# Patient Record
Sex: MALE | Race: WHITE | Employment: OTHER
[De-identification: names, ages, dates, MRNs, and addresses within clinical notes are randomized per-mention and may not be internally consistent; named-entity substitution may affect disease eponyms.]

---

## 2017-03-15 ENCOUNTER — TELEPHONE (OUTPATIENT)
Dept: OTHER | Facility: CLINIC | Age: 82
End: 2017-03-15

## 2017-03-16 ENCOUNTER — TELEPHONE (OUTPATIENT)
Dept: INTERNAL MEDICINE | Age: 82
End: 2017-03-16

## 2017-03-16 RX ORDER — AMOXICILLIN AND CLAVULANATE POTASSIUM 875; 125 MG/1; MG/1
1 TABLET, FILM COATED ORAL 2 TIMES DAILY
Qty: 20 TABLET | Refills: 0 | Status: SHIPPED | OUTPATIENT
Start: 2017-03-16 | End: 2017-03-26

## 2017-03-21 ENCOUNTER — OFFICE VISIT (OUTPATIENT)
Dept: INTERNAL MEDICINE | Age: 82
End: 2017-03-21

## 2017-03-21 ENCOUNTER — HOSPITAL ENCOUNTER (OUTPATIENT)
Dept: OTHER | Age: 82
Discharge: HOME | End: 2017-03-21
Attending: INTERNAL MEDICINE | Admitting: INTERNAL MEDICINE

## 2017-03-21 VITALS
HEART RATE: 72 BPM | DIASTOLIC BLOOD PRESSURE: 80 MMHG | WEIGHT: 227 LBS | HEIGHT: 71 IN | BODY MASS INDEX: 31.78 KG/M2 | SYSTOLIC BLOOD PRESSURE: 168 MMHG

## 2017-03-21 DIAGNOSIS — R20.0 NUMBNESS IN BOTH HANDS: ICD-10-CM

## 2017-03-21 DIAGNOSIS — Z00.00 PREVENTATIVE HEALTH CARE: ICD-10-CM

## 2017-03-21 DIAGNOSIS — E03.9 HYPOTHYROIDISM, UNSPECIFIED TYPE: ICD-10-CM

## 2017-03-21 DIAGNOSIS — E55.9 VITAMIN D DEFICIENCY: ICD-10-CM

## 2017-03-21 DIAGNOSIS — M19.90 ARTHRITIS: ICD-10-CM

## 2017-03-21 DIAGNOSIS — G62.9 NEUROPATHY: ICD-10-CM

## 2017-03-21 DIAGNOSIS — E78.5 HYPERLIPIDEMIA, UNSPECIFIED HYPERLIPIDEMIA TYPE: ICD-10-CM

## 2017-03-21 DIAGNOSIS — R79.89 LOW TESTOSTERONE: ICD-10-CM

## 2017-03-21 DIAGNOSIS — D50.8 OTHER IRON DEFICIENCY ANEMIA: ICD-10-CM

## 2017-03-21 DIAGNOSIS — I10 ESSENTIAL HYPERTENSION: ICD-10-CM

## 2017-03-21 DIAGNOSIS — J22 LOWER RESP. TRACT INFECTION: ICD-10-CM

## 2017-03-21 DIAGNOSIS — M79.89 SWELLING OF THIGH: ICD-10-CM

## 2017-03-21 DIAGNOSIS — E10.42 TYPE 1 DIABETES MELLITUS WITH DIABETIC POLYNEUROPATHY (HCC): ICD-10-CM

## 2017-03-21 DIAGNOSIS — Z12.5 SPECIAL SCREENING FOR MALIGNANT NEOPLASM OF PROSTATE: ICD-10-CM

## 2017-03-21 DIAGNOSIS — Z00.00 PREVENTATIVE HEALTH CARE: Primary | ICD-10-CM

## 2017-03-21 DIAGNOSIS — G62.9 ACQUIRED POLYNEUROPATHY: ICD-10-CM

## 2017-03-21 LAB
BASOPHILS ABSOLUTE: 0 K/UL (ref 0–0.2)
BASOPHILS RELATIVE PERCENT: 0.4 %
BILIRUBIN URINE: NEGATIVE
BLOOD, URINE: NEGATIVE
CLARITY: CLEAR
COLOR: YELLOW
EOSINOPHILS ABSOLUTE: 0.2 K/UL (ref 0–0.6)
EOSINOPHILS RELATIVE PERCENT: 2.8 %
GLUCOSE URINE: NEGATIVE MG/DL
HCT VFR BLD CALC: 41 % (ref 40.5–52.5)
HEMOGLOBIN: 13.4 G/DL (ref 13.5–17.5)
KETONES, URINE: NEGATIVE MG/DL
LEUKOCYTE ESTERASE, URINE: NEGATIVE
LYMPHOCYTES ABSOLUTE: 1.5 K/UL (ref 1–5.1)
LYMPHOCYTES RELATIVE PERCENT: 22.1 %
MCH RBC QN AUTO: 30.1 PG (ref 26–34)
MCHC RBC AUTO-ENTMCNC: 32.7 G/DL (ref 31–36)
MCV RBC AUTO: 92.1 FL (ref 80–100)
MICROSCOPIC EXAMINATION: NORMAL
MONOCYTES ABSOLUTE: 0.6 K/UL (ref 0–1.3)
MONOCYTES RELATIVE PERCENT: 8.7 %
NEUTROPHILS ABSOLUTE: 4.5 K/UL (ref 1.7–7.7)
NEUTROPHILS RELATIVE PERCENT: 66 %
NITRITE, URINE: NEGATIVE
PDW BLD-RTO: 14.2 % (ref 12.4–15.4)
PH UA: 5.5
PLATELET # BLD: 204 K/UL (ref 135–450)
PMV BLD AUTO: 9.8 FL (ref 5–10.5)
PROTEIN UA: NEGATIVE MG/DL
RBC # BLD: 4.46 M/UL (ref 4.2–5.9)
SPECIFIC GRAVITY UA: 1.02
URINE TYPE: NORMAL
UROBILINOGEN, URINE: 0.2 E.U./DL
WBC # BLD: 6.9 K/UL (ref 4–11)

## 2017-03-21 PROCEDURE — 99215 OFFICE O/P EST HI 40 MIN: CPT | Performed by: INTERNAL MEDICINE

## 2017-03-21 ASSESSMENT — PATIENT HEALTH QUESTIONNAIRE - PHQ9
1. LITTLE INTEREST OR PLEASURE IN DOING THINGS: 0
2. FEELING DOWN, DEPRESSED OR HOPELESS: 0
SUM OF ALL RESPONSES TO PHQ9 QUESTIONS 1 & 2: 0
SUM OF ALL RESPONSES TO PHQ QUESTIONS 1-9: 0

## 2017-03-21 ASSESSMENT — ENCOUNTER SYMPTOMS
COUGH: 1
GASTROINTESTINAL NEGATIVE: 1

## 2017-03-22 LAB
A/G RATIO: 1.4 (ref 1.1–2.2)
ALBUMIN SERPL-MCNC: 3.4 G/DL (ref 3.1–4.9)
ALBUMIN SERPL-MCNC: 4.1 G/DL (ref 3.4–5)
ALP BLD-CCNC: 58 U/L (ref 40–129)
ALPHA-1-GLOBULIN: 0.3 G/DL (ref 0.2–0.4)
ALPHA-2-GLOBULIN: 0.8 G/DL (ref 0.4–1.1)
ALT SERPL-CCNC: 21 U/L (ref 10–40)
ANION GAP SERPL CALCULATED.3IONS-SCNC: 14 MMOL/L (ref 3–16)
AST SERPL-CCNC: 22 U/L (ref 15–37)
BETA GLOBULIN: 1.1 G/DL (ref 0.9–1.6)
BILIRUB SERPL-MCNC: 0.3 MG/DL (ref 0–1)
BUN BLDV-MCNC: 13 MG/DL (ref 7–20)
CALCIUM SERPL-MCNC: 9.1 MG/DL (ref 8.3–10.6)
CHLORIDE BLD-SCNC: 102 MMOL/L (ref 99–110)
CHOLESTEROL, TOTAL: 187 MG/DL (ref 0–199)
CO2: 25 MMOL/L (ref 21–32)
CREAT SERPL-MCNC: 0.9 MG/DL (ref 0.8–1.3)
CREATININE URINE: 104.1 MG/DL (ref 39–259)
ESTIMATED AVERAGE GLUCOSE: 159.9 MG/DL
FERRITIN: 279.4 NG/ML (ref 30–400)
GAMMA GLOBULIN: 1.4 G/DL (ref 0.6–1.8)
GFR AFRICAN AMERICAN: >60
GFR NON-AFRICAN AMERICAN: >60
GLOBULIN: 2.9 G/DL
GLUCOSE BLD-MCNC: 95 MG/DL (ref 70–99)
HBA1C MFR BLD: 7.2 %
HDLC SERPL-MCNC: 37 MG/DL (ref 40–60)
HEPATITIS C ANTIBODY INTERPRETATION: NORMAL
IRON SATURATION: 33 % (ref 20–50)
IRON: 76 UG/DL (ref 59–158)
LDL CHOLESTEROL CALCULATED: 132 MG/DL
MICROALBUMIN UR-MCNC: 2.8 MG/DL
MICROALBUMIN/CREAT UR-RTO: 26.9 MG/G (ref 0–30)
POTASSIUM SERPL-SCNC: 4.3 MMOL/L (ref 3.5–5.1)
PROSTATE SPECIFIC ANTIGEN: 0.32 NG/ML (ref 0–4)
PROTEIN PROTEIN: 0.02 G/DL
PROTEIN PROTEIN: 18 MG/DL
SODIUM BLD-SCNC: 141 MMOL/L (ref 136–145)
SPE/IFE INTERPRETATION: NORMAL
TOTAL IRON BINDING CAPACITY: 232 UG/DL (ref 260–445)
TOTAL PROTEIN: 7 G/DL (ref 6.4–8.2)
TRIGL SERPL-MCNC: 92 MG/DL (ref 0–150)
TSH REFLEX FT4: 1.3 UIU/ML (ref 0.27–4.2)
URINE ELECTROPHORESIS INTERP: NORMAL
VITAMIN B-12: 659 PG/ML (ref 211–911)
VITAMIN D 25-HYDROXY: 23.7 NG/ML
VLDLC SERPL CALC-MCNC: 18 MG/DL

## 2017-03-23 LAB
SEX HORMONE BINDING GLOBULIN: 51 NMOL/L (ref 11–80)
TESTOSTERONE FREE PERCENT: 1.4 % (ref 1.6–2.9)
TESTOSTERONE FREE, CALC: 37 PG/ML (ref 47–244)
TESTOSTERONE TOTAL-MALE: 268 NG/DL (ref 300–720)

## 2017-03-30 ENCOUNTER — TELEPHONE (OUTPATIENT)
Dept: INTERNAL MEDICINE | Age: 82
End: 2017-03-30

## 2017-03-30 RX ORDER — AMOXICILLIN AND CLAVULANATE POTASSIUM 875; 125 MG/1; MG/1
1 TABLET, FILM COATED ORAL 2 TIMES DAILY
Qty: 20 TABLET | Refills: 0 | Status: SHIPPED | OUTPATIENT
Start: 2017-03-30 | End: 2017-04-09

## 2017-04-03 RX ORDER — HYDROCORTISONE 25 MG/ML
LOTION TOPICAL
Qty: 118 ML | Refills: 3 | Status: SHIPPED | OUTPATIENT
Start: 2017-04-03 | End: 2017-11-21

## 2017-04-06 ENCOUNTER — HOSPITAL ENCOUNTER (OUTPATIENT)
Dept: MRI IMAGING | Age: 82
Discharge: HOME | End: 2017-04-06
Attending: INTERNAL MEDICINE | Admitting: INTERNAL MEDICINE

## 2017-04-06 DIAGNOSIS — M79.89 SWELLING OF THIGH: ICD-10-CM

## 2017-04-06 DIAGNOSIS — M79.89 OTHER SPECIFIED SOFT TISSUE DISORDERS: ICD-10-CM

## 2017-04-24 ENCOUNTER — TELEPHONE (OUTPATIENT)
Dept: INTERNAL MEDICINE | Age: 82
End: 2017-04-24

## 2017-04-24 DIAGNOSIS — R79.89 LOW TESTOSTERONE: Primary | ICD-10-CM

## 2017-05-30 RX ORDER — GABAPENTIN 300 MG/1
CAPSULE ORAL
Qty: 270 CAPSULE | Refills: 3 | Status: SHIPPED | OUTPATIENT
Start: 2017-05-30 | End: 2018-04-10 | Stop reason: SDUPTHER

## 2017-06-07 ENCOUNTER — TELEPHONE (OUTPATIENT)
Dept: INTERNAL MEDICINE | Age: 82
End: 2017-06-07

## 2017-06-21 RX ORDER — PEN NEEDLE, DIABETIC 31 GX5/16"
NEEDLE, DISPOSABLE MISCELLANEOUS
Qty: 100 EACH | Refills: 3 | Status: SHIPPED | OUTPATIENT
Start: 2017-06-21 | End: 2018-08-22 | Stop reason: SDUPTHER

## 2017-06-23 ENCOUNTER — TELEPHONE (OUTPATIENT)
Dept: INTERNAL MEDICINE | Age: 82
End: 2017-06-23

## 2017-06-23 RX ORDER — NEOMYCIN/POLYMYXIN B/HYDROCORT 3.5-10K-1
SUSPENSION, DROPS(FINAL DOSAGE FORM)(ML) OPHTHALMIC (EYE)
Qty: 5 ML | Refills: 0 | Status: SHIPPED | OUTPATIENT
Start: 2017-06-23 | End: 2017-11-21

## 2017-08-09 RX ORDER — KETOCONAZOLE 20 MG/G
CREAM TOPICAL
Qty: 30 G | Refills: 1 | Status: SHIPPED | OUTPATIENT
Start: 2017-08-09 | End: 2017-11-21

## 2017-08-21 ENCOUNTER — TELEPHONE (OUTPATIENT)
Dept: INTERNAL MEDICINE | Age: 82
End: 2017-08-21

## 2017-08-21 RX ORDER — CEPHALEXIN 500 MG/1
500 CAPSULE ORAL 4 TIMES DAILY
Qty: 40 CAPSULE | Refills: 0 | Status: SHIPPED | OUTPATIENT
Start: 2017-08-21 | End: 2017-08-25

## 2017-08-21 RX ORDER — SULFAMETHOXAZOLE AND TRIMETHOPRIM 800; 160 MG/1; MG/1
1 TABLET ORAL 2 TIMES DAILY
Qty: 20 TABLET | Refills: 0 | Status: SHIPPED | OUTPATIENT
Start: 2017-08-21 | End: 2017-08-28

## 2017-08-25 ENCOUNTER — TELEPHONE (OUTPATIENT)
Dept: INTERNAL MEDICINE | Age: 82
End: 2017-08-25

## 2017-08-25 RX ORDER — SULFAMETHOXAZOLE AND TRIMETHOPRIM 800; 160 MG/1; MG/1
1 TABLET ORAL 2 TIMES DAILY
Qty: 20 TABLET | Refills: 0 | Status: SHIPPED | OUTPATIENT
Start: 2017-08-25 | End: 2017-08-25

## 2017-08-28 ENCOUNTER — OFFICE VISIT (OUTPATIENT)
Dept: INTERNAL MEDICINE | Age: 82
End: 2017-08-28

## 2017-08-28 VITALS
HEART RATE: 81 BPM | WEIGHT: 226 LBS | DIASTOLIC BLOOD PRESSURE: 82 MMHG | OXYGEN SATURATION: 98 % | BODY MASS INDEX: 31.64 KG/M2 | HEIGHT: 71 IN | SYSTOLIC BLOOD PRESSURE: 136 MMHG

## 2017-08-28 DIAGNOSIS — E10.42 TYPE 1 DIABETES MELLITUS WITH DIABETIC POLYNEUROPATHY (HCC): ICD-10-CM

## 2017-08-28 DIAGNOSIS — G62.9 NEUROPATHY: ICD-10-CM

## 2017-08-28 DIAGNOSIS — R20.0 NUMBNESS IN BOTH HANDS: ICD-10-CM

## 2017-08-28 DIAGNOSIS — I10 ESSENTIAL HYPERTENSION: ICD-10-CM

## 2017-08-28 DIAGNOSIS — S81.811A LACERATION OF RIGHT LOWER EXTREMITY, INITIAL ENCOUNTER: ICD-10-CM

## 2017-08-28 PROBLEM — M79.89 SWELLING OF THIGH: Status: RESOLVED | Noted: 2017-03-21 | Resolved: 2017-08-28

## 2017-08-28 PROBLEM — J22 LOWER RESP. TRACT INFECTION: Status: RESOLVED | Noted: 2017-03-21 | Resolved: 2017-08-28

## 2017-08-28 PROCEDURE — 99214 OFFICE O/P EST MOD 30 MIN: CPT | Performed by: INTERNAL MEDICINE

## 2017-08-28 RX ORDER — CLINDAMYCIN HYDROCHLORIDE 150 MG/1
150 CAPSULE ORAL 4 TIMES DAILY
Qty: 40 CAPSULE | Refills: 0 | Status: SHIPPED | OUTPATIENT
Start: 2017-08-28 | End: 2017-11-21

## 2017-08-28 ASSESSMENT — ENCOUNTER SYMPTOMS: RESPIRATORY NEGATIVE: 1

## 2017-09-05 ENCOUNTER — TELEPHONE (OUTPATIENT)
Dept: INTERNAL MEDICINE | Age: 82
End: 2017-09-05

## 2017-09-20 ENCOUNTER — TELEPHONE (OUTPATIENT)
Dept: INTERNAL MEDICINE | Age: 82
End: 2017-09-20

## 2017-09-20 RX ORDER — AMOXICILLIN AND CLAVULANATE POTASSIUM 875; 125 MG/1; MG/1
1 TABLET, FILM COATED ORAL 2 TIMES DAILY
Qty: 20 TABLET | Refills: 0 | Status: SHIPPED | OUTPATIENT
Start: 2017-09-20 | End: 2017-09-30

## 2017-10-23 ENCOUNTER — TELEPHONE (OUTPATIENT)
Dept: INTERNAL MEDICINE | Age: 82
End: 2017-10-23

## 2017-10-23 RX ORDER — IBUPROFEN 600 MG/1
TABLET ORAL
Qty: 60 TABLET | Refills: 3 | Status: ON HOLD | OUTPATIENT
Start: 2017-10-23 | End: 2018-09-27 | Stop reason: HOSPADM

## 2017-10-23 RX ORDER — BETAMETHASONE DIPROPIONATE 0.05 %
OINTMENT (GRAM) TOPICAL
Qty: 15 G | Refills: 0 | Status: SHIPPED | OUTPATIENT
Start: 2017-10-23 | End: 2017-11-21

## 2017-10-23 NOTE — TELEPHONE ENCOUNTER
Symptoms:cracked skin in corners of mouth, red sore, crusty, and burning    What medications have you tried:pt states tried drugstore medication    Pharmacy:[facility] on [facility]     Appointment offered:pt is asking for rx being called into pharmacy

## 2017-10-27 ENCOUNTER — TELEPHONE (OUTPATIENT)
Dept: INTERNAL MEDICINE | Age: 82
End: 2017-10-27

## 2017-10-27 NOTE — TELEPHONE ENCOUNTER
If the diprolene ointment is not helping then I rec a derm appt to make sure this is not early skin CA

## 2017-10-30 ENCOUNTER — OFFICE VISIT (OUTPATIENT)
Dept: ORTHOPEDIC SURGERY | Age: 82
End: 2017-10-30

## 2017-10-30 VITALS
DIASTOLIC BLOOD PRESSURE: 80 MMHG | BODY MASS INDEX: 32.34 KG/M2 | WEIGHT: 231 LBS | RESPIRATION RATE: 16 BRPM | HEIGHT: 71 IN | SYSTOLIC BLOOD PRESSURE: 153 MMHG | HEART RATE: 63 BPM

## 2017-10-30 DIAGNOSIS — G62.9 NEUROPATHY: Primary | ICD-10-CM

## 2017-10-30 PROCEDURE — 1036F TOBACCO NON-USER: CPT | Performed by: ORTHOPAEDIC SURGERY

## 2017-10-30 PROCEDURE — 99214 OFFICE O/P EST MOD 30 MIN: CPT | Performed by: ORTHOPAEDIC SURGERY

## 2017-10-30 PROCEDURE — G8417 CALC BMI ABV UP PARAM F/U: HCPCS | Performed by: ORTHOPAEDIC SURGERY

## 2017-10-30 PROCEDURE — 1123F ACP DISCUSS/DSCN MKR DOCD: CPT | Performed by: ORTHOPAEDIC SURGERY

## 2017-10-30 PROCEDURE — G8484 FLU IMMUNIZE NO ADMIN: HCPCS | Performed by: ORTHOPAEDIC SURGERY

## 2017-10-30 PROCEDURE — 4040F PNEUMOC VAC/ADMIN/RCVD: CPT | Performed by: ORTHOPAEDIC SURGERY

## 2017-10-30 PROCEDURE — G8427 DOCREV CUR MEDS BY ELIG CLIN: HCPCS | Performed by: ORTHOPAEDIC SURGERY

## 2017-10-30 NOTE — PROGRESS NOTES
This 81 y.o., right hand dominant retired man is seen in referral for Bobby Garcia MD with a chief complaint of numbness and tingling of the bilateral hands. Symptoms have been present for many years. The patient also frequently notices pain in the hand, wrist and arm, as well as weakness of , morning stiffness and swelling as well as difficulty performing functions which require fine touch. Symptoms are sometimes worse at night and can disturb sleep. The problem seems to recently have become worse. He underwent carpal tunnel surgery on his left hand 50 + years ago. He was seen by Dr. Carmelo Fisher over a year ago. An EMG (7/16) showed peripheral neuropathy, not carpal tunnel syndrome. A steroid injection was preformed, but he does not recall if it helped and he did not return to Dr. Carmelo Fisher. There is no history of wrist fracture. There is history and/or family history of diabetes. There is no history of thyroid disease. There is no family history of carpal tunnel syndrome. The pain assessment has been reviewed and is correct as stated. The patient's social history, past medical history, family history, medications, allergies and review of systems, entered 10/30/17, have been reviewed, and dated and are recorded in the chart. On examination the patient is Height: 5' 11\" (180.3 cm) tall and weighs Weight: 231 lb (104.8 kg). Respirations are 18 per minute. The patient is well nourished, is oriented to time and place, demonstrates appropriate mood and affect as well as normal gait and station. There is soft tissue swelling involving the volar aspect of the bilateral wrist.  There is moderate bilateral thenar and intrinsic muscle atrophy. The Tinel sign is negative over the median nerve at the  carpal tunnel bilaterally and negative over the ulnar nerve at the elbow bilaterally. The Phalen test is negative bilaterally at 30 seconds.   There is hypalgesia on sensory testing over the median nerve distribution of the right hand. Two point discrimination is abnormal bilaterally. Range of motion of the fingers, thumbs and wrists is normal, despite arthritis of the hands. Skin is intact without lesions. Distal circulation is normal.  All joints are stable. Fine motor coordination and gross muscle strength are normal. Deep tendon reflexes are brisk and present bilaterally. There are no subcutaneous masses or enlarged epitrochlear lymph nodes. Impression:   Peripheral polyneuropathy. Possible associated carpal tunnel syndrome. The nature of this medical problem is fully discussed with the patient, including all treatment options. All questions are answered. Unless he is miserable with his symptoms, surgery probably not be considered, since it is unlikely to be of benefit to him. He will think this over and call me if he has any additional questions.

## 2017-11-21 ENCOUNTER — TELEPHONE (OUTPATIENT)
Dept: INTERNAL MEDICINE | Age: 82
End: 2017-11-21

## 2017-12-11 RX ORDER — BLOOD-GLUCOSE METER
1 EACH MISCELLANEOUS DAILY PRN
Qty: 1 KIT | Refills: 0 | Status: SHIPPED | OUTPATIENT
Start: 2017-12-11 | End: 2018-04-10

## 2017-12-11 RX ORDER — LANCETS
EACH MISCELLANEOUS
Qty: 100 EACH | Refills: 3 | Status: SHIPPED | OUTPATIENT
Start: 2017-12-11 | End: 2017-12-15 | Stop reason: SDUPTHER

## 2017-12-11 RX ORDER — LANCETS
EACH MISCELLANEOUS
Qty: 100 EACH | Refills: 3 | Status: SHIPPED | OUTPATIENT
Start: 2017-12-11 | End: 2018-04-10

## 2017-12-15 RX ORDER — LANCETS
EACH MISCELLANEOUS
Qty: 100 EACH | Refills: 3 | Status: SHIPPED | OUTPATIENT
Start: 2017-12-15 | End: 2020-03-24

## 2017-12-18 ENCOUNTER — TELEPHONE (OUTPATIENT)
Dept: INTERNAL MEDICINE | Age: 82
End: 2017-12-18

## 2017-12-18 RX ORDER — BLOOD-GLUCOSE CONTROL, NORMAL
EACH MISCELLANEOUS
Qty: 1 EACH | Refills: 5 | Status: SHIPPED | OUTPATIENT
Start: 2017-12-18 | End: 2020-03-24

## 2018-01-18 RX ORDER — CARVEDILOL 25 MG/1
TABLET ORAL
Qty: 180 TABLET | Refills: 2 | Status: ON HOLD | OUTPATIENT
Start: 2018-01-18 | End: 2018-09-27 | Stop reason: HOSPADM

## 2018-02-09 ENCOUNTER — TELEPHONE (OUTPATIENT)
Dept: INTERNAL MEDICINE | Age: 83
End: 2018-02-09

## 2018-04-10 ENCOUNTER — OFFICE VISIT (OUTPATIENT)
Dept: INTERNAL MEDICINE | Age: 83
End: 2018-04-10

## 2018-04-10 VITALS
WEIGHT: 226 LBS | OXYGEN SATURATION: 98 % | BODY MASS INDEX: 31.64 KG/M2 | HEART RATE: 73 BPM | DIASTOLIC BLOOD PRESSURE: 80 MMHG | HEIGHT: 71 IN | SYSTOLIC BLOOD PRESSURE: 148 MMHG

## 2018-04-10 DIAGNOSIS — L72.3 SEBACEOUS CYST OF LEFT AXILLA: ICD-10-CM

## 2018-04-10 DIAGNOSIS — R20.0 NUMBNESS IN BOTH HANDS: ICD-10-CM

## 2018-04-10 DIAGNOSIS — G62.9 NEUROPATHY: Primary | ICD-10-CM

## 2018-04-10 DIAGNOSIS — E10.42 TYPE 1 DIABETES MELLITUS WITH DIABETIC POLYNEUROPATHY (HCC): ICD-10-CM

## 2018-04-10 PROBLEM — S81.811A LACERATION OF RIGHT LOWER EXTREMITY: Status: RESOLVED | Noted: 2017-08-28 | Resolved: 2018-04-10

## 2018-04-10 PROCEDURE — 99214 OFFICE O/P EST MOD 30 MIN: CPT | Performed by: INTERNAL MEDICINE

## 2018-04-10 PROCEDURE — 1036F TOBACCO NON-USER: CPT | Performed by: INTERNAL MEDICINE

## 2018-04-10 PROCEDURE — G8427 DOCREV CUR MEDS BY ELIG CLIN: HCPCS | Performed by: INTERNAL MEDICINE

## 2018-04-10 PROCEDURE — G8417 CALC BMI ABV UP PARAM F/U: HCPCS | Performed by: INTERNAL MEDICINE

## 2018-04-10 PROCEDURE — 1123F ACP DISCUSS/DSCN MKR DOCD: CPT | Performed by: INTERNAL MEDICINE

## 2018-04-10 PROCEDURE — 4040F PNEUMOC VAC/ADMIN/RCVD: CPT | Performed by: INTERNAL MEDICINE

## 2018-04-10 RX ORDER — DOXYCYCLINE HYCLATE 100 MG/1
100 CAPSULE ORAL 2 TIMES DAILY
Qty: 20 CAPSULE | Refills: 0 | Status: SHIPPED | OUTPATIENT
Start: 2018-04-10 | End: 2018-05-09

## 2018-04-10 RX ORDER — GABAPENTIN 300 MG/1
CAPSULE ORAL
Qty: 270 CAPSULE | Refills: 0 | Status: SHIPPED | OUTPATIENT
Start: 2018-04-10 | End: 2018-06-29 | Stop reason: SDUPTHER

## 2018-04-10 ASSESSMENT — ENCOUNTER SYMPTOMS
BACK PAIN: 1
GASTROINTESTINAL NEGATIVE: 1
RESPIRATORY NEGATIVE: 1

## 2018-04-17 ENCOUNTER — OFFICE VISIT (OUTPATIENT)
Dept: ORTHOPEDIC SURGERY | Age: 83
End: 2018-04-17

## 2018-04-17 VITALS — HEIGHT: 71 IN | BODY MASS INDEX: 31.64 KG/M2 | WEIGHT: 226 LBS

## 2018-04-17 DIAGNOSIS — G56.03 BILATERAL CARPAL TUNNEL SYNDROME: ICD-10-CM

## 2018-04-17 PROCEDURE — 20526 THER INJECTION CARP TUNNEL: CPT | Performed by: ORTHOPAEDIC SURGERY

## 2018-04-17 PROCEDURE — 1036F TOBACCO NON-USER: CPT | Performed by: ORTHOPAEDIC SURGERY

## 2018-04-17 PROCEDURE — 1123F ACP DISCUSS/DSCN MKR DOCD: CPT | Performed by: ORTHOPAEDIC SURGERY

## 2018-04-17 PROCEDURE — 99214 OFFICE O/P EST MOD 30 MIN: CPT | Performed by: ORTHOPAEDIC SURGERY

## 2018-04-17 PROCEDURE — G8417 CALC BMI ABV UP PARAM F/U: HCPCS | Performed by: ORTHOPAEDIC SURGERY

## 2018-04-17 PROCEDURE — 4040F PNEUMOC VAC/ADMIN/RCVD: CPT | Performed by: ORTHOPAEDIC SURGERY

## 2018-04-17 PROCEDURE — G8427 DOCREV CUR MEDS BY ELIG CLIN: HCPCS | Performed by: ORTHOPAEDIC SURGERY

## 2018-05-01 ENCOUNTER — TELEPHONE (OUTPATIENT)
Dept: ORTHOPEDIC SURGERY | Age: 83
End: 2018-05-01

## 2018-05-09 ENCOUNTER — OFFICE VISIT (OUTPATIENT)
Dept: INTERNAL MEDICINE | Age: 83
End: 2018-05-09

## 2018-05-09 VITALS
OXYGEN SATURATION: 95 % | WEIGHT: 223 LBS | HEIGHT: 71 IN | DIASTOLIC BLOOD PRESSURE: 78 MMHG | HEART RATE: 70 BPM | BODY MASS INDEX: 31.22 KG/M2 | SYSTOLIC BLOOD PRESSURE: 148 MMHG

## 2018-05-09 DIAGNOSIS — E10.42 TYPE 1 DIABETES MELLITUS WITH DIABETIC POLYNEUROPATHY (HCC): ICD-10-CM

## 2018-05-09 DIAGNOSIS — G56.03 BILATERAL CARPAL TUNNEL SYNDROME: ICD-10-CM

## 2018-05-09 DIAGNOSIS — I10 ESSENTIAL HYPERTENSION: ICD-10-CM

## 2018-05-09 DIAGNOSIS — G62.9 NEUROPATHY: ICD-10-CM

## 2018-05-09 DIAGNOSIS — Z01.818 PRE-OP TESTING: Primary | ICD-10-CM

## 2018-05-09 DIAGNOSIS — Z01.818 PREOP EXAMINATION: ICD-10-CM

## 2018-05-09 PROBLEM — L72.3 SEBACEOUS CYST OF LEFT AXILLA: Status: RESOLVED | Noted: 2018-04-10 | Resolved: 2018-05-09

## 2018-05-09 LAB
A/G RATIO: 1.6 (ref 1.1–2.2)
ALBUMIN SERPL-MCNC: 4.2 G/DL (ref 3.4–5)
ALP BLD-CCNC: 55 U/L (ref 40–129)
ALT SERPL-CCNC: 22 U/L (ref 10–40)
ANION GAP SERPL CALCULATED.3IONS-SCNC: 15 MMOL/L (ref 3–16)
AST SERPL-CCNC: 24 U/L (ref 15–37)
BASOPHILS ABSOLUTE: 0 K/UL (ref 0–0.2)
BASOPHILS RELATIVE PERCENT: 0.5 %
BILIRUB SERPL-MCNC: 0.5 MG/DL (ref 0–1)
BUN BLDV-MCNC: 20 MG/DL (ref 7–20)
CALCIUM SERPL-MCNC: 9.2 MG/DL (ref 8.3–10.6)
CHLORIDE BLD-SCNC: 100 MMOL/L (ref 99–110)
CHOLESTEROL, TOTAL: 196 MG/DL (ref 0–199)
CO2: 29 MMOL/L (ref 21–32)
CREAT SERPL-MCNC: 0.7 MG/DL (ref 0.8–1.3)
EOSINOPHILS ABSOLUTE: 0.2 K/UL (ref 0–0.6)
EOSINOPHILS RELATIVE PERCENT: 2.6 %
GFR AFRICAN AMERICAN: >60
GFR NON-AFRICAN AMERICAN: >60
GLOBULIN: 2.7 G/DL
GLUCOSE BLD-MCNC: 87 MG/DL (ref 70–99)
HCT VFR BLD CALC: 40.7 % (ref 40.5–52.5)
HDLC SERPL-MCNC: 52 MG/DL (ref 40–60)
HEMOGLOBIN: 14 G/DL (ref 13.5–17.5)
LDL CHOLESTEROL CALCULATED: 127 MG/DL
LYMPHOCYTES ABSOLUTE: 1.3 K/UL (ref 1–5.1)
LYMPHOCYTES RELATIVE PERCENT: 16.3 %
MCH RBC QN AUTO: 31.5 PG (ref 26–34)
MCHC RBC AUTO-ENTMCNC: 34.4 G/DL (ref 31–36)
MCV RBC AUTO: 91.6 FL (ref 80–100)
MONOCYTES ABSOLUTE: 0.6 K/UL (ref 0–1.3)
MONOCYTES RELATIVE PERCENT: 7.8 %
NEUTROPHILS ABSOLUTE: 6 K/UL (ref 1.7–7.7)
NEUTROPHILS RELATIVE PERCENT: 72.8 %
PDW BLD-RTO: 14.7 % (ref 12.4–15.4)
PLATELET # BLD: 189 K/UL (ref 135–450)
PMV BLD AUTO: 9.4 FL (ref 5–10.5)
POTASSIUM SERPL-SCNC: 4.5 MMOL/L (ref 3.5–5.1)
RBC # BLD: 4.45 M/UL (ref 4.2–5.9)
SODIUM BLD-SCNC: 144 MMOL/L (ref 136–145)
TOTAL PROTEIN: 6.9 G/DL (ref 6.4–8.2)
TRIGL SERPL-MCNC: 83 MG/DL (ref 0–150)
VLDLC SERPL CALC-MCNC: 17 MG/DL
WBC # BLD: 8.2 K/UL (ref 4–11)

## 2018-05-09 PROCEDURE — 93000 ELECTROCARDIOGRAM COMPLETE: CPT | Performed by: INTERNAL MEDICINE

## 2018-05-09 PROCEDURE — 1123F ACP DISCUSS/DSCN MKR DOCD: CPT | Performed by: INTERNAL MEDICINE

## 2018-05-09 PROCEDURE — G8417 CALC BMI ABV UP PARAM F/U: HCPCS | Performed by: INTERNAL MEDICINE

## 2018-05-09 PROCEDURE — 99215 OFFICE O/P EST HI 40 MIN: CPT | Performed by: INTERNAL MEDICINE

## 2018-05-09 PROCEDURE — G8427 DOCREV CUR MEDS BY ELIG CLIN: HCPCS | Performed by: INTERNAL MEDICINE

## 2018-05-09 PROCEDURE — 1036F TOBACCO NON-USER: CPT | Performed by: INTERNAL MEDICINE

## 2018-05-09 PROCEDURE — 4040F PNEUMOC VAC/ADMIN/RCVD: CPT | Performed by: INTERNAL MEDICINE

## 2018-05-09 ASSESSMENT — ENCOUNTER SYMPTOMS
RESPIRATORY NEGATIVE: 1
GASTROINTESTINAL NEGATIVE: 1
EYE DISCHARGE: 0
TROUBLE SWALLOWING: 0

## 2018-05-10 LAB
ESTIMATED AVERAGE GLUCOSE: 159.9 MG/DL
HBA1C MFR BLD: 7.2 %

## 2018-05-16 ENCOUNTER — HOSPITAL ENCOUNTER (OUTPATIENT)
Dept: SURGERY | Age: 83
Discharge: HOME | End: 2018-05-16
Attending: ORTHOPAEDIC SURGERY | Admitting: ORTHOPAEDIC SURGERY

## 2018-05-16 VITALS
HEART RATE: 60 BPM | DIASTOLIC BLOOD PRESSURE: 78 MMHG | TEMPERATURE: 97 F | HEIGHT: 71 IN | SYSTOLIC BLOOD PRESSURE: 134 MMHG | WEIGHT: 223 LBS | RESPIRATION RATE: 18 BRPM | BODY MASS INDEX: 31.22 KG/M2 | OXYGEN SATURATION: 98 %

## 2018-05-16 DIAGNOSIS — G56.03 BILATERAL CARPAL TUNNEL SYNDROME: Primary | ICD-10-CM

## 2018-05-16 LAB
GLUCOSE BLD-MCNC: 106 MG/DL (ref 70–99)
GLUCOSE BLD-MCNC: 107 MG/DL (ref 70–99)
PERFORMED ON: ABNORMAL
PERFORMED ON: ABNORMAL

## 2018-05-16 RX ORDER — MORPHINE SULFATE 2 MG/ML
1 INJECTION, SOLUTION INTRAMUSCULAR; INTRAVENOUS EVERY 5 MIN PRN
Status: DISCONTINUED | OUTPATIENT
Start: 2018-05-16 | End: 2018-05-17 | Stop reason: HOSPADM

## 2018-05-16 RX ORDER — HYDROCODONE BITARTRATE AND ACETAMINOPHEN 5; 325 MG/1; MG/1
1 TABLET ORAL EVERY 6 HOURS PRN
Qty: 10 TABLET | Refills: 0 | Status: SHIPPED | OUTPATIENT
Start: 2018-05-16 | End: 2018-05-23

## 2018-05-16 RX ORDER — LABETALOL HYDROCHLORIDE 5 MG/ML
5 INJECTION, SOLUTION INTRAVENOUS EVERY 10 MIN PRN
Status: DISCONTINUED | OUTPATIENT
Start: 2018-05-16 | End: 2018-05-17 | Stop reason: HOSPADM

## 2018-05-16 RX ORDER — OXYCODONE HYDROCHLORIDE AND ACETAMINOPHEN 5; 325 MG/1; MG/1
2 TABLET ORAL PRN
Status: ACTIVE | OUTPATIENT
Start: 2018-05-16 | End: 2018-05-16

## 2018-05-16 RX ORDER — MEPERIDINE HYDROCHLORIDE 50 MG/ML
12.5 INJECTION INTRAMUSCULAR; INTRAVENOUS; SUBCUTANEOUS EVERY 5 MIN PRN
Status: DISCONTINUED | OUTPATIENT
Start: 2018-05-16 | End: 2018-05-17 | Stop reason: HOSPADM

## 2018-05-16 RX ORDER — HYDRALAZINE HYDROCHLORIDE 20 MG/ML
5 INJECTION INTRAMUSCULAR; INTRAVENOUS
Status: DISCONTINUED | OUTPATIENT
Start: 2018-05-16 | End: 2018-05-17 | Stop reason: HOSPADM

## 2018-05-16 RX ORDER — OXYCODONE HYDROCHLORIDE AND ACETAMINOPHEN 5; 325 MG/1; MG/1
1 TABLET ORAL PRN
Status: ACTIVE | OUTPATIENT
Start: 2018-05-16 | End: 2018-05-16

## 2018-05-16 RX ORDER — SODIUM CHLORIDE 0.9 % (FLUSH) 0.9 %
10 SYRINGE (ML) INJECTION PRN
Status: DISCONTINUED | OUTPATIENT
Start: 2018-05-16 | End: 2018-05-17 | Stop reason: HOSPADM

## 2018-05-16 RX ORDER — LIDOCAINE HYDROCHLORIDE 10 MG/ML
1 INJECTION, SOLUTION EPIDURAL; INFILTRATION; INTRACAUDAL; PERINEURAL
Status: ACTIVE | OUTPATIENT
Start: 2018-05-16 | End: 2018-05-16

## 2018-05-16 RX ORDER — ONDANSETRON 2 MG/ML
4 INJECTION INTRAMUSCULAR; INTRAVENOUS
Status: ACTIVE | OUTPATIENT
Start: 2018-05-16 | End: 2018-05-16

## 2018-05-16 RX ORDER — MORPHINE SULFATE 2 MG/ML
2 INJECTION, SOLUTION INTRAMUSCULAR; INTRAVENOUS EVERY 5 MIN PRN
Status: DISCONTINUED | OUTPATIENT
Start: 2018-05-16 | End: 2018-05-17 | Stop reason: HOSPADM

## 2018-05-16 RX ORDER — SODIUM CHLORIDE, SODIUM LACTATE, POTASSIUM CHLORIDE, CALCIUM CHLORIDE 600; 310; 30; 20 MG/100ML; MG/100ML; MG/100ML; MG/100ML
INJECTION, SOLUTION INTRAVENOUS CONTINUOUS
Status: DISCONTINUED | OUTPATIENT
Start: 2018-05-16 | End: 2018-05-17 | Stop reason: HOSPADM

## 2018-05-16 RX ORDER — SODIUM CHLORIDE 0.9 % (FLUSH) 0.9 %
10 SYRINGE (ML) INJECTION EVERY 12 HOURS SCHEDULED
Status: DISCONTINUED | OUTPATIENT
Start: 2018-05-16 | End: 2018-05-17 | Stop reason: HOSPADM

## 2018-05-16 RX ADMIN — SODIUM CHLORIDE, SODIUM LACTATE, POTASSIUM CHLORIDE, CALCIUM CHLORIDE: 600; 310; 30; 20 INJECTION, SOLUTION INTRAVENOUS at 06:54

## 2018-05-16 ASSESSMENT — PAIN SCALES - GENERAL: PAINLEVEL_OUTOF10: 2

## 2018-05-16 ASSESSMENT — PAIN - FUNCTIONAL ASSESSMENT: PAIN_FUNCTIONAL_ASSESSMENT: 0-10

## 2018-05-29 ENCOUNTER — OFFICE VISIT (OUTPATIENT)
Dept: ORTHOPEDIC SURGERY | Age: 83
End: 2018-05-29

## 2018-05-29 VITALS — HEIGHT: 71 IN | BODY MASS INDEX: 31.23 KG/M2 | WEIGHT: 223.11 LBS

## 2018-05-29 DIAGNOSIS — G56.03 BILATERAL CARPAL TUNNEL SYNDROME: Primary | ICD-10-CM

## 2018-05-29 PROCEDURE — 99024 POSTOP FOLLOW-UP VISIT: CPT | Performed by: ORTHOPAEDIC SURGERY

## 2018-05-29 PROCEDURE — L3908 WHO COCK-UP NONMOLDE PRE OTS: HCPCS | Performed by: ORTHOPAEDIC SURGERY

## 2018-06-08 PROBLEM — Z01.818 PREOP EXAMINATION: Status: RESOLVED | Noted: 2018-05-09 | Resolved: 2018-06-08

## 2018-06-29 DIAGNOSIS — G62.9 NEUROPATHY: ICD-10-CM

## 2018-06-29 RX ORDER — GABAPENTIN 300 MG/1
CAPSULE ORAL
Qty: 270 CAPSULE | Refills: 1 | Status: ON HOLD | OUTPATIENT
Start: 2018-06-29 | End: 2018-09-25 | Stop reason: SDUPTHER

## 2018-07-03 RX ORDER — GABAPENTIN 300 MG/1
CAPSULE ORAL
Qty: 270 CAPSULE | Refills: 1 | Status: ON HOLD | OUTPATIENT
Start: 2018-07-03 | End: 2018-09-27 | Stop reason: HOSPADM

## 2018-07-16 ENCOUNTER — HOSPITAL ENCOUNTER (OUTPATIENT)
Dept: PHYSICAL THERAPY | Age: 83
Discharge: HOME | End: 2018-07-31
Admitting: ORTHOPAEDIC SURGERY

## 2018-07-16 NOTE — PLAN OF CARE
(E66.01)  [x]Diabetes type 1(E10.65) or 2 (E11.65)   []Neuropathy (G60.9)     Pulmonary conditions   []Asthma (J45)  []Coughing   []COPD (J44.9)   Psychological Disorders  []Anxiety (F41.9)  []Depression (F32.9)   []Other:   []Other:          Barriers to/and or personal factors that will affect rehab potential:              [x]Age  []Sex    []Smoker              []Motivation/Lack of Motivation                        [x]Co-Morbidities              []Cognitive Function, education/learning barriers              []Environmental, home barriers              []profession/work barriers  []past PT/medical experience  []other:  Justification:     Falls Risk Assessment (30 days):   [x] Falls Risk assessed and no intervention required. [] Falls Risk assessed and Patient requires intervention due to being higher risk   TUG score (>12s at risk):     [] Falls education provided, including       G-Codes:  PT G-Codes  Functional Assessment Tool Used: LEFS  Score: 32% LOF  Functional Limitation: Carrying, moving and handling objects  Carrying, Moving and Handling Objects Current Status (): At least 20 percent but less than 40 percent impaired, limited or restricted  Carrying, Moving and Handling Objects Goal Status ():  At least 1 percent but less than 20 percent impaired, limited or restricted    ASSESSMENT:   Functional Impairments   []Noted spinal or UE joint hypomobility   []Noted spinal or UE joint hypermobility   [x]Decreased UE functional ROM   []Decreased UE functional strength   []Abnormal reflexes/sensation/myotomal/dermatomal deficits   [x]Decreased RC/scapular/core strength and neuromuscular control   []other:      Functional Activity Limitations (from functional questionnaire and intake)   []Reduced ability to tolerate prolonged functional positions   []Reduced ability or difficulty with changes of positions or transfers between positions   []Reduced ability to maintain good posture and demonstrate good body mechanics with sitting, bending, and lifting   [] Reduced ability or tolerance with driving and/or computer work   [x]Reduced ability to sleep   []Reduced ability to perform lifting, reaching, carrying tasks   []Reduced ability to tolerate impact through UE   []Reduced ability to reach behind back   []Reduced ability to  or hold objects   []Reduced ability to throw or toss an object   []other:    Participation Restrictions   []Reduced participation in self care activities   [x]Reduced participation in home management activities   []Reduced participation in work activities   [x]Reduced participation in social activities. [x]Reduced participation in sport/recreation activities. Classification:   []Signs/symptoms consistent with post-surgical status including decreased ROM, strength and function.   []Signs/symptoms consistent with joint sprain/strain   []Signs/symptoms consistent with shoulder impingement   []Signs/symptoms consistent with shoulder/elbow/wrist tendinopathy   [x]Signs/symptoms consistent with Rotator cuff tear   []Signs/symptoms consistent with labral tear   []Signs/symptoms consistent with postural dysfunction    []Signs/symptoms consistent with Glenohumeral IR Deficit - <45 degrees   []Signs/symptoms consistent with facet dysfunction of cervical/thoracic spine    []Signs/symptoms consistent with pathology which may benefit from Dry needling     []other:     Prognosis/Rehab Potential:      []Excellent   [x]Good    []Fair   []Poor    Tolerance of evaluation/treatment:    []Excellent   [x]Good    []Fair   []Poor    Physical Therapy Evaluation Complexity Justification  [x] A history of present problem with:  [] no personal factors and/or comorbidities that impact the plan of care;  []1-2 personal factors and/or comorbidities that impact the plan of care  [x]3 personal factors and/or comorbidities that impact the plan of care  [x] An examination of body systems using standardized tests and measures addressing any of the following: body structures and functions (impairments), activity limitations, and/or participation restrictions;:  [] a total of 1-2 or more elements   [] a total of 3 or more elements   [x] a total of 4 or more elements   [x] A clinical presentation with:  [x] stable and/or uncomplicated characteristics   [] evolving clinical presentation with changing characteristics  [] unstable and unpredictable characteristics;   [x] Clinical decision making of [x] low, [] moderate, [] high complexity using standardized patient assessment instrument and/or measurable assessment of functional outcome. [x] EVAL (LOW) 21573 (typically 15 minutes face-to-face)  [] EVAL (MOD) 91934 (typically 30 minutes face-to-face)  [] EVAL (HIGH) 27405 (typically 45 minutes face-to-face)  [] RE-EVAL     PLAN:  Frequency/Duration:  1 days per week for 6 Weeks:  Pt reports he is limited on how often he can attend therapy due to $40 copay  INTERVENTIONS:  [x] Therapeutic exercise including: strength training, ROM, for Upper extremity and core   [x]  NMR activation and proprioception for UE, scap and Core   [x] Manual therapy as indicated for shoulder, scapula and spine to include: Dry Needling/IASTM, STM, PROM, Gr I-IV mobilizations, manipulation. [x] Modalities as needed that may include: thermal agents, E-stim, Biofeedback, US, iontophoresis as indicated  [x] Patient education on joint protection, postural re-education, activity modification, progression of HEP. HEP instruction:(see scanned forms)    GOALS:  Patient stated goal:  Learn appropriate exercise and manage pain    Therapist goals for Patient:   Short Term Goals: To be achieved in: 2 weeks  1. Independent in HEP and progression per patient tolerance, in order to prevent re-injury. 2. Patient will have a decrease in pain to facilitate improvement in movement, function, and ADLs as indicated by Functional Deficits. Long Term Goals:  To be achieved in: 6 weeks  1. Disability index score of 15% or less for the UEFI to assist with reaching prior level of function. 2. Patient will demonstrate an increase in Strength to 4/5 or > ER strength (R) to allow for proper functional mobility as indicated by patients Functional Deficits. 3. Patient will return to functional activities including being able to lift up to 10lbs at his side without increased symptoms or restriction for improved performance of normal housework and gardening activities.       Electronically signed by:  Fareed Martinez PT

## 2018-07-16 NOTE — FLOWSHEET NOTE
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 Coughlin Has of care signed (Y/N):     Date of Patient follow up with Physician:     G-Code (if applicable):      Date G-Code Applied:  18  PT G-Codes  Functional Assessment Tool Used: LEFS  Score: 32% LOF  Functional Limitation: Carrying, moving and handling objects  Carrying, Moving and Handling Objects Current Status (): At least 20 percent but less than 40 percent impaired, limited or restricted  Carrying, Moving and Handling Objects Goal Status ():  At least 1 percent but less than 20 percent impaired, limited or restricted    Progress Note: [x]  Yes  []  No  Next due by: Visit #10      Latex Allergy:  [x]NO      []YES  Preferred Language for Healthcare:   [x]English       []other:    Visit # Insurance Allowable   1 MN     Pain level:  3/10 currently  8/10 at worst     SUBJECTIVE:  See eval    OBJECTIVE: See eval  Observation:   Test measurements:      RESTRICTIONS/PRECAUTIONS:  Type II Diabetes    Exercises/Interventions:   Therapeutic Exercise x24' Resistance / level Sets/sec Reps Notes   Supine AAROM wand 2 10 HEP   Standing ER/IR isometrics 3\" 2 10 HEP   Standing Extension Green 3 10 HEP                                                           Neuromuscular Re-ed / Therapeutic Activities                                                        Manual Intervention       Shld /GH Mobs       Post Cap mobs       Thoracic/Rib manipualtion       CT MT/Mobs       PROM MT

## 2018-07-24 ENCOUNTER — HOSPITAL ENCOUNTER (OUTPATIENT)
Dept: PHYSICAL THERAPY | Age: 83
Discharge: HOME | End: 2018-07-25
Admitting: ORTHOPAEDIC SURGERY

## 2018-07-24 NOTE — FLOWSHEET NOTE
Therapeutic Exercise x30' Resistance / level Sets/sec Reps Notes   Supine AAROM wand 2 10 HEP   Standing ER Red 3 10 HEP ^ 7/24   Standing Extension Green 3 10 HEP   Standing IR Green 3 10 Added 7/24   Wall Walks  2 10 Added 7/24   Standing Rows Green 3 10 Added 7 /24   Supine Punches 2lb 3 10 Added 7/24                               Neuromuscular Re-ed / Therapeutic Activities                                                        Manual Intervention x 12'       Shld /GH Mobs Gentle Distraction Gr III     Post Cap mobs Gr II/III      Thoracic/Rib manipualtion       CT MT/Mobs       PROM MT Gentle flexion 4'     STM Anterior shoulder including pec minor and proximal bicep 5'         Therapeutic Exercise and NMR EXR  [x] (13626) Provided verbal/tactile cueing for activities related to strengthening, flexibility, endurance, ROM  for improvements in scapular, scapulothoracic and UE control with self care, reaching, carrying, lifting, house/yardwork, driving/computer work.    [] (04702) Provided verbal/tactile cueing for activities related to improving balance, coordination, kinesthetic sense, posture, motor skill, proprioception  to assist with  scapular, scapulothoracic and UE control with self care, reaching, carrying, lifting, house/yardwork, driving/computer work. Therapeutic Activities:    [] (82086 or 03516) Provided verbal/tactile cueing for activities related to improving balance, coordination, kinesthetic sense, posture, motor skill, proprioception and motor activation to allow for proper function of scapular, scapulothoracic and UE control with self care, carrying, lifting, driving/computer work.      Home Exercise Program:    [x] (08763) Reviewed/Progressed HEP activities related to strengthening, flexibility, endurance, ROM of scapular, scapulothoracic and UE control with self care, reaching, carrying, lifting, house/yardwork, driving/computer work  [] (64602) Reviewed/Progressed HEP activities related

## 2018-08-01 ENCOUNTER — HOSPITAL ENCOUNTER (OUTPATIENT)
Dept: PHYSICAL THERAPY | Age: 83
Discharge: HOME | End: 2018-08-31
Attending: ORTHOPAEDIC SURGERY | Admitting: ORTHOPAEDIC SURGERY

## 2018-08-02 ENCOUNTER — HOSPITAL ENCOUNTER (OUTPATIENT)
Dept: PHYSICAL THERAPY | Age: 83
Discharge: HOME | End: 2018-08-03
Admitting: ORTHOPAEDIC SURGERY

## 2018-08-02 NOTE — FLOWSHEET NOTE
posterior glide (R) shoulder  TTP  And restrictions noted deltoid    RESTRICTIONS/PRECAUTIONS:  Type II Diabetes    Exercises/Interventions:   Therapeutic Exercise x27' Resistance / level Sets/sec Reps Notes   Supine AAROM wand 3 10 HEP   Standing ER Red 3 10 HEP ^ 7/24   Standing Extension Green 3 10 HEP   Standing IR Green 3 10 Added 7/24   Wall Walks  3 10 Added 7/24   Standing Rows Green 3 10 Added 7 /24   Supine Punches 2lb 3 10 Added 7/24   Sidelying ER 2lb 3 10 Added 8/2. Verbal and tactile cues to control trunk and shoulder rotation                        Neuromuscular Re-ed / Therapeutic Activities                                                        Manual Intervention x 12'       Shld /GH Mobs Gentle Distraction Gr III     Post Cap mobs Gr II/III      Thoracic/Rib manipualtion       CT MT/Mobs       PROM MT Gentle flexion, abd 5'     STM Deltoid, pec major 5'         Therapeutic Exercise and NMR EXR  [x] (87571) Provided verbal/tactile cueing for activities related to strengthening, flexibility, endurance, ROM  for improvements in scapular, scapulothoracic and UE control with self care, reaching, carrying, lifting, house/yardwork, driving/computer work.    [] (62339) Provided verbal/tactile cueing for activities related to improving balance, coordination, kinesthetic sense, posture, motor skill, proprioception  to assist with  scapular, scapulothoracic and UE control with self care, reaching, carrying, lifting, house/yardwork, driving/computer work. Therapeutic Activities:    [] (60015 or 41603) Provided verbal/tactile cueing for activities related to improving balance, coordination, kinesthetic sense, posture, motor skill, proprioception and motor activation to allow for proper function of scapular, scapulothoracic and UE control with self care, carrying, lifting, driving/computer work.      Home Exercise Program:    [x] (41614) Reviewed/Progressed HEP activities related to strengthening, flexibility, endurance, ROM of scapular, scapulothoracic and UE control with self care, reaching, carrying, lifting, house/yardwork, driving/computer work  [] (09193) Reviewed/Progressed HEP activities related to improving balance, coordination, kinesthetic sense, posture, motor skill, proprioception of scapular, scapulothoracic and UE control with self care, reaching, carrying, lifting, house/yardwork, driving/computer work      Manual Treatments:  PROM / STM / Oscillations-Mobs:  G-I, II, III, IV (PA's, Inf., Post.)  [x] (22107) Provided manual therapy to mobilize soft tissue/joints of cervical/CT, scapular GHJ and UE for the purpose of modulating pain, promoting relaxation,  increasing ROM, reducing/eliminating soft tissue swelling/inflammation/restriction, improving soft tissue extensibility and allowing for proper ROM for normal function with self care, reaching, carrying, lifting, house/yardwork, driving/computer work    Modalities:  declined    Charges:  Timed Code Treatment Minutes: 39'   Total Treatment Minutes: 52'       [] EVAL - LOW (75201)   [] EVAL - MOD (74458)  [] EVAL - HIGH (70227)  [] RE-EVAL (37475)  [x] VV(34523) x  2   [] IONTO  [] NMR (03726) x      [] VASO  [x] Manual (71974) x  1    [] Other:  [] TA x       [] Mech Traction (48083)  [] ES(attended) (82539)      [] ES (un) (33566):     GOALS:  Patient stated goal:  Learn appropriate exercise and manage pain    Therapist goals for Patient:   Short Term Goals: To be achieved in: 2 weeks  1. Independent in HEP and progression per patient tolerance, in order to prevent re-injury. 2. Patient will have a decrease in pain to facilitate improvement in movement, function, and ADLs as indicated by Functional Deficits. Long Term Goals: To be achieved in: 6 weeks  1. Disability index score of 15% or less for the UEFI to assist with reaching prior level of function.    2. Patient will demonstrate an increase in Strength to 4/5 or > ER strength (R) to

## 2018-08-22 DIAGNOSIS — E11.8 TYPE 2 DIABETES MELLITUS WITH COMPLICATION, UNSPECIFIED WHETHER LONG TERM INSULIN USE: Primary | ICD-10-CM

## 2018-08-22 RX ORDER — PEN NEEDLE, DIABETIC 31 GX5/16"
NEEDLE, DISPOSABLE MISCELLANEOUS
Qty: 100 EACH | Refills: 3 | Status: SHIPPED | OUTPATIENT
Start: 2018-08-22 | End: 2019-09-05 | Stop reason: SDUPTHER

## 2018-09-01 ENCOUNTER — HOSPITAL ENCOUNTER (OUTPATIENT)
Dept: PHYSICAL THERAPY | Age: 83
Discharge: HOME | End: 2018-09-01
Attending: ORTHOPAEDIC SURGERY | Admitting: ORTHOPAEDIC SURGERY

## 2018-09-05 ENCOUNTER — TELEPHONE (OUTPATIENT)
Dept: INTERNAL MEDICINE | Age: 83
End: 2018-09-05

## 2018-09-05 RX ORDER — NEOMYCIN/POLYMYXIN B/HYDROCORT 3.5-10K-1
SUSPENSION, DROPS(FINAL DOSAGE FORM)(ML) OPHTHALMIC (EYE)
Qty: 7.5 ML | Refills: 0 | Status: SHIPPED | OUTPATIENT
Start: 2018-09-05 | End: 2018-09-06 | Stop reason: ALTCHOICE

## 2018-09-05 NOTE — TELEPHONE ENCOUNTER
Patient believes he has pink eye. Burning, itching, pink eyes. Patient requesting Abx eyedrops to be called into:    [facility]/pharmacy #[id number]- [address] - [address]  Tiffany Age [phone] - F [phone] [LIYKA[place] Preferred]   [phone]

## 2018-09-06 ENCOUNTER — TELEPHONE (OUTPATIENT)
Dept: INTERNAL MEDICINE | Age: 83
End: 2018-09-06

## 2018-09-06 RX ORDER — GENTAMICIN SULFATE 3 MG/ML
1 SOLUTION/ DROPS OPHTHALMIC 4 TIMES DAILY
Qty: 5 ML | Refills: 0 | Status: SHIPPED | OUTPATIENT
Start: 2018-09-06 | End: 2018-09-16

## 2018-09-17 ENCOUNTER — TELEPHONE (OUTPATIENT)
Dept: INTERNAL MEDICINE | Age: 83
End: 2018-09-17

## 2018-09-17 RX ORDER — AZITHROMYCIN 250 MG/1
TABLET, FILM COATED ORAL
Qty: 1 PACKET | Refills: 0 | Status: SHIPPED | OUTPATIENT
Start: 2018-09-17 | End: 2018-09-21

## 2018-09-19 ENCOUNTER — TELEPHONE (OUTPATIENT)
Dept: INTERNAL MEDICINE | Age: 83
End: 2018-09-19

## 2018-09-19 NOTE — TELEPHONE ENCOUNTER
Patient called said he sent a letter addressed to Dr. Jensen Liu regarding a referral he needed to go see for diabetic neuropathy.      [facility] [address] [place] [address], [phone]

## 2018-09-19 NOTE — TELEPHONE ENCOUNTER
Left voice message that whoever he wants to see in the practice is fine and if he needs something to let us know

## 2018-09-25 ENCOUNTER — HOSPITAL ENCOUNTER (INPATIENT)
Age: 83
LOS: 1 days | Discharge: HOME | DRG: 280 | End: 2018-09-27
Attending: EMERGENCY MEDICINE | Admitting: PSYCHIATRY & NEUROLOGY
Payer: MEDICARE

## 2018-09-25 ENCOUNTER — APPOINTMENT (OUTPATIENT)
Dept: CT IMAGING | Age: 83
DRG: 280 | End: 2018-09-25
Payer: MEDICARE

## 2018-09-25 DIAGNOSIS — I50.9 CONGESTIVE HEART FAILURE, UNSPECIFIED HF CHRONICITY, UNSPECIFIED HEART FAILURE TYPE (HCC): ICD-10-CM

## 2018-09-25 DIAGNOSIS — R06.00 DYSPNEA, UNSPECIFIED TYPE: Primary | ICD-10-CM

## 2018-09-25 PROBLEM — R07.9 CHEST PAIN: Status: ACTIVE | Noted: 2018-09-25

## 2018-09-25 LAB
A/G RATIO: 1.3 (ref 1.1–2.2)
ALBUMIN SERPL-MCNC: 4.3 G/DL (ref 3.4–5)
ALP BLD-CCNC: 55 U/L (ref 40–129)
ALT SERPL-CCNC: 27 U/L (ref 10–40)
ANION GAP SERPL CALCULATED.3IONS-SCNC: 17 MMOL/L (ref 3–16)
APTT: 31.2 SEC (ref 26–36)
AST SERPL-CCNC: 24 U/L (ref 15–37)
BASOPHILS ABSOLUTE: 0 K/UL (ref 0–0.2)
BASOPHILS RELATIVE PERCENT: 0.4 %
BILIRUB SERPL-MCNC: 0.6 MG/DL (ref 0–1)
BUN BLDV-MCNC: 18 MG/DL (ref 7–20)
CALCIUM SERPL-MCNC: 9.7 MG/DL (ref 8.3–10.6)
CHLORIDE BLD-SCNC: 103 MMOL/L (ref 99–110)
CHOLESTEROL, TOTAL: 178 MG/DL (ref 0–199)
CO2: 22 MMOL/L (ref 21–32)
CREAT SERPL-MCNC: 0.9 MG/DL (ref 0.8–1.3)
EKG ATRIAL RATE: 64 BPM
EKG ATRIAL RATE: 82 BPM
EKG DIAGNOSIS: NORMAL
EKG DIAGNOSIS: NORMAL
EKG P AXIS: 42 DEGREES
EKG P AXIS: 55 DEGREES
EKG P-R INTERVAL: 176 MS
EKG P-R INTERVAL: 208 MS
EKG Q-T INTERVAL: 400 MS
EKG Q-T INTERVAL: 506 MS
EKG QRS DURATION: 114 MS
EKG QRS DURATION: 130 MS
EKG QTC CALCULATION (BAZETT): 467 MS
EKG QTC CALCULATION (BAZETT): 522 MS
EKG R AXIS: -52 DEGREES
EKG R AXIS: -67 DEGREES
EKG T AXIS: -2 DEGREES
EKG T AXIS: 52 DEGREES
EKG VENTRICULAR RATE: 64 BPM
EKG VENTRICULAR RATE: 82 BPM
EOSINOPHILS ABSOLUTE: 0 K/UL (ref 0–0.6)
EOSINOPHILS RELATIVE PERCENT: 0.4 %
ESTIMATED AVERAGE GLUCOSE: 151.3 MG/DL
GFR AFRICAN AMERICAN: >60
GFR NON-AFRICAN AMERICAN: >60
GLOBULIN: 3.3 G/DL
GLUCOSE BLD-MCNC: 117 MG/DL (ref 70–99)
GLUCOSE BLD-MCNC: 117 MG/DL (ref 70–99)
GLUCOSE BLD-MCNC: 122 MG/DL (ref 70–99)
GLUCOSE BLD-MCNC: 137 MG/DL (ref 70–99)
GLUCOSE BLD-MCNC: 149 MG/DL (ref 70–99)
GLUCOSE BLD-MCNC: 191 MG/DL (ref 70–99)
HBA1C MFR BLD: 6.9 %
HCT VFR BLD CALC: 40.8 % (ref 40.5–52.5)
HCT VFR BLD CALC: 41 % (ref 40.5–52.5)
HDLC SERPL-MCNC: 55 MG/DL (ref 40–60)
HEMOGLOBIN: 13.5 G/DL (ref 13.5–17.5)
HEMOGLOBIN: 13.6 G/DL (ref 13.5–17.5)
LDL CHOLESTEROL CALCULATED: 113 MG/DL
LV EF: 60 %
LVEF MODALITY: NORMAL
LYMPHOCYTES ABSOLUTE: 1.3 K/UL (ref 1–5.1)
LYMPHOCYTES RELATIVE PERCENT: 14.3 %
MCH RBC QN AUTO: 30.9 PG (ref 26–34)
MCH RBC QN AUTO: 31.2 PG (ref 26–34)
MCHC RBC AUTO-ENTMCNC: 33 G/DL (ref 31–36)
MCHC RBC AUTO-ENTMCNC: 33.3 G/DL (ref 31–36)
MCV RBC AUTO: 93.5 FL (ref 80–100)
MCV RBC AUTO: 93.7 FL (ref 80–100)
MONOCYTES ABSOLUTE: 0.6 K/UL (ref 0–1.3)
MONOCYTES RELATIVE PERCENT: 6.9 %
NEUTROPHILS ABSOLUTE: 7.2 K/UL (ref 1.7–7.7)
NEUTROPHILS RELATIVE PERCENT: 78 %
PDW BLD-RTO: 14.4 % (ref 12.4–15.4)
PDW BLD-RTO: 14.4 % (ref 12.4–15.4)
PERFORMED ON: ABNORMAL
PLATELET # BLD: 205 K/UL (ref 135–450)
PLATELET # BLD: 222 K/UL (ref 135–450)
PMV BLD AUTO: 10.1 FL (ref 5–10.5)
PMV BLD AUTO: 9.3 FL (ref 5–10.5)
POTASSIUM SERPL-SCNC: 3.8 MMOL/L (ref 3.5–5.1)
PRO-BNP: 1642 PG/ML (ref 0–449)
RBC # BLD: 4.36 M/UL (ref 4.2–5.9)
RBC # BLD: 4.37 M/UL (ref 4.2–5.9)
SODIUM BLD-SCNC: 142 MMOL/L (ref 136–145)
TOTAL PROTEIN: 7.6 G/DL (ref 6.4–8.2)
TRIGL SERPL-MCNC: 48 MG/DL (ref 0–150)
TROPONIN: 0.04 NG/ML
TROPONIN: 0.05 NG/ML
TROPONIN: 0.06 NG/ML
VLDLC SERPL CALC-MCNC: 10 MG/DL
WBC # BLD: 9.2 K/UL (ref 4–11)
WBC # BLD: 9.8 K/UL (ref 4–11)

## 2018-09-25 PROCEDURE — 6360000004 HC RX CONTRAST MEDICATION: Performed by: EMERGENCY MEDICINE

## 2018-09-25 PROCEDURE — 85730 THROMBOPLASTIN TIME PARTIAL: CPT

## 2018-09-25 PROCEDURE — G0378 HOSPITAL OBSERVATION PER HR: HCPCS

## 2018-09-25 PROCEDURE — 93306 TTE W/DOPPLER COMPLETE: CPT

## 2018-09-25 PROCEDURE — 93010 ELECTROCARDIOGRAM REPORT: CPT | Performed by: INTERNAL MEDICINE

## 2018-09-25 PROCEDURE — 99285 EMERGENCY DEPT VISIT HI MDM: CPT

## 2018-09-25 PROCEDURE — 96375 TX/PRO/DX INJ NEW DRUG ADDON: CPT

## 2018-09-25 PROCEDURE — 6370000000 HC RX 637 (ALT 250 FOR IP): Performed by: INTERNAL MEDICINE

## 2018-09-25 PROCEDURE — 85025 COMPLETE CBC W/AUTO DIFF WBC: CPT

## 2018-09-25 PROCEDURE — 6370000000 HC RX 637 (ALT 250 FOR IP): Performed by: EMERGENCY MEDICINE

## 2018-09-25 PROCEDURE — 2580000003 HC RX 258: Performed by: HOSPITALIST

## 2018-09-25 PROCEDURE — 83036 HEMOGLOBIN GLYCOSYLATED A1C: CPT

## 2018-09-25 PROCEDURE — 80061 LIPID PANEL: CPT

## 2018-09-25 PROCEDURE — 96372 THER/PROPH/DIAG INJ SC/IM: CPT

## 2018-09-25 PROCEDURE — 93005 ELECTROCARDIOGRAM TRACING: CPT | Performed by: EMERGENCY MEDICINE

## 2018-09-25 PROCEDURE — 99223 1ST HOSP IP/OBS HIGH 75: CPT | Performed by: INTERNAL MEDICINE

## 2018-09-25 PROCEDURE — 96374 THER/PROPH/DIAG INJ IV PUSH: CPT

## 2018-09-25 PROCEDURE — 6360000002 HC RX W HCPCS: Performed by: EMERGENCY MEDICINE

## 2018-09-25 PROCEDURE — 85027 COMPLETE CBC AUTOMATED: CPT

## 2018-09-25 PROCEDURE — 93005 ELECTROCARDIOGRAM TRACING: CPT | Performed by: HOSPITALIST

## 2018-09-25 PROCEDURE — 2580000003 HC RX 258

## 2018-09-25 PROCEDURE — 36415 COLL VENOUS BLD VENIPUNCTURE: CPT

## 2018-09-25 PROCEDURE — 2700000000 HC OXYGEN THERAPY PER DAY

## 2018-09-25 PROCEDURE — 6360000002 HC RX W HCPCS: Performed by: INTERNAL MEDICINE

## 2018-09-25 PROCEDURE — 6370000000 HC RX 637 (ALT 250 FOR IP): Performed by: HOSPITALIST

## 2018-09-25 PROCEDURE — 2580000003 HC RX 258: Performed by: INTERNAL MEDICINE

## 2018-09-25 PROCEDURE — 80053 COMPREHEN METABOLIC PANEL: CPT

## 2018-09-25 PROCEDURE — 94760 N-INVAS EAR/PLS OXIMETRY 1: CPT

## 2018-09-25 PROCEDURE — 71260 CT THORAX DX C+: CPT

## 2018-09-25 PROCEDURE — 84484 ASSAY OF TROPONIN QUANT: CPT

## 2018-09-25 PROCEDURE — 83880 ASSAY OF NATRIURETIC PEPTIDE: CPT

## 2018-09-25 RX ORDER — ONDANSETRON 2 MG/ML
4 INJECTION INTRAMUSCULAR; INTRAVENOUS EVERY 6 HOURS PRN
Status: DISCONTINUED | OUTPATIENT
Start: 2018-09-25 | End: 2018-09-27 | Stop reason: HOSPADM

## 2018-09-25 RX ORDER — ASPIRIN 81 MG/1
324 TABLET, CHEWABLE ORAL ONCE
Status: COMPLETED | OUTPATIENT
Start: 2018-09-25 | End: 2018-09-25

## 2018-09-25 RX ORDER — ATORVASTATIN CALCIUM 40 MG/1
40 TABLET, FILM COATED ORAL NIGHTLY
Status: DISCONTINUED | OUTPATIENT
Start: 2018-09-25 | End: 2018-09-27

## 2018-09-25 RX ORDER — DEXTROSE MONOHYDRATE 50 MG/ML
100 INJECTION, SOLUTION INTRAVENOUS PRN
Status: DISCONTINUED | OUTPATIENT
Start: 2018-09-25 | End: 2018-09-27 | Stop reason: HOSPADM

## 2018-09-25 RX ORDER — ASPIRIN 81 MG/1
81 TABLET, CHEWABLE ORAL DAILY
Status: DISCONTINUED | OUTPATIENT
Start: 2018-09-26 | End: 2018-09-27

## 2018-09-25 RX ORDER — FEXOFENADINE HCL 180 MG/1
180 TABLET ORAL DAILY PRN
Refills: 3 | COMMUNITY
Start: 2018-09-18 | End: 2019-04-01

## 2018-09-25 RX ORDER — GABAPENTIN 300 MG/1
900 CAPSULE ORAL 3 TIMES DAILY
Status: DISCONTINUED | OUTPATIENT
Start: 2018-09-25 | End: 2018-09-25

## 2018-09-25 RX ORDER — SODIUM CHLORIDE 0.9 % (FLUSH) 0.9 %
10 SYRINGE (ML) INJECTION PRN
Status: DISCONTINUED | OUTPATIENT
Start: 2018-09-25 | End: 2018-09-27 | Stop reason: HOSPADM

## 2018-09-25 RX ORDER — HEPARIN SODIUM 1000 [USP'U]/ML
4000 INJECTION, SOLUTION INTRAVENOUS; SUBCUTANEOUS ONCE
Status: COMPLETED | OUTPATIENT
Start: 2018-09-25 | End: 2018-09-25

## 2018-09-25 RX ORDER — SODIUM CHLORIDE 0.9 % (FLUSH) 0.9 %
10 SYRINGE (ML) INJECTION EVERY 12 HOURS SCHEDULED
Status: DISCONTINUED | OUTPATIENT
Start: 2018-09-25 | End: 2018-09-27 | Stop reason: HOSPADM

## 2018-09-25 RX ORDER — NICOTINE POLACRILEX 4 MG
15 LOZENGE BUCCAL PRN
Status: DISCONTINUED | OUTPATIENT
Start: 2018-09-25 | End: 2018-09-27 | Stop reason: HOSPADM

## 2018-09-25 RX ORDER — ACETAMINOPHEN 325 MG/1
650 TABLET ORAL EVERY 4 HOURS PRN
Status: DISCONTINUED | OUTPATIENT
Start: 2018-09-25 | End: 2018-09-27 | Stop reason: HOSPADM

## 2018-09-25 RX ORDER — CARVEDILOL 25 MG/1
25 TABLET ORAL 2 TIMES DAILY WITH MEALS
Status: DISCONTINUED | OUTPATIENT
Start: 2018-09-25 | End: 2018-09-25

## 2018-09-25 RX ORDER — AMOXICILLIN 250 MG/1
500 CAPSULE ORAL EVERY 8 HOURS SCHEDULED
Status: DISCONTINUED | OUTPATIENT
Start: 2018-09-25 | End: 2018-09-27 | Stop reason: HOSPADM

## 2018-09-25 RX ORDER — SODIUM CHLORIDE 9 MG/ML
INJECTION, SOLUTION INTRAVENOUS
Status: COMPLETED
Start: 2018-09-25 | End: 2018-09-25

## 2018-09-25 RX ORDER — METHYLPREDNISOLONE 4 MG/1
4 TABLET ORAL SEE ADMIN INSTRUCTIONS
Status: ON HOLD | COMMUNITY
Start: 2018-09-22 | End: 2018-09-27 | Stop reason: HOSPADM

## 2018-09-25 RX ORDER — HEPARIN SODIUM 10000 [USP'U]/100ML
10 INJECTION, SOLUTION INTRAVENOUS CONTINUOUS
Status: DISCONTINUED | OUTPATIENT
Start: 2018-09-25 | End: 2018-09-26

## 2018-09-25 RX ORDER — 0.9 % SODIUM CHLORIDE 0.9 %
500 INTRAVENOUS SOLUTION INTRAVENOUS ONCE
Status: COMPLETED | OUTPATIENT
Start: 2018-09-25 | End: 2018-09-25

## 2018-09-25 RX ORDER — GABAPENTIN 300 MG/1
600 CAPSULE ORAL NIGHTLY
Status: DISCONTINUED | OUTPATIENT
Start: 2018-09-25 | End: 2018-09-27

## 2018-09-25 RX ORDER — GABAPENTIN 300 MG/1
300 CAPSULE ORAL EVERY MORNING
Status: DISCONTINUED | OUTPATIENT
Start: 2018-09-25 | End: 2018-09-27

## 2018-09-25 RX ORDER — 0.9 % SODIUM CHLORIDE 0.9 %
1000 INTRAVENOUS SOLUTION INTRAVENOUS ONCE
Status: DISCONTINUED | OUTPATIENT
Start: 2018-09-25 | End: 2018-09-25

## 2018-09-25 RX ORDER — FUROSEMIDE 10 MG/ML
40 INJECTION INTRAMUSCULAR; INTRAVENOUS ONCE
Status: COMPLETED | OUTPATIENT
Start: 2018-09-25 | End: 2018-09-25

## 2018-09-25 RX ORDER — AMOXICILLIN 500 MG/1
500 CAPSULE ORAL 3 TIMES DAILY
Status: ON HOLD | COMMUNITY
Start: 2018-09-22 | End: 2018-09-27 | Stop reason: HOSPADM

## 2018-09-25 RX ORDER — FUROSEMIDE 10 MG/ML
20 INJECTION INTRAMUSCULAR; INTRAVENOUS ONCE
Status: COMPLETED | OUTPATIENT
Start: 2018-09-25 | End: 2018-09-25

## 2018-09-25 RX ORDER — CARVEDILOL 12.5 MG/1
12.5 TABLET ORAL 2 TIMES DAILY WITH MEALS
Status: DISCONTINUED | OUTPATIENT
Start: 2018-09-26 | End: 2018-09-27

## 2018-09-25 RX ORDER — DEXTROSE MONOHYDRATE 25 G/50ML
12.5 INJECTION, SOLUTION INTRAVENOUS PRN
Status: DISCONTINUED | OUTPATIENT
Start: 2018-09-25 | End: 2018-09-27 | Stop reason: HOSPADM

## 2018-09-25 RX ADMIN — SODIUM CHLORIDE 1000 ML: 9 INJECTION, SOLUTION INTRAVENOUS at 10:50

## 2018-09-25 RX ADMIN — MAGNESIUM HYDROXIDE 30 ML: 400 SUSPENSION ORAL at 09:51

## 2018-09-25 RX ADMIN — INSULIN LISPRO 1 UNITS: 100 INJECTION, SOLUTION INTRAVENOUS; SUBCUTANEOUS at 21:18

## 2018-09-25 RX ADMIN — HEPARIN SODIUM 4000 UNITS: 1000 INJECTION INTRAVENOUS; SUBCUTANEOUS at 18:17

## 2018-09-25 RX ADMIN — ENOXAPARIN SODIUM 40 MG: 40 INJECTION SUBCUTANEOUS at 09:33

## 2018-09-25 RX ADMIN — FUROSEMIDE 40 MG: 10 INJECTION, SOLUTION INTRAMUSCULAR; INTRAVENOUS at 17:58

## 2018-09-25 RX ADMIN — GABAPENTIN 300 MG: 300 CAPSULE ORAL at 09:33

## 2018-09-25 RX ADMIN — ASPIRIN 81 MG 324 MG: 81 TABLET ORAL at 03:26

## 2018-09-25 RX ADMIN — FUROSEMIDE 20 MG: 10 INJECTION, SOLUTION INTRAMUSCULAR; INTRAVENOUS at 03:26

## 2018-09-25 RX ADMIN — HEPARIN SODIUM 10 ML/HR: 10000 INJECTION, SOLUTION INTRAVENOUS at 18:18

## 2018-09-25 RX ADMIN — SODIUM CHLORIDE 500 ML: 9 INJECTION, SOLUTION INTRAVENOUS at 11:04

## 2018-09-25 RX ADMIN — INSULIN LISPRO 2 UNITS: 100 INJECTION, SOLUTION INTRAVENOUS; SUBCUTANEOUS at 18:05

## 2018-09-25 RX ADMIN — IOPAMIDOL 75 ML: 755 INJECTION, SOLUTION INTRAVENOUS at 02:21

## 2018-09-25 RX ADMIN — NITROGLYCERIN 1 INCH: 20 OINTMENT TOPICAL at 04:03

## 2018-09-25 RX ADMIN — AMOXICILLIN 500 MG: 250 CAPSULE ORAL at 21:14

## 2018-09-25 RX ADMIN — ATORVASTATIN CALCIUM 40 MG: 40 TABLET, FILM COATED ORAL at 21:14

## 2018-09-25 RX ADMIN — Medication 10 ML: at 09:34

## 2018-09-25 RX ADMIN — Medication 10 ML: at 21:15

## 2018-09-25 RX ADMIN — GABAPENTIN 600 MG: 300 CAPSULE ORAL at 21:14

## 2018-09-25 RX ADMIN — AMOXICILLIN 500 MG: 250 CAPSULE ORAL at 17:58

## 2018-09-25 RX ADMIN — Medication 1000 ML: at 10:50

## 2018-09-25 RX ADMIN — CARVEDILOL 25 MG: 25 TABLET, FILM COATED ORAL at 09:33

## 2018-09-25 ASSESSMENT — PAIN SCALES - GENERAL
PAINLEVEL_OUTOF10: 5
PAINLEVEL_OUTOF10: 0

## 2018-09-25 ASSESSMENT — PAIN DESCRIPTION - PAIN TYPE
TYPE: CHRONIC PAIN
TYPE: CHRONIC PAIN

## 2018-09-25 NOTE — H&P
Procedure Laterality Date    CARPAL TUNNEL RELEASE Right 05/16/2018    HAND SURGERY      Left carpel tunnel     HIP SURGERY Bilateral     total hips       Medications Prior to Admission:      Prior to Admission medications    Medication Sig Start Date End Date Taking? Authorizing Provider   amoxicillin (AMOXIL) 500 MG capsule Take 500 mg by mouth 3 times daily 9/22/18 10/1/18 Yes Historical Provider, MD BA ULTRAFINE III SHORT PEN 31G X 8 MM MISC USE DAILY WITH LANTUS 8/22/18   Juliaette Epley, MD   gabapentin (NEURONTIN) 300 MG capsule TAKE 3 CAPSULES BY MOUTH EVERY NIGHT AT BEDTIME 7/3/18 10/1/18  Juliaette Epley, MD   triamcinolone (KENALOG) 0.1 % ointment APPLY THIN AMOUNT TO AFFECTED AREA TWO TIMES A DAY AS NEEDED 5/9/18   Juliaette Epley, MD   insulin glargine [facility] AUTHORITY KWIKPEN) 100 UNIT/ML injection pen Inject 22 Units into the skin daily 4/10/18   Juliaette Epley, MD   carvedilol (COREG) 25 MG tablet TAKE 1 TABLET BY MOUTH TWICE A DAY 1/18/18   Juliaette Epley, MD   Insulin Degludec (TRESIBA FLEXTOUCH) 100 UNIT/ML SOPN Inject 25U qd 12/18/17   Juliaette Epley, MD   Blood Glucose Calibration (OT ULTRA/FASTTK CNTRL SOLN) SOLN Monthly as needed 12/18/17   Juliaette Epley, MD   glucose blood VI test strips (ONE TOUCH ULTRA TEST) strip Test bid 12/15/17   Juliaette Epley, MD   ONE TOUCH ULTRASOFT LANCETS MISC Test bid 12/15/17   Juliaette Epley, MD   ibuprofen (ADVIL;MOTRIN) 600 MG tablet TAKE 1 TABLET BY MOUTH EVERY 8 HOURS AS NEEDED FOR PAIN 10/23/17   Juliaette Epley, MD       Allergies:  Levofloxacin    Social History:      TOBACCO:   reports that he quit smoking about 45 years ago. He has never used smokeless tobacco.  ETOH:   reports that he drinks alcohol. Family History:      Family History   Problem Relation Age of Onset    Family history unknown: Yes       REVIEW OF SYSTEMS:   Pertinent positives as noted in the HPI. All other systems reviewed and negative.     PHYSICAL EXAM:    /62   Pulse 84   Temp

## 2018-09-25 NOTE — ED PROVIDER NOTES
Triage Chief Complaint:   Shortness of Breath    [place]:  Jacky Abler is a 81 y.o. male that presents to emergency Department with complaints of having shortness of breath which been getting steadily worse over last 2-3 days. The patient states he's had some stomach upset as well, but has recently been started on Medrol Dosepak. Patient been more persistent, and wife is relating that the patient's having difficulty sleeping secondary to anxiety over his situation. Patient states that he does have trouble with chronic pain secondary to no up with these, but that is currently being worked up by his primary care doctor. .    ROS:  At least 10 systems reviewed and otherwise acutely negative except as in the [address]     Past Medical History:   Diagnosis Date    Degeneration of cervical intervertebral disc 10/22/2010    Diverticulosis of colon (without mention of hemorrhage) 10/22/2010    Edema 10/22/2010    Elevated sedimentation rate 10/22/2010    Essential hypertension, benign 10/22/2010    [place] (hard of hearing)     Hyperlipidemia 10/22/2010    Lumbago 10/22/2010    Mononeuritis of unspecified site 10/22/2010    Neoplasm of uncertain behavior of skin 10/22/2010    Onychia and paronychia of toe 10/22/2010    Osteoarthrosis, unspecified whether generalized or localized, pelvic region and thigh 10/22/2010    Other specified iron deficiency anemias 10/22/2010    Rheumatic chorea 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

## 2018-09-26 LAB
ANION GAP SERPL CALCULATED.3IONS-SCNC: 12 MMOL/L (ref 3–16)
APTT: 59.7 SEC (ref 26–36)
APTT: 62.6 SEC (ref 26–36)
BASOPHILS ABSOLUTE: 0.1 K/UL (ref 0–0.2)
BASOPHILS RELATIVE PERCENT: 0.6 %
BUN BLDV-MCNC: 17 MG/DL (ref 7–20)
CALCIUM SERPL-MCNC: 9.2 MG/DL (ref 8.3–10.6)
CHLORIDE BLD-SCNC: 98 MMOL/L (ref 99–110)
CO2: 28 MMOL/L (ref 21–32)
CREAT SERPL-MCNC: 0.8 MG/DL (ref 0.8–1.3)
EOSINOPHILS ABSOLUTE: 0.2 K/UL (ref 0–0.6)
EOSINOPHILS RELATIVE PERCENT: 2.3 %
GFR AFRICAN AMERICAN: >60
GFR NON-AFRICAN AMERICAN: >60
GLUCOSE BLD-MCNC: 119 MG/DL (ref 70–99)
GLUCOSE BLD-MCNC: 146 MG/DL (ref 70–99)
GLUCOSE BLD-MCNC: 176 MG/DL (ref 70–99)
GLUCOSE BLD-MCNC: 219 MG/DL (ref 70–99)
HCT VFR BLD CALC: 40.9 % (ref 40.5–52.5)
HEMOGLOBIN: 13.6 G/DL (ref 13.5–17.5)
LV EF: 59 %
LVEF MODALITY: NORMAL
LYMPHOCYTES ABSOLUTE: 1.8 K/UL (ref 1–5.1)
LYMPHOCYTES RELATIVE PERCENT: 20.7 %
MCH RBC QN AUTO: 30.8 PG (ref 26–34)
MCHC RBC AUTO-ENTMCNC: 33.3 G/DL (ref 31–36)
MCV RBC AUTO: 92.3 FL (ref 80–100)
MONOCYTES ABSOLUTE: 0.6 K/UL (ref 0–1.3)
MONOCYTES RELATIVE PERCENT: 7.3 %
NEUTROPHILS ABSOLUTE: 5.9 K/UL (ref 1.7–7.7)
NEUTROPHILS RELATIVE PERCENT: 69.1 %
PDW BLD-RTO: 14.3 % (ref 12.4–15.4)
PERFORMED ON: ABNORMAL
PLATELET # BLD: 205 K/UL (ref 135–450)
PMV BLD AUTO: 9.7 FL (ref 5–10.5)
POTASSIUM REFLEX MAGNESIUM: 3.8 MMOL/L (ref 3.5–5.1)
POTASSIUM SERPL-SCNC: 3.8 MMOL/L (ref 3.5–5.1)
PRO-BNP: 1414 PG/ML (ref 0–449)
RBC # BLD: 4.43 M/UL (ref 4.2–5.9)
SODIUM BLD-SCNC: 138 MMOL/L (ref 136–145)
WBC # BLD: 8.6 K/UL (ref 4–11)

## 2018-09-26 PROCEDURE — 6370000000 HC RX 637 (ALT 250 FOR IP): Performed by: INTERNAL MEDICINE

## 2018-09-26 PROCEDURE — 85025 COMPLETE CBC W/AUTO DIFF WBC: CPT

## 2018-09-26 PROCEDURE — A9502 TC99M TETROFOSMIN: HCPCS | Performed by: HOSPITALIST

## 2018-09-26 PROCEDURE — 78452 HT MUSCLE IMAGE SPECT MULT: CPT

## 2018-09-26 PROCEDURE — 93017 CV STRESS TEST TRACING ONLY: CPT

## 2018-09-26 PROCEDURE — 3430000000 HC RX DIAGNOSTIC RADIOPHARMACEUTICAL: Performed by: HOSPITALIST

## 2018-09-26 PROCEDURE — 1200000000 HC SEMI PRIVATE

## 2018-09-26 PROCEDURE — 6360000002 HC RX W HCPCS: Performed by: INTERNAL MEDICINE

## 2018-09-26 PROCEDURE — 6360000002 HC RX W HCPCS: Performed by: HOSPITALIST

## 2018-09-26 PROCEDURE — 83880 ASSAY OF NATRIURETIC PEPTIDE: CPT

## 2018-09-26 PROCEDURE — 2580000003 HC RX 258: Performed by: INTERNAL MEDICINE

## 2018-09-26 PROCEDURE — 6370000000 HC RX 637 (ALT 250 FOR IP): Performed by: HOSPITALIST

## 2018-09-26 PROCEDURE — 80048 BASIC METABOLIC PNL TOTAL CA: CPT

## 2018-09-26 PROCEDURE — 85730 THROMBOPLASTIN TIME PARTIAL: CPT

## 2018-09-26 PROCEDURE — 99233 SBSQ HOSP IP/OBS HIGH 50: CPT | Performed by: INTERNAL MEDICINE

## 2018-09-26 PROCEDURE — 36415 COLL VENOUS BLD VENIPUNCTURE: CPT

## 2018-09-26 RX ORDER — FUROSEMIDE 10 MG/ML
40 INJECTION INTRAMUSCULAR; INTRAVENOUS 2 TIMES DAILY
Status: DISCONTINUED | OUTPATIENT
Start: 2018-09-26 | End: 2018-09-27

## 2018-09-26 RX ADMIN — TETROFOSMIN 10 MILLICURIE: 0.23 INJECTION, POWDER, LYOPHILIZED, FOR SOLUTION INTRAVENOUS at 09:55

## 2018-09-26 RX ADMIN — Medication 10 ML: at 21:17

## 2018-09-26 RX ADMIN — CARVEDILOL 12.5 MG: 12.5 TABLET, FILM COATED ORAL at 08:46

## 2018-09-26 RX ADMIN — ATORVASTATIN CALCIUM 40 MG: 40 TABLET, FILM COATED ORAL at 21:16

## 2018-09-26 RX ADMIN — Medication 10 ML: at 08:47

## 2018-09-26 RX ADMIN — AMOXICILLIN 500 MG: 250 CAPSULE ORAL at 06:08

## 2018-09-26 RX ADMIN — AMOXICILLIN 500 MG: 250 CAPSULE ORAL at 13:10

## 2018-09-26 RX ADMIN — AMOXICILLIN 500 MG: 250 CAPSULE ORAL at 21:17

## 2018-09-26 RX ADMIN — FUROSEMIDE 40 MG: 10 INJECTION, SOLUTION INTRAMUSCULAR; INTRAVENOUS at 17:26

## 2018-09-26 RX ADMIN — ASPIRIN 81 MG 81 MG: 81 TABLET ORAL at 08:46

## 2018-09-26 RX ADMIN — ENOXAPARIN SODIUM 40 MG: 40 INJECTION SUBCUTANEOUS at 13:17

## 2018-09-26 RX ADMIN — CARVEDILOL 12.5 MG: 12.5 TABLET, FILM COATED ORAL at 17:26

## 2018-09-26 RX ADMIN — GABAPENTIN 300 MG: 300 CAPSULE ORAL at 08:46

## 2018-09-26 RX ADMIN — TETROFOSMIN 30 MILLICURIE: 0.23 INJECTION, POWDER, LYOPHILIZED, FOR SOLUTION INTRAVENOUS at 11:18

## 2018-09-26 RX ADMIN — INSULIN LISPRO 4 UNITS: 100 INJECTION, SOLUTION INTRAVENOUS; SUBCUTANEOUS at 13:10

## 2018-09-26 RX ADMIN — REGADENOSON 0.4 MG: 0.08 INJECTION, SOLUTION INTRAVENOUS at 11:17

## 2018-09-26 RX ADMIN — INSULIN LISPRO 1 UNITS: 100 INJECTION, SOLUTION INTRAVENOUS; SUBCUTANEOUS at 21:24

## 2018-09-26 RX ADMIN — FUROSEMIDE 40 MG: 10 INJECTION, SOLUTION INTRAMUSCULAR; INTRAVENOUS at 13:18

## 2018-09-26 RX ADMIN — INSULIN LISPRO 2 UNITS: 100 INJECTION, SOLUTION INTRAVENOUS; SUBCUTANEOUS at 17:26

## 2018-09-26 ASSESSMENT — PAIN SCALES - GENERAL
PAINLEVEL_OUTOF10: 0

## 2018-09-27 VITALS
TEMPERATURE: 98.3 F | SYSTOLIC BLOOD PRESSURE: 139 MMHG | BODY MASS INDEX: 29.01 KG/M2 | HEART RATE: 80 BPM | OXYGEN SATURATION: 97 % | HEIGHT: 73 IN | RESPIRATION RATE: 16 BRPM | DIASTOLIC BLOOD PRESSURE: 74 MMHG | WEIGHT: 218.92 LBS

## 2018-09-27 PROBLEM — R07.9 CHEST PAIN: Status: RESOLVED | Noted: 2018-09-25 | Resolved: 2018-09-27

## 2018-09-27 LAB
ANION GAP SERPL CALCULATED.3IONS-SCNC: 14 MMOL/L (ref 3–16)
BASOPHILS ABSOLUTE: 0 K/UL (ref 0–0.2)
BASOPHILS RELATIVE PERCENT: 0.3 %
BUN BLDV-MCNC: 22 MG/DL (ref 7–20)
CALCIUM SERPL-MCNC: 9.1 MG/DL (ref 8.3–10.6)
CHLORIDE BLD-SCNC: 97 MMOL/L (ref 99–110)
CO2: 30 MMOL/L (ref 21–32)
CREAT SERPL-MCNC: 1.1 MG/DL (ref 0.8–1.3)
EKG ATRIAL RATE: 102 BPM
EKG DIAGNOSIS: NORMAL
EKG Q-T INTERVAL: 370 MS
EKG QRS DURATION: 116 MS
EKG QTC CALCULATION (BAZETT): 489 MS
EKG R AXIS: -76 DEGREES
EKG T AXIS: 21 DEGREES
EKG VENTRICULAR RATE: 105 BPM
EOSINOPHILS ABSOLUTE: 0.2 K/UL (ref 0–0.6)
EOSINOPHILS RELATIVE PERCENT: 2.6 %
GFR AFRICAN AMERICAN: >60
GFR NON-AFRICAN AMERICAN: >60
GLUCOSE BLD-MCNC: 126 MG/DL (ref 70–99)
GLUCOSE BLD-MCNC: 208 MG/DL (ref 70–99)
HCT VFR BLD CALC: 40.7 % (ref 40.5–52.5)
HEMOGLOBIN: 13.6 G/DL (ref 13.5–17.5)
LYMPHOCYTES ABSOLUTE: 1.6 K/UL (ref 1–5.1)
LYMPHOCYTES RELATIVE PERCENT: 19.3 %
MAGNESIUM: 1.8 MG/DL (ref 1.8–2.4)
MCH RBC QN AUTO: 30.8 PG (ref 26–34)
MCHC RBC AUTO-ENTMCNC: 33.4 G/DL (ref 31–36)
MCV RBC AUTO: 92.4 FL (ref 80–100)
MONOCYTES ABSOLUTE: 0.9 K/UL (ref 0–1.3)
MONOCYTES RELATIVE PERCENT: 10.8 %
NEUTROPHILS ABSOLUTE: 5.6 K/UL (ref 1.7–7.7)
NEUTROPHILS RELATIVE PERCENT: 67 %
PDW BLD-RTO: 14.4 % (ref 12.4–15.4)
PERFORMED ON: ABNORMAL
PLATELET # BLD: 192 K/UL (ref 135–450)
PMV BLD AUTO: 9.3 FL (ref 5–10.5)
POTASSIUM REFLEX MAGNESIUM: 3.5 MMOL/L (ref 3.5–5.1)
POTASSIUM SERPL-SCNC: 3.5 MMOL/L (ref 3.5–5.1)
PRO-BNP: 735 PG/ML (ref 0–449)
RBC # BLD: 4.4 M/UL (ref 4.2–5.9)
SODIUM BLD-SCNC: 141 MMOL/L (ref 136–145)
WBC # BLD: 8.3 K/UL (ref 4–11)

## 2018-09-27 PROCEDURE — 6370000000 HC RX 637 (ALT 250 FOR IP): Performed by: HOSPITALIST

## 2018-09-27 PROCEDURE — 83735 ASSAY OF MAGNESIUM: CPT

## 2018-09-27 PROCEDURE — 93005 ELECTROCARDIOGRAM TRACING: CPT | Performed by: HOSPITALIST

## 2018-09-27 PROCEDURE — 93010 ELECTROCARDIOGRAM REPORT: CPT | Performed by: INTERNAL MEDICINE

## 2018-09-27 PROCEDURE — 85025 COMPLETE CBC W/AUTO DIFF WBC: CPT

## 2018-09-27 PROCEDURE — 83880 ASSAY OF NATRIURETIC PEPTIDE: CPT

## 2018-09-27 PROCEDURE — 99233 SBSQ HOSP IP/OBS HIGH 50: CPT | Performed by: INTERNAL MEDICINE

## 2018-09-27 PROCEDURE — 6360000002 HC RX W HCPCS: Performed by: HOSPITALIST

## 2018-09-27 PROCEDURE — 2580000003 HC RX 258: Performed by: INTERNAL MEDICINE

## 2018-09-27 PROCEDURE — 80048 BASIC METABOLIC PNL TOTAL CA: CPT

## 2018-09-27 PROCEDURE — 6370000000 HC RX 637 (ALT 250 FOR IP): Performed by: INTERNAL MEDICINE

## 2018-09-27 PROCEDURE — 94760 N-INVAS EAR/PLS OXIMETRY 1: CPT

## 2018-09-27 RX ORDER — CARVEDILOL 12.5 MG/1
12.5 TABLET ORAL 2 TIMES DAILY WITH MEALS
Qty: 60 TABLET | Refills: 3 | Status: SHIPPED | OUTPATIENT
Start: 2018-09-27 | End: 2018-09-27 | Stop reason: HOSPADM

## 2018-09-27 RX ORDER — ATORVASTATIN CALCIUM 10 MG/1
10 TABLET, FILM COATED ORAL NIGHTLY
Qty: 30 TABLET | Refills: 3 | Status: SHIPPED | OUTPATIENT
Start: 2018-09-27 | End: 2018-09-27

## 2018-09-27 RX ORDER — FUROSEMIDE 20 MG/1
20 TABLET ORAL DAILY
Status: DISCONTINUED | OUTPATIENT
Start: 2018-09-27 | End: 2018-09-27

## 2018-09-27 RX ORDER — ASPIRIN 81 MG/1
81 TABLET, CHEWABLE ORAL DAILY
Qty: 30 TABLET | Refills: 3 | Status: SHIPPED | OUTPATIENT
Start: 2018-09-28 | End: 2018-09-27 | Stop reason: HOSPADM

## 2018-09-27 RX ORDER — FUROSEMIDE 20 MG/1
20 TABLET ORAL DAILY
Qty: 60 TABLET | Refills: 3 | Status: SHIPPED | OUTPATIENT
Start: 2018-09-27 | End: 2018-09-27 | Stop reason: HOSPADM

## 2018-09-27 RX ORDER — METOPROLOL SUCCINATE 50 MG/1
50 TABLET, EXTENDED RELEASE ORAL DAILY
Qty: 30 TABLET | Refills: 3 | Status: SHIPPED | OUTPATIENT
Start: 2018-09-27 | End: 2018-09-27

## 2018-09-27 RX ORDER — ATORVASTATIN CALCIUM 10 MG/1
10 TABLET, FILM COATED ORAL NIGHTLY
Status: DISCONTINUED | OUTPATIENT
Start: 2018-09-27 | End: 2018-09-27 | Stop reason: HOSPADM

## 2018-09-27 RX ORDER — AMIODARONE HYDROCHLORIDE 200 MG/1
200 TABLET ORAL 2 TIMES DAILY
Status: DISCONTINUED | OUTPATIENT
Start: 2018-09-27 | End: 2018-09-27 | Stop reason: HOSPADM

## 2018-09-27 RX ORDER — ATORVASTATIN CALCIUM 10 MG/1
10 TABLET, FILM COATED ORAL NIGHTLY
Qty: 30 TABLET | Refills: 3 | Status: SHIPPED | OUTPATIENT
Start: 2018-09-27 | End: 2019-01-08 | Stop reason: SDUPTHER

## 2018-09-27 RX ORDER — TORSEMIDE 20 MG/1
20 TABLET ORAL DAILY
Qty: 30 TABLET | Refills: 3 | Status: SHIPPED | OUTPATIENT
Start: 2018-09-27 | End: 2018-09-27

## 2018-09-27 RX ORDER — METOPROLOL SUCCINATE 50 MG/1
50 TABLET, EXTENDED RELEASE ORAL DAILY
Qty: 30 TABLET | Refills: 3 | Status: SHIPPED | OUTPATIENT
Start: 2018-09-27 | End: 2019-01-08 | Stop reason: SDUPTHER

## 2018-09-27 RX ORDER — METOPROLOL SUCCINATE 50 MG/1
50 TABLET, EXTENDED RELEASE ORAL DAILY
Status: DISCONTINUED | OUTPATIENT
Start: 2018-09-27 | End: 2018-09-27 | Stop reason: HOSPADM

## 2018-09-27 RX ORDER — TORSEMIDE 20 MG/1
20 TABLET ORAL DAILY
Qty: 30 TABLET | Refills: 3 | Status: SHIPPED | OUTPATIENT
Start: 2018-09-27 | End: 2018-11-06 | Stop reason: SDUPTHER

## 2018-09-27 RX ORDER — ATORVASTATIN CALCIUM 40 MG/1
40 TABLET, FILM COATED ORAL NIGHTLY
Qty: 30 TABLET | Refills: 3 | Status: SHIPPED | OUTPATIENT
Start: 2018-09-27 | End: 2018-09-27 | Stop reason: HOSPADM

## 2018-09-27 RX ORDER — AMIODARONE HYDROCHLORIDE 200 MG/1
200 TABLET ORAL 2 TIMES DAILY
Qty: 30 TABLET | Refills: 3 | Status: SHIPPED | OUTPATIENT
Start: 2018-09-27 | End: 2018-09-28 | Stop reason: SDUPTHER

## 2018-09-27 RX ADMIN — CARVEDILOL 12.5 MG: 12.5 TABLET, FILM COATED ORAL at 13:10

## 2018-09-27 RX ADMIN — AMOXICILLIN 500 MG: 250 CAPSULE ORAL at 05:54

## 2018-09-27 RX ADMIN — ENOXAPARIN SODIUM 40 MG: 40 INJECTION SUBCUTANEOUS at 10:28

## 2018-09-27 RX ADMIN — INSULIN LISPRO 4 UNITS: 100 INJECTION, SOLUTION INTRAVENOUS; SUBCUTANEOUS at 13:10

## 2018-09-27 RX ADMIN — MAGNESIUM HYDROXIDE 30 ML: 400 SUSPENSION ORAL at 06:53

## 2018-09-27 RX ADMIN — ASPIRIN 81 MG 81 MG: 81 TABLET ORAL at 10:27

## 2018-09-27 RX ADMIN — AMOXICILLIN 500 MG: 250 CAPSULE ORAL at 13:10

## 2018-09-27 RX ADMIN — Medication 10 ML: at 10:28

## 2018-09-27 ASSESSMENT — PAIN SCALES - GENERAL: PAINLEVEL_OUTOF10: 0

## 2018-09-27 NOTE — PROGRESS NOTES
CLINICAL PHARMACY NOTE: MEDS TO [address] Select Patient?: No  Total # of Prescriptions Filled: 1     The following medications were delivered to the patient:  · Eliquis    Total # of Interventions Completed: 1   Eliquis free co-pay card    Time Spent (min): 15    Additional Documentation:  Medications delivered to the patient - counseled on appropriate use of medication and how to get his medication in the future using another coupon card.     [facility]
Clinical Pharmacy Note  Heparin Dosing       Lab Results   Component Value Date    APTT 59.7 09/26/2018     Lab Results   Component Value Date    HGB 13.6 09/26/2018    HCT 40.9 09/26/2018     09/26/2018       Current Infusion Rate: 10 mL/hr    Plan:  Continue same rate: 10 mL/hr  Next aPTT: 09/27/18 0600    Pharmacy will continue to monitor and adjust based on aPTT results.
Pt refusing Coreg and Gabapentin. States Coreg makes his BP drop low and Gabapentin makes him \"bounce off the walls\". Wants to take to the doctor about his medications.
Edema normal  Neurological: Oriented to time, place, and person, Non-anxious  Psychiatric: Normal mood and affect  Skin: Warm and dry,  No rash seen    Current Facility-Administered Medications: sodium chloride flush 0.9 % injection 10 mL, 10 mL, Intravenous, 2 times per day  sodium chloride flush 0.9 % injection 10 mL, 10 mL, Intravenous, PRN  acetaminophen (TYLENOL) tablet 650 mg, 650 mg, Oral, Q4H PRN  magnesium hydroxide (MILK OF MAGNESIA) 400 MG/5ML suspension 30 mL, 30 mL, Oral, Daily PRN  ondansetron (ZOFRAN) injection 4 mg, 4 mg, Intravenous, Q6H PRN  aspirin chewable tablet 81 mg, 81 mg, Oral, Daily  glucose (GLUTOSE) 40 % oral gel 15 g, 15 g, Oral, PRN  dextrose 50 % solution 12.5 g, 12.5 g, Intravenous, PRN  glucagon (rDNA) injection 1 mg, 1 mg, Intramuscular, PRN  dextrose 5 % solution, 100 mL/hr, Intravenous, PRN  insulin lispro (HUMALOG) injection vial 0-12 Units, 0-12 Units, Subcutaneous, TID WC  insulin lispro (HUMALOG) injection vial 0-6 Units, 0-6 Units, Subcutaneous, Nightly  gabapentin (NEURONTIN) capsule 300 mg, 300 mg, Oral, QAM  gabapentin (NEURONTIN) capsule 600 mg, 600 mg, Oral, Nightly  amoxicillin (AMOXIL) capsule 500 mg, 500 mg, Oral, 3 times per day  carvedilol (COREG) tablet 12.5 mg, 12.5 mg, Oral, BID WC  atorvastatin (LIPITOR) tablet 40 mg, 40 mg, Oral, Nightly  heparin 25,000 units in dextrose 5% 250 mL infusion, 10 mL/hr, Intravenous, Continuous         Labs:   Recent Labs      09/25/18   1051  09/26/18   0609   WBC  9.8  8.6   HGB  13.5  13.6   HCT  40.8  40.9   PLT  222  205     Recent Labs      09/25/18   0105  09/26/18   0609   NA  142  138   K  3.8  3.8   CO2  22  28   BUN  18  17   CREATININE  0.9  0.8   LABGLOM  >60  >60     No results for input(s): BNP in the last 72 hours. No results for input(s): PROTIME, INR in the last 72 hours. Recent Labs      09/25/18   0105  09/25/18   0506  09/25/18   1051   TROPONINI  0.04*  0.05*  0.06*       EKG:     This rhythm with marked
ECHO:  Mild conc. LVH with no wall motion abnormalities and EF of 60%. The left atrium is mildly dilated. Normal right ventricular size and function. Mild mitral and trivial aortic and pulmonic regurgitation. Inferior vena cava appears dilated. Assessment and plan      Acute Diastolic Heart failure  I believe his shortness of breath is from heart failure. Elevated proBNP  This could be because of diastolic dysfunction and gabapentin use  I/O not kept.    On Oral lasix now  Suggest Torsemide 20 mg daily when he goes home  Have educated him about fluid and salt restriction    Troponin elevation  His troponin elevation is probably secondary to CHF  Stress nuclear is negative     Atrial fibrillation   The patient is in A fibrillation with a controlled rate  Chads vasc score (age, CHF, DM)  4  Needs anticoagulation  Will start Eliquis  Will also start amiodarone to keep him in SR (since AF just started)  Would like to change his Coreg to Toprol  Discontinue aspirin    Donavan Linda M.D  9/27/2018

## 2018-09-27 NOTE — CARE COORDINATION
CASE MANAGEMENT:  rx  Card for Eliquis given to pt after confirming with [facility] pharmacy that eliquis and xarelto are comparable in price. Spoke with [employer] [facility]. He prefers Eliquis. His RXS were faxed to his own pharmacy. Shanice Montero R.N.     [phone]

## 2018-09-27 NOTE — DISCHARGE SUMMARY
START taking these medications    Details   amiodarone (CORDARONE) 200 MG tablet Take 1 tablet by mouth 2 times daily  Qty: 30 tablet, Refills: 3      apixaban (ELIQUIS) 5 MG TABS tablet Take 1 tablet by mouth 2 times daily  Qty: 60 tablet, Refills: 1      atorvastatin (LIPITOR) 10 MG tablet Take 1 tablet by mouth nightly  Qty: 30 tablet, Refills: 3      metoprolol succinate (TOPROL XL) 50 MG extended release tablet Take 1 tablet by mouth daily  Qty: 30 tablet, Refills: 3      torsemide (DEMADEX) 20 MG tablet Take 1 tablet by mouth daily  Qty: 30 tablet, Refills: 3           Current Discharge Medication List        Current Discharge Medication List      CONTINUE these medications which have NOT CHANGED    Details   insulin glargine (BASAGLAR KWIKPEN) 100 UNIT/ML injection pen Inject 22 Units into the skin daily  Qty: 9 pen, Refills: 3      fexofenadine (ALLEGRA) 180 MG tablet Take 180 mg by mouth daily as needed   Refills: 3      B-D ULTRAFINE III SHORT PEN 31G X 8 MM MISC USE DAILY WITH LANTUS  Qty: 100 each, Refills: 3    Associated Diagnoses: Type 2 diabetes mellitus with complication, unspecified whether long term insulin use (HCC)      triamcinolone (KENALOG) 0.1 % ointment APPLY THIN AMOUNT TO AFFECTED AREA TWO TIMES A DAY AS NEEDED  Qty: 60 g, Refills: 1      Blood Glucose Calibration (OT ULTRA/FASTTK CNTRL SOLN) SOLN Monthly as needed  Qty: 1 each, Refills: 5    Comments: Dx:  E11.9      glucose blood VI test strips (ONE TOUCH ULTRA TEST) strip Test bid  Qty: 100 each, Refills: 3    Comments: Dx:  E11.9      ONE TOUCH ULTRASOFT LANCETS MISC Test bid  Qty: 100 each, Refills: 3    Comments: Dx:  E11.9           Current Discharge Medication List      STOP taking these medications       amoxicillin (AMOXIL) 500 MG capsule Comments:   Reason for Stopping:         methylPREDNISolone (MEDROL DOSEPACK) 4 MG tablet Comments:   Reason for Stopping:         gabapentin (NEURONTIN) 300 MG capsule Comments:   Reason for Findings  No ischemic ST changes. Symptoms  Developed symptoms likely related to [address] [place] [address]. There was stress induced shortness of breath and lightheaded. Symptoms resolved with rest.  Post-Test SpO2 = 99% RA. Complications  Procedure complication was none. Stress Interpretation  Negative pharmacological stress portion of the study.   Procedure Medications   - Lexiscan I.V. 0.4 mg.  Imaging Protocols   Rest                   Stress   Isotope:Myoview        Isotope: Myoview  Isotope dose:12.6 mCi  Isotope dose:31.2 mCi  Date:09/26/2018 00:00  Date:09/26/2018 00:00                          Technique:   Gated  Imaging Results    Summed scores     - Summed stress score: 7     - Summed rest score: 2     - Summed difference score:    5     Stress ejection    Ejection fraction:59 %    EDV :149 ml    ESV :61 ml    Stroke volume :88 ml  Medical History  Signatures   ------------------------------------------------------------------  Electronically signed by Klaus Campbell MD (Interpreting  physician) on 09/26/2018 at 14:44  ------------------------------------------------------------------            Last Labs on Discharge:     Recent Results (from the past 24 hour(s))   POCT Glucose    Collection Time: 09/26/18  4:50 PM   Result Value Ref Range    POC Glucose 176 (H) 70 - 99 mg/dl    Performed on ACCU-CHEK    POCT Glucose    Collection Time: 09/26/18  9:23 PM   Result Value Ref Range    POC Glucose 146 (H) 70 - 99 mg/dl    Performed on ACCU-CHEK    Basic Metabolic Panel    Collection Time: 09/27/18  5:13 AM   Result Value Ref Range    Sodium 141 136 - 145 mmol/L    Potassium 3.5 3.5 - 5.1 mmol/L    Chloride 97 (L) 99 - 110 mmol/L    CO2 30 21 - 32 mmol/L    Anion Gap 14 3 - 16    Glucose 126 (H) 70 - 99 mg/dL    BUN 22 (H) 7 - 20 mg/dL    CREATININE 1.1 0.8 - 1.3 mg/dL    GFR Non-African American >60 >60    GFR African American >60 >60    Calcium 9.1 8.3 - 10.6 mg/dL   Basic Metabolic Panel w/ Reflex to MG

## 2018-09-27 NOTE — CONSULTS
cardiac history and no established cardiologist.  He was treated with IV bolus Lasix. Cardiology was consulted and noted elevated Trop 0.06 and patient was started on Heparin gtt per Dr Dirk Terrell. Patient improved with Lasix and trop bump was felt to be related to HF exac. Patient underwent a stress test on 9/26 to r/o ACS - stress was negative for ischemia. Patient sitting on side of bed finishing lunch. He is on room air and appears comfortable at rest and with light conversation. Patient welcomes me to spend time with him for HF education. He confirms the above summary of events leading to admission. He states he feels \"so much better -- normal now\". Patient reports he is very active, walks on treadmill daily, works outside,etc.  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  He reports overall good health. We discussed the general definition of diastolic heart failure and the need for daily, ongoing self assessment. I informed him there is no cure but with proper management, the disease can be slowed and patients can have good quality of life. He voices understanding. Patient reports he has a scale at home but does habitually weigh himself. He is agreeable to start doing so. I provided weight log and corresponding AHA HF zones sheet. We discussed the 3/5 rule and s/sx of worsening HF. Patient reports he has neuropathy in his hands and LEs which often is associated with edema. He is aware the gabapentin is likely contributing to LE edema. He is waiting for Dr Nikkie Hoover to round and to see if there is an alternative treatment for his neuropathy that would not have the same side effect. Patient appears VERY motivated to do whatever is requested of him. He surmises \"I am just going to have to make some lifestyle changes\". I praised his insight. I provided [facility] and [facility] HF WPS Resources and urged the early report of concerns to most easily address issues.   I patrickt down questions just as he had and call Resource Line at any time. He voiced his appreciation and comfort of knowing he can call for support. Patient has a discharge order in place. He has received >60 mins of HF education. HF instructions are in place on AVS.  Follow up is set for Thursday Oct 4 at 10:40am with Fransisco Postal NP. Pharmacy has been notified of need for discharge med rec. CURRENT DIET: DIET CARB CONTROL; Low Sodium (2 GM); Daily Fluid Restriction: 1200 ml    EDUCATIONAL PACKETS PROVIDED- PRINTED FROM "[employer] Titles and material given:  Yes   [x]  What is Heart Failure? [x]  Heart Failure: Warning Signs of a Flare-Up  [x]  Heart Failure: Making Changes to Your Diet  [x]  Heart Failure: Medications to Help Your Heart   [x]  Other: weight log, AHA HF zones sheet, I [facility] brochure, Sodium Content in Foods     PATIENT/CAREGIVER TEACHING:    Level of patient/caregiver understanding able to:   [x] Verbalize understanding   [] Demonstrate understanding       [x] Teach back        [x] Needs reinforcement     []  Other:      TEACHING TIME:  60 minutes       Plan:       DISCHARGE PLAN:  Placement for patient upon discharge: home with support   Hospice Care:  no  Code Status: Full Code  Discharge appointment scheduled: Yes     RECOMMENDATIONS:   [x]  Encourage to call Heart Failure Resource Line with any questions or concerns. [x]  Educate further Mr. Stover's on fluid restriction 48 oz- 64 oz during inpatient stay so he can understand how to measure intake at home. [x]  Continue to educate on S/S of Heart Failure.   [x]  Emphasize daily weights, diet, and if changes, to call Heart Failure Resource Line  [x]  Other:  [address] referral : accepted - brochure provided         Electronically signed by Hans Hernandez, RN, BSN CHFN  on 9/27/2018 at 12:17 PM

## 2018-09-28 ENCOUNTER — TELEPHONE (OUTPATIENT)
Dept: CARDIOLOGY CLINIC | Age: 83
End: 2018-09-28

## 2018-09-28 RX ORDER — AMIODARONE HYDROCHLORIDE 200 MG/1
200 TABLET ORAL 2 TIMES DAILY
Qty: 30 TABLET | Refills: 3 | OUTPATIENT
Start: 2018-09-28

## 2018-09-28 RX ORDER — AMIODARONE HYDROCHLORIDE 200 MG/1
200 TABLET ORAL 2 TIMES DAILY
Qty: 30 TABLET | Refills: 3 | Status: SHIPPED | OUTPATIENT
Start: 2018-09-28 | End: 2018-10-24

## 2018-09-28 NOTE — TELEPHONE ENCOUNTER
Patient called requesting Amiodarone to be sent to [facility] on [address] . He states it is the only one that is missing from his medications.  Call [phone] for questions

## 2018-09-28 NOTE — TELEPHONE ENCOUNTER
Naomy Nava is calling stating Jarold Closs had talked to him about his medication while he was in the hospital and he went to  the 5 meds and [facility] had 4 out of the 5. Naomy Nava would like us to call to see what the problem is will filling the medication      amiodarone (CORDARONE) 200 MG tablet   Take 1 tablet by mouth 2 times daily    [facility]/pharmacy #[id number]- [place], [place] - [address]  Rboin Clifton [phone] Chari Doan [phone]      Please call Nahid at home number first at  [phone] and then his cell [phone]

## 2018-10-04 ENCOUNTER — OFFICE VISIT (OUTPATIENT)
Dept: CARDIOLOGY CLINIC | Age: 83
End: 2018-10-04
Payer: MEDICARE

## 2018-10-04 VITALS
DIASTOLIC BLOOD PRESSURE: 68 MMHG | WEIGHT: 222 LBS | SYSTOLIC BLOOD PRESSURE: 132 MMHG | HEIGHT: 71 IN | OXYGEN SATURATION: 99 % | BODY MASS INDEX: 31.08 KG/M2 | HEART RATE: 61 BPM

## 2018-10-04 DIAGNOSIS — I10 ESSENTIAL HYPERTENSION: ICD-10-CM

## 2018-10-04 DIAGNOSIS — I48.19 PERSISTENT ATRIAL FIBRILLATION (HCC): Primary | ICD-10-CM

## 2018-10-04 DIAGNOSIS — E78.5 HYPERLIPIDEMIA, UNSPECIFIED HYPERLIPIDEMIA TYPE: ICD-10-CM

## 2018-10-04 DIAGNOSIS — I50.32 CHRONIC DIASTOLIC HF (HEART FAILURE) (HCC): ICD-10-CM

## 2018-10-04 PROCEDURE — 93000 ELECTROCARDIOGRAM COMPLETE: CPT | Performed by: NURSE PRACTITIONER

## 2018-10-04 PROCEDURE — G8427 DOCREV CUR MEDS BY ELIG CLIN: HCPCS | Performed by: NURSE PRACTITIONER

## 2018-10-04 PROCEDURE — G8598 ASA/ANTIPLAT THER USED: HCPCS | Performed by: NURSE PRACTITIONER

## 2018-10-04 PROCEDURE — 4040F PNEUMOC VAC/ADMIN/RCVD: CPT | Performed by: NURSE PRACTITIONER

## 2018-10-04 PROCEDURE — 1036F TOBACCO NON-USER: CPT | Performed by: NURSE PRACTITIONER

## 2018-10-04 PROCEDURE — G8417 CALC BMI ABV UP PARAM F/U: HCPCS | Performed by: NURSE PRACTITIONER

## 2018-10-04 PROCEDURE — 1101F PT FALLS ASSESS-DOCD LE1/YR: CPT | Performed by: NURSE PRACTITIONER

## 2018-10-04 PROCEDURE — G8484 FLU IMMUNIZE NO ADMIN: HCPCS | Performed by: NURSE PRACTITIONER

## 2018-10-04 PROCEDURE — 1123F ACP DISCUSS/DSCN MKR DOCD: CPT | Performed by: NURSE PRACTITIONER

## 2018-10-04 PROCEDURE — 1111F DSCHRG MED/CURRENT MED MERGE: CPT | Performed by: NURSE PRACTITIONER

## 2018-10-04 PROCEDURE — 99213 OFFICE O/P EST LOW 20 MIN: CPT | Performed by: NURSE PRACTITIONER

## 2018-10-04 RX ORDER — GABAPENTIN 300 MG/1
300 CAPSULE ORAL 3 TIMES DAILY
COMMUNITY
End: 2018-12-27 | Stop reason: SDUPTHER

## 2018-10-04 NOTE — LETTER
[facility] HEART [place] [address]  Phone: [phone]  Fax: [phone]    MARIA FERNANDA Cook - CNP        October 4, 2018     Carlotta Davila MD  [address] [facility] [address]    Patient: Zoila Gillette  MR Number: [id number]  YOB: 1935  Date of Visit: 10/4/2018    Dear Dr. Carlotta Davila: Thank you for the request for consultation for Alfredo Morris. HPI:  The patient is 81 y.o. male with a past medical history significant for HTN, hyperlipidemia, DHF, NSTEMI, diabetes mellitus and GERD who is here for hospital follow up. Hospitalized from 9/25/2018-9/27/2018 with chest pain and SOB. Diuresed with weight going from 235# to 218#. Stress test performed was negative for ischemia. Seen by Dr. Patsy Camara in hospital follow up and noted to be in atrial fib and placed on Eliquis, amiodarone and Toprol. Here today for follow up. Feeling well. Wt at home stable at 216#. Denies chest pain/discomfort, SOB, orthopnea/PND, cough, palpitations, syncope, edema , weight change or claudication. Has chronic issues with dizziness d/t gabapentin. Uses cane for ambulation. Tries to stay active at home [employer], helps his wife with household activities, etc). Assessment:    1. Persistent atrial fibrillation (HCC)  -VR controlled on Amiodarone and BB  CHADS VAsc score 4  -on Eliquis  -will follow up ECG next week    2. Chronic diastolic HF (heart failure) (HCC)  -continue diuretic  -currently compensated: NYHA class II-III  -continue Toprol Xl  -low sodium diet and fluid restriction    3. Hyperlipidemia, unspecified hyperlipidemia type  -on statin    4.  Essential hypertension  -controlled  -continue medical management    Plan:    Decrease Amiodarone to 200 mg daily as of 10/11/2018  Continue torsemide, Toprol, statin, Eliquis  Emphasized and discussed low-fat/low sodium diet, monitoring of daily weights, fluid restriction, worsening signs and symptoms of heart failure and when to call, and the importance of regular exercise and activity. ECG next week  Follow up with D. Enzweiler, CNP in 4 weeks or sooner if needexd    Return in about 4 weeks (around 11/1/2018) for with D. Enzweiler, CNP. Thanks for allowing me to participate in the care of this patient. If you have questions, please do not hesitate to call me. I look forward to following Domingo Fung along with you.     Sincerely,        MARIA FERNANDA Horton - CNP

## 2018-10-10 ENCOUNTER — TELEPHONE (OUTPATIENT)
Dept: PHARMACY | Age: 83
End: 2018-10-10

## 2018-10-12 ENCOUNTER — OFFICE VISIT (OUTPATIENT)
Dept: INTERNAL MEDICINE CLINIC | Age: 83
End: 2018-10-12
Payer: MEDICARE

## 2018-10-12 VITALS
HEIGHT: 71 IN | WEIGHT: 218 LBS | BODY MASS INDEX: 30.52 KG/M2 | DIASTOLIC BLOOD PRESSURE: 70 MMHG | SYSTOLIC BLOOD PRESSURE: 128 MMHG

## 2018-10-12 DIAGNOSIS — E10.42 TYPE 1 DIABETES MELLITUS WITH DIABETIC POLYNEUROPATHY (HCC): Primary | ICD-10-CM

## 2018-10-12 DIAGNOSIS — I10 ESSENTIAL HYPERTENSION: ICD-10-CM

## 2018-10-12 DIAGNOSIS — R06.02 SHORTNESS OF BREATH: ICD-10-CM

## 2018-10-12 DIAGNOSIS — I50.32 CHRONIC DIASTOLIC HEART FAILURE (HCC): ICD-10-CM

## 2018-10-12 DIAGNOSIS — E78.5 HYPERLIPIDEMIA, UNSPECIFIED HYPERLIPIDEMIA TYPE: ICD-10-CM

## 2018-10-12 DIAGNOSIS — K59.00 OBSTIPATION: ICD-10-CM

## 2018-10-12 DIAGNOSIS — E10.42 TYPE 1 DIABETES MELLITUS WITH DIABETIC POLYNEUROPATHY (HCC): ICD-10-CM

## 2018-10-12 DIAGNOSIS — D50.9 IRON DEFICIENCY ANEMIA, UNSPECIFIED IRON DEFICIENCY ANEMIA TYPE: ICD-10-CM

## 2018-10-12 DIAGNOSIS — G62.9 NEUROPATHY: ICD-10-CM

## 2018-10-12 PROBLEM — I50.30 DIASTOLIC HEART FAILURE (HCC): Status: ACTIVE | Noted: 2018-09-01

## 2018-10-12 LAB
A/G RATIO: 1.5 (ref 1.1–2.2)
ALBUMIN SERPL-MCNC: 4.1 G/DL (ref 3.4–5)
ALP BLD-CCNC: 62 U/L (ref 40–129)
ALT SERPL-CCNC: 22 U/L (ref 10–40)
ANION GAP SERPL CALCULATED.3IONS-SCNC: 14 MMOL/L (ref 3–16)
AST SERPL-CCNC: 20 U/L (ref 15–37)
BASOPHILS ABSOLUTE: 0 K/UL (ref 0–0.2)
BASOPHILS RELATIVE PERCENT: 0.4 %
BILIRUB SERPL-MCNC: 0.8 MG/DL (ref 0–1)
BUN BLDV-MCNC: 26 MG/DL (ref 7–20)
CALCIUM SERPL-MCNC: 9.4 MG/DL (ref 8.3–10.6)
CHLORIDE BLD-SCNC: 98 MMOL/L (ref 99–110)
CHOLESTEROL, TOTAL: 125 MG/DL (ref 0–199)
CO2: 29 MMOL/L (ref 21–32)
CREAT SERPL-MCNC: 1.3 MG/DL (ref 0.8–1.3)
EOSINOPHILS ABSOLUTE: 0 K/UL (ref 0–0.6)
EOSINOPHILS RELATIVE PERCENT: 0.4 %
ESTIMATED AVERAGE GLUCOSE: 168.6 MG/DL
GFR AFRICAN AMERICAN: >60
GFR NON-AFRICAN AMERICAN: 53
GLOBULIN: 2.8 G/DL
GLUCOSE BLD-MCNC: 193 MG/DL (ref 70–99)
HBA1C MFR BLD: 7.5 %
HCT VFR BLD CALC: 40 % (ref 40.5–52.5)
HDLC SERPL-MCNC: 53 MG/DL (ref 40–60)
HEMOGLOBIN: 13.4 G/DL (ref 13.5–17.5)
LDL CHOLESTEROL CALCULATED: 60 MG/DL
LYMPHOCYTES ABSOLUTE: 1 K/UL (ref 1–5.1)
LYMPHOCYTES RELATIVE PERCENT: 10.3 %
MCH RBC QN AUTO: 31.2 PG (ref 26–34)
MCHC RBC AUTO-ENTMCNC: 33.4 G/DL (ref 31–36)
MCV RBC AUTO: 93.5 FL (ref 80–100)
MONOCYTES ABSOLUTE: 0.8 K/UL (ref 0–1.3)
MONOCYTES RELATIVE PERCENT: 8 %
NEUTROPHILS ABSOLUTE: 7.9 K/UL (ref 1.7–7.7)
NEUTROPHILS RELATIVE PERCENT: 80.9 %
PDW BLD-RTO: 14.2 % (ref 12.4–15.4)
PLATELET # BLD: 233 K/UL (ref 135–450)
PMV BLD AUTO: 9.5 FL (ref 5–10.5)
POTASSIUM SERPL-SCNC: 3.9 MMOL/L (ref 3.5–5.1)
PRO-BNP: 1371 PG/ML (ref 0–449)
RBC # BLD: 4.28 M/UL (ref 4.2–5.9)
SODIUM BLD-SCNC: 141 MMOL/L (ref 136–145)
TOTAL PROTEIN: 6.9 G/DL (ref 6.4–8.2)
TRIGL SERPL-MCNC: 62 MG/DL (ref 0–150)
TSH REFLEX FT4: 2.02 UIU/ML (ref 0.27–4.2)
VLDLC SERPL CALC-MCNC: 12 MG/DL
WBC # BLD: 9.8 K/UL (ref 4–11)

## 2018-10-12 PROCEDURE — 1123F ACP DISCUSS/DSCN MKR DOCD: CPT | Performed by: INTERNAL MEDICINE

## 2018-10-12 PROCEDURE — 99215 OFFICE O/P EST HI 40 MIN: CPT | Performed by: INTERNAL MEDICINE

## 2018-10-12 PROCEDURE — G8427 DOCREV CUR MEDS BY ELIG CLIN: HCPCS | Performed by: INTERNAL MEDICINE

## 2018-10-12 PROCEDURE — G8484 FLU IMMUNIZE NO ADMIN: HCPCS | Performed by: INTERNAL MEDICINE

## 2018-10-12 PROCEDURE — 4040F PNEUMOC VAC/ADMIN/RCVD: CPT | Performed by: INTERNAL MEDICINE

## 2018-10-12 PROCEDURE — G8598 ASA/ANTIPLAT THER USED: HCPCS | Performed by: INTERNAL MEDICINE

## 2018-10-12 PROCEDURE — 1101F PT FALLS ASSESS-DOCD LE1/YR: CPT | Performed by: INTERNAL MEDICINE

## 2018-10-12 PROCEDURE — 1036F TOBACCO NON-USER: CPT | Performed by: INTERNAL MEDICINE

## 2018-10-12 PROCEDURE — G8417 CALC BMI ABV UP PARAM F/U: HCPCS | Performed by: INTERNAL MEDICINE

## 2018-10-12 PROCEDURE — 1111F DSCHRG MED/CURRENT MED MERGE: CPT | Performed by: INTERNAL MEDICINE

## 2018-10-12 ASSESSMENT — ENCOUNTER SYMPTOMS
TROUBLE SWALLOWING: 0
WHEEZING: 0
SHORTNESS OF BREATH: 1
CONSTIPATION: 1

## 2018-10-12 ASSESSMENT — PATIENT HEALTH QUESTIONNAIRE - PHQ9
1. LITTLE INTEREST OR PLEASURE IN DOING THINGS: 0
SUM OF ALL RESPONSES TO PHQ QUESTIONS 1-9: 0
2. FEELING DOWN, DEPRESSED OR HOPELESS: 0
SUM OF ALL RESPONSES TO PHQ QUESTIONS 1-9: 0
SUM OF ALL RESPONSES TO PHQ9 QUESTIONS 1 & 2: 0

## 2018-10-12 NOTE — PROGRESS NOTES
SUBJECTIVE:  Patient ID: Cheri Sol is an 81 y.o. male. HPI: Patient here today for the f/u of chronic problems-- see Problem List and associated comments. New issues or complaints include (alsosee Assessment for more details):  Hospital follow-up. Patient admitted at outside hospital with diastolic heart failure and new onset atrial fibrillation. Felt fatigued and weak. Complicated by significant constipation which is finally been relieved. He does get short of breath with overexertion. He missed this point with cardiology yesterday and is rescheduled. He is now on amiodarone and anticoagulation. Mostly he just feels fatigued at this time. He is the middle of trying to have some dental work accomplished as well. Review of Systems   Constitutional: Positive for activity change, appetite change and fatigue. HENT: Negative for trouble swallowing. Respiratory: Positive for shortness of breath. Negative for wheezing. Cardiovascular: Negative for chest pain and palpitations. Gastrointestinal: Positive for constipation. Genitourinary: Negative for difficulty urinating. Musculoskeletal: Positive for arthralgias. Skin: Negative for wound. Neurological: Positive for numbness. Psychiatric/Behavioral: Negative. OBJECTIVE:    /70 (Site: Right Upper Arm)   Ht 5' 11\" (1.803 m)   Wt 218 lb (98.9 kg)   BMI 30.40 kg/m²      Physical Exam   Constitutional: He appears well-developed. Eyes: EOM are normal.   Neck: No JVD present. Cardiovascular: Intact distal pulses. An irregular rhythm present. Exam reveals no gallop. Pulses:       Carotid pulses are 2+ on the right side, and 2+ on the left side. Radial pulses are 2+ on the right side, and 2+ on the left side. Pulmonary/Chest: Effort normal and breath sounds normal. No stridor. Abdominal: Soft. Bowel sounds are normal.   Musculoskeletal: He exhibits edema (trace ankles nonpitting). Neurological: He is alert. Skin: He is not diaphoretic. No pallor. ASSESSMENT:       Encounter Diagnoses   Name Primary? 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) Yes    Chronic diastolic heart failure (HCC)     Iron deficiency anemia, unspecified iron deficiency anemia type     Essential hypertension     Shortness of breath     Hyperlipidemia, unspecified hyperlipidemia type     Obstipation     Neuropathy        Diabetes mellitus ([address]  Check A1c    Obstipation  Use MiraLAX daily    Iron deficiency anemia  Recheck CBC    Hypertension  BP okay    Hyperlipidemia  Check labs on Lipitor    Neuropathy  Continue gabapentin which gives him moderate relief from his neuropathy        PLAN:See ASSESSMENT for evaluation & PLAN     Orders Placed This Encounter   Procedures    CBC Auto Differential     Standing Status:   Future     Number of Occurrences:   1     Standing Expiration Date:   10/12/2019    Comprehensive Metabolic Panel     Standing Status:   Future     Number of Occurrences:   1     Standing Expiration Date:   10/12/2019    Hemoglobin A1C     Standing Status:   Future     Number of Occurrences:   1     Standing Expiration Date:   10/12/2019    TSH WITH REFLEX TO FT4     Standing Status:   Future     Number of Occurrences:   1     Standing Expiration Date:   10/12/2019    BRAIN NATRIURETIC PEPTIDE (BNP)     Standing Status:   Future     Number of Occurrences:   1     Standing Expiration Date:   10/12/2019    Lipid Panel     Standing Status:   Future     Number of Occurrences:   1     Standing Expiration Date:   10/12/2019     Order Specific Question:   Is Patient Fasting?/# of Hours     Answer:   yes - 8 hours       PSH, PMH, SH and FH reviewed and noted. Recent and past labs, tests and consultsalso reviewed. Recent or new meds also reviewed. 45 minutes spent with patient and family/caregivers discussing diagnoses and plan; at least 50% of which was for care coordination.

## 2018-10-24 ENCOUNTER — OFFICE VISIT (OUTPATIENT)
Dept: PHARMACY | Age: 83
End: 2018-10-24
Payer: MEDICARE

## 2018-10-24 VITALS
HEART RATE: 70 BPM | WEIGHT: 220 LBS | BODY MASS INDEX: 30.68 KG/M2 | OXYGEN SATURATION: 97 % | SYSTOLIC BLOOD PRESSURE: 116 MMHG | DIASTOLIC BLOOD PRESSURE: 66 MMHG

## 2018-10-24 DIAGNOSIS — I50.32 CHRONIC DIASTOLIC HEART FAILURE (HCC): ICD-10-CM

## 2018-10-24 PROCEDURE — 99214 OFFICE O/P EST MOD 30 MIN: CPT

## 2018-10-24 RX ORDER — AMIODARONE HYDROCHLORIDE 200 MG/1
200 TABLET ORAL DAILY
COMMUNITY
End: 2019-01-08

## 2018-10-24 RX ORDER — ACETAMINOPHEN 500 MG
500-1000 TABLET ORAL EVERY 6 HOURS PRN
COMMUNITY
End: 2020-03-24

## 2018-10-29 ENCOUNTER — SCHEDULED TELEPHONE ENCOUNTER (OUTPATIENT)
Dept: PHARMACY | Age: 83
End: 2018-10-29

## 2018-11-06 ENCOUNTER — OFFICE VISIT (OUTPATIENT)
Dept: CARDIOLOGY CLINIC | Age: 83
End: 2018-11-06
Payer: MEDICARE

## 2018-11-06 VITALS
BODY MASS INDEX: 30.8 KG/M2 | OXYGEN SATURATION: 99 % | HEART RATE: 53 BPM | SYSTOLIC BLOOD PRESSURE: 138 MMHG | HEIGHT: 71 IN | DIASTOLIC BLOOD PRESSURE: 64 MMHG | WEIGHT: 220 LBS

## 2018-11-06 DIAGNOSIS — I25.2 HISTORY OF NON-ST ELEVATION MYOCARDIAL INFARCTION (NSTEMI): ICD-10-CM

## 2018-11-06 DIAGNOSIS — I10 ESSENTIAL HYPERTENSION: ICD-10-CM

## 2018-11-06 DIAGNOSIS — I50.32 CHRONIC DIASTOLIC HF (HEART FAILURE) (HCC): ICD-10-CM

## 2018-11-06 DIAGNOSIS — I48.19 PERSISTENT ATRIAL FIBRILLATION (HCC): Primary | ICD-10-CM

## 2018-11-06 DIAGNOSIS — E78.5 HYPERLIPIDEMIA, UNSPECIFIED HYPERLIPIDEMIA TYPE: ICD-10-CM

## 2018-11-06 PROCEDURE — G8598 ASA/ANTIPLAT THER USED: HCPCS | Performed by: NURSE PRACTITIONER

## 2018-11-06 PROCEDURE — 99214 OFFICE O/P EST MOD 30 MIN: CPT | Performed by: NURSE PRACTITIONER

## 2018-11-06 PROCEDURE — G8427 DOCREV CUR MEDS BY ELIG CLIN: HCPCS | Performed by: NURSE PRACTITIONER

## 2018-11-06 PROCEDURE — G8484 FLU IMMUNIZE NO ADMIN: HCPCS | Performed by: NURSE PRACTITIONER

## 2018-11-06 PROCEDURE — 1123F ACP DISCUSS/DSCN MKR DOCD: CPT | Performed by: NURSE PRACTITIONER

## 2018-11-06 PROCEDURE — 4040F PNEUMOC VAC/ADMIN/RCVD: CPT | Performed by: NURSE PRACTITIONER

## 2018-11-06 PROCEDURE — G8417 CALC BMI ABV UP PARAM F/U: HCPCS | Performed by: NURSE PRACTITIONER

## 2018-11-06 PROCEDURE — 1101F PT FALLS ASSESS-DOCD LE1/YR: CPT | Performed by: NURSE PRACTITIONER

## 2018-11-06 PROCEDURE — 1036F TOBACCO NON-USER: CPT | Performed by: NURSE PRACTITIONER

## 2018-11-06 PROCEDURE — 93000 ELECTROCARDIOGRAM COMPLETE: CPT | Performed by: NURSE PRACTITIONER

## 2018-11-06 RX ORDER — TORSEMIDE 20 MG/1
10 TABLET ORAL DAILY
Qty: 30 TABLET | Refills: 3
Start: 2018-11-06 | End: 2019-01-08 | Stop reason: SDUPTHER

## 2018-11-06 NOTE — LETTER
-will hold statin for trial given his muscle aches (drug holiday)  -risk factor modification    5. Hyperlipidemia, unspecified hyperlipidemia type  -will hold statin for drug holiday to see if muscle aches improve    Plan:    Decrease Torsemide to 10 mg daily; Can take add'l 10 mg if wt gain > 3# or increased edema or SOB  Stop atorvastatin x 3-4 weeks and see if muscle aches improve  Continue Toprol, Eliquis and amiodarone  Reinforced and discussed low-fat/low sodium diet, monitoring of daily weights, fluid restriction, worsening signs and symptoms of heart failure and when to call, and the importance of regular exercise and activity. Will d/w Dr. Ramo Maldonado: ? Continue with medical management vs DCCV. Discussed both with patients  Follow up with Dr. Ramo Maldonado in 6-8 weeks or sooner if needed     Return in about 6 weeks (around 12/18/2018) for 6-8 weeks with Dr. Ramo Maldonado. Thanks for allowing me to participate in the care of this patient. If you have questions, please do not hesitate to call me. I look forward to following Kwesi Monteiro along with you.     Sincerely,        MARIA FERNANDA Thompson - CNP

## 2018-11-07 NOTE — COMMUNICATION BODY
HPI:  The patient is 81 y.o. male with a past medical history significant for HTN, hyperlipidemia, DHF, NSTEMI, diabetes mellitus and GERD who is here for follow up. Hospitalized from 9/25/2018-9/27/2018 with chest pain and SOB. Diuresed with weight going from 235# to 218#. Stress test performed was negative for ischemia. He has been seen  In office since his hospital stay his amiodarone dose has been adjusted. He was last seen on 10/4/2018 and here for follow up today. He is not feeling well. C/O muscles aching and dry mouth and he is convinced it is d/t medications. Wt stable at home (216-218#). Denies chest pain/discomfort, SOB, orthopnea/PND, cough, palpitations, dizziness, syncope, edema  or claudication. Walks daily on treadmill at 2 mph with 2% incline for 15 minutes . Remains on gabapentin and he cannot tolerate lower dose. Assessment:    1. Persistent atrial fibrillation (HCC)  -remains in atrial fib on ECG today  -VR controlled on amiodarone and BB  CHADS VAsc score 4  -on Eliquis    2. Chronic diastolic HF (heart failure) (HCC)  -currently compensated: NYHA class II-III  -will decrease diuretic given his dry mouth  -continue Toprol Xl  -monitor for sxs of CHF (d/w pt)  -low sodium diet and fluid restriction    3. Essential hypertension  -fairly well controlled  -continue medical management    4. History of non-ST elevation myocardial infarction (NSTEMI)  -no obstructive CAD  -no angina  -continue BB  -will hold statin for trial given his muscle aches (drug holiday)  -risk factor modification    5. Hyperlipidemia, unspecified hyperlipidemia type  -will hold statin for drug holiday to see if muscle aches improve    Plan:    Decrease Torsemide to 10 mg daily;   Can take add'l 10 mg if wt gain > 3# or increased edema or SOB  Stop atorvastatin x 3-4 weeks and see if muscle aches improve  Continue Toprol, Eliquis and amiodarone  Reinforced and discussed low-fat/low sodium diet, monitoring of daily

## 2018-11-12 ENCOUNTER — SCHEDULED TELEPHONE ENCOUNTER (OUTPATIENT)
Dept: PHARMACY | Age: 83
End: 2018-11-12

## 2018-11-23 ENCOUNTER — TELEPHONE (OUTPATIENT)
Dept: PHARMACY | Age: 83
End: 2018-11-23

## 2018-12-06 ENCOUNTER — SCHEDULED TELEPHONE ENCOUNTER (OUTPATIENT)
Dept: PHARMACY | Age: 83
End: 2018-12-06
Payer: MEDICARE

## 2018-12-12 ENCOUNTER — TELEPHONE (OUTPATIENT)
Dept: INTERNAL MEDICINE CLINIC | Age: 83
End: 2018-12-12

## 2018-12-12 RX ORDER — CEFUROXIME AXETIL 250 MG/1
250 TABLET ORAL 2 TIMES DAILY
Qty: 14 TABLET | Refills: 0 | Status: SHIPPED | OUTPATIENT
Start: 2018-12-12 | End: 2018-12-22

## 2018-12-12 RX ORDER — APIXABAN 5 MG/1
TABLET, FILM COATED ORAL
Qty: 60 TABLET | Refills: 1 | Status: SHIPPED | OUTPATIENT
Start: 2018-12-12 | End: 2020-01-06

## 2018-12-12 RX ORDER — METHYLPREDNISOLONE 4 MG/1
TABLET ORAL
Qty: 1 KIT | Refills: 0 | Status: SHIPPED | OUTPATIENT
Start: 2018-12-12 | End: 2018-12-18

## 2018-12-27 ENCOUNTER — OFFICE VISIT (OUTPATIENT)
Dept: INTERNAL MEDICINE CLINIC | Age: 83
End: 2018-12-27
Payer: MEDICARE

## 2018-12-27 VITALS
WEIGHT: 222 LBS | SYSTOLIC BLOOD PRESSURE: 128 MMHG | BODY MASS INDEX: 31.08 KG/M2 | HEIGHT: 71 IN | DIASTOLIC BLOOD PRESSURE: 64 MMHG

## 2018-12-27 DIAGNOSIS — E10.42 TYPE 1 DIABETES MELLITUS WITH DIABETIC POLYNEUROPATHY (HCC): ICD-10-CM

## 2018-12-27 DIAGNOSIS — I10 ESSENTIAL HYPERTENSION: ICD-10-CM

## 2018-12-27 DIAGNOSIS — G62.9 NEUROPATHY: Primary | ICD-10-CM

## 2018-12-27 DIAGNOSIS — K59.00 OBSTIPATION: ICD-10-CM

## 2018-12-27 DIAGNOSIS — I48.20 CHRONIC ATRIAL FIBRILLATION (HCC): ICD-10-CM

## 2018-12-27 DIAGNOSIS — Z23 NEED FOR INFLUENZA VACCINATION: ICD-10-CM

## 2018-12-27 PROCEDURE — G8482 FLU IMMUNIZE ORDER/ADMIN: HCPCS | Performed by: INTERNAL MEDICINE

## 2018-12-27 PROCEDURE — 1036F TOBACCO NON-USER: CPT | Performed by: INTERNAL MEDICINE

## 2018-12-27 PROCEDURE — 1101F PT FALLS ASSESS-DOCD LE1/YR: CPT | Performed by: INTERNAL MEDICINE

## 2018-12-27 PROCEDURE — 4040F PNEUMOC VAC/ADMIN/RCVD: CPT | Performed by: INTERNAL MEDICINE

## 2018-12-27 PROCEDURE — G8417 CALC BMI ABV UP PARAM F/U: HCPCS | Performed by: INTERNAL MEDICINE

## 2018-12-27 PROCEDURE — 90662 IIV NO PRSV INCREASED AG IM: CPT | Performed by: INTERNAL MEDICINE

## 2018-12-27 PROCEDURE — 99214 OFFICE O/P EST MOD 30 MIN: CPT | Performed by: INTERNAL MEDICINE

## 2018-12-27 PROCEDURE — G8427 DOCREV CUR MEDS BY ELIG CLIN: HCPCS | Performed by: INTERNAL MEDICINE

## 2018-12-27 PROCEDURE — 1123F ACP DISCUSS/DSCN MKR DOCD: CPT | Performed by: INTERNAL MEDICINE

## 2018-12-27 PROCEDURE — G0008 ADMIN INFLUENZA VIRUS VAC: HCPCS | Performed by: INTERNAL MEDICINE

## 2018-12-27 PROCEDURE — G8598 ASA/ANTIPLAT THER USED: HCPCS | Performed by: INTERNAL MEDICINE

## 2018-12-27 RX ORDER — GABAPENTIN 300 MG/1
300 CAPSULE ORAL 3 TIMES DAILY
Qty: 90 CAPSULE | Refills: 2 | Status: SHIPPED | OUTPATIENT
Start: 2018-12-27 | End: 2019-06-10 | Stop reason: SDUPTHER

## 2018-12-27 ASSESSMENT — ENCOUNTER SYMPTOMS
CONSTIPATION: 0
CHEST TIGHTNESS: 0
SHORTNESS OF BREATH: 0

## 2019-01-08 ENCOUNTER — OFFICE VISIT (OUTPATIENT)
Dept: CARDIOLOGY CLINIC | Age: 84
End: 2019-01-08
Payer: MEDICARE

## 2019-01-08 VITALS
OXYGEN SATURATION: 98 % | SYSTOLIC BLOOD PRESSURE: 138 MMHG | HEIGHT: 71 IN | DIASTOLIC BLOOD PRESSURE: 70 MMHG | BODY MASS INDEX: 30.66 KG/M2 | HEART RATE: 71 BPM | WEIGHT: 219 LBS

## 2019-01-08 DIAGNOSIS — I50.32 CHRONIC DIASTOLIC HEART FAILURE (HCC): ICD-10-CM

## 2019-01-08 DIAGNOSIS — I25.2 HISTORY OF NON-ST ELEVATION MYOCARDIAL INFARCTION (NSTEMI): ICD-10-CM

## 2019-01-08 DIAGNOSIS — I10 ESSENTIAL HYPERTENSION: ICD-10-CM

## 2019-01-08 DIAGNOSIS — I50.32 CHRONIC DIASTOLIC HF (HEART FAILURE) (HCC): ICD-10-CM

## 2019-01-08 DIAGNOSIS — I48.19 PERSISTENT ATRIAL FIBRILLATION (HCC): Primary | ICD-10-CM

## 2019-01-08 DIAGNOSIS — E78.2 MIXED HYPERLIPIDEMIA: ICD-10-CM

## 2019-01-08 PROCEDURE — G8598 ASA/ANTIPLAT THER USED: HCPCS | Performed by: INTERNAL MEDICINE

## 2019-01-08 PROCEDURE — 4040F PNEUMOC VAC/ADMIN/RCVD: CPT | Performed by: INTERNAL MEDICINE

## 2019-01-08 PROCEDURE — 1123F ACP DISCUSS/DSCN MKR DOCD: CPT | Performed by: INTERNAL MEDICINE

## 2019-01-08 PROCEDURE — G8427 DOCREV CUR MEDS BY ELIG CLIN: HCPCS | Performed by: INTERNAL MEDICINE

## 2019-01-08 PROCEDURE — 1036F TOBACCO NON-USER: CPT | Performed by: INTERNAL MEDICINE

## 2019-01-08 PROCEDURE — 99214 OFFICE O/P EST MOD 30 MIN: CPT | Performed by: INTERNAL MEDICINE

## 2019-01-08 PROCEDURE — G8417 CALC BMI ABV UP PARAM F/U: HCPCS | Performed by: INTERNAL MEDICINE

## 2019-01-08 PROCEDURE — 1101F PT FALLS ASSESS-DOCD LE1/YR: CPT | Performed by: INTERNAL MEDICINE

## 2019-01-08 PROCEDURE — G8482 FLU IMMUNIZE ORDER/ADMIN: HCPCS | Performed by: INTERNAL MEDICINE

## 2019-01-08 PROCEDURE — 93000 ELECTROCARDIOGRAM COMPLETE: CPT | Performed by: INTERNAL MEDICINE

## 2019-01-08 RX ORDER — TORSEMIDE 20 MG/1
10 TABLET ORAL DAILY
Qty: 90 TABLET | Refills: 3 | Status: SHIPPED | OUTPATIENT
Start: 2019-01-08 | End: 2019-06-10 | Stop reason: SDUPTHER

## 2019-01-08 RX ORDER — ATORVASTATIN CALCIUM 10 MG/1
10 TABLET, FILM COATED ORAL NIGHTLY
Qty: 90 TABLET | Refills: 3 | Status: SHIPPED | OUTPATIENT
Start: 2019-01-08 | End: 2019-03-08 | Stop reason: ALTCHOICE

## 2019-01-08 RX ORDER — AMIODARONE HYDROCHLORIDE 200 MG/1
200 TABLET ORAL DAILY
Qty: 90 TABLET | Refills: 3 | Status: CANCELLED | OUTPATIENT
Start: 2019-01-08

## 2019-01-08 RX ORDER — METOPROLOL SUCCINATE 50 MG/1
50 TABLET, EXTENDED RELEASE ORAL DAILY
Qty: 90 TABLET | Refills: 3 | Status: SHIPPED | OUTPATIENT
Start: 2019-01-08 | End: 2019-04-01

## 2019-01-24 ENCOUNTER — SCHEDULED TELEPHONE ENCOUNTER (OUTPATIENT)
Dept: PHARMACY | Age: 84
End: 2019-01-24

## 2019-01-31 ENCOUNTER — SCHEDULED TELEPHONE ENCOUNTER (OUTPATIENT)
Dept: PHARMACY | Age: 84
End: 2019-01-31

## 2019-03-07 ENCOUNTER — SCHEDULED TELEPHONE ENCOUNTER (OUTPATIENT)
Dept: PHARMACY | Age: 84
End: 2019-03-07

## 2019-03-08 ENCOUNTER — OFFICE VISIT (OUTPATIENT)
Dept: CARDIOLOGY CLINIC | Age: 84
End: 2019-03-08
Payer: MEDICARE

## 2019-03-08 VITALS
HEART RATE: 70 BPM | OXYGEN SATURATION: 98 % | BODY MASS INDEX: 30.52 KG/M2 | HEIGHT: 71 IN | DIASTOLIC BLOOD PRESSURE: 70 MMHG | SYSTOLIC BLOOD PRESSURE: 124 MMHG | WEIGHT: 218 LBS

## 2019-03-08 DIAGNOSIS — E78.2 MIXED HYPERLIPIDEMIA: ICD-10-CM

## 2019-03-08 DIAGNOSIS — I48.20 CHRONIC ATRIAL FIBRILLATION (HCC): Primary | ICD-10-CM

## 2019-03-08 DIAGNOSIS — I10 ESSENTIAL HYPERTENSION: ICD-10-CM

## 2019-03-08 DIAGNOSIS — I50.32 CHRONIC DIASTOLIC HEART FAILURE (HCC): ICD-10-CM

## 2019-03-08 DIAGNOSIS — I48.20 CHRONIC ATRIAL FIBRILLATION (HCC): ICD-10-CM

## 2019-03-08 LAB
ANION GAP SERPL CALCULATED.3IONS-SCNC: 15 MMOL/L (ref 3–16)
BUN BLDV-MCNC: 28 MG/DL (ref 7–20)
CALCIUM SERPL-MCNC: 9.4 MG/DL (ref 8.3–10.6)
CHLORIDE BLD-SCNC: 100 MMOL/L (ref 99–110)
CO2: 28 MMOL/L (ref 21–32)
CREAT SERPL-MCNC: 1.2 MG/DL (ref 0.8–1.3)
GFR AFRICAN AMERICAN: >60
GFR NON-AFRICAN AMERICAN: 58
GLUCOSE BLD-MCNC: 138 MG/DL (ref 70–99)
POTASSIUM SERPL-SCNC: 4.5 MMOL/L (ref 3.5–5.1)
SODIUM BLD-SCNC: 143 MMOL/L (ref 136–145)

## 2019-03-08 PROCEDURE — G8598 ASA/ANTIPLAT THER USED: HCPCS | Performed by: INTERNAL MEDICINE

## 2019-03-08 PROCEDURE — 99213 OFFICE O/P EST LOW 20 MIN: CPT | Performed by: INTERNAL MEDICINE

## 2019-03-08 PROCEDURE — 1123F ACP DISCUSS/DSCN MKR DOCD: CPT | Performed by: INTERNAL MEDICINE

## 2019-03-08 PROCEDURE — G8482 FLU IMMUNIZE ORDER/ADMIN: HCPCS | Performed by: INTERNAL MEDICINE

## 2019-03-08 PROCEDURE — 93000 ELECTROCARDIOGRAM COMPLETE: CPT | Performed by: INTERNAL MEDICINE

## 2019-03-08 PROCEDURE — 1036F TOBACCO NON-USER: CPT | Performed by: INTERNAL MEDICINE

## 2019-03-08 PROCEDURE — 1101F PT FALLS ASSESS-DOCD LE1/YR: CPT | Performed by: INTERNAL MEDICINE

## 2019-03-08 PROCEDURE — G8427 DOCREV CUR MEDS BY ELIG CLIN: HCPCS | Performed by: INTERNAL MEDICINE

## 2019-03-08 PROCEDURE — G8417 CALC BMI ABV UP PARAM F/U: HCPCS | Performed by: INTERNAL MEDICINE

## 2019-03-08 PROCEDURE — 4040F PNEUMOC VAC/ADMIN/RCVD: CPT | Performed by: INTERNAL MEDICINE

## 2019-03-14 ENCOUNTER — TELEPHONE (OUTPATIENT)
Dept: INTERNAL MEDICINE CLINIC | Age: 84
End: 2019-03-14

## 2019-03-25 ENCOUNTER — TELEPHONE (OUTPATIENT)
Dept: INTERNAL MEDICINE CLINIC | Age: 84
End: 2019-03-25

## 2019-04-01 ENCOUNTER — TELEPHONE (OUTPATIENT)
Dept: PHARMACY | Age: 84
End: 2019-04-01

## 2019-04-01 ENCOUNTER — OFFICE VISIT (OUTPATIENT)
Dept: INTERNAL MEDICINE CLINIC | Age: 84
End: 2019-04-01
Payer: MEDICARE

## 2019-04-01 VITALS
SYSTOLIC BLOOD PRESSURE: 118 MMHG | WEIGHT: 219 LBS | OXYGEN SATURATION: 96 % | TEMPERATURE: 98 F | HEART RATE: 77 BPM | DIASTOLIC BLOOD PRESSURE: 70 MMHG | HEIGHT: 71 IN | BODY MASS INDEX: 30.66 KG/M2

## 2019-04-01 DIAGNOSIS — E10.42 TYPE 1 DIABETES MELLITUS WITH DIABETIC POLYNEUROPATHY (HCC): ICD-10-CM

## 2019-04-01 DIAGNOSIS — D50.9 IRON DEFICIENCY ANEMIA, UNSPECIFIED IRON DEFICIENCY ANEMIA TYPE: ICD-10-CM

## 2019-04-01 DIAGNOSIS — I10 ESSENTIAL HYPERTENSION: ICD-10-CM

## 2019-04-01 DIAGNOSIS — G62.9 NEUROPATHY: ICD-10-CM

## 2019-04-01 DIAGNOSIS — R53.83 OTHER FATIGUE: ICD-10-CM

## 2019-04-01 DIAGNOSIS — E78.5 HYPERLIPIDEMIA, UNSPECIFIED HYPERLIPIDEMIA TYPE: ICD-10-CM

## 2019-04-01 DIAGNOSIS — R06.02 SHORTNESS OF BREATH: ICD-10-CM

## 2019-04-01 DIAGNOSIS — E10.42 TYPE 1 DIABETES MELLITUS WITH DIABETIC POLYNEUROPATHY (HCC): Primary | ICD-10-CM

## 2019-04-01 DIAGNOSIS — I21.4 NSTEMI (NON-ST ELEVATED MYOCARDIAL INFARCTION) (HCC): ICD-10-CM

## 2019-04-01 DIAGNOSIS — I48.20 CHRONIC ATRIAL FIBRILLATION (HCC): ICD-10-CM

## 2019-04-01 LAB
A/G RATIO: 0.8 (ref 1.1–2.2)
ALBUMIN SERPL-MCNC: 3.2 G/DL (ref 3.4–5)
ALP BLD-CCNC: 76 U/L (ref 40–129)
ALT SERPL-CCNC: 11 U/L (ref 10–40)
ANION GAP SERPL CALCULATED.3IONS-SCNC: 11 MMOL/L (ref 3–16)
AST SERPL-CCNC: 11 U/L (ref 15–37)
BASOPHILS ABSOLUTE: 0 K/UL (ref 0–0.2)
BASOPHILS RELATIVE PERCENT: 0.1 %
BILIRUB SERPL-MCNC: 0.4 MG/DL (ref 0–1)
BUN BLDV-MCNC: 24 MG/DL (ref 7–20)
CALCIUM SERPL-MCNC: 9.2 MG/DL (ref 8.3–10.6)
CHLORIDE BLD-SCNC: 100 MMOL/L (ref 99–110)
CO2: 31 MMOL/L (ref 21–32)
CORTISOL TOTAL: 14.1 UG/DL
CREAT SERPL-MCNC: 1 MG/DL (ref 0.8–1.3)
EOSINOPHILS ABSOLUTE: 0.1 K/UL (ref 0–0.6)
EOSINOPHILS RELATIVE PERCENT: 0.7 %
GFR AFRICAN AMERICAN: >60
GFR NON-AFRICAN AMERICAN: >60
GLOBULIN: 3.8 G/DL
GLUCOSE BLD-MCNC: 175 MG/DL (ref 70–99)
HCT VFR BLD CALC: 37.9 % (ref 40.5–52.5)
HEMOGLOBIN: 12.5 G/DL (ref 13.5–17.5)
LYMPHOCYTES ABSOLUTE: 1.3 K/UL (ref 1–5.1)
LYMPHOCYTES RELATIVE PERCENT: 14.4 %
MCH RBC QN AUTO: 30.7 PG (ref 26–34)
MCHC RBC AUTO-ENTMCNC: 33.1 G/DL (ref 31–36)
MCV RBC AUTO: 92.7 FL (ref 80–100)
MONOCYTES ABSOLUTE: 1 K/UL (ref 0–1.3)
MONOCYTES RELATIVE PERCENT: 10.6 %
NEUTROPHILS ABSOLUTE: 6.7 K/UL (ref 1.7–7.7)
NEUTROPHILS RELATIVE PERCENT: 74.2 %
PDW BLD-RTO: 14.7 % (ref 12.4–15.4)
PLATELET # BLD: 312 K/UL (ref 135–450)
PMV BLD AUTO: 8.7 FL (ref 5–10.5)
POTASSIUM SERPL-SCNC: 4 MMOL/L (ref 3.5–5.1)
RBC # BLD: 4.09 M/UL (ref 4.2–5.9)
SODIUM BLD-SCNC: 142 MMOL/L (ref 136–145)
TOTAL PROTEIN: 7 G/DL (ref 6.4–8.2)
TSH REFLEX FT4: 0.99 UIU/ML (ref 0.27–4.2)
WBC # BLD: 9.1 K/UL (ref 4–11)

## 2019-04-01 PROCEDURE — 4040F PNEUMOC VAC/ADMIN/RCVD: CPT | Performed by: INTERNAL MEDICINE

## 2019-04-01 PROCEDURE — G8598 ASA/ANTIPLAT THER USED: HCPCS | Performed by: INTERNAL MEDICINE

## 2019-04-01 PROCEDURE — G8427 DOCREV CUR MEDS BY ELIG CLIN: HCPCS | Performed by: INTERNAL MEDICINE

## 2019-04-01 PROCEDURE — 1036F TOBACCO NON-USER: CPT | Performed by: INTERNAL MEDICINE

## 2019-04-01 PROCEDURE — 99214 OFFICE O/P EST MOD 30 MIN: CPT | Performed by: INTERNAL MEDICINE

## 2019-04-01 PROCEDURE — 1123F ACP DISCUSS/DSCN MKR DOCD: CPT | Performed by: INTERNAL MEDICINE

## 2019-04-01 PROCEDURE — G8417 CALC BMI ABV UP PARAM F/U: HCPCS | Performed by: INTERNAL MEDICINE

## 2019-04-01 RX ORDER — METOPROLOL SUCCINATE 50 MG/1
TABLET, EXTENDED RELEASE ORAL
Qty: 90 TABLET | Refills: 3 | COMMUNITY
Start: 2019-04-01 | End: 2020-01-27

## 2019-04-01 ASSESSMENT — ENCOUNTER SYMPTOMS
CHEST TIGHTNESS: 0
SHORTNESS OF BREATH: 1

## 2019-04-01 ASSESSMENT — PATIENT HEALTH QUESTIONNAIRE - PHQ9
SUM OF ALL RESPONSES TO PHQ QUESTIONS 1-9: 0
SUM OF ALL RESPONSES TO PHQ QUESTIONS 1-9: 0
2. FEELING DOWN, DEPRESSED OR HOPELESS: 0
SUM OF ALL RESPONSES TO PHQ9 QUESTIONS 1 & 2: 0
1. LITTLE INTEREST OR PLEASURE IN DOING THINGS: 0

## 2019-04-01 NOTE — PROGRESS NOTES
the left side. Pulmonary/Chest: Effort normal and breath sounds normal. No stridor. Abdominal: Soft. Bowel sounds are normal. He exhibits no distension. Musculoskeletal: He exhibits edema (trace ankles nonpitting). Lymphadenopathy:     He has no cervical adenopathy. Neurological: He is alert. He has normal strength. No cranial nerve deficit. Skin: He is not diaphoretic. No pallor. Psychiatric: He has a normal mood and affect. His behavior is normal. Judgment and thought content normal.       ASSESSMENT:       Encounter Diagnoses   Name Primary? 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 Decrease metoprolol. Check labs. Chronic atrial fibrillation ([facility])  Patient off amiodarone. We'll decrease Toprol 50 mg to 25 mg due to fatigue. NSTEMI (non-ST elevated myocardial infarction) ([facility] [address]  Recent stress test okay. No sign of reversible ischemia. Good LV function. Neuropathy  Stable but bothersome. Hyperlipidemia  Statin intolerance    Hypertension  Patient's BP systolic is been running in the low 100s. Complaining of fatigue. We'll decrease Toprol to 25 mg.     Iron deficiency anemia  Check CBC        PLAN:See ASSESSMENT for evaluation & PLAN     Orders Placed This Encounter   Procedures    CBC Auto Differential     Standing Status:   Future     Standing Expiration Date:   3/31/2020    Comprehensive Metabolic Panel     Standing Status:   Future     Standing Expiration Date:   3/31/2020    TSH WITH REFLEX TO FT4     Standing Status:   Future     Standing Expiration Date:   3/31/2020    Cortisol

## 2019-04-02 LAB
ESTIMATED AVERAGE GLUCOSE: 200.1 MG/DL
HBA1C MFR BLD: 8.6 %

## 2019-04-04 ENCOUNTER — TELEPHONE (OUTPATIENT)
Dept: INTERNAL MEDICINE CLINIC | Age: 84
End: 2019-04-04

## 2019-04-04 NOTE — TELEPHONE ENCOUNTER
Patient called stating he is returning call from doctor regarding his medication being changed. Patient requesting a call back.

## 2019-04-10 ENCOUNTER — TELEPHONE (OUTPATIENT)
Dept: INTERNAL MEDICINE CLINIC | Age: 84
End: 2019-04-10

## 2019-04-10 RX ORDER — AMOXICILLIN 500 MG/1
CAPSULE ORAL
Qty: 30 CAPSULE | Refills: 0 | Status: SHIPPED | OUTPATIENT
Start: 2019-04-10 | End: 2020-03-24

## 2019-04-10 NOTE — TELEPHONE ENCOUNTER
Patient has had a temperature since last night. This morning his temp was 100.5. Patient has taken tylenol. Patient also c/o muscle aches and would like something sent to the pharmacy. [facility]/pharmacy #[id number]- [address] - [address]  - P [phone] - F [phone]

## 2019-04-15 ENCOUNTER — SCHEDULED TELEPHONE ENCOUNTER (OUTPATIENT)
Dept: PHARMACY | Age: 84
End: 2019-04-15

## 2019-04-15 ENCOUNTER — HOSPITAL ENCOUNTER (OUTPATIENT)
Dept: GENERAL RADIOLOGY | Age: 84
Discharge: HOME | End: 2019-04-15
Payer: MEDICARE

## 2019-04-15 ENCOUNTER — HOSPITAL ENCOUNTER (OUTPATIENT)
Age: 84
Discharge: HOME | End: 2019-04-15
Payer: MEDICARE

## 2019-04-15 ENCOUNTER — TELEPHONE (OUTPATIENT)
Dept: INTERNAL MEDICINE CLINIC | Age: 84
End: 2019-04-15

## 2019-04-15 DIAGNOSIS — R05.9 COUGH: ICD-10-CM

## 2019-04-15 DIAGNOSIS — R05.9 COUGH: Primary | ICD-10-CM

## 2019-04-15 PROCEDURE — 71046 X-RAY EXAM CHEST 2 VIEWS: CPT

## 2019-04-15 NOTE — TELEPHONE ENCOUNTER
[address] [facility]  Heart Failure Service  [phone]        Follow up phone call:     Are you having any issues that need immediate attention? No     Do you have any signs or symptoms of edema? No              []  Shortness of breath              []  Swelling of hands, feet, ankles, or lower legs              []  Abdominal fullness              []  Cough, especially at night or when you lie down              []  Fatigue that limits activity     Do you have any other signs or symptoms to report? No              []  Dizziness              []  Light headedness, like you are going to pass out              []  Headache                                                                                []  Other    Wt Readings from Last 6 Encounters:   04/01/19 219 lb (99.3 kg)   03/08/19 218 lb (98.9 kg)   01/08/19 219 lb (99.3 kg)   12/27/18 222 lb (100.7 kg)   11/06/18 220 lb (99.8 kg)   10/24/18 220 lb (99.8 kg)         Do you have a working scale? Yes     Have you been checking your weight daily? Yes              If not, the patient was encouraged to do so.      What is your weight today? 219     With regards to your weight, when would you call the doctor? [x] increased by 3 pounds or more in one day               [x] 5 pounds or more in a week              [x] weight above my dry weight              [] other                 [] unknown     Has your weight increased by 3 pounds or more in one day or 5 pounds or more in a week? No          Please call the [employer] at [phone] or your Cardiologist (Magali Dozier @ [phone]) if you have a weight gain of 3 pounds or more in one day or 5 pounds or more in a week or above your dry weight.     Have you been admitted to the hospital or visited an emergency room recently?  No If yes, be sure to follow the medication instructions given to you when you were sent home.     Are you out of or have you missed any of

## 2019-04-15 NOTE — TELEPHONE ENCOUNTER
Patient would like an order for a chest xray. His chest is still feeling tight after abx. Patient would like to go this morning to the [facility].

## 2019-04-16 ENCOUNTER — TELEPHONE (OUTPATIENT)
Dept: INTERNAL MEDICINE CLINIC | Age: 84
End: 2019-04-16

## 2019-04-16 DIAGNOSIS — R93.89 ABNORMAL CXR: Primary | ICD-10-CM

## 2019-04-16 NOTE — TELEPHONE ENCOUNTER
Patient's wife called stating she was told the only reason they can not get him in until Saturday because you just wrote an order for a regular C -Scan and it needs to be written STAT per admitting. Her  is getting worse and is even stating he is considering going to the hospital.        Please call to advise.

## 2019-04-16 NOTE — TELEPHONE ENCOUNTER
Result Notes for XR CHEST STANDARD (2 VW)     Notes recorded by Ramiro Valladares MD on 4/15/2019 at 4:27 PM EDT  Chest x-ray shows some fluid or infection in the left lower lobe.  I would like to get a CT scan without contrast for better visualization.  Use diagnosis of abnormal chest x-ray. Patient and spouse informed. Test ordered  Wife stated having family issues with their daughter. Cannot see grandchildren, patient very upset, depressed on this. Wife upset and wants patient to get help with this.  Explained to her that he needs to get this CT first. Gave scheduling number for spouse to call to schedule test earlier

## 2019-04-17 ENCOUNTER — HOSPITAL ENCOUNTER (OUTPATIENT)
Dept: CT IMAGING | Age: 84
Discharge: HOME | End: 2019-04-17
Payer: MEDICARE

## 2019-04-17 DIAGNOSIS — R93.89 ABNORMAL CXR: ICD-10-CM

## 2019-04-17 PROCEDURE — 71250 CT THORAX DX C-: CPT

## 2019-04-18 ENCOUNTER — TELEPHONE (OUTPATIENT)
Dept: PULMONOLOGY | Age: 84
End: 2019-04-18

## 2019-04-18 ENCOUNTER — TELEPHONE (OUTPATIENT)
Dept: CARDIOLOGY CLINIC | Age: 84
End: 2019-04-18

## 2019-04-18 ENCOUNTER — TELEPHONE (OUTPATIENT)
Dept: INTERNAL MEDICINE CLINIC | Age: 84
End: 2019-04-18

## 2019-04-18 NOTE — TELEPHONE ENCOUNTER
Patient's wife called stating she was told [employer] left message and [employer] never left a message. I confirmed her telephone number and it was the right number. She is stating she is very upset and wants to speak to Dr. Cleopatra Bah  or [place] only! She wants to know what pulmonary doctor you are referring them too? Please call to advise .

## 2019-04-18 NOTE — TELEPHONE ENCOUNTER
Patient wants Dr. Josue Koroma to call her wants to know why her  needs to see a pulmonologist and what to do in the mean time

## 2019-04-18 NOTE — TELEPHONE ENCOUNTER
Dr Lupe Palacios at [facility] or the [address] near [address], [place] [address] [place]

## 2019-04-18 NOTE — TELEPHONE ENCOUNTER
Spoke to Sugey Concepcion  She advised me to let pts wife  know to f/u with pulmonolgy first to rule out any lung issues first and if need be they will refer pt to cardiology .  She v/u

## 2019-04-18 NOTE — TELEPHONE ENCOUNTER
Patient's wife called regarding a chest xray. Per wife the pt had a chest xray completed and it showed fluid on the lungs, pt's wife wants to know if this is cardiac related. Pt's PCP is referring him to a pulmonologist.  Usman Sen can reach the pt or his wife at [phone].

## 2019-04-19 ENCOUNTER — TELEPHONE (OUTPATIENT)
Dept: INTERNAL MEDICINE CLINIC | Age: 84
End: 2019-04-19

## 2019-04-19 ENCOUNTER — TELEPHONE (OUTPATIENT)
Dept: PULMONOLOGY | Age: 84
End: 2019-04-19

## 2019-04-19 NOTE — TELEPHONE ENCOUNTER
Has appt with Dr. Aspen Knott in May but per Dr Javier Lazar does not want patient to wait that long. Called pulmonary and spoke to Joselyn Shaw and she will try and get the patient earlier appt with Dr Amelia Guerrero but cannot guarantee. She will call the patient on this and also let us know.

## 2019-04-19 NOTE — TELEPHONE ENCOUNTER
Please see Dr. Jeffery Chen comments on CT result. Appt scheduled for 05/16/19, but request to move it sooner. Patient is short of breath.

## 2019-04-23 ENCOUNTER — OFFICE VISIT (OUTPATIENT)
Dept: PULMONOLOGY | Age: 84
End: 2019-04-23
Payer: MEDICARE

## 2019-04-23 ENCOUNTER — TELEPHONE (OUTPATIENT)
Dept: PULMONOLOGY | Age: 84
End: 2019-04-23

## 2019-04-23 VITALS
RESPIRATION RATE: 16 BRPM | DIASTOLIC BLOOD PRESSURE: 80 MMHG | SYSTOLIC BLOOD PRESSURE: 132 MMHG | HEART RATE: 79 BPM | HEIGHT: 72 IN | OXYGEN SATURATION: 98 % | BODY MASS INDEX: 28.71 KG/M2 | WEIGHT: 212 LBS

## 2019-04-23 DIAGNOSIS — J90 PLEURAL EFFUSION: Primary | ICD-10-CM

## 2019-04-23 DIAGNOSIS — R06.09 DOE (DYSPNEA ON EXERTION): ICD-10-CM

## 2019-04-23 DIAGNOSIS — I50.32 CHRONIC DIASTOLIC HEART FAILURE (HCC): ICD-10-CM

## 2019-04-23 DIAGNOSIS — I48.20 CHRONIC ATRIAL FIBRILLATION (HCC): ICD-10-CM

## 2019-04-23 PROCEDURE — 1036F TOBACCO NON-USER: CPT | Performed by: INTERNAL MEDICINE

## 2019-04-23 PROCEDURE — 1123F ACP DISCUSS/DSCN MKR DOCD: CPT | Performed by: INTERNAL MEDICINE

## 2019-04-23 PROCEDURE — G8598 ASA/ANTIPLAT THER USED: HCPCS | Performed by: INTERNAL MEDICINE

## 2019-04-23 PROCEDURE — 99204 OFFICE O/P NEW MOD 45 MIN: CPT | Performed by: INTERNAL MEDICINE

## 2019-04-23 PROCEDURE — G8427 DOCREV CUR MEDS BY ELIG CLIN: HCPCS | Performed by: INTERNAL MEDICINE

## 2019-04-23 PROCEDURE — G8417 CALC BMI ABV UP PARAM F/U: HCPCS | Performed by: INTERNAL MEDICINE

## 2019-04-23 PROCEDURE — 4040F PNEUMOC VAC/ADMIN/RCVD: CPT | Performed by: INTERNAL MEDICINE

## 2019-04-23 NOTE — PROGRESS NOTES
[facility]    Outpatient Initial Note    Subjective:   [address] [facility] / HPI:     The patient is 81 y.o. male who presents today for a new patient visit for evaluation of dyspnea on exertion, fatigue, in the setting of an abnormal CT chest.  Sue Reyes has an extensive past cardiac history including a NSTEMI in September associated with diastolic CHF and chronic atrial fibrillation on torsemide 10 mg daily, Toprol-XL, and Eliquis. He states over the past several months he's had worsening dyspnea on exertion and fatigue. He has associated peripheral edema and did have some chest tightness that was relieved with amoxicillin. He denies any fevers, chills, anorexia, weight change, chest pain, orthopnea, PND or hemoptysis. He has never been seen by pulmonologist and does not have any diagnosed chronic lung disease. His recent evaluation includes a chest x-ray April 15, 2019 that shows a left basilar pleural parenchymal opacity with minimal right pleural effusion. This prompted a CT chest on April 17 that showed a moderate left-sided pleural effusion with partial left lower lobe atelectasis. He has a very small right-sided pleural effusion and an and large pulmonary artery. His left-sided pleural effusion is significantly increased from his previous CT chest dated September 25, 2018.     Past Medical History:    Past Medical History:   Diagnosis Date    Atrial fibrillation ([address] 09/2018    Atrial fib    Degeneration of cervical intervertebral disc 33/38/3828    Diastolic heart failure ([facility] [address] 09/2018    Diverticulosis of colon (without mention of hemorrhage) 10/22/2010    Essential hypertension, benign 10/22/2010    [place] (hard of hearing)     Hyperlipidemia 10/22/2010    Mononeuritis of unspecified site 10/22/2010    Osteoarthrosis, unspecified whether generalized or localized, pelvic region and thigh 10/22/2010    Other specified iron deficiency anemias 10/22/2010    Rosacea 10/22/2010    Seborrheic dermatitis 10/22/2010    Type II or unspecified type diabetes mellitus without mention of complication, not stated as uncontrolled 10/22/2010    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 10/22/2010    Unspecified pruritic disorder 10/22/2010       Social History:    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 He quit smoking 50 years ago. He smoked approximately 14 years but is vague on how much he smoked daily    Family History:  No Chronic Lung Disease  DM    Current Medications:  Current Outpatient Medications on File Prior to Visit   Medication Sig Dispense Refill    amoxicillin (AMOXIL) 500 MG capsule Take 1 tid 30 capsule 0    insulin glargine (BASAGLAR KWIKPEN) 100 UNIT/ML injection pen Inject 25U qd 9 pen 3    metoprolol succinate (TOPROL XL) 50 MG extended release tablet 1/2 qd 90 tablet 3    torsemide (DEMADEX) 20 MG tablet Take 0.5 tablets by mouth daily 90 tablet 3    ELIQUIS 5 MG TABS tablet TAKE 1 TABLET BY MOUTH TWICE A DAY 60 tablet 1    acetaminophen (TYLENOL) 500 MG tablet Take 500-1,000 mg by mouth every 6 hours as needed for Pain      B-D ULTRAFINE III SHORT PEN 31G X 8 MM MISC USE DAILY WITH LANTUS 100 each 3    triamcinolone (KENALOG) 0.1 % ointment APPLY THIN AMOUNT TO AFFECTED AREA TWO TIMES A DAY AS NEEDED (Patient taking differently: APPLY THIN AMOUNT TO AFFECTED AREA TWO TIMES A DAY AS NEEDED for skin irritation) 60 g 1    Blood Glucose Calibration (OT ULTRA/FASTTK CNTRL SOLN) SOLN Monthly as needed 1 each 5    glucose blood VI test strips (ONE TOUCH ULTRA TEST) strip Test bid 100 each 3    ONE TOUCH ULTRASOFT LANCETS MISC Test bid 100 each 3    gabapentin (NEURONTIN) 300 MG capsule Take 1 capsule by mouth 3 times daily for 90 days. . 90 capsule 2     No current facility-administered medications on file prior to visit.       REVIEW OF SYSTEMS:    CONSTITUTIONAL: Negative for fevers and chills  HEENT:     Coronary artery calcification is noted.       Small pericardial effusion slightly increased from the comparison.       No suspicious hilar or mediastinal adenopathy noted.       Previously noted 1.4 cm paratracheal lymph node is diminished in size from   the comparison.  Small lymph nodes are noted within mediastinum.       Lungs/pleura: Interval increase in size of left-sided pleural effusion which   is moderate in size on the current study.  Small right-sided pleural effusion   appears relatively stable.       There is associated volume loss, partial collapse of left lower lobe.       Some minimal dependent atelectasis also noted within the left upper lobe   along the fissure.       There is volume loss within the lingula.       Minimal dependent atelectasis noted at the right lung base.  Lungs are   otherwise grossly clear.       Upper Abdomen: Low-attenuation lesions within the liver appears stable likely   a small cyst.       Bilateral adrenal glands are unremarkable in appearance.       Limited imaging of the upper abdomen grossly unremarkable in appearance.       Soft Tissues/Bones: No acute osseous abnormality noted.           Impression   Moderate left-sided pleural effusion with partial collapse of the left lower   lobe and dependent consolidation likely atelectasis.  Findings increased from   the comparison.       Small right-sided pleural effusion without significant change from the   comparison.       No significant change of enlargement of the main pulmonary artery which can   be seen with pulmonary hypertension.       Mild mediastinal adenopathy slightly decreased in size. Last PFTs:  None on file    ECHO  Conclusions      Summary   Mild conc.  LVH with no wall motion abnormalities and EF of 60%.   The left atrium is mildly dilated.   Normal right ventricular size and function.   Mild mitral and trivial aortic and pulmonic regurgitation.   Inferior vena cava appears dilated.      Signature      ------------------------------------------------------------------   Electronically signed by Korin Nowak MD (Interpreting  [phone]) on 09/25/2018 at 05:17 PM   ------------------------------------------------------------------      Findings      Left Ventricle   Left atrium is of normal size.      Mitral Valve   Mitral valve leaflets appear mildly thickened.   Mild mitral regurgitation.      Left Atrium   The left atrium is mildly dilated.      Aortic Valve   Aortic valve appears sclerotic but opens adequately.   Trivial aortic regurgitation.      Aorta   The aortic root is normal in size.      Right Ventricle   Normal right ventricular size and function.      Tricuspid Valve   Tricuspid valve is structurally normal.   Trivial tricuspid regurgitation.      Right Atrium   The right atrium is normal in size.      Pulmonic Valve   The pulmonic valve is not well visualized.   No evidence of pulmonic valve regurgitation.   No evidence of pulmonic valve stenosis.     Pericardial Effusion   No evidence of pericardial or pleural effusion.      Miscellaneous   Inferior vena cava appears dilated. Assessment:      Diagnosis Orders   1. Pleural effusion  US THORACENTESIS   2. ASHFORD (dyspnea on exertion)     3. Chronic diastolic heart failure ([address]     4. Chronic atrial fibrillation (HCC)         Plan:     Upon review of clinical presentation and imaging I do believe his dyspnea on exertion and pleural effusion is likely from his diastolic CHF. However I do believe a large volume thoracentesis is needed to further evaluate the pleural fluid and to also see if it helps relieve his dyspnea on exertion. He will need to stop his Eliquis 48 hours prior to thoracentesis which has been tentatively scheduled for Friday May 3rd at 1 PM (patient prefers that I perform the procedure and this is my earliest availability).   Patient is aware of holding Elqiuis 48 hrs prior to procedure    I discussed the procedure and its benefits and risks with the patient and he is wanting to proceed with the procedure. I will obtain an INR, proBNP, LDH, and total protein on day of thoracentesis prior to the procedure.

## 2019-05-03 ENCOUNTER — HOSPITAL ENCOUNTER (OUTPATIENT)
Dept: ULTRASOUND IMAGING | Age: 84
Discharge: HOME | End: 2019-05-03
Payer: MEDICARE

## 2019-05-03 VITALS
HEIGHT: 72 IN | OXYGEN SATURATION: 97 % | TEMPERATURE: 98.2 F | DIASTOLIC BLOOD PRESSURE: 76 MMHG | BODY MASS INDEX: 29.93 KG/M2 | RESPIRATION RATE: 16 BRPM | SYSTOLIC BLOOD PRESSURE: 155 MMHG | HEART RATE: 81 BPM | WEIGHT: 221 LBS

## 2019-05-03 DIAGNOSIS — J90 PLEURAL EFFUSION: ICD-10-CM

## 2019-05-03 LAB
ANION GAP SERPL CALCULATED.3IONS-SCNC: 12 MMOL/L (ref 3–16)
BUN BLDV-MCNC: 25 MG/DL (ref 7–20)
CALCIUM SERPL-MCNC: 9.7 MG/DL (ref 8.3–10.6)
CHLORIDE BLD-SCNC: 97 MMOL/L (ref 99–110)
CO2: 30 MMOL/L (ref 21–32)
CREAT SERPL-MCNC: 1.1 MG/DL (ref 0.8–1.3)
GFR AFRICAN AMERICAN: >60
GFR NON-AFRICAN AMERICAN: >60
GLUCOSE BLD-MCNC: 130 MG/DL (ref 70–99)
POTASSIUM SERPL-SCNC: 4 MMOL/L (ref 3.5–5.1)
SODIUM BLD-SCNC: 139 MMOL/L (ref 136–145)

## 2019-05-03 PROCEDURE — 36415 COLL VENOUS BLD VENIPUNCTURE: CPT

## 2019-05-03 PROCEDURE — 80048 BASIC METABOLIC PNL TOTAL CA: CPT

## 2019-05-03 PROCEDURE — 76604 US EXAM CHEST: CPT

## 2019-05-03 PROCEDURE — 99214 OFFICE O/P EST MOD 30 MIN: CPT | Performed by: INTERNAL MEDICINE

## 2019-05-03 NOTE — PROGRESS NOTES
Patient here to have ultrasound thoracentesis. Ultrasound called and Dr. Haleigh Leggett called. Both here in [place] [id number] with patient. Dr. Haleigh Leggett cancelled thoracentesis due to improvement inpatient condition. Patient and wife aware.

## 2019-05-03 NOTE — PROGRESS NOTES
[facility]    Outpatient Follow Up Note    Subjective:   CHIEF COMPLAINT / HPI:     The patient is 81 y.o. male who is here for outpatient ultrasound-guided thoracentesis. Since last visit about 10 days ago he increased his Demadex to 20 mg daily from 10 mg daily. He states that he's been urinating a lot and his dyspnea on exertion is much improved as is his peripheral edema. Initial visit April 23, 2019  Theodore Small  presents today for a new patient visit for evaluation of dyspnea on exertion, fatigue, in the setting of an abnormal CT chest.  Theodore Small has an extensive past cardiac history including a NSTEMI in September associated with diastolic CHF and chronic atrial fibrillation on torsemide 10 mg daily, Toprol-XL, and Eliquis. He states over the past several months he's had worsening dyspnea on exertion and fatigue. He has associated peripheral edema and did have some chest tightness that was relieved with amoxicillin. He denies any fevers, chills, anorexia, weight change, chest pain, orthopnea, PND or hemoptysis. He has never been seen by pulmonologist and does not have any diagnosed chronic lung disease. His recent evaluation includes a chest x-ray April 15, 2019 that shows a left basilar pleural parenchymal opacity with minimal right pleural effusion. This prompted a CT chest on April 17 that showed a moderate left-sided pleural effusion with partial left lower lobe atelectasis. He has a very small right-sided pleural effusion and an and large pulmonary artery. His left-sided pleural effusion is significantly increased from his previous CT chest dated September 25, 2018.     Past Medical History:    Past Medical History:   Diagnosis Date    Atrial fibrillation ([address] 09/2018    Atrial fib    Degeneration of cervical intervertebral disc 44/04/1608    Diastolic heart failure ([facility] [address] 09/2018    Diverticulosis of colon (without mention of hemorrhage) 10/22/2010    Essential hypertension, benign 10/22/2010    [place] (hard of hearing)     Hyperlipidemia 10/22/2010    Mononeuritis of unspecified site 10/22/2010    Osteoarthrosis, unspecified whether generalized or localized, pelvic region and thigh 10/22/2010    Other specified iron deficiency anemias 10/22/2010    Rosacea 10/22/2010    Seborrheic dermatitis 10/22/2010    Type II or unspecified type diabetes mellitus without mention of complication, not stated as uncontrolled 10/22/2010    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 10/22/2010    Unspecified pruritic disorder 10/22/2010       Social History:    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 He quit smoking 50 years ago. He smoked approximately 14 years but is vague on how much he smoked daily    Family History:  No Chronic Lung Disease  DM    Current Medications:  Current Outpatient Medications on File Prior to Encounter   Medication Sig Dispense Refill    metoprolol succinate (TOPROL XL) 50 MG extended release tablet 1/2 qd 90 tablet 3    torsemide (DEMADEX) 20 MG tablet Take 0.5 tablets by mouth daily 90 tablet 3    ELIQUIS 5 MG TABS tablet TAKE 1 TABLET BY MOUTH TWICE A DAY 60 tablet 1    amoxicillin (AMOXIL) 500 MG capsule Take 1 tid 30 capsule 0    insulin glargine (BASAGLAR KWIKPEN) 100 UNIT/ML injection pen Inject 25U qd 9 pen 3    gabapentin (NEURONTIN) 300 MG capsule Take 1 capsule by mouth 3 times daily for 90 days. . 90 capsule 2    acetaminophen (TYLENOL) 500 MG tablet Take 500-1,000 mg by mouth every 6 hours as needed for Pain      B-D ULTRAFINE III SHORT PEN 31G X 8 MM MISC USE DAILY WITH LANTUS 100 each 3    triamcinolone (KENALOG) 0.1 % ointment APPLY THIN AMOUNT TO AFFECTED AREA TWO TIMES A DAY AS NEEDED (Patient taking differently: APPLY THIN AMOUNT TO AFFECTED AREA TWO TIMES A DAY AS NEEDED for skin irritation) 60 g 1    Blood Glucose Calibration (OT ULTRA/FASTTK CNTRL SOLN) SOLN Monthly as needed 1 each 5    glucose blood VI test strips (ONE TOUCH ULTRA TEST) strip Test bid 100 each 3    ONE TOUCH ULTRASOFT LANCETS MISC Test bid 100 each 3     No current facility-administered medications on file prior to encounter. REVIEW OF SYSTEMS:    CONSTITUTIONAL: Negative for fevers and chills  HEENT: Negative for oropharyngeal exudate, post nasal drip, sinus pain / pressure, nasal congestion, ear pain  RESPIRATORY:  See HPI  CARDIOVASCULAR: Negative for chest pain, palpitations, edema  GASTROINTESTINAL: Negative for nausea, vomiting, diarrhea, constipation and abdominal pain  GENITOURINARY: Negative for dysuria, urinary frequency, urinary hesitancy  HEMATOLOGICAL: Negative for adenopathy  SKIN: Negative for clubbing, cyanosis, skin lesions  ENDOCRINE: Negative for polyuria, polydipsia, heat intolerance, cold intolerance   EXTREMITIES: Negative for weakness, decreased ROM  NEUROLOGICAL: Negative for unilateral weakness, speech or gait abnormalities    Objective:   PHYSICAL EXAM:        VITALS:  BP (!) 155/76   Pulse 81   Temp 98.2 °F (36.8 °C) (Oral)   Resp 16   Ht 5' 11.5\" (1.816 m)   Wt 221 lb (100.2 kg)   SpO2 97%   BMI 30.39 kg/m²   On room air  CONSTITUTIONAL:  Awake, alert, cooperative, no apparent distress, and appears stated age  HEENT: No oropharyngeal exudate, PERRL, no cervical adenopathy, no tracheal deviation, thyroid size normal  LUNGS:  No increased work of breathing and clear to auscultation, no crackles or wheezing. CARDIOVASCULAR:  normal S1 and S2 and no JVD  ABDOMEN:  Normal bowel sounds, non-distended and non-tender to palpation  EXT: 2+ edema, no calf tenderness. Pulses are present bilaterally. NEUROLOGIC:  Mental Status Exam:  Level of Alertness:   awake  Orientation:   person, place, time. SKIN:  normal skin color, texture, turgor, no redness, warmth, or swelling     DATA:      Radiology Review:  Pertinent images / reports were reviewed as a part of this visit.  CT Chest 4/17/2019 reveals the following:  FINDINGS:   Mediastinum: Limited noncontrast imaging of the mediastinum demonstrates   stable heart size.  Main pulmonary artery measures approximately 3.8 cm   similar to the prior study.       Aorta measures 3.8 cm.       Coronary artery calcification is noted.       Small pericardial effusion slightly increased from the comparison.       No suspicious hilar or mediastinal adenopathy noted.       Previously noted 1.4 cm paratracheal lymph node is diminished in size from   the comparison.  Small lymph nodes are noted within mediastinum.       Lungs/pleura: Interval increase in size of left-sided pleural effusion which   is moderate in size on the current study.  Small right-sided pleural effusion   appears relatively stable.       There is associated volume loss, partial collapse of left lower lobe.       Some minimal dependent atelectasis also noted within the left upper lobe   along the fissure.       There is volume loss within the lingula.       Minimal dependent atelectasis noted at the right lung base.  Lungs are   otherwise grossly clear.       Upper Abdomen: Low-attenuation lesions within the liver appears stable likely   a small cyst.       Bilateral adrenal glands are unremarkable in appearance.       Limited imaging of the upper abdomen grossly unremarkable in appearance.       Soft Tissues/Bones: No acute osseous abnormality noted.           Impression   Moderate left-sided pleural effusion with partial collapse of the left lower   lobe and dependent consolidation likely atelectasis.  Findings increased from   the comparison.       Small right-sided pleural effusion without significant change from the   comparison.       No significant change of enlargement of the main pulmonary artery which can   be seen with pulmonary hypertension.       Mild mediastinal adenopathy slightly decreased in size. Last PFTs:  None on file    ECHO  Conclusions      Summary   Mild conc. LVH with no wall motion abnormalities and EF of 60%.   The left atrium is mildly dilated.   Normal right ventricular size and function.   Mild mitral and trivial aortic and pulmonic regurgitation.   Inferior vena cava appears dilated.      Signature      ------------------------------------------------------------------   Electronically signed by Gus Street MD (Interpreting   physician) on 09/25/2018 at 05:17 PM   ------------------------------------------------------------------      Findings      Left Ventricle   Left atrium is of normal size.      Mitral Valve   Mitral valve leaflets appear mildly thickened.   Mild mitral regurgitation.      Left Atrium   The left atrium is mildly dilated.      Aortic Valve   Aortic valve appears sclerotic but opens adequately.   Trivial aortic regurgitation.      Aorta   The aortic root is normal in size.      Right Ventricle   Normal right ventricular size and function.      Tricuspid Valve   Tricuspid valve is structurally normal.   Trivial tricuspid regurgitation.      Right Atrium   The right atrium is normal in size.      Pulmonic Valve   The pulmonic valve is not well visualized.   No evidence of pulmonic valve regurgitation.   No evidence of pulmonic valve stenosis.     Pericardial Effusion   No evidence of pericardial or pleural effusion.      Miscellaneous   Inferior vena cava appears dilated. Assessment:      Diagnosis Orders   1. Pleural effusion  US THORACENTESIS   2. [place] (dyspnea on exertion)     3. Chronic diastolic heart failure ([address]     4. Chronic atrial fibrillation (HCC)         Plan:     Ultrasound showed trace residual pleural fluid on the right and very small amount on the left. It appears the etiology was volume overload and the Demadex fixed the situation. No thora needed or performed    - Continue demadex 20 mg daily  - Check BMP today  - Weight today is 216 which is his dry weight.  If weight changes by > 3 pounds he is to call me  - Fluid restrict to 1800 ml (64 oz)  - RTC 8 weeks with CXR prior    Irma Roberto MD

## 2019-06-07 ENCOUNTER — TELEPHONE (OUTPATIENT)
Dept: INTERNAL MEDICINE CLINIC | Age: 84
End: 2019-06-07

## 2019-06-10 ENCOUNTER — OFFICE VISIT (OUTPATIENT)
Dept: INTERNAL MEDICINE CLINIC | Age: 84
End: 2019-06-10
Payer: MEDICARE

## 2019-06-10 VITALS
DIASTOLIC BLOOD PRESSURE: 70 MMHG | OXYGEN SATURATION: 96 % | WEIGHT: 216 LBS | HEART RATE: 72 BPM | HEIGHT: 71 IN | SYSTOLIC BLOOD PRESSURE: 124 MMHG | BODY MASS INDEX: 30.24 KG/M2

## 2019-06-10 DIAGNOSIS — I50.32 CHRONIC DIASTOLIC HF (HEART FAILURE) (HCC): ICD-10-CM

## 2019-06-10 DIAGNOSIS — R60.9 EDEMA, UNSPECIFIED TYPE: ICD-10-CM

## 2019-06-10 DIAGNOSIS — G62.9 NEUROPATHY: ICD-10-CM

## 2019-06-10 DIAGNOSIS — J90 PLEURAL EFFUSION: ICD-10-CM

## 2019-06-10 DIAGNOSIS — I10 ESSENTIAL HYPERTENSION: ICD-10-CM

## 2019-06-10 DIAGNOSIS — L30.9 DERMATITIS: ICD-10-CM

## 2019-06-10 DIAGNOSIS — M51.9 LUMBAR DISC DISEASE: Primary | ICD-10-CM

## 2019-06-10 DIAGNOSIS — I50.32 CHRONIC DIASTOLIC HEART FAILURE (HCC): ICD-10-CM

## 2019-06-10 PROCEDURE — G8427 DOCREV CUR MEDS BY ELIG CLIN: HCPCS | Performed by: INTERNAL MEDICINE

## 2019-06-10 PROCEDURE — G8598 ASA/ANTIPLAT THER USED: HCPCS | Performed by: INTERNAL MEDICINE

## 2019-06-10 PROCEDURE — 99214 OFFICE O/P EST MOD 30 MIN: CPT | Performed by: INTERNAL MEDICINE

## 2019-06-10 PROCEDURE — 1036F TOBACCO NON-USER: CPT | Performed by: INTERNAL MEDICINE

## 2019-06-10 PROCEDURE — 1123F ACP DISCUSS/DSCN MKR DOCD: CPT | Performed by: INTERNAL MEDICINE

## 2019-06-10 PROCEDURE — 4040F PNEUMOC VAC/ADMIN/RCVD: CPT | Performed by: INTERNAL MEDICINE

## 2019-06-10 PROCEDURE — G8417 CALC BMI ABV UP PARAM F/U: HCPCS | Performed by: INTERNAL MEDICINE

## 2019-06-10 RX ORDER — TRAMADOL HYDROCHLORIDE 50 MG/1
50 TABLET ORAL DAILY
Qty: 30 TABLET | Refills: 2 | Status: SHIPPED | OUTPATIENT
Start: 2019-06-10 | End: 2019-07-10

## 2019-06-10 RX ORDER — TORSEMIDE 20 MG/1
TABLET ORAL
Qty: 90 TABLET | Refills: 3 | COMMUNITY
Start: 2019-06-10 | End: 2019-07-12 | Stop reason: SDUPTHER

## 2019-06-10 RX ORDER — GABAPENTIN 300 MG/1
300 CAPSULE ORAL 3 TIMES DAILY
Qty: 90 CAPSULE | Refills: 2 | Status: SHIPPED | OUTPATIENT
Start: 2019-06-10 | End: 2019-10-12 | Stop reason: SDUPTHER

## 2019-06-10 ASSESSMENT — ENCOUNTER SYMPTOMS
CHEST TIGHTNESS: 0
SHORTNESS OF BREATH: 0

## 2019-06-10 ASSESSMENT — PATIENT HEALTH QUESTIONNAIRE - PHQ9
1. LITTLE INTEREST OR PLEASURE IN DOING THINGS: 0
SUM OF ALL RESPONSES TO PHQ9 QUESTIONS 1 & 2: 0
SUM OF ALL RESPONSES TO PHQ QUESTIONS 1-9: 0
SUM OF ALL RESPONSES TO PHQ QUESTIONS 1-9: 0
2. FEELING DOWN, DEPRESSED OR HOPELESS: 0

## 2019-06-10 NOTE — PROGRESS NOTES
SUBJECTIVE:  Patient ID: Dougie Granados is an 81 y.o. male. HPI: Patient here today for the f/u of chronic problems-- see Problem List and associated comments. New issues or complaints include (alsosee Assessment for more details): He is tried various different topical creams and ointments and over-the-counter solutions but only for a day or 2 each. He tries to keep the area clean and dry. His pleural effusion and peripheral edema has improved by using full dose Demadex instead of half a tablet. His back pain is severe in the morning when he first wakes up for the first 3 to 4 hours. The gabapentin helps for the rest of the day. Review of Systems   Constitutional: Negative for activity change and appetite change. Respiratory: Negative for chest tightness and shortness of breath. Cardiovascular: Positive for leg swelling. Negative for chest pain and palpitations. Genitourinary: Positive for frequency. Skin: Positive for rash. OBJECTIVE:    /70 (Site: Left Upper Arm, Position: Sitting, Cuff Size: Large Adult)   Pulse 72   Ht 5' 11\" (1.803 m)   Wt 216 lb (98 kg)   SpO2 96%   BMI 30.13 kg/m²      Physical Exam   Constitutional: He is oriented to person, place, and time. He appears well-developed and well-nourished. Cardiovascular: Normal rate. Exam reveals no gallop. Pulmonary/Chest: Effort normal and breath sounds normal. No stridor. No respiratory distress. Musculoskeletal: He exhibits edema. Neurological: He is alert and oriented to person, place, and time. Skin: He is not diaphoretic. No pallor. Superior gluteal fold- red erythematous skin with small crack in the skin       ASSESSMENT:       Encounter Diagnoses   Name Primary?     Lumbar disc disease Yes    Chronic diastolic HF (heart failure) (HCC)     Neuropathy     Dermatitis     Pleural effusion     Chronic diastolic heart failure (HCC)     Essential hypertension     Edema, unspecified type Dermatitis  Gluteal skin fold- use his ketoconazole for 2 weeks. Keep area dry. Lumbar disc disease  Significant a.m. pain for first 3 to 4 hours after awakening. Monitor report done. Try tramadol 50 mg first thing in the morning. Status post SAMANTHA. Pleural effusion  Resolved with full dose Demadex    Diastolic heart failure (HCC)  Because of pleural effusion and peripheral edema. Improved with full dose Demadex. Neuropathy  Monitor report done. Refill gabapentin    Hypertension  BP okay    Edema  Improved with full dose Demadex        PLAN:See ASSESSMENT for evaluation & PLAN     No orders of the defined types were placed in this encounter. PSH, PMH, SH and FH reviewed and noted. Recent and past labs, tests and consultsalso reviewed. Recent or new meds also reviewed.

## 2019-06-10 NOTE — ASSESSMENT & PLAN NOTE
Significant a.m. pain for first 3 to 4 hours after awakening. Monitor report done. Try tramadol 50 mg first thing in the morning. Status post SAMANTHA.

## 2019-06-18 ENCOUNTER — TELEPHONE (OUTPATIENT)
Dept: INTERNAL MEDICINE CLINIC | Age: 84
End: 2019-06-18

## 2019-06-18 RX ORDER — NEOMYCIN/POLYMYXIN B/HYDROCORT 3.5-10K-1
SUSPENSION, DROPS(FINAL DOSAGE FORM)(ML) OPHTHALMIC (EYE)
Qty: 5 ML | Refills: 0 | Status: SHIPPED | OUTPATIENT
Start: 2019-06-18 | End: 2020-03-24

## 2019-06-18 NOTE — TELEPHONE ENCOUNTER
Symptoms: Possible pink eye     How long have you had the symptoms: Both eyes, red , itchy , crusted over     What medications have you tried: N/A crusted over     Pharmacy: N/A    Appointment offered: yes, but patient declined.

## 2019-06-19 ENCOUNTER — TELEPHONE (OUTPATIENT)
Dept: INTERNAL MEDICINE CLINIC | Age: 84
End: 2019-06-19

## 2019-06-24 ENCOUNTER — SCHEDULED TELEPHONE ENCOUNTER (OUTPATIENT)
Dept: PHARMACY | Age: 84
End: 2019-06-24

## 2019-06-24 NOTE — TELEPHONE ENCOUNTER
[facility]  Heart Failure Service  [phone]        Follow up phone call:     Are you having any issues that need immediate attention? No     Do you have any signs or symptoms of edema? Not at this time, patient did have these symptoms in April. He increased his Demadex to one tablet daily which helped. [x]  Shortness of breath              [x]  Swelling of hands, feet, ankles, or lower legs              []  Abdominal fullness              []  Cough, especially at night or when you lie down              [x]  Fatigue that limits activity     Do you have any other signs or symptoms to report? no              []  Dizziness              []  Light headedness, like you are going to pass out              []  Headache                                                                                []  Other    Wt Readings from Last 6 Encounters:   06/10/19 216 lb (98 kg)   05/03/19 221 lb (100.2 kg)   04/23/19 212 lb (96.2 kg)   04/01/19 219 lb (99.3 kg)   03/08/19 218 lb (98.9 kg)   01/08/19 219 lb (99.3 kg)           Has your weight increased by 3 pounds or more in one day or 5 pounds or more in a week? No          Please call the [facility] at [phone] or your Cardiologist ([address] @ [phone]) if you have a weight gain of 3 pounds or more in one day or 5 pounds or more in a week or above your dry weight.     Have you been admitted to the hospital or visited an emergency room recently? No If yes, be sure to follow the medication instructions given to you when you were sent home.     Are you out of or have you missed any of your medications ? No    Do you have any questions about your medications? No     []  Do not take any NSAIDs (non steroidal anti inflammatory drugs) like Aleve          (naproxen), Advil and Motrin (ibuprofen), Mobic (diclofenac) and Celebrex                       (celecoxib).        It is okay to take Tylenol (acetaminophen) unless your physician has told you        Otherwise.     []  Limit how much fluid you drink to 1,500-2,000mL (48-64 ounces) per day.     []  Follow your low sodium diet of 2,000-2,400mg per day.      Do you know what type of foods you should limit or avoid? [] table salt              [] salty foods like chips, soups, processed meats lie spencer, sausage, or                                 lunchmeat              [] other                 [] unknown      Do you have a follow up scheduled with your Cardiologist Yes  Do you have a follow up scheduled with Tristen Mckay He does not want to schedule an appointment.     Do you have any questions or concerns that I can help you with today? No     Please call any time, especially with increases in your weight or shortness of breath or increased swelling.  Do not wait to call.     PLAN:   I will schedule another f/u by telephone in 8 weeks.     Appropriate staff has been notified with regards to any concerns noted above   [facility]  Heart Failure Service  [phone]

## 2019-07-12 DIAGNOSIS — I50.32 CHRONIC DIASTOLIC HF (HEART FAILURE) (HCC): ICD-10-CM

## 2019-07-12 RX ORDER — TORSEMIDE 20 MG/1
TABLET ORAL
Qty: 90 TABLET | Refills: 3 | Status: SHIPPED | OUTPATIENT
Start: 2019-07-12 | End: 2020-03-27

## 2019-09-05 DIAGNOSIS — E11.8 TYPE 2 DIABETES MELLITUS WITH COMPLICATION, UNSPECIFIED WHETHER LONG TERM INSULIN USE: ICD-10-CM

## 2019-09-05 RX ORDER — PEN NEEDLE, DIABETIC 31 GX5/16"
NEEDLE, DISPOSABLE MISCELLANEOUS
Qty: 100 EACH | Refills: 3 | Status: SHIPPED | OUTPATIENT
Start: 2019-09-05 | End: 2020-03-24

## 2019-09-10 ENCOUNTER — TELEPHONE (OUTPATIENT)
Dept: INTERNAL MEDICINE CLINIC | Age: 84
End: 2019-09-10

## 2019-09-10 RX ORDER — BETAMETHASONE DIPROPIONATE 0.05 %
OINTMENT (GRAM) TOPICAL
Qty: 15 G | Refills: 0 | Status: SHIPPED | OUTPATIENT
Start: 2019-09-10 | End: 2020-03-24

## 2019-09-17 ENCOUNTER — TELEPHONE (OUTPATIENT)
Dept: INTERNAL MEDICINE CLINIC | Age: 84
End: 2019-09-17

## 2019-09-20 ENCOUNTER — TELEPHONE (OUTPATIENT)
Dept: INTERNAL MEDICINE CLINIC | Age: 84
End: 2019-09-20

## 2019-09-20 RX ORDER — AZITHROMYCIN 250 MG/1
250 TABLET, FILM COATED ORAL SEE ADMIN INSTRUCTIONS
Qty: 6 TABLET | Refills: 0 | Status: SHIPPED | OUTPATIENT
Start: 2019-09-20 | End: 2019-09-25

## 2019-10-07 ENCOUNTER — TELEPHONE (OUTPATIENT)
Dept: INTERNAL MEDICINE CLINIC | Age: 84
End: 2019-10-07

## 2019-10-07 DIAGNOSIS — M25.511 ACUTE PAIN OF RIGHT SHOULDER: Primary | ICD-10-CM

## 2019-10-07 RX ORDER — TRAMADOL HYDROCHLORIDE 50 MG/1
50 TABLET ORAL EVERY 8 HOURS PRN
Qty: 12 TABLET | Refills: 0 | Status: SHIPPED | OUTPATIENT
Start: 2019-10-07 | End: 2019-10-10

## 2019-10-15 ENCOUNTER — HOSPITAL ENCOUNTER (OUTPATIENT)
Age: 84
End: 2019-10-15
Payer: MEDICARE

## 2019-10-15 ENCOUNTER — HOSPITAL ENCOUNTER (OUTPATIENT)
Dept: GENERAL RADIOLOGY | Age: 84
Discharge: HOME | End: 2019-10-15
Payer: MEDICARE

## 2019-10-15 DIAGNOSIS — M25.511 ACUTE PAIN OF RIGHT SHOULDER: ICD-10-CM

## 2019-10-15 PROCEDURE — 73030 X-RAY EXAM OF SHOULDER: CPT

## 2019-10-16 RX ORDER — KETOCONAZOLE 20 MG/G
CREAM TOPICAL
Qty: 30 G | Refills: 1 | Status: SHIPPED | OUTPATIENT
Start: 2019-10-16 | End: 2020-01-23

## 2019-10-22 ENCOUNTER — TELEPHONE (OUTPATIENT)
Dept: INTERNAL MEDICINE CLINIC | Age: 84
End: 2019-10-22

## 2019-10-22 DIAGNOSIS — R52 PAIN: Primary | ICD-10-CM

## 2019-10-22 RX ORDER — ACETAMINOPHEN AND CODEINE PHOSPHATE 300; 30 MG/1; MG/1
1 TABLET ORAL EVERY 4 HOURS PRN
Qty: 12 TABLET | Refills: 0 | OUTPATIENT
Start: 2019-10-22 | End: 2019-10-25

## 2019-11-06 ENCOUNTER — OFFICE VISIT (OUTPATIENT)
Dept: INTERNAL MEDICINE CLINIC | Age: 84
End: 2019-11-06
Payer: MEDICARE

## 2019-11-06 VITALS
OXYGEN SATURATION: 99 % | SYSTOLIC BLOOD PRESSURE: 138 MMHG | BODY MASS INDEX: 29.71 KG/M2 | DIASTOLIC BLOOD PRESSURE: 82 MMHG | HEART RATE: 82 BPM | HEIGHT: 72 IN

## 2019-11-06 DIAGNOSIS — I10 ESSENTIAL HYPERTENSION: ICD-10-CM

## 2019-11-06 DIAGNOSIS — K21.9 GASTROESOPHAGEAL REFLUX DISEASE, ESOPHAGITIS PRESENCE NOT SPECIFIED: ICD-10-CM

## 2019-11-06 DIAGNOSIS — L98.411 SKIN ULCER OF BUTTOCK, LIMITED TO BREAKDOWN OF SKIN (HCC): ICD-10-CM

## 2019-11-06 DIAGNOSIS — G62.9 NEUROPATHY: ICD-10-CM

## 2019-11-06 DIAGNOSIS — J30.0 VASOMOTOR RHINITIS: ICD-10-CM

## 2019-11-06 PROBLEM — L98.419 SKIN ULCER OF BUTTOCK (HCC): Status: ACTIVE | Noted: 2019-11-06

## 2019-11-06 PROCEDURE — 1036F TOBACCO NON-USER: CPT | Performed by: INTERNAL MEDICINE

## 2019-11-06 PROCEDURE — G8598 ASA/ANTIPLAT THER USED: HCPCS | Performed by: INTERNAL MEDICINE

## 2019-11-06 PROCEDURE — G8417 CALC BMI ABV UP PARAM F/U: HCPCS | Performed by: INTERNAL MEDICINE

## 2019-11-06 PROCEDURE — 1123F ACP DISCUSS/DSCN MKR DOCD: CPT | Performed by: INTERNAL MEDICINE

## 2019-11-06 PROCEDURE — 4040F PNEUMOC VAC/ADMIN/RCVD: CPT | Performed by: INTERNAL MEDICINE

## 2019-11-06 PROCEDURE — G8427 DOCREV CUR MEDS BY ELIG CLIN: HCPCS | Performed by: INTERNAL MEDICINE

## 2019-11-06 PROCEDURE — G8484 FLU IMMUNIZE NO ADMIN: HCPCS | Performed by: INTERNAL MEDICINE

## 2019-11-06 PROCEDURE — 99214 OFFICE O/P EST MOD 30 MIN: CPT | Performed by: INTERNAL MEDICINE

## 2019-11-06 RX ORDER — OLOPATADINE HYDROCHLORIDE 665 UG/1
2 SPRAY NASAL 2 TIMES DAILY PRN
Qty: 1 BOTTLE | Refills: 5 | Status: SHIPPED | OUTPATIENT
Start: 2019-11-06 | End: 2021-01-15

## 2019-11-06 RX ORDER — GABAPENTIN 300 MG/1
CAPSULE ORAL
Qty: 60 CAPSULE | Refills: 5 | Status: SHIPPED | OUTPATIENT
Start: 2019-11-06 | End: 2020-06-08

## 2019-11-06 ASSESSMENT — ENCOUNTER SYMPTOMS
NAUSEA: 0
TROUBLE SWALLOWING: 0
ABDOMINAL DISTENTION: 0
SHORTNESS OF BREATH: 0
ABDOMINAL PAIN: 0

## 2019-12-06 RX ORDER — IBUPROFEN 600 MG/1
TABLET ORAL
Qty: 60 TABLET | Refills: 0 | Status: SHIPPED | OUTPATIENT
Start: 2019-12-06 | End: 2021-01-15

## 2019-12-18 ENCOUNTER — TELEPHONE (OUTPATIENT)
Dept: INTERNAL MEDICINE CLINIC | Age: 84
End: 2019-12-18

## 2019-12-18 DIAGNOSIS — M50.30 DEGENERATION OF CERVICAL INTERVERTEBRAL DISC: Primary | ICD-10-CM

## 2019-12-18 DIAGNOSIS — M51.9 LUMBAR DISC DISEASE: ICD-10-CM

## 2020-01-06 RX ORDER — APIXABAN 5 MG/1
TABLET, FILM COATED ORAL
Qty: 180 TABLET | Refills: 3 | Status: SHIPPED | OUTPATIENT
Start: 2020-01-06 | End: 2020-12-17 | Stop reason: SDUPTHER

## 2020-01-23 RX ORDER — KETOCONAZOLE 20 MG/G
CREAM TOPICAL
Qty: 30 G | Refills: 1 | Status: SHIPPED | OUTPATIENT
Start: 2020-01-23 | End: 2021-01-15

## 2020-01-27 RX ORDER — METOPROLOL SUCCINATE 50 MG/1
TABLET, EXTENDED RELEASE ORAL
Qty: 90 TABLET | Refills: 3 | Status: SHIPPED | OUTPATIENT
Start: 2020-01-27 | End: 2020-12-17

## 2020-02-13 ENCOUNTER — APPOINTMENT (OUTPATIENT)
Dept: CT IMAGING | Age: 85
End: 2020-02-13
Payer: MEDICARE

## 2020-02-13 ENCOUNTER — APPOINTMENT (OUTPATIENT)
Dept: GENERAL RADIOLOGY | Age: 85
End: 2020-02-13
Payer: MEDICARE

## 2020-02-13 ENCOUNTER — HOSPITAL ENCOUNTER (EMERGENCY)
Age: 85
Discharge: HOME | End: 2020-02-13
Payer: MEDICARE

## 2020-02-13 VITALS
RESPIRATION RATE: 16 BRPM | SYSTOLIC BLOOD PRESSURE: 140 MMHG | OXYGEN SATURATION: 98 % | HEART RATE: 76 BPM | DIASTOLIC BLOOD PRESSURE: 90 MMHG | TEMPERATURE: 97.8 F

## 2020-02-13 PROCEDURE — 71101 X-RAY EXAM UNILAT RIBS/CHEST: CPT

## 2020-02-13 PROCEDURE — 70450 CT HEAD/BRAIN W/O DYE: CPT

## 2020-02-13 PROCEDURE — 99283 EMERGENCY DEPT VISIT LOW MDM: CPT

## 2020-02-13 ASSESSMENT — ENCOUNTER SYMPTOMS
VOMITING: 0
BACK PAIN: 0
APNEA: 0
CHOKING: 0
NAUSEA: 0
EYE DISCHARGE: 0
SHORTNESS OF BREATH: 0
ABDOMINAL PAIN: 0
FACIAL SWELLING: 0
EYE REDNESS: 0

## 2020-02-13 ASSESSMENT — PAIN DESCRIPTION - LOCATION: LOCATION: RIB CAGE

## 2020-02-13 ASSESSMENT — PAIN DESCRIPTION - ORIENTATION: ORIENTATION: LEFT

## 2020-02-13 ASSESSMENT — PAIN DESCRIPTION - DESCRIPTORS: DESCRIPTORS: THROBBING

## 2020-02-13 ASSESSMENT — PAIN DESCRIPTION - PAIN TYPE: TYPE: ACUTE PAIN

## 2020-02-13 ASSESSMENT — PAIN DESCRIPTION - FREQUENCY: FREQUENCY: CONTINUOUS

## 2020-02-13 NOTE — ED PROVIDER NOTES
**EVALUATED BY ADVANCED PRACTICE PROVIDER**        [facility]      Pt Name: Raisa Ariza  GOA:[id number]  Birthdate 1935  Date of evaluation: 2/13/2020  Provider: Dona Sheehan PA-C      Chief Complaint:    Chief Complaint   Patient presents with    Fall     mechanical fall, hit ribs on arm of a chair. pt c/o pain on left side. pt denies hitting head        Nursing Notes, Past Medical Hx, Past Surgical Hx, Social Hx, Allergies, and Family Hx were all reviewed and agreed with or any disagreements were addressed in the HPI.    HPI:  (Location, Duration, Timing, Severity, Quality, Assoc Sx, Context, Modifying factors)  This is a  81 y.o. male who complain of fall. He states that he tripped on a carpet fell on his left side rib. Complaint of rib pain. Denied loss of consciousness. Did not hit his head. Denies neck pain or back pain. Denies chest pain. No numbness or tingling in feet or finger. He is ambulatory. Does not appear to be in acute distress. He is on Eliquis for atrial fibrillation.       PastMedical/Surgical History:      Diagnosis Date    Atrial fibrillation ([address] 09/2018    Atrial fib    Degeneration of cervical intervertebral disc 45/77/7008    Diastolic heart failure ([facility] [address] 09/2018    Diverticulosis of colon (without mention of hemorrhage) 10/22/2010    Essential hypertension, benign 10/22/2010    [place] (hard of hearing)     Hyperlipidemia 10/22/2010    Mononeuritis of unspecified site 10/22/2010    Osteoarthrosis, unspecified whether generalized or localized, pelvic region and thigh 10/22/2010    Other specified iron deficiency anemias 10/22/2010    Rosacea 10/22/2010    Seborrheic dermatitis 10/22/2010    Type II or unspecified type diabetes mellitus without mention of complication, not stated as uncontrolled 10/22/2010    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 10/22/2010    Unspecified pruritic disorder 10/22/2010         Procedure Laterality Date    CARDIOVASCULAR STRESS TEST  09/2018    negative    CARPAL TUNNEL RELEASE Right 05/16/2018    HAND SURGERY      Left carpel tunnel     HIP SURGERY Bilateral     total hips       Medications:  Previous Medications    ACETAMINOPHEN (TYLENOL) 500 MG TABLET    Take 500-1,000 mg by mouth every 6 hours as needed for Pain    AMOXICILLIN (AMOXIL) 500 MG CAPSULE    Take 1 tid    B-D ULTRAFINE III SHORT PEN 31G X 8 MM MISC    USE DAILY WITH LANTUS    BETAMETHASONE DIPROPIONATE (DIPROLENE) 0.05 % OINTMENT    Apply tid    BLOOD GLUCOSE CALIBRATION (OT ULTRA/FASTTK CNTRL SOLN) SOLN    Monthly as needed    ELIQUIS 5 MG TABS TABLET    TAKE 1 TABLET BY MOUTH TWICE A DAY    GABAPENTIN (NEURONTIN) 300 MG CAPSULE    Take 2 caps every evening    GLUCOSE BLOOD VI TEST STRIPS (ONE TOUCH ULTRA TEST) STRIP    Test bid    IBUPROFEN (ADVIL;MOTRIN) 600 MG TABLET    TAKE 1 TABLET BY MOUTH EVERY 8 HOURS AS NEEDED FOR PAIN    INSULIN GLARGINE (BASAGLAR KWIKPEN) 100 UNIT/ML INJECTION PEN    Inject 25U qd    INSULIN GLARGINE (BASAGLAR KWIKPEN) 100 UNIT/ML INJECTION PEN    Inject 25 units qd    KETOCONAZOLE (NIZORAL) 2 % CREAM    APPLY TO AFFECTED AREA TWICE A DAY    METOPROLOL SUCCINATE (TOPROL XL) 50 MG EXTENDED RELEASE TABLET    TAKE 1 TABLET BY MOUTH EVERY DAY    NEOMYCIN-POLYMYXIN-HYDROCORTISONE (CORTISPORIN) 3.5-00414-8 OPHTHALMIC SUSPENSION    2 gtts affected eye 4 times daily    OLOPATADINE (PATANASE) 0.6 % SOLN NASSL SOLN    2 sprays by Nasal route 2 times daily as needed (runny nose)    ONE TOUCH ULTRASOFT LANCETS MISC    Test bid    TORSEMIDE (DEMADEX) 20 MG TABLET    Take 1 tablet qd    TRIAMCINOLONE (KENALOG) 0.1 % OINTMENT    APPLY THIN AMOUNT TO AFFECTED AREA TWO TIMES A DAY AS NEEDED for skin irritation         Review of Systems:  Review of Systems   Constitutional: Negative for chills and fever.    HENT: Negative for congestion, facial swelling and Reviewed - No data to display     Remainder of labs reviewed and werenegative at this time or not returned at the time of this note. RADIOLOGY:   Non-plain film images such as CT, Ultrasound and MRI are read by the radiologist. Clemencia Acuña PA-C have directly visualized the radiologic plain film image(s) with the below findings:        Interpretation per the Radiologist below, if available at the time of this note:    CT Head WO Contrast   Final Result   No acute intracranial abnormality. XR RIBS LEFT INCLUDE CHEST (MIN 3 VIEWS)   Final Result   1. No radiographic evidence of a displaced left-sided rib fracture. If there   is persistent concern for acute osseous injury to the ribs, dedicated   noncontrast CT of the thorax would be the next best imaging modality. 2. Streaky left basilar opacity noted on the frontal view of the chest likely   reflecting subsegmental atelectasis versus scarring. 3. No pneumothorax. Xr Ribs Left Include Chest (min 3 Views)    Result Date: 2/13/2020  EXAMINATION: 5 XRAY VIEWS OF THE LEFT RIBS WITH FRONTAL XRAY VIEW OF THE CHEST 2/13/2020 2:19 pm COMPARISON: CT thorax 04/17/2019, frontal and lateral views of the chest 02/11/2009. HISTORY: ORDERING SYSTEM PROVIDED HISTORY: Fall TECHNOLOGIST PROVIDED HISTORY: Reason for exam:-> fall Reason for Exam: fell left anterior rib pain Acuity: Acute Type of Exam: Initial FINDINGS: The cardiopericardial silhouette is mildly enlarged. Atherosclerotic calcification of the thoracic aorta noted. Streaky left basilar opacity seen on the frontal view likely reflect subsegmental atelectasis versus scarring. There is no pneumothorax. There is no radiographic evidence of a displaced left-sided rib fracture. Degenerative changes of the shoulders noted. 1. No radiographic evidence of a displaced left-sided rib fracture.   If there is persistent concern for acute osseous injury to the ribs, dedicated noncontrast CT of the

## 2020-02-13 NOTE — ED TRIAGE NOTES
Pt presents to ED for fall this morning. C/o L rib pain states he hit a table. Denies LOC.  Pt AAOx4

## 2020-02-13 NOTE — ED NOTES
AVS reviewed with pt who verbalized understanding of f/u care and d/c instructions pt stable for d.c      Richard Hammond RN  02/13/20 [id number]

## 2020-02-20 ENCOUNTER — TELEPHONE (OUTPATIENT)
Dept: INTERNAL MEDICINE CLINIC | Age: 85
End: 2020-02-20

## 2020-02-29 RX ORDER — OSELTAMIVIR PHOSPHATE 75 MG/1
75 CAPSULE ORAL DAILY
Qty: 10 CAPSULE | Refills: 0 | Status: SHIPPED | OUTPATIENT
Start: 2020-02-29 | End: 2020-03-10

## 2020-03-06 ENCOUNTER — TELEPHONE (OUTPATIENT)
Dept: INTERNAL MEDICINE CLINIC | Age: 85
End: 2020-03-06

## 2020-03-06 RX ORDER — ACETAMINOPHEN AND CODEINE PHOSPHATE 300; 30 MG/1; MG/1
1 TABLET ORAL EVERY 6 HOURS PRN
Qty: 20 TABLET | Refills: 0 | OUTPATIENT
Start: 2020-03-06 | End: 2020-03-09

## 2020-03-06 NOTE — TELEPHONE ENCOUNTER
We can call in a few pain medications but the pain still hurts that bad he should get a chest x-ray and repeat rib films to make sure there is no delayed fracture.     Tylenol No. 3  20.  1 every 6 hours as needed pain

## 2020-03-06 NOTE — TELEPHONE ENCOUNTER
Pt states 3 weeks ago he injured his ribs and he went to the ER and they said his ribs was bruise. Pt states he is still in pain especially when he breaths and wants to know if it is normal to still have rib pain after 3 weeks. If it is normal he is asking for pain meds to help with pain. Pt states the ER Dr told him to watch for signs of  Pneumonia. Pt wants to know is there any treatment to help prevent him getting pneumonia.       Please be advise

## 2020-03-10 ENCOUNTER — HOSPITAL ENCOUNTER (OUTPATIENT)
Dept: GENERAL RADIOLOGY | Age: 85
Discharge: HOME | End: 2020-03-10
Payer: MEDICARE

## 2020-03-10 ENCOUNTER — HOSPITAL ENCOUNTER (OUTPATIENT)
Age: 85
Discharge: HOME | End: 2020-03-10
Payer: MEDICARE

## 2020-03-10 ENCOUNTER — TELEPHONE (OUTPATIENT)
Dept: INTERNAL MEDICINE CLINIC | Age: 85
End: 2020-03-10

## 2020-03-10 DIAGNOSIS — E78.5 HYPERLIPIDEMIA, UNSPECIFIED HYPERLIPIDEMIA TYPE: ICD-10-CM

## 2020-03-10 DIAGNOSIS — E11.9 DIABETES MELLITUS WITH NO COMPLICATION (HCC): ICD-10-CM

## 2020-03-10 LAB
BASOPHILS ABSOLUTE: 0 K/UL (ref 0–0.2)
BASOPHILS RELATIVE PERCENT: 0.4 %
CHOLESTEROL, TOTAL: 165 MG/DL (ref 0–199)
EOSINOPHILS ABSOLUTE: 0.1 K/UL (ref 0–0.6)
EOSINOPHILS RELATIVE PERCENT: 1.1 %
HCT VFR BLD CALC: 40.4 % (ref 40.5–52.5)
HDLC SERPL-MCNC: 52 MG/DL (ref 40–60)
HEMOGLOBIN: 13.6 G/DL (ref 13.5–17.5)
LDL CHOLESTEROL CALCULATED: 100 MG/DL
LYMPHOCYTES ABSOLUTE: 1.2 K/UL (ref 1–5.1)
LYMPHOCYTES RELATIVE PERCENT: 15.5 %
MCH RBC QN AUTO: 31.4 PG (ref 26–34)
MCHC RBC AUTO-ENTMCNC: 33.7 G/DL (ref 31–36)
MCV RBC AUTO: 93.1 FL (ref 80–100)
MONOCYTES ABSOLUTE: 0.5 K/UL (ref 0–1.3)
MONOCYTES RELATIVE PERCENT: 6.5 %
NEUTROPHILS ABSOLUTE: 6 K/UL (ref 1.7–7.7)
NEUTROPHILS RELATIVE PERCENT: 76.5 %
PDW BLD-RTO: 14.7 % (ref 12.4–15.4)
PLATELET # BLD: 194 K/UL (ref 135–450)
PMV BLD AUTO: 9.3 FL (ref 5–10.5)
RBC # BLD: 4.34 M/UL (ref 4.2–5.9)
TRIGL SERPL-MCNC: 63 MG/DL (ref 0–150)
VLDLC SERPL CALC-MCNC: 13 MG/DL
WBC # BLD: 7.8 K/UL (ref 4–11)

## 2020-03-10 PROCEDURE — 71110 X-RAY EXAM RIBS BIL 3 VIEWS: CPT

## 2020-03-10 PROCEDURE — 71046 X-RAY EXAM CHEST 2 VIEWS: CPT

## 2020-03-11 ENCOUNTER — TELEPHONE (OUTPATIENT)
Dept: INTERNAL MEDICINE CLINIC | Age: 85
End: 2020-03-11

## 2020-03-11 LAB
ESTIMATED AVERAGE GLUCOSE: 180 MG/DL
HBA1C MFR BLD: 7.9 %

## 2020-03-11 NOTE — TELEPHONE ENCOUNTER
Patient called stating he had blood work done and did not know he was suppose to fast.  Please let him know if he needs to have this redone.      Please call to advise

## 2020-03-24 ENCOUNTER — OFFICE VISIT (OUTPATIENT)
Dept: INTERNAL MEDICINE CLINIC | Age: 85
End: 2020-03-24
Payer: MEDICARE

## 2020-03-24 VITALS
BODY MASS INDEX: 30.13 KG/M2 | TEMPERATURE: 97.6 F | SYSTOLIC BLOOD PRESSURE: 118 MMHG | DIASTOLIC BLOOD PRESSURE: 62 MMHG | HEART RATE: 84 BPM | HEIGHT: 71 IN

## 2020-03-24 PROBLEM — M79.604 LEG PAIN, RIGHT: Status: ACTIVE | Noted: 2020-03-24

## 2020-03-24 PROBLEM — K59.00 OBSTIPATION: Status: RESOLVED | Noted: 2018-10-12 | Resolved: 2020-03-24

## 2020-03-24 PROBLEM — R53.83 OTHER FATIGUE: Status: RESOLVED | Noted: 2019-04-01 | Resolved: 2020-03-24

## 2020-03-24 PROBLEM — L98.419 SKIN ULCER OF BUTTOCK (HCC): Status: RESOLVED | Noted: 2019-11-06 | Resolved: 2020-03-24

## 2020-03-24 PROCEDURE — 3051F HG A1C>EQUAL 7.0%<8.0%: CPT | Performed by: INTERNAL MEDICINE

## 2020-03-24 PROCEDURE — G8417 CALC BMI ABV UP PARAM F/U: HCPCS | Performed by: INTERNAL MEDICINE

## 2020-03-24 PROCEDURE — G8427 DOCREV CUR MEDS BY ELIG CLIN: HCPCS | Performed by: INTERNAL MEDICINE

## 2020-03-24 PROCEDURE — 99214 OFFICE O/P EST MOD 30 MIN: CPT | Performed by: INTERNAL MEDICINE

## 2020-03-24 PROCEDURE — 1036F TOBACCO NON-USER: CPT | Performed by: INTERNAL MEDICINE

## 2020-03-24 PROCEDURE — 1123F ACP DISCUSS/DSCN MKR DOCD: CPT | Performed by: INTERNAL MEDICINE

## 2020-03-24 PROCEDURE — G8484 FLU IMMUNIZE NO ADMIN: HCPCS | Performed by: INTERNAL MEDICINE

## 2020-03-24 PROCEDURE — 4040F PNEUMOC VAC/ADMIN/RCVD: CPT | Performed by: INTERNAL MEDICINE

## 2020-03-24 RX ORDER — TRAMADOL HYDROCHLORIDE 50 MG/1
50 TABLET ORAL 3 TIMES DAILY PRN
Qty: 90 TABLET | Refills: 2 | Status: SHIPPED | OUTPATIENT
Start: 2020-03-24 | End: 2021-01-15

## 2020-03-24 ASSESSMENT — PATIENT HEALTH QUESTIONNAIRE - PHQ9
SUM OF ALL RESPONSES TO PHQ9 QUESTIONS 1 & 2: 0
2. FEELING DOWN, DEPRESSED OR HOPELESS: 0
1. LITTLE INTEREST OR PLEASURE IN DOING THINGS: 0
SUM OF ALL RESPONSES TO PHQ QUESTIONS 1-9: 0
SUM OF ALL RESPONSES TO PHQ QUESTIONS 1-9: 0

## 2020-03-24 ASSESSMENT — ENCOUNTER SYMPTOMS
GASTROINTESTINAL NEGATIVE: 1
SHORTNESS OF BREATH: 0
BACK PAIN: 0

## 2020-03-24 NOTE — PROGRESS NOTES
SUBJECTIVE:  Patient ID: Mendoza Rodriguez is an 81 y.o. male. HPI: Patient here today for the f/u of chronic problems-- see Problem List and associated comments. New issues or complaints include (also see Assessment for more details): Patient complained of increasing pain in his right thigh. He also complains of more numb sensation. The pain is mostly when he bears weight. 3 years ago this area was scanned with an MRI for similar reasons. At that time there was a loss of fatty tissue but otherwise no mass or other abnormalities were noted. At this point it is causing some more pain, up in the groin region, which is affecting his ability to ambulate and he is using a cane more. He does not have any worsening of his back pain. He does not feel any pain radiating from his back down his right leg. Recent labs reviewed. Although sugars are still high he is content to stay on the current medications. Review of Systems   Constitutional: Positive for activity change and fatigue. Respiratory: Negative for shortness of breath. Cardiovascular: Negative for chest pain. Gastrointestinal: Negative. Genitourinary: Negative for difficulty urinating. Musculoskeletal: Positive for arthralgias. Negative for back pain. Neurological: Positive for weakness and numbness. Psychiatric/Behavioral: Negative for dysphoric mood. OBJECTIVE:    /62 (Site: Right Upper Arm, Position: Sitting, Cuff Size: Large Adult)   Pulse 84   Temp 97.6 °F (36.4 °C) (Oral)   Ht 5' 11\" (1.803 m)   BMI 30.13 kg/m²      Physical Exam  Cardiovascular:      Rate and Rhythm: Normal rate. Pulmonary:      Effort: Pulmonary effort is normal. No respiratory distress. Musculoskeletal:      Right lower leg: Edema (Trace ankles) present. Left lower leg: Edema (Trace ankles) present. Comments: Right thigh- more prominent right quadricep muscle. Nontender to palpation.   Strength normal.    Right thigh 2 cm larger than left thigh. Lymphadenopathy:      Lower Body: No right inguinal adenopathy. Neurological:      Gait: Gait abnormal (Favors right hip-using cane). ASSESSMENT:       Encounter Diagnoses   Name Primary?  Leg pain, right Yes    Neuropathy     Lumbar disc disease    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)     Chronic atrial fibrillation        Leg pain, right  Suspect neuropathy. Continue current meds and add tramadol as needed. Get CT without contrast of right hip and right thigh. Diabetes mellitus ([address]  Improved to 7.9. Continue insulin. No changes. Chronic atrial fibrillation (HCC)  Eliquis and rate control    Lumbar disc disease  Stable-no new radicular symptoms        PLAN:See ASSESSMENT for evaluation & PLAN     Orders Placed This Encounter   Procedures    CT HIP RIGHT WO CONTRAST     Standing Status:   Future     Standing Expiration Date:   3/24/2021     Order Specific Question:   Reason for exam:     Answer:   pain right groin - emily w/ weight bearing    CT FEMUR RIGHT WO CONTRAST     Standing Status:   Future     Standing Expiration Date:   3/24/2021     Order Specific Question:   Reason for exam:     Answer:   pain, swelling, numbness sensation anterior right thigh to groin     , PMH, SH and FH reviewed and noted. Recent and past labs, tests and consultsalso reviewed. Recent or new meds also reviewed.

## 2020-03-24 NOTE — ASSESSMENT & PLAN NOTE
Suspect neuropathy. Continue current meds and add tramadol as needed. Get CT without contrast of right hip and right thigh.

## 2020-03-27 RX ORDER — TORSEMIDE 20 MG/1
TABLET ORAL
Qty: 45 TABLET | Refills: 7 | Status: SHIPPED | OUTPATIENT
Start: 2020-03-27 | End: 2020-08-11

## 2020-04-02 ENCOUNTER — TELEPHONE (OUTPATIENT)
Dept: INTERNAL MEDICINE CLINIC | Age: 85
End: 2020-04-02

## 2020-04-02 RX ORDER — ACETAMINOPHEN AND CODEINE PHOSPHATE 300; 30 MG/1; MG/1
1 TABLET ORAL EVERY 6 HOURS PRN
Qty: 30 TABLET | Refills: 0 | Status: SHIPPED | OUTPATIENT
Start: 2020-04-02 | End: 2020-05-01

## 2020-04-02 NOTE — TELEPHONE ENCOUNTER
Pt states he is unable to get an appt for CT until April 7th. Pt states his right leg he is in Pain, swelling and soreness and the tramadol is not working. Pt would like to know if he could get something or the pain, swelling and soreness until he is able to get in for the CT on the 7th.

## 2020-04-07 ENCOUNTER — HOSPITAL ENCOUNTER (OUTPATIENT)
Dept: CT IMAGING | Age: 85
Discharge: HOME | End: 2020-04-07
Payer: MEDICARE

## 2020-04-07 ENCOUNTER — TELEPHONE (OUTPATIENT)
Dept: INTERNAL MEDICINE CLINIC | Age: 85
End: 2020-04-07

## 2020-04-07 PROCEDURE — 73700 CT LOWER EXTREMITY W/O DYE: CPT

## 2020-04-07 NOTE — TELEPHONE ENCOUNTER
The CT scans of his hip and leg only show some arthritis.  The hip replacement looks good with no sign of loosening or infection.  There are no unusual masses or signs of any tumors or cancer.  The treatment for his discomfort is going to be gabapentin and the tramadol. Informed patient of the results. He stated he still is swollen  on right side  By hip - he stated doc would know. Anything to do to help with the swelling & pain? He has tramadol yet.

## 2020-04-13 RX ORDER — TRIAMCINOLONE ACETONIDE 1 MG/G
CREAM TOPICAL
Qty: 15 G | Refills: 1 | Status: SHIPPED | OUTPATIENT
Start: 2020-04-13 | End: 2020-05-01 | Stop reason: SDUPTHER

## 2020-05-01 ENCOUNTER — OFFICE VISIT (OUTPATIENT)
Dept: INTERNAL MEDICINE CLINIC | Age: 85
End: 2020-05-01
Payer: MEDICARE

## 2020-05-01 VITALS
OXYGEN SATURATION: 96 % | BODY MASS INDEX: 30.13 KG/M2 | SYSTOLIC BLOOD PRESSURE: 138 MMHG | HEIGHT: 71 IN | TEMPERATURE: 97.6 F | DIASTOLIC BLOOD PRESSURE: 88 MMHG | HEART RATE: 80 BPM

## 2020-05-01 PROCEDURE — 1123F ACP DISCUSS/DSCN MKR DOCD: CPT | Performed by: INTERNAL MEDICINE

## 2020-05-01 PROCEDURE — 99214 OFFICE O/P EST MOD 30 MIN: CPT | Performed by: INTERNAL MEDICINE

## 2020-05-01 PROCEDURE — G8427 DOCREV CUR MEDS BY ELIG CLIN: HCPCS | Performed by: INTERNAL MEDICINE

## 2020-05-01 PROCEDURE — 4040F PNEUMOC VAC/ADMIN/RCVD: CPT | Performed by: INTERNAL MEDICINE

## 2020-05-01 PROCEDURE — 1036F TOBACCO NON-USER: CPT | Performed by: INTERNAL MEDICINE

## 2020-05-01 PROCEDURE — G8417 CALC BMI ABV UP PARAM F/U: HCPCS | Performed by: INTERNAL MEDICINE

## 2020-05-01 RX ORDER — LIDOCAINE 50 MG/G
1 PATCH TOPICAL DAILY
Qty: 30 PATCH | Refills: 5 | Status: SHIPPED | OUTPATIENT
Start: 2020-05-01 | End: 2020-05-31

## 2020-05-01 RX ORDER — TRIAMCINOLONE ACETONIDE 1 MG/G
CREAM TOPICAL
Qty: 30 G | Refills: 1 | Status: SHIPPED | OUTPATIENT
Start: 2020-05-01 | End: 2020-11-04

## 2020-05-01 ASSESSMENT — ENCOUNTER SYMPTOMS: SHORTNESS OF BREATH: 0

## 2020-05-01 NOTE — PROGRESS NOTES
SUBJECTIVE:  Patient ID: Jeni Mendoza is an 81 y.o. male. HPI: Patient here today for the f/u of chronic problems-- see Problem List and associated comments. New issues or complaints include (also see Assessment for more details): Patient here for follow-up on his right thigh pain. He still feels like this area is swollen and painful. It is a perfectly oval area over the right anterior lateral thigh. Recent CT scans did not show any significant pathology or masses. He cannot tolerate a higher dose of gabapentin. Tramadol only helped a minimal amount. His blood pressure and diabetes are stable. He is compliant with his medication. Review of Systems   Constitutional: Negative for fever. Respiratory: Negative for shortness of breath. Cardiovascular: Positive for leg swelling. Negative for chest pain. Musculoskeletal: Positive for arthralgias and myalgias. Neurological: Positive for numbness. OBJECTIVE:    /88   Pulse 80   Temp 97.6 °F (36.4 °C)   Ht 5' 11\" (1.803 m)   SpO2 96%   BMI 30.13 kg/m²      Physical Exam  Constitutional:       Appearance: He is not ill-appearing. Comments: Abnormal gait-uses cane for support   Cardiovascular:      Rate and Rhythm: Normal rate and regular rhythm. Pulmonary:      Effort: Pulmonary effort is normal. No respiratory distress. Musculoskeletal:      Right lower leg: Edema present. Left lower leg: Edema present. Comments: Right thigh-56 cm  Left thigh-55 cm    Slight hyperesthesia over right quad muscles   Skin:     Coloration: Skin is not pale. Neurological:      Mental Status: He is alert. Psychiatric:         Mood and Affect: Mood normal.         Thought Content: Thought content normal.         Judgment: Judgment normal.         ASSESSMENT:       Encounter Diagnoses   Name Primary?     Neuropathy Yes    Leg pain, right     Essential hypertension        Leg pain, right  Continues to have pain in the cutaneous

## 2020-05-05 ENCOUNTER — TELEPHONE (OUTPATIENT)
Dept: ORTHOPEDIC SURGERY | Age: 85
End: 2020-05-05

## 2020-06-08 RX ORDER — GABAPENTIN 300 MG/1
CAPSULE ORAL
Qty: 60 CAPSULE | Refills: 5 | Status: SHIPPED | OUTPATIENT
Start: 2020-06-08 | End: 2021-04-12

## 2020-07-23 ENCOUNTER — OFFICE VISIT (OUTPATIENT)
Dept: INTERNAL MEDICINE CLINIC | Age: 85
End: 2020-07-23
Payer: MEDICARE

## 2020-07-23 VITALS
OXYGEN SATURATION: 99 % | HEIGHT: 72 IN | TEMPERATURE: 97.4 F | DIASTOLIC BLOOD PRESSURE: 80 MMHG | BODY MASS INDEX: 30.07 KG/M2 | HEART RATE: 108 BPM | WEIGHT: 222 LBS | SYSTOLIC BLOOD PRESSURE: 130 MMHG

## 2020-07-23 PROCEDURE — 4040F PNEUMOC VAC/ADMIN/RCVD: CPT | Performed by: INTERNAL MEDICINE

## 2020-07-23 PROCEDURE — 1036F TOBACCO NON-USER: CPT | Performed by: INTERNAL MEDICINE

## 2020-07-23 PROCEDURE — G8417 CALC BMI ABV UP PARAM F/U: HCPCS | Performed by: INTERNAL MEDICINE

## 2020-07-23 PROCEDURE — 1123F ACP DISCUSS/DSCN MKR DOCD: CPT | Performed by: INTERNAL MEDICINE

## 2020-07-23 PROCEDURE — 99214 OFFICE O/P EST MOD 30 MIN: CPT | Performed by: INTERNAL MEDICINE

## 2020-07-23 PROCEDURE — 3051F HG A1C>EQUAL 7.0%<8.0%: CPT | Performed by: INTERNAL MEDICINE

## 2020-07-23 PROCEDURE — G8427 DOCREV CUR MEDS BY ELIG CLIN: HCPCS | Performed by: INTERNAL MEDICINE

## 2020-07-23 ASSESSMENT — ENCOUNTER SYMPTOMS
SHORTNESS OF BREATH: 0
GASTROINTESTINAL NEGATIVE: 1
EYE REDNESS: 1
BACK PAIN: 1
EYE ITCHING: 1

## 2020-07-23 NOTE — PROGRESS NOTES
Extraocular Movements: Extraocular movements intact. Comments: Hyperemic conjunctiva bilaterally   Neck:      Musculoskeletal: No neck rigidity. Cardiovascular:      Rate and Rhythm: Normal rate. Rhythm irregular. Heart sounds: No gallop. Pulmonary:      Effort: Pulmonary effort is normal. No respiratory distress. Breath sounds: Normal breath sounds. Abdominal:      General: Bowel sounds are normal.      Palpations: Abdomen is soft. Musculoskeletal:      Right lower leg: No edema. Left lower leg: No edema. Skin:     Coloration: Skin is not pale. Comments: Buttock lesion-well-healed   Neurological:      General: No focal deficit present. Mental Status: He is alert. Psychiatric:         Mood and Affect: Mood normal.         Thought Content: Thought content normal.         Judgment: Judgment normal.         ASSESSMENT:       Encounter Diagnoses   Name Primary?  Arthritis     Chronic atrial fibrillation    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)     Chronic diastolic heart failure (HCC)     Edema, unspecified type     Essential hypertension     Lumbar disc disease     Neuropathy        Arthritis  Diffuse- especially hands and knees- he can work with the current situation. He tries to stay active. Chronic atrial fibrillation (HCC)  Controlled rate. Asymptomatic. Continue Eliquis    Diabetes mellitus ([address]  Very rare hypoglycemic episodes which responded well to orange juice and a glucose tablet. No nighttime symptoms. Diastolic heart failure (HCC)  Doing well with the addition of Lasix-is helped his breathing and keeps his edema control. Edema  Controlled with Lasix    Hypertension  BP okay    Lumbar disc disease  Mildly severe disc disease noted on recent x-ray- significant disc disease, spurring and arthritis. Continue current treatment. No consideration for surgical or injection therapy at this time. Neuropathy  Stable.

## 2020-07-31 ENCOUNTER — TELEPHONE (OUTPATIENT)
Dept: INTERNAL MEDICINE CLINIC | Age: 85
End: 2020-07-31

## 2020-07-31 RX ORDER — BETAMETHASONE DIPROPIONATE 0.05 %
OINTMENT (GRAM) TOPICAL
Qty: 15 G | Refills: 0 | Status: SHIPPED | OUTPATIENT
Start: 2020-07-31 | End: 2021-01-15

## 2020-07-31 NOTE — TELEPHONE ENCOUNTER
Pt states he has a crusty area around his lips. Pt states it hurts. Pt has been putting meprocin cream on the area and it has not healed.  Pt requesting a cream or something  to help treat this area

## 2020-08-11 RX ORDER — TORSEMIDE 20 MG/1
TABLET ORAL
Qty: 90 TABLET | Refills: 3 | Status: SHIPPED | OUTPATIENT
Start: 2020-08-11 | End: 2021-09-07

## 2020-08-26 ENCOUNTER — CARE COORDINATION (OUTPATIENT)
Dept: CARE COORDINATION | Age: 85
End: 2020-08-26

## 2020-08-27 NOTE — CARE COORDINATION
2nd attempt to reach patient. There was no answer. A message was left to have patient call back. Office number left. [phone].

## 2020-09-28 ENCOUNTER — TELEPHONE (OUTPATIENT)
Dept: INTERNAL MEDICINE CLINIC | Age: 85
End: 2020-09-28

## 2020-09-28 RX ORDER — AZITHROMYCIN 250 MG/1
250 TABLET, FILM COATED ORAL SEE ADMIN INSTRUCTIONS
Qty: 6 TABLET | Refills: 0 | Status: SHIPPED | OUTPATIENT
Start: 2020-09-28 | End: 2020-10-03

## 2020-09-28 RX ORDER — TOBRAMYCIN AND DEXAMETHASONE 3; 1 MG/ML; MG/ML
1 SUSPENSION/ DROPS OPHTHALMIC
Qty: 120 ML | Refills: 0 | Status: SHIPPED | OUTPATIENT
Start: 2020-09-28 | End: 2020-10-08

## 2020-09-28 NOTE — TELEPHONE ENCOUNTER
Patient called to advise he has pink eye in both eyes and would like something called in as soon as possible. Patient states his eyes are red and weepy. [facility]/pharmacy #[id number]- [place] - [address] Cynthia Siegel [phone] - F [phone]     Please advise.

## 2020-10-16 ENCOUNTER — TELEPHONE (OUTPATIENT)
Dept: INTERNAL MEDICINE CLINIC | Age: 85
End: 2020-10-16

## 2020-10-16 RX ORDER — CEPHALEXIN 500 MG/1
500 CAPSULE ORAL 4 TIMES DAILY
Qty: 40 CAPSULE | Refills: 0 | Status: SHIPPED | OUTPATIENT
Start: 2020-10-16 | End: 2021-01-15

## 2020-10-16 NOTE — TELEPHONE ENCOUNTER
The patient is report he scrapped the top of his RT foot and now it is infected, he is a diabetic and is requesting a abx    [facility]/pharmacy #[id number]- [place], [address] [place] [address].  [address] [place] [phone] - F [phone]

## 2020-11-04 RX ORDER — TRIAMCINOLONE ACETONIDE 1 MG/G
CREAM TOPICAL
Qty: 15 G | Refills: 1 | Status: SHIPPED | OUTPATIENT
Start: 2020-11-04 | End: 2021-01-15

## 2020-11-05 ENCOUNTER — TELEPHONE (OUTPATIENT)
Dept: INTERNAL MEDICINE CLINIC | Age: 85
End: 2020-11-05

## 2020-11-05 RX ORDER — ACETAMINOPHEN AND CODEINE PHOSPHATE 300; 30 MG/1; MG/1
1 TABLET ORAL EVERY 6 HOURS PRN
Qty: 30 TABLET | Refills: 0 | Status: SHIPPED | OUTPATIENT
Start: 2020-11-05 | End: 2021-01-15

## 2020-11-18 ENCOUNTER — TELEPHONE (OUTPATIENT)
Dept: INTERNAL MEDICINE CLINIC | Age: 85
End: 2020-11-18

## 2020-11-18 NOTE — TELEPHONE ENCOUNTER
Patient states his left knee is swollen and painful and he would like an x-ray ordered. Please call and advise.

## 2020-11-20 ENCOUNTER — HOSPITAL ENCOUNTER (OUTPATIENT)
Age: 85
Discharge: HOME | End: 2020-11-20
Payer: MEDICARE

## 2020-11-20 ENCOUNTER — HOSPITAL ENCOUNTER (OUTPATIENT)
Dept: GENERAL RADIOLOGY | Age: 85
Discharge: HOME | End: 2020-11-20
Payer: MEDICARE

## 2020-11-20 PROCEDURE — 73562 X-RAY EXAM OF KNEE 3: CPT

## 2020-12-02 ENCOUNTER — OFFICE VISIT (OUTPATIENT)
Dept: INTERNAL MEDICINE CLINIC | Age: 85
End: 2020-12-02
Payer: COMMERCIAL

## 2020-12-02 VITALS
WEIGHT: 221 LBS | HEART RATE: 84 BPM | SYSTOLIC BLOOD PRESSURE: 120 MMHG | OXYGEN SATURATION: 99 % | TEMPERATURE: 97.7 F | DIASTOLIC BLOOD PRESSURE: 84 MMHG | HEIGHT: 72 IN | BODY MASS INDEX: 29.93 KG/M2

## 2020-12-02 PROBLEM — I87.8 VENOUS STASIS OF BOTH LOWER EXTREMITIES: Status: ACTIVE | Noted: 2020-12-02

## 2020-12-02 PROCEDURE — 4040F PNEUMOC VAC/ADMIN/RCVD: CPT | Performed by: INTERNAL MEDICINE

## 2020-12-02 PROCEDURE — G8427 DOCREV CUR MEDS BY ELIG CLIN: HCPCS | Performed by: INTERNAL MEDICINE

## 2020-12-02 PROCEDURE — 1036F TOBACCO NON-USER: CPT | Performed by: INTERNAL MEDICINE

## 2020-12-02 PROCEDURE — 1123F ACP DISCUSS/DSCN MKR DOCD: CPT | Performed by: INTERNAL MEDICINE

## 2020-12-02 PROCEDURE — G8417 CALC BMI ABV UP PARAM F/U: HCPCS | Performed by: INTERNAL MEDICINE

## 2020-12-02 PROCEDURE — 99213 OFFICE O/P EST LOW 20 MIN: CPT | Performed by: INTERNAL MEDICINE

## 2020-12-02 PROCEDURE — G8484 FLU IMMUNIZE NO ADMIN: HCPCS | Performed by: INTERNAL MEDICINE

## 2020-12-02 ASSESSMENT — ENCOUNTER SYMPTOMS
SHORTNESS OF BREATH: 0
NAUSEA: 0
CHEST TIGHTNESS: 0
BACK PAIN: 0
EYE REDNESS: 0
ABDOMINAL PAIN: 0

## 2020-12-02 NOTE — PROGRESS NOTES
Subjective:      Patient ID: Maribel Newton is a 81 y.o. male    Chief Complaint   Patient presents with    Leg Swelling     right leg starting at the knee all the way down to the ankle     Knee Pain     right knee behind        Knee Pain    The incident occurred more than 1 week ago. There was no injury mechanism. The pain is present in the right knee. The quality of the pain is described as aching. The pain is at a severity of 2/10. The pain is mild. The pain has been fluctuating since onset. Associated symptoms comments: Pain with leg extension . The symptoms are aggravated by movement. He has tried elevation and acetaminophen for the symptoms. The treatment provided mild relief. Current Outpatient Medications on File Prior to Visit   Medication Sig Dispense Refill    acetaminophen-codeine (TYLENOL/CODEINE #3) 300-30 MG per tablet Take 1 tablet by mouth every 6 hours as needed (leg pain. ). 30 tablet 0    triamcinolone (KENALOG) 0.1 % cream APPLY TOPICALLY 2 TIMES DAILY AS NEEDED 15 g 1    torsemide (DEMADEX) 20 MG tablet TAKE 1 TABLET BY MOUTH EVERY DAY 90 tablet 3    betamethasone dipropionate (DIPROLENE) 0.05 % ointment Apply to affected area twice daily 15 g 0    mupirocin (BACTROBAN) 2 % ointment APPLY TOPICALLY 2 (TWO) TIMES DAILY FOR 10 DAYS. 15 g 2    traMADol (ULTRAM) 50 MG tablet Take 1 tablet by mouth 3 times daily as needed for Pain.  90 tablet 2    metoprolol succinate (TOPROL XL) 50 MG extended release tablet TAKE 1 TABLET BY MOUTH EVERY DAY 90 tablet 3    ketoconazole (NIZORAL) 2 % cream APPLY TO AFFECTED AREA TWICE A DAY 30 g 1    ELIQUIS 5 MG TABS tablet TAKE 1 TABLET BY MOUTH TWICE A  tablet 3    insulin glargine (BASAGLAR KWIKPEN) 100 UNIT/ML injection pen Inject 25 units qd 9 pen 3    ibuprofen (ADVIL;MOTRIN) 600 MG tablet TAKE 1 TABLET BY MOUTH EVERY 8 HOURS AS NEEDED FOR PAIN 60 tablet 0    olopatadine (PATANASE) 0.6 % SOLN nassl soln 2 sprays by Nasal route 2 times daily as needed (runny nose) 1 Bottle 5    insulin glargine (BASAGLAR KWIKPEN) 100 UNIT/ML injection pen Inject 25U qd 9 pen 3    cephALEXin (KEFLEX) 500 MG capsule Take 1 capsule by mouth 4 times daily (Patient not taking: Reported on 12/2/2020) 40 capsule 0    gabapentin (NEURONTIN) 300 MG capsule TAKE 2 CAPSULES BY MOUTH EVERY EVENING 60 capsule 5     No current facility-administered medications on file prior to visit. Allergies   Allergen Reactions    Levofloxacin      Pt states he doesn't have any allergies. His PCP added this to his chart        Review of Systems   Constitutional: Negative for fatigue, fever and unexpected weight change. HENT: Negative for hearing loss. Eyes: Negative for redness and visual disturbance. Respiratory: Negative for chest tightness and shortness of breath. Cardiovascular: Negative for chest pain and palpitations. Gastrointestinal: Negative for abdominal pain and nausea. Endocrine: Negative for cold intolerance, heat intolerance, polydipsia and polyuria. Genitourinary: Negative for dysuria and hematuria. Musculoskeletal: Positive for arthralgias. Negative for back pain and neck pain. Skin: Negative for rash and wound. Neurological: Negative for dizziness and headaches. Hematological: Negative for adenopathy. Does not bruise/bleed easily. Psychiatric/Behavioral: Negative for agitation. The patient is not nervous/anxious. Objective:   Physical Exam  Constitutional:       Appearance: Normal appearance. He is well-developed. Cardiovascular:      Rate and Rhythm: Normal rate and regular rhythm. Heart sounds: Normal heart sounds. No murmur. Pulmonary:      Effort: Pulmonary effort is normal.      Breath sounds: Normal breath sounds. No rales. Musculoskeletal:      Right lower leg: Edema present. Left lower leg: Edema present. Comments: Venous stasis of bilateral lower extremities.   Mild fullness with mild sensitivity of the right posterior knee. No venous cords, erythema, or venous tenderness. Skin:     General: Skin is warm and dry. Findings: No rash. Neurological:      General: No focal deficit present. Mental Status: He is alert and oriented to person, place, and time. Assessment and plan       1. Venous stasis of both lower extremities  Bilateral mild venous stasis of both lower extremities. Patient normally does wear his support hose to keep the swelling under control. He did not wear support hose today because he wanted me to check his knee. There is minimal fullness behind the right knee. There is no erythema, venous cord, or regional leg warmth to suggest DVT. 2. Baker's cyst of knee, right  Possible Baker's cyst of the right knee. He may try supporting the right knee with a elastic wrap. He should continue to wear his support hose. He may want to get a consultation with an orthopedic surgeon.   We also could do an ultrasound of the right knee in the future to look for Baker's cyst.

## 2020-12-09 ENCOUNTER — TELEPHONE (OUTPATIENT)
Dept: INTERNAL MEDICINE CLINIC | Age: 85
End: 2020-12-09

## 2020-12-09 RX ORDER — DULOXETIN HYDROCHLORIDE 60 MG/1
60 CAPSULE, DELAYED RELEASE ORAL DAILY
Qty: 30 CAPSULE | Refills: 5 | Status: SHIPPED | OUTPATIENT
Start: 2020-12-09 | End: 2021-10-14

## 2020-12-09 NOTE — TELEPHONE ENCOUNTER
Patient wife is concerned about her  he is feeling depressed very often he is breaking down crying, he just recent had a fall the other day and he just in very bad state very sad and his wife is requesting DR. Kellie Nielsen to prescribe a Anti Depressant please call wife with any other questions [phone] Please Advice

## 2020-12-10 ENCOUNTER — TELEPHONE (OUTPATIENT)
Dept: INTERNAL MEDICINE CLINIC | Age: 85
End: 2020-12-10

## 2020-12-10 ENCOUNTER — HOSPITAL ENCOUNTER (OUTPATIENT)
Age: 85
Discharge: HOME | End: 2020-12-10
Payer: COMMERCIAL

## 2020-12-10 ENCOUNTER — HOSPITAL ENCOUNTER (OUTPATIENT)
Dept: GENERAL RADIOLOGY | Age: 85
Discharge: HOME | End: 2020-12-10
Payer: COMMERCIAL

## 2020-12-10 PROCEDURE — 73080 X-RAY EXAM OF ELBOW: CPT

## 2020-12-10 PROCEDURE — 73060 X-RAY EXAM OF HUMERUS: CPT

## 2020-12-10 RX ORDER — PEN NEEDLE, DIABETIC 31 GX5/16"
NEEDLE, DISPOSABLE MISCELLANEOUS
Qty: 100 EACH | Refills: 3 | Status: SHIPPED | OUTPATIENT
Start: 2020-12-10 | End: 2021-01-15

## 2020-12-10 NOTE — TELEPHONE ENCOUNTER
Patient fell on his right elbow two days ago and is having some swelling. Patient is requesting to have a elbow on his elbow. Please advise.

## 2020-12-17 RX ORDER — METOPROLOL SUCCINATE 50 MG/1
TABLET, EXTENDED RELEASE ORAL
Qty: 90 TABLET | Refills: 3 | Status: SHIPPED | OUTPATIENT
Start: 2020-12-17 | End: 2022-01-07

## 2021-01-15 ENCOUNTER — OFFICE VISIT (OUTPATIENT)
Dept: INTERNAL MEDICINE CLINIC | Age: 86
End: 2021-01-15
Payer: COMMERCIAL

## 2021-01-15 VITALS
DIASTOLIC BLOOD PRESSURE: 70 MMHG | SYSTOLIC BLOOD PRESSURE: 128 MMHG | BODY MASS INDEX: 30.48 KG/M2 | TEMPERATURE: 97.3 F | WEIGHT: 225 LBS | HEIGHT: 72 IN

## 2021-01-15 DIAGNOSIS — G62.9 NEUROPATHY: ICD-10-CM

## 2021-01-15 DIAGNOSIS — Z00.00 PREVENTATIVE HEALTH CARE: Primary | ICD-10-CM

## 2021-01-15 DIAGNOSIS — D50.9 IRON DEFICIENCY ANEMIA, UNSPECIFIED IRON DEFICIENCY ANEMIA TYPE: ICD-10-CM

## 2021-01-15 DIAGNOSIS — Z12.5 SPECIAL SCREENING FOR MALIGNANT NEOPLASM OF PROSTATE: ICD-10-CM

## 2021-01-15 DIAGNOSIS — E78.5 HYPERLIPIDEMIA, UNSPECIFIED HYPERLIPIDEMIA TYPE: ICD-10-CM

## 2021-01-15 DIAGNOSIS — I10 ESSENTIAL HYPERTENSION: ICD-10-CM

## 2021-01-15 DIAGNOSIS — E53.8 B12 DEFICIENCY: ICD-10-CM

## 2021-01-15 DIAGNOSIS — M51.9 LUMBAR DISC DISEASE: ICD-10-CM

## 2021-01-15 DIAGNOSIS — M19.90 ARTHRITIS: ICD-10-CM

## 2021-01-15 DIAGNOSIS — I50.32 CHRONIC DIASTOLIC HEART FAILURE (HCC): ICD-10-CM

## 2021-01-15 DIAGNOSIS — E10.42 TYPE 1 DIABETES MELLITUS WITH DIABETIC POLYNEUROPATHY (HCC): ICD-10-CM

## 2021-01-15 DIAGNOSIS — K21.9 GASTROESOPHAGEAL REFLUX DISEASE, UNSPECIFIED WHETHER ESOPHAGITIS PRESENT: ICD-10-CM

## 2021-01-15 DIAGNOSIS — I48.20 CHRONIC ATRIAL FIBRILLATION (HCC): ICD-10-CM

## 2021-01-15 DIAGNOSIS — E11.8 TYPE 2 DIABETES MELLITUS WITH COMPLICATION (HCC): ICD-10-CM

## 2021-01-15 DIAGNOSIS — R60.9 EDEMA, UNSPECIFIED TYPE: ICD-10-CM

## 2021-01-15 DIAGNOSIS — Z00.00 ROUTINE GENERAL MEDICAL EXAMINATION AT A HEALTH CARE FACILITY: ICD-10-CM

## 2021-01-15 PROCEDURE — 4040F PNEUMOC VAC/ADMIN/RCVD: CPT | Performed by: INTERNAL MEDICINE

## 2021-01-15 PROCEDURE — 1123F ACP DISCUSS/DSCN MKR DOCD: CPT | Performed by: INTERNAL MEDICINE

## 2021-01-15 PROCEDURE — G8417 CALC BMI ABV UP PARAM F/U: HCPCS | Performed by: INTERNAL MEDICINE

## 2021-01-15 PROCEDURE — G8484 FLU IMMUNIZE NO ADMIN: HCPCS | Performed by: INTERNAL MEDICINE

## 2021-01-15 PROCEDURE — G0439 PPPS, SUBSEQ VISIT: HCPCS | Performed by: INTERNAL MEDICINE

## 2021-01-15 PROCEDURE — 1036F TOBACCO NON-USER: CPT | Performed by: INTERNAL MEDICINE

## 2021-01-15 PROCEDURE — 99214 OFFICE O/P EST MOD 30 MIN: CPT | Performed by: INTERNAL MEDICINE

## 2021-01-15 PROCEDURE — G8427 DOCREV CUR MEDS BY ELIG CLIN: HCPCS | Performed by: INTERNAL MEDICINE

## 2021-01-15 ASSESSMENT — ENCOUNTER SYMPTOMS
SHORTNESS OF BREATH: 0
ABDOMINAL PAIN: 0
TROUBLE SWALLOWING: 0
ABDOMINAL DISTENTION: 0

## 2021-01-15 NOTE — PATIENT INSTRUCTIONS
Personalized Preventive Plan for Jazmyn Smiley - 1/15/2021  Medicare offers a range of preventive health benefits. Some of the tests and screenings are paid in full while other may be subject to a deductible, co-insurance, and/or copay. Some of these benefits include a comprehensive review of your medical history including lifestyle, illnesses that may run in your family, and various assessments and screenings as appropriate. After reviewing your medical record and screening and assessments performed today your provider may have ordered immunizations, labs, imaging, and/or referrals for you. A list of these orders (if applicable) as well as your Preventive Care list are included within your After Visit Summary for your review. Other Preventive Recommendations:    · A preventive eye exam performed by an eye specialist is recommended every 1-2 years to screen for glaucoma; cataracts, macular degeneration, and other eye disorders. · A preventive dental visit is recommended every 6 months. · Try to get at least 150 minutes of exercise per week or 10,000 steps per day on a pedometer . · Order or download the FREE \"Exercise & Physical Activity: Your Everyday Guide\" from [employer]. Call 3-800.616.5439 or search [employer] online. · You need 9708-3348 mg of calcium and 3235-3643 IU of vitamin D per day. It is possible to meet your calcium requirement with diet alone, but a vitamin D supplement is usually necessary to meet this goal.  · When exposed to the sun, use a sunscreen that protects against both UVA and UVB radiation with an SPF of 30 or greater. Reapply every 2 to 3 hours or after sweating, drying off with a towel, or swimming. · Always wear a seat belt when traveling in a car. Always wear a helmet when riding a bicycle or motorcycle.

## 2021-01-15 NOTE — PROGRESS NOTES
SUBJECTIVE:  Patient ID: Leti Tolentino is an 81 y.o. male. HPI: Patient here today for the f/u of chronic problems-- see Problem List and associated comments. New issues or complaints include (also see Assessment for more details): Patient here for his routine yearly checkup. As usual his biggest problems continue to be his neuropathy and arthritis. He also continues to get recurrent sores on his buttocks, mostly from sitting. Doing arm seems to help. No signs or symptoms of Covid during the pandemic. He intends to get the vaccination. Review of Systems   Constitutional: Positive for fatigue. Negative for fever. HENT: Negative for trouble swallowing. Respiratory: Negative for shortness of breath. Cardiovascular: Positive for leg swelling. Negative for chest pain. Gastrointestinal: Negative for abdominal distention and abdominal pain. Belching   Musculoskeletal: Positive for arthralgias. Skin: Positive for wound (Buttocks). Allergic/Immunologic: Negative for immunocompromised state. Neurological: Positive for weakness and numbness. Hematological: Negative. Psychiatric/Behavioral: Negative. OBJECTIVE:    /70 (Site: Left Upper Arm)   Temp 97.3 °F (36.3 °C)   Ht 6' (1.829 m)   Wt 225 lb (102.1 kg)   BMI 30.52 kg/m²      Physical Exam  Constitutional:       General: He is not in acute distress. Appearance: He is not ill-appearing. HENT:      Right Ear: External ear normal.      Left Ear: External ear normal.      Nose: No congestion. Mouth/Throat:      Mouth: Mucous membranes are moist.      Pharynx: Oropharynx is clear. No oropharyngeal exudate or posterior oropharyngeal erythema. Comments: Partially edentulous  Eyes:      General: No scleral icterus. Extraocular Movements: Extraocular movements intact. Neck:      Vascular: No carotid bruit. Cardiovascular:      Rate and Rhythm: Normal rate. Rhythm irregular.       Heart sounds: Murmur present. No gallop. Pulmonary:      Effort: Pulmonary effort is normal. No respiratory distress. Abdominal:      General: Bowel sounds are normal.      Palpations: Abdomen is soft. Genitourinary:         Comments: Areas of superficial skin breakdown-no full-thickness. Dry skin. Musculoskeletal:      Right lower leg: Edema (Trace ankles) present. Left lower leg: Edema (Trace ankles) present. Comments: Right thigh- more prominent right quadricep muscle. Nontender to palpation. Strength normal.    Right thigh 2 cm larger than left thigh. Lymphadenopathy:      Cervical: No cervical adenopathy. Lower Body: No right inguinal adenopathy. Skin:     Coloration: Skin is not jaundiced or pale. Neurological:      Mental Status: He is alert and oriented to person, place, and time. Gait: Gait abnormal (Favors right hip-using cane). Psychiatric:         Mood and Affect: Mood normal.         Thought Content: Thought content normal.         Judgment: Judgment normal.         ASSESSMENT:       Encounter Diagnoses   Name Primary?  Routine general medical examination at a health care facility Yes    Type 2 diabetes mellitus with complication ([facility]     Neuropathy    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)     Hyperlipidemia, unspecified hyperlipidemia type     Iron deficiency anemia, unspecified iron deficiency anemia type     Essential hypertension     Special screening for malignant neoplasm of prostate     B12 deficiency     Preventative health care     Lumbar disc disease     Edema, unspecified type     Gastroesophageal reflux disease, unspecified whether esophagitis present     Arthritis     Chronic diastolic heart failure (HCC)     Chronic atrial fibrillation ([address]        Preventative health care  Here for his routine checkup. Nonfasting labs today. No signs of Covid infection. See problem list for his chronic and relatively stable problems.     Lumbar disc disease  Stable. Chronic neuropathy. Continue gabapentin which does help. Uses Tylenol and Tylenol 3 rarely for backup. Iron deficiency anemia  Check CBC    Edema  Attenuated by diuretics    Hypertension  BP okay    Hyperlipidemia  Nonfasting lipids today    Diabetes mellitus (HCC)  Check A1c    GERD (gastroesophageal reflux disease)  Belching is his biggest problem. Probable hiatal hernia. Neuropathy  Significant but fairly well controlled with gabapentin. Unfortunately gabapentin causes sedation. Uses Tylenol for backup, also uses Tylenol 3 rarely as a backup. Works better than tramadol. Arthritis  Diffuse-mostly knees. Stable. Diastolic heart failure (HCC)  Controlled with current medication diuretics-per cardiology    Chronic atrial fibrillation ([facility] [address]  Rate control and anticoagulation        PLAN:See ASSESSMENT for evaluation & PLAN     Orders Placed This Encounter   Procedures    CBC Auto Differential     Standing Status:   Future     Standing Expiration Date:   1/15/2022    Comprehensive Metabolic Panel     Standing Status:   Future     Standing Expiration Date:   1/15/2022    Lipid Panel     Standing Status:   Future     Standing Expiration Date:   1/15/2022     Order Specific Question:   Is Patient Fasting?/# of Hours     Answer:   yes - 8 hours    Hemoglobin A1C     Standing Status:   Future     Standing Expiration Date:   1/15/2022    TSH WITH REFLEX TO FT4     Standing Status:   Future     Standing Expiration Date:   1/15/2022    Psa screening     Standing Status:   Future     Standing Expiration Date:   1/15/2022    Vitamin B12     Standing Status:   Future     Standing Expiration Date:   1/15/2022     , PMH, SH and FH reviewed and noted. Recent and past labs, tests and consultsalso reviewed. Recent or new meds also reviewed.      The patient encounter today has included elements of the following:   - preparation to see the patient   - review of outside records, histories   - performing an appropriate medical evaluation and examination   - counseling pf patient/family/caregiver   - ordering/changing medications, tests, procedures   - referring and communicating with other HCPs when appropriate   - documentation in the EMR   - independently interpreting results and communicating results to patient/family/caregiver   - care coordination and completing appropriate referrals and forms        The patient encounter today included at least 1 out of 3 of the following:   - review of external notes, tests and outside assessments, or   - independent interpretation of outside tests performed by other HCPs, or   - discussion of management of tests and interpretations with outside HCPs.

## 2021-01-15 NOTE — ASSESSMENT & PLAN NOTE
Significant but fairly well controlled with gabapentin. Unfortunately gabapentin causes sedation. Uses Tylenol for backup, also uses Tylenol 3 rarely as a backup. Works better than tramadol.

## 2021-01-15 NOTE — ASSESSMENT & PLAN NOTE
Stable. Chronic neuropathy. Continue gabapentin which does help. Uses Tylenol and Tylenol 3 rarely for backup.

## 2021-01-15 NOTE — ASSESSMENT & PLAN NOTE
Here for his routine checkup. Nonfasting labs today. No signs of Covid infection. See problem list for his chronic and relatively stable problems.

## 2021-03-22 ENCOUNTER — TELEPHONE (OUTPATIENT)
Dept: INTERNAL MEDICINE CLINIC | Age: 86
End: 2021-03-22

## 2021-03-22 NOTE — TELEPHONE ENCOUNTER
Try nasal saline rinses twice a day, which work wonderful to help with nasal congestion. Can buy OTC. Can try Mucinex.

## 2021-03-24 RX ORDER — KETOCONAZOLE 20 MG/G
CREAM TOPICAL
Qty: 30 G | Refills: 1 | Status: SHIPPED | OUTPATIENT
Start: 2021-03-24 | End: 2021-04-09 | Stop reason: SDUPTHER

## 2021-03-31 RX ORDER — OLOPATADINE HYDROCHLORIDE 665 UG/1
SPRAY NASAL
Qty: 1 BOTTLE | Refills: 5 | Status: SHIPPED | OUTPATIENT
Start: 2021-03-31 | End: 2021-10-14

## 2021-03-31 NOTE — TELEPHONE ENCOUNTER
Is he referring to Patanase nasal spray?     Patanase     1 bottle        2 sprays in each nostril twice daily as needed runny nose        refill 5

## 2021-03-31 NOTE — TELEPHONE ENCOUNTER
Pt states he has mucous that has ran from his sinus to his chest and states Dr. Yasmin Muñoz has prescribed him something in the past that has helped. He has tried OTC Mucinex and it has not worked.   Please advise

## 2021-04-06 ENCOUNTER — TELEPHONE (OUTPATIENT)
Dept: INTERNAL MEDICINE CLINIC | Age: 86
End: 2021-04-06

## 2021-04-06 RX ORDER — PENICILLIN V POTASSIUM 500 MG/1
500 TABLET ORAL 4 TIMES DAILY
Qty: 40 TABLET | Refills: 0 | Status: SHIPPED | OUTPATIENT
Start: 2021-04-06 | End: 2021-04-16

## 2021-04-06 NOTE — TELEPHONE ENCOUNTER
Penicillin  mg     #40       1 4 times daily  We have tried several things for his mucus. Try Mucinex-it is over-the-counter. I will try to think of something else by the time he gets here.
Pt aware   Scripts sent
normal (ped)...

## 2021-04-09 ENCOUNTER — OFFICE VISIT (OUTPATIENT)
Dept: INTERNAL MEDICINE CLINIC | Age: 86
End: 2021-04-09
Payer: COMMERCIAL

## 2021-04-09 ENCOUNTER — HOSPITAL ENCOUNTER (OUTPATIENT)
Dept: GENERAL RADIOLOGY | Age: 86
Discharge: HOME | End: 2021-04-09
Payer: COMMERCIAL

## 2021-04-09 ENCOUNTER — HOSPITAL ENCOUNTER (OUTPATIENT)
Age: 86
Discharge: HOME | End: 2021-04-09
Payer: COMMERCIAL

## 2021-04-09 VITALS
HEIGHT: 72 IN | OXYGEN SATURATION: 94 % | BODY MASS INDEX: 30.52 KG/M2 | TEMPERATURE: 97.7 F | SYSTOLIC BLOOD PRESSURE: 136 MMHG | DIASTOLIC BLOOD PRESSURE: 70 MMHG | HEART RATE: 81 BPM

## 2021-04-09 DIAGNOSIS — M79.10 MYALGIA: ICD-10-CM

## 2021-04-09 DIAGNOSIS — R06.02 SOB (SHORTNESS OF BREATH): ICD-10-CM

## 2021-04-09 DIAGNOSIS — R53.82 CHRONIC FATIGUE: ICD-10-CM

## 2021-04-09 DIAGNOSIS — R06.09 DOE (DYSPNEA ON EXERTION): ICD-10-CM

## 2021-04-09 DIAGNOSIS — I48.20 CHRONIC ATRIAL FIBRILLATION (HCC): ICD-10-CM

## 2021-04-09 DIAGNOSIS — R53.83 FATIGUE, UNSPECIFIED TYPE: ICD-10-CM

## 2021-04-09 DIAGNOSIS — D50.9 IRON DEFICIENCY ANEMIA, UNSPECIFIED IRON DEFICIENCY ANEMIA TYPE: ICD-10-CM

## 2021-04-09 DIAGNOSIS — I10 ESSENTIAL HYPERTENSION: ICD-10-CM

## 2021-04-09 DIAGNOSIS — R06.02 SOB (SHORTNESS OF BREATH): Primary | ICD-10-CM

## 2021-04-09 DIAGNOSIS — I50.32 CHRONIC DIASTOLIC HEART FAILURE (HCC): ICD-10-CM

## 2021-04-09 DIAGNOSIS — I50.32 CHRONIC DIASTOLIC HEART FAILURE (HCC): Primary | ICD-10-CM

## 2021-04-09 DIAGNOSIS — E10.42 TYPE 1 DIABETES MELLITUS WITH DIABETIC POLYNEUROPATHY (HCC): ICD-10-CM

## 2021-04-09 LAB
CORTISOL TOTAL: 10.9 UG/DL
PRO-BNP: 3890 PG/ML (ref 0–449)
SEDIMENTATION RATE, ERYTHROCYTE: 9 MM/HR (ref 0–20)

## 2021-04-09 PROCEDURE — 93000 ELECTROCARDIOGRAM COMPLETE: CPT | Performed by: INTERNAL MEDICINE

## 2021-04-09 PROCEDURE — 1123F ACP DISCUSS/DSCN MKR DOCD: CPT | Performed by: INTERNAL MEDICINE

## 2021-04-09 PROCEDURE — 4040F PNEUMOC VAC/ADMIN/RCVD: CPT | Performed by: INTERNAL MEDICINE

## 2021-04-09 PROCEDURE — 71046 X-RAY EXAM CHEST 2 VIEWS: CPT

## 2021-04-09 PROCEDURE — G8427 DOCREV CUR MEDS BY ELIG CLIN: HCPCS | Performed by: INTERNAL MEDICINE

## 2021-04-09 PROCEDURE — G8417 CALC BMI ABV UP PARAM F/U: HCPCS | Performed by: INTERNAL MEDICINE

## 2021-04-09 PROCEDURE — 99214 OFFICE O/P EST MOD 30 MIN: CPT | Performed by: INTERNAL MEDICINE

## 2021-04-09 PROCEDURE — 1036F TOBACCO NON-USER: CPT | Performed by: INTERNAL MEDICINE

## 2021-04-09 PROCEDURE — 3051F HG A1C>EQUAL 7.0%<8.0%: CPT | Performed by: INTERNAL MEDICINE

## 2021-04-09 RX ORDER — KETOCONAZOLE 20 MG/G
CREAM TOPICAL
Qty: 30 G | Refills: 1 | Status: SHIPPED | OUTPATIENT
Start: 2021-04-09 | End: 2021-10-14

## 2021-04-09 ASSESSMENT — ENCOUNTER SYMPTOMS
SHORTNESS OF BREATH: 1
BACK PAIN: 1
RHINORRHEA: 1

## 2021-04-09 ASSESSMENT — PATIENT HEALTH QUESTIONNAIRE - PHQ9
2. FEELING DOWN, DEPRESSED OR HOPELESS: 0
1. LITTLE INTEREST OR PLEASURE IN DOING THINGS: 0
SUM OF ALL RESPONSES TO PHQ QUESTIONS 1-9: 0
SUM OF ALL RESPONSES TO PHQ9 QUESTIONS 1 & 2: 0
SUM OF ALL RESPONSES TO PHQ QUESTIONS 1-9: 0
SUM OF ALL RESPONSES TO PHQ QUESTIONS 1-9: 0

## 2021-04-09 NOTE — PROGRESS NOTES
SUBJECTIVE:  Patient ID: Teresa Castanon is an 81 y.o. male. HPI: Patient here today for the f/u of chronic problems-- see Problem List and associated comments. New issues or complaints include (also see Assessment for more details): He just does not feel well. Tired and fatigued. At least the medication is dried up his secretions. Review of Systems   Constitutional: Positive for fatigue. HENT: Positive for rhinorrhea (Improved). Respiratory: Positive for shortness of breath. Cardiovascular: Positive for leg swelling. Negative for chest pain. Musculoskeletal: Positive for arthralgias, back pain and myalgias. Neurological: Positive for weakness and numbness. Negative for dizziness and light-headedness. OBJECTIVE:    /70 (Site: Right Upper Arm)   Pulse 81   Temp 97.7 °F (36.5 °C)   Ht 6' (1.829 m)   SpO2 94%   BMI 30.52 kg/m²      Physical Exam  Constitutional:       General: He is not in acute distress. Appearance: He is not ill-appearing. Comments: Using cane   Eyes:      General: No scleral icterus. Cardiovascular:      Rate and Rhythm: Normal rate. Rhythm irregular. Heart sounds: No gallop. Pulmonary:      Effort: Pulmonary effort is normal. No respiratory distress. Breath sounds: No stridor. No wheezing. Abdominal:      General: Bowel sounds are normal.      Palpations: Abdomen is soft. Musculoskeletal:      Right lower leg: Edema present. Left lower leg: Edema present. Skin:     Coloration: Skin is not pale. Neurological:      General: No focal deficit present. Mental Status: He is oriented to person, place, and time. Psychiatric:         Mood and Affect: Mood normal.         Thought Content: Thought content normal.         Judgment: Judgment normal.         ASSESSMENT:       Encounter Diagnoses   Name Primary?     SOB (shortness of breath) Yes    ASHFORD (dyspnea on exertion)     Fatigue, unspecified type     Myalgia     Iron

## 2021-04-10 DIAGNOSIS — G62.9 NEUROPATHY: ICD-10-CM

## 2021-04-12 RX ORDER — GABAPENTIN 300 MG/1
CAPSULE ORAL
Qty: 60 CAPSULE | Refills: 0 | Status: SHIPPED | OUTPATIENT
Start: 2021-04-12 | End: 2021-05-05

## 2021-04-19 ENCOUNTER — TELEPHONE (OUTPATIENT)
Dept: INTERNAL MEDICINE CLINIC | Age: 86
End: 2021-04-19

## 2021-04-19 RX ORDER — PENICILLIN V POTASSIUM 500 MG/1
500 TABLET ORAL 4 TIMES DAILY
Qty: 28 TABLET | Refills: 0 | Status: SHIPPED | OUTPATIENT
Start: 2021-04-19 | End: 2021-06-09 | Stop reason: SDUPTHER

## 2021-04-19 NOTE — TELEPHONE ENCOUNTER
Pt states that he has appt sched for echo and CT for 05/18. Is it ok for him to wait this long?      Also states that he needs PCN, if you want to refill

## 2021-05-04 DIAGNOSIS — G62.9 NEUROPATHY: ICD-10-CM

## 2021-05-05 RX ORDER — GABAPENTIN 300 MG/1
CAPSULE ORAL
Qty: 60 CAPSULE | Refills: 1 | Status: SHIPPED | OUTPATIENT
Start: 2021-05-05 | End: 2021-07-07

## 2021-05-07 RX ORDER — INSULIN GLARGINE 100 [IU]/ML
INJECTION, SOLUTION SUBCUTANEOUS
Qty: 5 PEN | Refills: 3 | Status: SHIPPED | OUTPATIENT
Start: 2021-05-07 | End: 2021-10-14 | Stop reason: SDUPTHER

## 2021-05-18 ENCOUNTER — HOSPITAL ENCOUNTER (OUTPATIENT)
Dept: CT IMAGING | Age: 86
Discharge: HOME | End: 2021-05-18
Payer: COMMERCIAL

## 2021-05-18 ENCOUNTER — HOSPITAL ENCOUNTER (OUTPATIENT)
Dept: NON INVASIVE DIAGNOSTICS | Age: 86
Discharge: HOME | End: 2021-05-18
Payer: COMMERCIAL

## 2021-05-18 DIAGNOSIS — R06.02 SOB (SHORTNESS OF BREATH): ICD-10-CM

## 2021-05-18 DIAGNOSIS — I50.32 CHRONIC DIASTOLIC HEART FAILURE (HCC): ICD-10-CM

## 2021-05-18 DIAGNOSIS — R53.83 FATIGUE, UNSPECIFIED TYPE: ICD-10-CM

## 2021-05-18 LAB
LV EF: 53 %
LVEF MODALITY: NORMAL

## 2021-05-18 PROCEDURE — 93306 TTE W/DOPPLER COMPLETE: CPT

## 2021-05-18 PROCEDURE — 71250 CT THORAX DX C-: CPT

## 2021-05-21 DIAGNOSIS — J18.9 INFLAMMATION OF LUNG: Primary | ICD-10-CM

## 2021-06-08 ENCOUNTER — TELEPHONE (OUTPATIENT)
Dept: INTERNAL MEDICINE CLINIC | Age: 86
End: 2021-06-08

## 2021-06-08 NOTE — TELEPHONE ENCOUNTER
Pt requesting refill on abx. States he is out and his sx are no better than before. [id number] [place], [address] [facility].  Paula Diaz [phone] [place] [phone]      Please advise

## 2021-06-08 NOTE — TELEPHONE ENCOUNTER
If the penicillin has not helped, I am not sure why he wants to refill it. Maybe he should follow-up.

## 2021-06-09 RX ORDER — PENICILLIN V POTASSIUM 500 MG/1
TABLET ORAL
Qty: 40 TABLET | Refills: 0 | Status: SHIPPED | OUTPATIENT
Start: 2021-06-09 | End: 2021-10-14

## 2021-06-24 ENCOUNTER — OFFICE VISIT (OUTPATIENT)
Dept: PULMONOLOGY | Age: 86
End: 2021-06-24
Payer: MEDICARE

## 2021-06-24 VITALS
SYSTOLIC BLOOD PRESSURE: 136 MMHG | DIASTOLIC BLOOD PRESSURE: 82 MMHG | OXYGEN SATURATION: 97 % | TEMPERATURE: 96.8 F | HEART RATE: 82 BPM | HEIGHT: 72 IN | WEIGHT: 222 LBS | BODY MASS INDEX: 30.07 KG/M2

## 2021-06-24 DIAGNOSIS — J98.11 PULMONARY ATELECTASIS: ICD-10-CM

## 2021-06-24 PROCEDURE — 1123F ACP DISCUSS/DSCN MKR DOCD: CPT | Performed by: INTERNAL MEDICINE

## 2021-06-24 PROCEDURE — G8427 DOCREV CUR MEDS BY ELIG CLIN: HCPCS | Performed by: INTERNAL MEDICINE

## 2021-06-24 PROCEDURE — 99212 OFFICE O/P EST SF 10 MIN: CPT | Performed by: INTERNAL MEDICINE

## 2021-06-24 PROCEDURE — 1036F TOBACCO NON-USER: CPT | Performed by: INTERNAL MEDICINE

## 2021-06-24 PROCEDURE — G8417 CALC BMI ABV UP PARAM F/U: HCPCS | Performed by: INTERNAL MEDICINE

## 2021-06-24 PROCEDURE — 4040F PNEUMOC VAC/ADMIN/RCVD: CPT | Performed by: INTERNAL MEDICINE

## 2021-06-24 NOTE — PROGRESS NOTES
Kelley Collazo (:  1935) is a 81 y.o. male,New patient, here for evaluation of the following chief complaint(s):  Abnormal Chest X-ray         ASSESSMENT/PLAN:  1. Pulmonary atelectasis  Review of current CT scan from May 2021 and prior studies in 2019, 2018 indicate lingular atelectasis that is not significantly changed. It is likely secondary to a primary airway abnormality. No hilar mass or other lesion is seen. This is a long-term problem, and will be seen on future images as well. No treatment is necessary. Left pleural effusion is associated with atelectasis. It waxes and wanes, has been seen with right pleural effusion as well, and is consistent with heart failure. No follow-ups on file. Subjective   SUBJECTIVE/OBJECTIVE:  HPI   Mr. Horacio Boggs is referred by Dr. Warden Rodriguez because of abnormal chest CT scan. The CT was obtained 2021 and the interpretation suggested atelectasis or pneumonia. Follow-up to resolution was recommended by radiologist, and the consideration given to bronchoscopy if it did not resolve because of possible underlying endobronchial or mass lesion. The patient has no chest discomfort. He denies feeling short of breath. Activity is limited particularly by problems with his left knee, for which he uses a cane or walker, and by back pain. He does not cough but occasionally clears his throat. He has not been treated for chronic lung disease. He is a former smoker, having quit at age 41. He is treated for diastolic heart failure, takes Lasix daily. He finds this controls peripheral edema pretty well. He also has atrial fibrillation. Objective   Physical Exam  Constitutional:       Appearance: He is well-developed and normal weight. HENT:      Head: Normocephalic and atraumatic. Mouth/Throat:      Pharynx: No oropharyngeal exudate. Eyes:      General: No scleral icterus. Right eye: No discharge.       Conjunctiva/sclera: Conjunctivae normal.   Neck:      Thyroid: No thyromegaly. Trachea: No tracheal deviation. Cardiovascular:      Rate and Rhythm: Normal rate. Rhythm irregular. Pulses:           Radial pulses are 2+ on the right side and 2+ on the left side. Heart sounds: Normal heart sounds. No murmur heard. Pulmonary:      Effort: Pulmonary effort is normal. No respiratory distress. Breath sounds: Normal breath sounds. No stridor. No wheezing, rhonchi or rales. Chest:      Chest wall: No tenderness. Musculoskeletal:      Right lower le+ Pitting Edema present. Left lower le+ Pitting Edema present. Lymphadenopathy:      Cervical: No cervical adenopathy. Skin:     General: Skin is warm and dry. Neurological:      Mental Status: He is alert and oriented to person, place, and time. Psychiatric:         Mood and Affect: Mood normal.         Behavior: Behavior normal.         Thought Content: Thought content normal.         Judgment: Judgment normal.     Chest CT from 2021 is reviewed, and compared to previous CT scans from 2019 and 2018. Lingular atelectasis is seen on all images and is consistent. There is no hilar mass or other lesion seen. Left pleural effusion is present, with associated atelectasis. On prior studies, the pleural effusion has been larger, and there has been right pleural effusion as well. On this date 2021 I have spent 25 minutes reviewing previous notes, test results and face to face with the patient discussing the diagnosis and importance of compliance with the treatment plan as well as documenting on the day of the visit. An electronic signature was used to authenticate this note.     --Tommi Lennox, MD

## 2021-07-06 DIAGNOSIS — G62.9 NEUROPATHY: ICD-10-CM

## 2021-07-07 RX ORDER — GABAPENTIN 300 MG/1
CAPSULE ORAL
Qty: 60 CAPSULE | Refills: 1 | Status: SHIPPED | OUTPATIENT
Start: 2021-07-07 | End: 2021-09-01 | Stop reason: SDUPTHER

## 2021-07-12 ENCOUNTER — TELEPHONE (OUTPATIENT)
Dept: INTERNAL MEDICINE CLINIC | Age: 86
End: 2021-07-12

## 2021-07-12 RX ORDER — PENICILLIN V POTASSIUM 500 MG/1
500 TABLET ORAL 4 TIMES DAILY
Qty: 40 TABLET | Refills: 0 | Status: SHIPPED | OUTPATIENT
Start: 2021-07-12 | End: 2021-07-22

## 2021-07-12 NOTE — TELEPHONE ENCOUNTER
Patient called wanting to know if you could prescribe him some medication, he has a infected tooth. He needs something until he can get into the dentist.     Please call to advise. [id number]- [place] - [address]  - P [phone] - F [phone]

## 2021-08-06 ENCOUNTER — OFFICE VISIT (OUTPATIENT)
Dept: INTERNAL MEDICINE CLINIC | Age: 86
End: 2021-08-06
Payer: COMMERCIAL

## 2021-08-06 ENCOUNTER — TELEPHONE (OUTPATIENT)
Dept: INTERNAL MEDICINE CLINIC | Age: 86
End: 2021-08-06

## 2021-08-06 VITALS
TEMPERATURE: 98.2 F | OXYGEN SATURATION: 99 % | HEIGHT: 72 IN | HEART RATE: 99 BPM | SYSTOLIC BLOOD PRESSURE: 130 MMHG | BODY MASS INDEX: 30.11 KG/M2 | DIASTOLIC BLOOD PRESSURE: 80 MMHG

## 2021-08-06 DIAGNOSIS — S80.811A ABRASION OF RIGHT LOWER EXTREMITY, INITIAL ENCOUNTER: ICD-10-CM

## 2021-08-06 PROCEDURE — G8417 CALC BMI ABV UP PARAM F/U: HCPCS | Performed by: INTERNAL MEDICINE

## 2021-08-06 PROCEDURE — 1036F TOBACCO NON-USER: CPT | Performed by: INTERNAL MEDICINE

## 2021-08-06 PROCEDURE — 99213 OFFICE O/P EST LOW 20 MIN: CPT | Performed by: INTERNAL MEDICINE

## 2021-08-06 PROCEDURE — G8427 DOCREV CUR MEDS BY ELIG CLIN: HCPCS | Performed by: INTERNAL MEDICINE

## 2021-08-06 PROCEDURE — 4040F PNEUMOC VAC/ADMIN/RCVD: CPT | Performed by: INTERNAL MEDICINE

## 2021-08-06 PROCEDURE — 1123F ACP DISCUSS/DSCN MKR DOCD: CPT | Performed by: INTERNAL MEDICINE

## 2021-08-06 RX ORDER — CEPHALEXIN 500 MG/1
500 CAPSULE ORAL 4 TIMES DAILY
Qty: 40 CAPSULE | Refills: 0 | Status: SHIPPED | OUTPATIENT
Start: 2021-08-06 | End: 2021-08-16

## 2021-08-06 SDOH — ECONOMIC STABILITY: FOOD INSECURITY: WITHIN THE PAST 12 MONTHS, YOU WORRIED THAT YOUR FOOD WOULD RUN OUT BEFORE YOU GOT MONEY TO BUY MORE.: NEVER TRUE

## 2021-08-06 SDOH — ECONOMIC STABILITY: INCOME INSECURITY: IN THE LAST 12 MONTHS, WAS THERE A TIME WHEN YOU WERE NOT ABLE TO PAY THE MORTGAGE OR RENT ON TIME?: NO

## 2021-08-06 SDOH — ECONOMIC STABILITY: FOOD INSECURITY: WITHIN THE PAST 12 MONTHS, THE FOOD YOU BOUGHT JUST DIDN'T LAST AND YOU DIDN'T HAVE MONEY TO GET MORE.: NEVER TRUE

## 2021-08-06 SDOH — ECONOMIC STABILITY: HOUSING INSECURITY
IN THE LAST 12 MONTHS, WAS THERE A TIME WHEN YOU DID NOT HAVE A STEADY PLACE TO SLEEP OR SLEPT IN A SHELTER (INCLUDING NOW)?: NO

## 2021-08-06 ASSESSMENT — SOCIAL DETERMINANTS OF HEALTH (SDOH): HOW HARD IS IT FOR YOU TO PAY FOR THE VERY BASICS LIKE FOOD, HOUSING, MEDICAL CARE, AND HEATING?: NOT HARD AT ALL

## 2021-08-06 NOTE — PROGRESS NOTES
SUBJECTIVE:  Patient ID: Edith Cohn is an 81 y.o. male. HPI: Patient here today for the f/u of chronic problems-- see Problem List and associated comments. New issues or complaints include (also see Assessment for more details): Patient scraped his right shin on car door approximately 4 days ago and now he has a slight open area with surrounding redness. No systemic symptoms, fever or chills. Review of Systems   Constitutional: Negative for chills and fever. Skin: Positive for wound. OBJECTIVE:    /80   Pulse 99   Temp 98.2 °F (36.8 °C)   Ht 6' (1.829 m)   SpO2 99%   BMI 30.11 kg/m²      Physical Exam  Constitutional:       Appearance: He is not ill-appearing, toxic-appearing or diaphoretic. Musculoskeletal:        Legs:          ASSESSMENT:       Encounter Diagnoses   Name Primary?  Abrasion of right lower extremity, initial encounter        Abrasion of leg, right    patient scraped his right shin on his car door 4 days ago. He is having increasing erythema around the scratch area. Appears to have cellulitis. No fluctuance or induration. Start cephalexin and keep leg elevated. Follow-up in 10 days. Call if any worsening symptoms. Culture done. PLAN:See ASSESSMENT for evaluation & PLAN     No orders of the defined types were placed in this encounter.    , PMH, SH and FH reviewed and noted. Recent and past labs, tests and consultsalso reviewed. Recent or new meds also reviewed. negative

## 2021-08-06 NOTE — TELEPHONE ENCOUNTER
----- Message from [employer] sent at 8/6/2021  7:20 AM EDT -----  Subject: Appointment Request    Reason for Call: Urgent (Patient Request) No Script    QUESTIONS  Type of Appointment? Established Patient  Reason for appointment request? Available appointments did not meet   patient need  Additional Information for Provider? Patient states he has a bad cut below   right knee would like to be seen today please advise.   ---------------------------------------------------------------------------  --------------  CALL BACK INFO  What is the best way for the office to contact you? OK to leave message on   voicemail  Preferred Call Back Phone Number? [phone]  ---------------------------------------------------------------------------  --------------  SCRIPT ANSWERS  Relationship to Patient? Self  (Is the patient requesting to see the provider for a procedure?)? No  (Is the patient requesting to see the provider urgently  today or   tomorrow. )? Yes  Have you been diagnosed with, awaiting test results for, or told that you   are suspected of having COVID-19 (Coronavirus)? (If patient has tested   negative or was tested as a requirement for work, school, or travel and   not based on symptoms, answer no)? No  Do you currently have flu-like symptoms including fever or chills, cough,   shortness of breath, difficulty breathing, or new loss of taste or smell? No  Have you had close contact with someone with COVID-19 in the last 14 days? No  (Service Expert  click yes below to proceed with [employer] As Usual   Scheduling)?  Yes

## 2021-08-06 NOTE — ASSESSMENT & PLAN NOTE
patient scraped his right shin on his car door 4 days ago. He is having increasing erythema around the scratch area. Appears to have cellulitis. No fluctuance or induration. Start cephalexin and keep leg elevated. Follow-up in 10 days. Call if any worsening symptoms. Culture done.

## 2021-08-09 ENCOUNTER — TELEPHONE (OUTPATIENT)
Dept: INTERNAL MEDICINE CLINIC | Age: 86
End: 2021-08-09

## 2021-08-09 DIAGNOSIS — S80.811A ABRASION OF RIGHT LOWER EXTREMITY, INITIAL ENCOUNTER: Primary | ICD-10-CM

## 2021-08-09 LAB
GRAM STAIN RESULT: NORMAL
WOUND/ABSCESS: NORMAL

## 2021-08-09 NOTE — TELEPHONE ENCOUNTER
He will need to be referred to one of the wound clinics at the hospitals-he may need more aggressive treatment such as compression hose and cleaning of the wound.   Please try to see if he can get into either [facility] or [facility] wound clinic

## 2021-08-09 NOTE — TELEPHONE ENCOUNTER
----- Message from Yvonne Matamoros sent at 8/9/2021  7:20 AM EDT -----  Subject: Message to Provider    QUESTIONS  Information for Provider? Patient had a laceration on his lower calf that   got infected. Patient took the medication that Dr. Angie Caceres gave him and it   doesn't seem to have improved at all. Patient wants to know what he should   do next.   ---------------------------------------------------------------------------  --------------  CALL BACK INFO  What is the best way for the office to contact you? OK to leave message on   voicemail  Preferred Call Back Phone Number? [phone]  ---------------------------------------------------------------------------  --------------  SCRIPT ANSWERS  Relationship to Patient?  Self

## 2021-08-12 ENCOUNTER — HOSPITAL ENCOUNTER (OUTPATIENT)
Dept: WOUND CARE | Age: 86
Discharge: HOME | End: 2021-08-12
Payer: MEDICARE

## 2021-08-12 VITALS
HEIGHT: 72 IN | BODY MASS INDEX: 30.13 KG/M2 | RESPIRATION RATE: 16 BRPM | SYSTOLIC BLOOD PRESSURE: 134 MMHG | DIASTOLIC BLOOD PRESSURE: 82 MMHG | TEMPERATURE: 97.3 F | WEIGHT: 222.44 LBS | HEART RATE: 86 BPM

## 2021-08-12 DIAGNOSIS — L97.212 VARICOSE VEINS OF RIGHT LOWER EXTREMITY WITH INFLAMMATION, WITH ULCER OF CALF WITH FAT LAYER EXPOSED (HCC): Primary | ICD-10-CM

## 2021-08-12 DIAGNOSIS — I83.212 VARICOSE VEINS OF RIGHT LOWER EXTREMITY WITH INFLAMMATION, WITH ULCER OF CALF WITH FAT LAYER EXPOSED (HCC): Primary | ICD-10-CM

## 2021-08-12 PROBLEM — L97.219 VARICOSE VEINS OF RIGHT LOWER EXTREMITY WITH BOTH ULCER OF CALF AND INFLAMMATION (HCC): Status: ACTIVE | Noted: 2021-08-12

## 2021-08-12 PROCEDURE — 11042 DBRDMT SUBQ TIS 1ST 20SQCM/<: CPT

## 2021-08-12 PROCEDURE — 99213 OFFICE O/P EST LOW 20 MIN: CPT

## 2021-08-12 RX ORDER — LIDOCAINE HYDROCHLORIDE 40 MG/ML
SOLUTION TOPICAL ONCE
Status: CANCELLED | OUTPATIENT
Start: 2021-08-12 | End: 2021-08-12

## 2021-08-12 RX ORDER — GENTAMICIN SULFATE 1 MG/G
OINTMENT TOPICAL ONCE
Status: CANCELLED | OUTPATIENT
Start: 2021-08-12 | End: 2021-08-12

## 2021-08-12 RX ORDER — LIDOCAINE 50 MG/G
OINTMENT TOPICAL ONCE
Status: CANCELLED | OUTPATIENT
Start: 2021-08-12 | End: 2021-08-12

## 2021-08-12 RX ORDER — CLOBETASOL PROPIONATE 0.5 MG/G
OINTMENT TOPICAL ONCE
Status: CANCELLED | OUTPATIENT
Start: 2021-08-12 | End: 2021-08-12

## 2021-08-12 RX ORDER — LIDOCAINE HYDROCHLORIDE 20 MG/ML
JELLY TOPICAL ONCE
Status: CANCELLED | OUTPATIENT
Start: 2021-08-12 | End: 2021-08-12

## 2021-08-12 RX ORDER — BACITRACIN ZINC AND POLYMYXIN B SULFATE 500; 1000 [USP'U]/G; [USP'U]/G
OINTMENT TOPICAL ONCE
Status: CANCELLED | OUTPATIENT
Start: 2021-08-12 | End: 2021-08-12

## 2021-08-12 RX ORDER — BACITRACIN, NEOMYCIN, POLYMYXIN B 400; 3.5; 5 [USP'U]/G; MG/G; [USP'U]/G
OINTMENT TOPICAL ONCE
Status: CANCELLED | OUTPATIENT
Start: 2021-08-12 | End: 2021-08-12

## 2021-08-12 RX ORDER — GINSENG 100 MG
CAPSULE ORAL ONCE
Status: CANCELLED | OUTPATIENT
Start: 2021-08-12 | End: 2021-08-12

## 2021-08-12 RX ORDER — BETAMETHASONE DIPROPIONATE 0.05 %
OINTMENT (GRAM) TOPICAL ONCE
Status: CANCELLED | OUTPATIENT
Start: 2021-08-12 | End: 2021-08-12

## 2021-08-12 RX ORDER — LIDOCAINE HYDROCHLORIDE 40 MG/ML
SOLUTION TOPICAL ONCE
Status: COMPLETED | OUTPATIENT
Start: 2021-08-12 | End: 2021-08-12

## 2021-08-12 RX ORDER — LIDOCAINE 40 MG/G
CREAM TOPICAL ONCE
Status: CANCELLED | OUTPATIENT
Start: 2021-08-12 | End: 2021-08-12

## 2021-08-12 RX ADMIN — LIDOCAINE HYDROCHLORIDE: 40 SOLUTION TOPICAL at 12:12

## 2021-08-12 ASSESSMENT — PAIN SCALES - GENERAL: PAINLEVEL_OUTOF10: 0

## 2021-08-12 NOTE — PLAN OF CARE
Patient Name:  Tho Odell  YOB: 1935  Today's Date:  August 12, 2021  Medical Record Number:  [id number]  Provider:    [address] [place] [address] Pkw[address]   Appointment Treatment Guidelines        The [address] [place] [address] Pkwy Appointment Treatment Guidelines were reviewed on August 12, 2021 with the patient. Mr. Virginia Velez verbalizes understanding of the [address] [place] [address] Pkwy Appointment Treatment Guidelines.       Electronically signed by Kody Astudillo RN on 8/12/21 at 8:39 AM EDT

## 2021-08-12 NOTE — LETTER
[address]  [address] [place] [address] [facility] [address]  [phone]  Dept: [phone]        Thank you Mr. Lara Calles for choosing [facility] for your Wound Care needs. We hope you found your first visit both encouraging and educational.  We look forward to serving you throughout the healing process. Before your next visit please review the information you received in your [employer]. The first visit can be overwhelming and this is a useful tool to refresh what information you may have been given. If you find yourself with any questions prior to your upcoming appointment, please feel free to contact us. Often wounds can be challenging and it is our goal to see you through the healing process with as much support possible. Again, thank you for choosing [facility]!     Sincerely,      The Staff of [address] [place] [address] Pkwy and [facility]
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
## 2021-08-12 NOTE — LETTER
[address]  [address] OFFICE [address] [address]  [phone]  Dept: [phone]     TODAYS DATE: 8/11/2021      [facility]ound [facility]   Appointment Treatment Guidelines  Welcome Mr. Barb Singleton to the [address] [place] [address] Pkwy. We appreciate the confidence you have shown in choosing us as your wound care provider. Our goal is to heal your wound(s) as quickly as possible. Please read the items below regarding the nature of your appointments. 1. We will make every effort to schedule appointments that are convenient for you. Certain days and times may not be available, depending on your providers office hours and details of your care. 2. Patients will not necessarily be brought to an exam room in the order in which they arrive. Many providers work out of this office and patients are here for different procedures. Our goal is to serve you as quickly as possible. 3. We acknowledge that your time is valuable. Please remember that wound healing takes time and we appreciate your understanding that the length of each patients appointment will vary depending upon their need. 4. It is for your protection that we ask for insurance cards, photo ID, and new consent forms on your first visit and periodically throughout your treatment at all our facilities. 5. Wound Care treatment is known to be most effective when provided on a regular basis. Missed appointments, and not following the recommended plan of care can result in ineffective treatment and a poor outcome. If you find it difficult to keep appointments or to follow the recommended plan of care, it is your responsibility to let the staff know, so that we can work with you toward a solution. 6. If you need to miss an appointment, please call to let us know. We expect 24 hours notice for all cancellations.  We also expect missed visits to be rescheduled as soon as possible, preferably within the same week to promote the most effective healing time for your wound(s). 7. If you will be late for an appointment, please call our center to be sure that the provider can still see you when you arrive. If you are more than 15 minutes late your appointment may need to be rescheduled. 8. If two (2) appointments are missed without notifying us, your care plan may be discontinued. The same may happen if multiple visits are cancelled or rescheduled, even with notice. A missed visit is time when another patient, who also needs care, could have been seen. Thank you for your understanding and consideration.

## 2021-08-16 ENCOUNTER — HOSPITAL ENCOUNTER (OUTPATIENT)
Dept: WOUND CARE | Age: 86
Discharge: HOME | End: 2021-08-16
Payer: MEDICARE

## 2021-08-16 VITALS
SYSTOLIC BLOOD PRESSURE: 139 MMHG | RESPIRATION RATE: 20 BRPM | DIASTOLIC BLOOD PRESSURE: 83 MMHG | TEMPERATURE: 98.2 F | HEART RATE: 86 BPM

## 2021-08-16 PROCEDURE — 29581 APPL MULTLAYER CMPRN SYS LEG: CPT

## 2021-08-16 ASSESSMENT — PAIN SCALES - GENERAL: PAINLEVEL_OUTOF10: 0

## 2021-08-16 NOTE — PLAN OF CARE
Multilayer Compression Wrap   (Not Unna) Below the Knee    NAME:  Nate Gaw OF BIRTH:  1935  MEDICAL RECORD NUMBER:  [id number]  DATE:  8/16/2021    Multilayer compression wrap: Removed old Multilayer wrap if indicated and wash leg with mild soap/water. Applied moisturizing agent to dry skin as needed. Applied primary and secondary dressing as ordered. Applied multilayered dressing below the knee to right lower leg. Instructed patient/caregiver not to remove dressing and to keep it clean and dry. Instructed patient/caregiver on complications to report to provider, such as pain, numbness in toes, heavy drainage, and slippage of dressing. Instructed patient on purpose of compression dressing and on activity and exercise recommendations.       Electronically signed by Roberto Cortes RN on 8/16/2021 at 8:15 AM

## 2021-08-19 ENCOUNTER — HOSPITAL ENCOUNTER (OUTPATIENT)
Dept: WOUND CARE | Age: 86
Discharge: HOME | End: 2021-08-19
Payer: MEDICARE

## 2021-08-19 VITALS
TEMPERATURE: 98.1 F | HEART RATE: 83 BPM | RESPIRATION RATE: 20 BRPM | SYSTOLIC BLOOD PRESSURE: 134 MMHG | DIASTOLIC BLOOD PRESSURE: 79 MMHG

## 2021-08-19 DIAGNOSIS — I83.212 VARICOSE VEINS OF RIGHT LOWER EXTREMITY WITH INFLAMMATION, WITH ULCER OF CALF WITH FAT LAYER EXPOSED (HCC): Primary | ICD-10-CM

## 2021-08-19 DIAGNOSIS — L97.212 VARICOSE VEINS OF RIGHT LOWER EXTREMITY WITH INFLAMMATION, WITH ULCER OF CALF WITH FAT LAYER EXPOSED (HCC): Primary | ICD-10-CM

## 2021-08-19 PROCEDURE — 11042 DBRDMT SUBQ TIS 1ST 20SQCM/<: CPT

## 2021-08-19 RX ORDER — BACITRACIN ZINC AND POLYMYXIN B SULFATE 500; 1000 [USP'U]/G; [USP'U]/G
OINTMENT TOPICAL ONCE
Status: CANCELLED | OUTPATIENT
Start: 2021-08-19 | End: 2021-08-19

## 2021-08-19 RX ORDER — LIDOCAINE HYDROCHLORIDE 20 MG/ML
JELLY TOPICAL ONCE
Status: CANCELLED | OUTPATIENT
Start: 2021-08-19 | End: 2021-08-19

## 2021-08-19 RX ORDER — GENTAMICIN SULFATE 1 MG/G
OINTMENT TOPICAL ONCE
Status: CANCELLED | OUTPATIENT
Start: 2021-08-19 | End: 2021-08-19

## 2021-08-19 RX ORDER — BETAMETHASONE DIPROPIONATE 0.05 %
OINTMENT (GRAM) TOPICAL ONCE
Status: CANCELLED | OUTPATIENT
Start: 2021-08-19 | End: 2021-08-19

## 2021-08-19 RX ORDER — CLOBETASOL PROPIONATE 0.5 MG/G
OINTMENT TOPICAL ONCE
Status: CANCELLED | OUTPATIENT
Start: 2021-08-19 | End: 2021-08-19

## 2021-08-19 RX ORDER — LIDOCAINE HYDROCHLORIDE 40 MG/ML
SOLUTION TOPICAL ONCE
Status: COMPLETED | OUTPATIENT
Start: 2021-08-19 | End: 2021-08-19

## 2021-08-19 RX ORDER — GINSENG 100 MG
CAPSULE ORAL ONCE
Status: CANCELLED | OUTPATIENT
Start: 2021-08-19 | End: 2021-08-19

## 2021-08-19 RX ORDER — BACITRACIN, NEOMYCIN, POLYMYXIN B 400; 3.5; 5 [USP'U]/G; MG/G; [USP'U]/G
OINTMENT TOPICAL ONCE
Status: CANCELLED | OUTPATIENT
Start: 2021-08-19 | End: 2021-08-19

## 2021-08-19 RX ORDER — LIDOCAINE 50 MG/G
OINTMENT TOPICAL ONCE
Status: CANCELLED | OUTPATIENT
Start: 2021-08-19 | End: 2021-08-19

## 2021-08-19 RX ORDER — LIDOCAINE 40 MG/G
CREAM TOPICAL ONCE
Status: CANCELLED | OUTPATIENT
Start: 2021-08-19 | End: 2021-08-19

## 2021-08-19 RX ORDER — LIDOCAINE HYDROCHLORIDE 40 MG/ML
SOLUTION TOPICAL ONCE
Status: CANCELLED | OUTPATIENT
Start: 2021-08-19 | End: 2021-08-19

## 2021-08-19 RX ADMIN — LIDOCAINE HYDROCHLORIDE 2.5 ML: 40 SOLUTION TOPICAL at 08:44

## 2021-08-19 ASSESSMENT — PAIN DESCRIPTION - ONSET
ONSET: ON-GOING
ONSET: ON-GOING

## 2021-08-19 ASSESSMENT — PAIN DESCRIPTION - DESCRIPTORS
DESCRIPTORS: ACHING
DESCRIPTORS: SHOOTING

## 2021-08-19 ASSESSMENT — PAIN DESCRIPTION - PAIN TYPE
TYPE: ACUTE PAIN
TYPE: ACUTE PAIN

## 2021-08-19 ASSESSMENT — PAIN DESCRIPTION - ORIENTATION
ORIENTATION: RIGHT
ORIENTATION: RIGHT

## 2021-08-19 ASSESSMENT — PAIN DESCRIPTION - PROGRESSION
CLINICAL_PROGRESSION: NOT CHANGED
CLINICAL_PROGRESSION: NOT CHANGED

## 2021-08-19 ASSESSMENT — PAIN SCALES - GENERAL
PAINLEVEL_OUTOF10: 5
PAINLEVEL_OUTOF10: 4

## 2021-08-19 ASSESSMENT — PAIN DESCRIPTION - FREQUENCY
FREQUENCY: INTERMITTENT
FREQUENCY: INTERMITTENT

## 2021-08-19 ASSESSMENT — PAIN DESCRIPTION - LOCATION
LOCATION: LEG
LOCATION: LEG

## 2021-08-19 ASSESSMENT — PAIN - FUNCTIONAL ASSESSMENT
PAIN_FUNCTIONAL_ASSESSMENT: ACTIVITIES ARE NOT PREVENTED
PAIN_FUNCTIONAL_ASSESSMENT: ACTIVITIES ARE NOT PREVENTED
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
## 2021-08-19 NOTE — PLAN OF CARE
Multilayer Compression Wrap   (Not Unna) Below the Knee    NAME:  Nate Gaw OF BIRTH:  1935  MEDICAL RECORD NUMBER:  [id number]  DATE:  8/19/2021    Multilayer compression wrap: Removed old Multilayer wrap if indicated and wash leg with mild soap/water. Applied moisturizing agent to dry skin as needed. Applied primary and secondary dressing as ordered. Applied multilayered dressing below the knee to right lower leg. Instructed patient/caregiver not to remove dressing and to keep it clean and dry. Instructed patient/caregiver on complications to report to provider, such as pain, numbness in toes, heavy drainage, and slippage of dressing. Instructed patient on purpose of compression dressing and on activity and exercise recommendations.       Electronically signed by Nidhi Carroll RN on 8/19/2021 at 10:06 AM

## 2021-08-24 ENCOUNTER — HOSPITAL ENCOUNTER (OUTPATIENT)
Dept: WOUND CARE | Age: 86
Discharge: HOME | End: 2021-08-24
Payer: MEDICARE

## 2021-08-24 VITALS
DIASTOLIC BLOOD PRESSURE: 79 MMHG | SYSTOLIC BLOOD PRESSURE: 127 MMHG | HEART RATE: 72 BPM | RESPIRATION RATE: 20 BRPM | TEMPERATURE: 97.8 F

## 2021-08-24 DIAGNOSIS — L97.212 VARICOSE VEINS OF RIGHT LOWER EXTREMITY WITH INFLAMMATION, WITH ULCER OF CALF WITH FAT LAYER EXPOSED (HCC): Primary | ICD-10-CM

## 2021-08-24 DIAGNOSIS — I83.212 VARICOSE VEINS OF RIGHT LOWER EXTREMITY WITH INFLAMMATION, WITH ULCER OF CALF WITH FAT LAYER EXPOSED (HCC): Primary | ICD-10-CM

## 2021-08-24 PROCEDURE — 29581 APPL MULTLAYER CMPRN SYS LEG: CPT

## 2021-08-24 RX ORDER — GINSENG 100 MG
CAPSULE ORAL ONCE
Status: CANCELLED | OUTPATIENT
Start: 2021-08-24 | End: 2021-08-24

## 2021-08-24 RX ORDER — LIDOCAINE HYDROCHLORIDE 40 MG/ML
SOLUTION TOPICAL ONCE
Status: CANCELLED | OUTPATIENT
Start: 2021-08-24 | End: 2021-08-24

## 2021-08-24 RX ORDER — LIDOCAINE 40 MG/G
CREAM TOPICAL ONCE
Status: CANCELLED | OUTPATIENT
Start: 2021-08-24 | End: 2021-08-24

## 2021-08-24 RX ORDER — LIDOCAINE HYDROCHLORIDE 20 MG/ML
JELLY TOPICAL ONCE
Status: CANCELLED | OUTPATIENT
Start: 2021-08-24 | End: 2021-08-24

## 2021-08-24 RX ORDER — GENTAMICIN SULFATE 1 MG/G
OINTMENT TOPICAL ONCE
Status: CANCELLED | OUTPATIENT
Start: 2021-08-24 | End: 2021-08-24

## 2021-08-24 RX ORDER — BACITRACIN ZINC AND POLYMYXIN B SULFATE 500; 1000 [USP'U]/G; [USP'U]/G
OINTMENT TOPICAL ONCE
Status: CANCELLED | OUTPATIENT
Start: 2021-08-24 | End: 2021-08-24

## 2021-08-24 RX ORDER — BETAMETHASONE DIPROPIONATE 0.05 %
OINTMENT (GRAM) TOPICAL ONCE
Status: CANCELLED | OUTPATIENT
Start: 2021-08-24 | End: 2021-08-24

## 2021-08-24 RX ORDER — LIDOCAINE 50 MG/G
OINTMENT TOPICAL ONCE
Status: CANCELLED | OUTPATIENT
Start: 2021-08-24 | End: 2021-08-24

## 2021-08-24 RX ORDER — BACITRACIN, NEOMYCIN, POLYMYXIN B 400; 3.5; 5 [USP'U]/G; MG/G; [USP'U]/G
OINTMENT TOPICAL ONCE
Status: CANCELLED | OUTPATIENT
Start: 2021-08-24 | End: 2021-08-24

## 2021-08-24 RX ORDER — CLOBETASOL PROPIONATE 0.5 MG/G
OINTMENT TOPICAL ONCE
Status: CANCELLED | OUTPATIENT
Start: 2021-08-24 | End: 2021-08-24

## 2021-08-24 ASSESSMENT — PAIN DESCRIPTION - PROGRESSION: CLINICAL_PROGRESSION: NOT CHANGED

## 2021-08-24 ASSESSMENT — PAIN - FUNCTIONAL ASSESSMENT: PAIN_FUNCTIONAL_ASSESSMENT: ACTIVITIES ARE NOT PREVENTED

## 2021-08-24 ASSESSMENT — PAIN DESCRIPTION - LOCATION: LOCATION: LEG

## 2021-08-24 ASSESSMENT — PAIN SCALES - GENERAL: PAINLEVEL_OUTOF10: 2

## 2021-08-24 ASSESSMENT — PAIN DESCRIPTION - FREQUENCY: FREQUENCY: INTERMITTENT

## 2021-08-24 ASSESSMENT — PAIN DESCRIPTION - ONSET: ONSET: ON-GOING

## 2021-08-24 ASSESSMENT — PAIN DESCRIPTION - DESCRIPTORS: DESCRIPTORS: BURNING;ACHING

## 2021-08-24 ASSESSMENT — PAIN DESCRIPTION - PAIN TYPE: TYPE: ACUTE PAIN

## 2021-08-24 ASSESSMENT — PAIN DESCRIPTION - ORIENTATION: ORIENTATION: RIGHT

## 2021-08-24 NOTE — PLAN OF CARE
Multilayer Compression Wrap   (Not Unna) Below the Knee    NAME:  Meenakshi Henning OF BIRTH:  1935  MEDICAL RECORD NUMBER:  [id number]  DATE:  8/24/2021    Multilayer compression wrap: Removed old Multilayer wrap if indicated and wash leg with mild soap/water. Applied moisturizing agent to dry skin as needed. Applied primary and secondary dressing as ordered. Applied multilayered dressing below the knee to right lower leg. Instructed patient/caregiver not to remove dressing and to keep it clean and dry. Instructed patient/caregiver on complications to report to provider, such as pain, numbness in toes, heavy drainage, and slippage of dressing. Instructed patient on purpose of compression dressing and on activity and exercise recommendations.       Electronically signed by Gómez Duran RN on 8/24/2021 at 9:24 AM

## 2021-08-26 ENCOUNTER — HOSPITAL ENCOUNTER (OUTPATIENT)
Dept: WOUND CARE | Age: 86
Discharge: HOME | End: 2021-08-26
Payer: MEDICARE

## 2021-08-26 VITALS
TEMPERATURE: 97.7 F | DIASTOLIC BLOOD PRESSURE: 70 MMHG | RESPIRATION RATE: 20 BRPM | HEART RATE: 82 BPM | SYSTOLIC BLOOD PRESSURE: 131 MMHG

## 2021-08-26 DIAGNOSIS — L97.212 VARICOSE VEINS OF RIGHT LOWER EXTREMITY WITH INFLAMMATION, WITH ULCER OF CALF WITH FAT LAYER EXPOSED (HCC): Primary | ICD-10-CM

## 2021-08-26 DIAGNOSIS — I83.212 VARICOSE VEINS OF RIGHT LOWER EXTREMITY WITH INFLAMMATION, WITH ULCER OF CALF WITH FAT LAYER EXPOSED (HCC): Primary | ICD-10-CM

## 2021-08-26 PROCEDURE — 11042 DBRDMT SUBQ TIS 1ST 20SQCM/<: CPT

## 2021-08-26 RX ORDER — CLOBETASOL PROPIONATE 0.5 MG/G
OINTMENT TOPICAL ONCE
Status: CANCELLED | OUTPATIENT
Start: 2021-08-26 | End: 2021-08-26

## 2021-08-26 RX ORDER — BACITRACIN ZINC AND POLYMYXIN B SULFATE 500; 1000 [USP'U]/G; [USP'U]/G
OINTMENT TOPICAL ONCE
Status: CANCELLED | OUTPATIENT
Start: 2021-08-26 | End: 2021-08-26

## 2021-08-26 RX ORDER — LIDOCAINE HYDROCHLORIDE 20 MG/ML
JELLY TOPICAL ONCE
Status: CANCELLED | OUTPATIENT
Start: 2021-08-26 | End: 2021-08-26

## 2021-08-26 RX ORDER — BETAMETHASONE DIPROPIONATE 0.05 %
OINTMENT (GRAM) TOPICAL ONCE
Status: CANCELLED | OUTPATIENT
Start: 2021-08-26 | End: 2021-08-26

## 2021-08-26 RX ORDER — LIDOCAINE HYDROCHLORIDE 40 MG/ML
SOLUTION TOPICAL ONCE
Status: COMPLETED | OUTPATIENT
Start: 2021-08-26 | End: 2021-08-26

## 2021-08-26 RX ORDER — LIDOCAINE 50 MG/G
OINTMENT TOPICAL ONCE
Status: CANCELLED | OUTPATIENT
Start: 2021-08-26 | End: 2021-08-26

## 2021-08-26 RX ORDER — GENTAMICIN SULFATE 1 MG/G
OINTMENT TOPICAL ONCE
Status: CANCELLED | OUTPATIENT
Start: 2021-08-26 | End: 2021-08-26

## 2021-08-26 RX ORDER — BACITRACIN, NEOMYCIN, POLYMYXIN B 400; 3.5; 5 [USP'U]/G; MG/G; [USP'U]/G
OINTMENT TOPICAL ONCE
Status: CANCELLED | OUTPATIENT
Start: 2021-08-26 | End: 2021-08-26

## 2021-08-26 RX ORDER — GINSENG 100 MG
CAPSULE ORAL ONCE
Status: CANCELLED | OUTPATIENT
Start: 2021-08-26 | End: 2021-08-26

## 2021-08-26 RX ORDER — LIDOCAINE 40 MG/G
CREAM TOPICAL ONCE
Status: CANCELLED | OUTPATIENT
Start: 2021-08-26 | End: 2021-08-26

## 2021-08-26 RX ORDER — LIDOCAINE HYDROCHLORIDE 40 MG/ML
SOLUTION TOPICAL ONCE
Status: CANCELLED | OUTPATIENT
Start: 2021-08-26 | End: 2021-08-26

## 2021-08-26 RX ADMIN — LIDOCAINE HYDROCHLORIDE 5 ML: 40 SOLUTION TOPICAL at 08:56

## 2021-08-26 ASSESSMENT — PAIN DESCRIPTION - LOCATION: LOCATION: LEG

## 2021-08-26 ASSESSMENT — PAIN DESCRIPTION - PROGRESSION: CLINICAL_PROGRESSION: NOT CHANGED

## 2021-08-26 ASSESSMENT — PAIN - FUNCTIONAL ASSESSMENT: PAIN_FUNCTIONAL_ASSESSMENT: ACTIVITIES ARE NOT PREVENTED

## 2021-08-26 ASSESSMENT — PAIN DESCRIPTION - ONSET: ONSET: ON-GOING

## 2021-08-26 ASSESSMENT — PAIN DESCRIPTION - ORIENTATION: ORIENTATION: RIGHT

## 2021-08-26 ASSESSMENT — PAIN SCALES - GENERAL
PAINLEVEL_OUTOF10: 0
PAINLEVEL_OUTOF10: 6

## 2021-08-26 ASSESSMENT — PAIN DESCRIPTION - DESCRIPTORS: DESCRIPTORS: BURNING

## 2021-08-26 ASSESSMENT — PAIN DESCRIPTION - PAIN TYPE: TYPE: ACUTE PAIN

## 2021-08-26 ASSESSMENT — PAIN DESCRIPTION - FREQUENCY: FREQUENCY: INTERMITTENT

## 2021-08-26 NOTE — PLAN OF CARE
Multilayer Compression Wrap   (Not Unna) Below the Knee    NAME:  Nahid Stover  YOB: 1935  MEDICAL RECORD NUMBER:  [id number]  DATE:  8/26/2021    Multilayer compression wrap: Removed old Multilayer wrap if indicated and wash leg with mild soap/water. Applied moisturizing agent to dry skin as needed. Applied primary and secondary dressing as ordered. Applied multilayered dressing below the knee to right lower leg. Instructed patient/caregiver not to remove dressing and to keep it clean and dry. Instructed patient/caregiver on complications to report to provider, such as pain, numbness in toes, heavy drainage, and slippage of dressing. Instructed patient on purpose of compression dressing and on activity and exercise recommendations.       Electronically signed by Corrinne Kennedy, RN on 8/26/2021 at 9:49 AM

## 2021-08-27 ENCOUNTER — TELEPHONE (OUTPATIENT)
Dept: INTERNAL MEDICINE CLINIC | Age: 86
End: 2021-08-27

## 2021-08-27 DIAGNOSIS — G62.9 NEUROPATHY: ICD-10-CM

## 2021-08-27 NOTE — TELEPHONE ENCOUNTER
Patient asked to have the dosage upped on : gabapentin (NEURONTIN) 300 MG capsule  Because he is taking 3 capsule a day  instead of 2 capsules  asked that the next time he gets a refill to have 90 called in instead of 60     Please still send to [facility], [address].  Bharath Rule [phone] Herb Douse [phone]     Please call to advise
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
## 2021-08-30 ENCOUNTER — HOSPITAL ENCOUNTER (OUTPATIENT)
Dept: WOUND CARE | Age: 86
Discharge: HOME | End: 2021-08-30
Payer: MEDICARE

## 2021-08-30 VITALS
RESPIRATION RATE: 20 BRPM | SYSTOLIC BLOOD PRESSURE: 127 MMHG | TEMPERATURE: 97.9 F | HEART RATE: 86 BPM | DIASTOLIC BLOOD PRESSURE: 74 MMHG

## 2021-08-30 DIAGNOSIS — L97.212 VARICOSE VEINS OF RIGHT LOWER EXTREMITY WITH INFLAMMATION, WITH ULCER OF CALF WITH FAT LAYER EXPOSED (HCC): Primary | ICD-10-CM

## 2021-08-30 DIAGNOSIS — I83.212 VARICOSE VEINS OF RIGHT LOWER EXTREMITY WITH INFLAMMATION, WITH ULCER OF CALF WITH FAT LAYER EXPOSED (HCC): Primary | ICD-10-CM

## 2021-08-30 PROCEDURE — 29581 APPL MULTLAYER CMPRN SYS LEG: CPT

## 2021-08-30 RX ORDER — GENTAMICIN SULFATE 1 MG/G
OINTMENT TOPICAL ONCE
Status: CANCELLED | OUTPATIENT
Start: 2021-08-30 | End: 2021-08-30

## 2021-08-30 RX ORDER — CLOBETASOL PROPIONATE 0.5 MG/G
OINTMENT TOPICAL ONCE
Status: CANCELLED | OUTPATIENT
Start: 2021-08-30 | End: 2021-08-30

## 2021-08-30 RX ORDER — LIDOCAINE 40 MG/G
CREAM TOPICAL ONCE
Status: CANCELLED | OUTPATIENT
Start: 2021-08-30 | End: 2021-08-30

## 2021-08-30 RX ORDER — BACITRACIN, NEOMYCIN, POLYMYXIN B 400; 3.5; 5 [USP'U]/G; MG/G; [USP'U]/G
OINTMENT TOPICAL ONCE
Status: CANCELLED | OUTPATIENT
Start: 2021-08-30 | End: 2021-08-30

## 2021-08-30 RX ORDER — LIDOCAINE HYDROCHLORIDE 20 MG/ML
JELLY TOPICAL ONCE
Status: CANCELLED | OUTPATIENT
Start: 2021-08-30 | End: 2021-08-30

## 2021-08-30 RX ORDER — GINSENG 100 MG
CAPSULE ORAL ONCE
Status: CANCELLED | OUTPATIENT
Start: 2021-08-30 | End: 2021-08-30

## 2021-08-30 RX ORDER — LIDOCAINE 50 MG/G
OINTMENT TOPICAL ONCE
Status: CANCELLED | OUTPATIENT
Start: 2021-08-30 | End: 2021-08-30

## 2021-08-30 RX ORDER — LIDOCAINE HYDROCHLORIDE 40 MG/ML
SOLUTION TOPICAL ONCE
Status: CANCELLED | OUTPATIENT
Start: 2021-08-30 | End: 2021-08-30

## 2021-08-30 RX ORDER — BACITRACIN ZINC AND POLYMYXIN B SULFATE 500; 1000 [USP'U]/G; [USP'U]/G
OINTMENT TOPICAL ONCE
Status: CANCELLED | OUTPATIENT
Start: 2021-08-30 | End: 2021-08-30

## 2021-08-30 RX ORDER — BETAMETHASONE DIPROPIONATE 0.05 %
OINTMENT (GRAM) TOPICAL ONCE
Status: CANCELLED | OUTPATIENT
Start: 2021-08-30 | End: 2021-08-30

## 2021-08-30 ASSESSMENT — PAIN SCALES - GENERAL: PAINLEVEL_OUTOF10: 0

## 2021-08-30 NOTE — PLAN OF CARE
Multilayer Compression Wrap   (Not Unna) Below the Knee    NAME:  Nahid Stover  YOB: 1935  MEDICAL RECORD NUMBER:  [id number]  DATE:  8/30/2021    Multilayer compression wrap: Removed old Multilayer wrap if indicated and wash leg with mild soap/water. Applied moisturizing agent to dry skin as needed. Applied primary and secondary dressing as ordered. Applied multilayered dressing below the knee to right lower leg. Instructed patient/caregiver not to remove dressing and to keep it clean and dry. Instructed patient/caregiver on complications to report to provider, such as pain, numbness in toes, heavy drainage, and slippage of dressing. Instructed patient on purpose of compression dressing and on activity and exercise recommendations.       Electronically signed by Dereck Ramachandran RN on 8/30/2021 at 10:06 AM

## 2021-09-01 RX ORDER — GABAPENTIN 300 MG/1
CAPSULE ORAL
Qty: 90 CAPSULE | Refills: 3 | OUTPATIENT
Start: 2021-09-01 | End: 2022-01-17 | Stop reason: SDUPTHER

## 2021-09-02 ENCOUNTER — HOSPITAL ENCOUNTER (OUTPATIENT)
Dept: WOUND CARE | Age: 86
Discharge: HOME | End: 2021-09-02
Payer: MEDICARE

## 2021-09-02 VITALS
DIASTOLIC BLOOD PRESSURE: 81 MMHG | RESPIRATION RATE: 18 BRPM | TEMPERATURE: 97.7 F | SYSTOLIC BLOOD PRESSURE: 129 MMHG | HEART RATE: 78 BPM

## 2021-09-02 DIAGNOSIS — I83.212 VARICOSE VEINS OF RIGHT LOWER EXTREMITY WITH INFLAMMATION, WITH ULCER OF CALF WITH FAT LAYER EXPOSED (HCC): Primary | ICD-10-CM

## 2021-09-02 DIAGNOSIS — L97.212 VARICOSE VEINS OF RIGHT LOWER EXTREMITY WITH INFLAMMATION, WITH ULCER OF CALF WITH FAT LAYER EXPOSED (HCC): Primary | ICD-10-CM

## 2021-09-02 PROCEDURE — 11042 DBRDMT SUBQ TIS 1ST 20SQCM/<: CPT

## 2021-09-02 RX ORDER — LIDOCAINE 40 MG/G
CREAM TOPICAL ONCE
Status: COMPLETED | OUTPATIENT
Start: 2021-09-02 | End: 2021-09-02

## 2021-09-02 RX ORDER — BACITRACIN, NEOMYCIN, POLYMYXIN B 400; 3.5; 5 [USP'U]/G; MG/G; [USP'U]/G
OINTMENT TOPICAL ONCE
Status: CANCELLED | OUTPATIENT
Start: 2021-09-02 | End: 2021-09-02

## 2021-09-02 RX ORDER — LIDOCAINE HYDROCHLORIDE 40 MG/ML
SOLUTION TOPICAL ONCE
Status: CANCELLED | OUTPATIENT
Start: 2021-09-02 | End: 2021-09-02

## 2021-09-02 RX ORDER — LIDOCAINE 50 MG/G
OINTMENT TOPICAL ONCE
Status: CANCELLED | OUTPATIENT
Start: 2021-09-02 | End: 2021-09-02

## 2021-09-02 RX ORDER — BACITRACIN ZINC AND POLYMYXIN B SULFATE 500; 1000 [USP'U]/G; [USP'U]/G
OINTMENT TOPICAL ONCE
Status: CANCELLED | OUTPATIENT
Start: 2021-09-02 | End: 2021-09-02

## 2021-09-02 RX ORDER — GINSENG 100 MG
CAPSULE ORAL ONCE
Status: CANCELLED | OUTPATIENT
Start: 2021-09-02 | End: 2021-09-02

## 2021-09-02 RX ORDER — LIDOCAINE HYDROCHLORIDE 20 MG/ML
JELLY TOPICAL ONCE
Status: CANCELLED | OUTPATIENT
Start: 2021-09-02 | End: 2021-09-02

## 2021-09-02 RX ORDER — CLOBETASOL PROPIONATE 0.5 MG/G
OINTMENT TOPICAL ONCE
Status: CANCELLED | OUTPATIENT
Start: 2021-09-02 | End: 2021-09-02

## 2021-09-02 RX ORDER — BETAMETHASONE DIPROPIONATE 0.05 %
OINTMENT (GRAM) TOPICAL ONCE
Status: CANCELLED | OUTPATIENT
Start: 2021-09-02 | End: 2021-09-02

## 2021-09-02 RX ORDER — LIDOCAINE 40 MG/G
CREAM TOPICAL ONCE
Status: CANCELLED | OUTPATIENT
Start: 2021-09-02 | End: 2021-09-02

## 2021-09-02 RX ORDER — GENTAMICIN SULFATE 1 MG/G
OINTMENT TOPICAL ONCE
Status: CANCELLED | OUTPATIENT
Start: 2021-09-02 | End: 2021-09-02

## 2021-09-02 RX ADMIN — LIDOCAINE: 40 CREAM TOPICAL at 08:49

## 2021-09-02 ASSESSMENT — PAIN SCALES - GENERAL
PAINLEVEL_OUTOF10: 0
PAINLEVEL_OUTOF10: 0
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
## 2021-09-04 DIAGNOSIS — I50.32 CHRONIC DIASTOLIC HF (HEART FAILURE) (HCC): ICD-10-CM

## 2021-09-07 RX ORDER — TORSEMIDE 20 MG/1
TABLET ORAL
Qty: 90 TABLET | Refills: 0 | Status: SHIPPED | OUTPATIENT
Start: 2021-09-07 | End: 2021-12-06

## 2021-09-09 ENCOUNTER — HOSPITAL ENCOUNTER (OUTPATIENT)
Dept: WOUND CARE | Age: 86
Discharge: HOME | End: 2021-09-09
Payer: MEDICARE

## 2021-09-09 VITALS
SYSTOLIC BLOOD PRESSURE: 132 MMHG | HEART RATE: 85 BPM | DIASTOLIC BLOOD PRESSURE: 79 MMHG | TEMPERATURE: 98.2 F | RESPIRATION RATE: 18 BRPM

## 2021-09-09 DIAGNOSIS — L97.212 VARICOSE VEINS OF RIGHT LOWER EXTREMITY WITH INFLAMMATION, WITH ULCER OF CALF WITH FAT LAYER EXPOSED (HCC): Primary | ICD-10-CM

## 2021-09-09 DIAGNOSIS — I83.212 VARICOSE VEINS OF RIGHT LOWER EXTREMITY WITH INFLAMMATION, WITH ULCER OF CALF WITH FAT LAYER EXPOSED (HCC): Primary | ICD-10-CM

## 2021-09-09 PROCEDURE — 11042 DBRDMT SUBQ TIS 1ST 20SQCM/<: CPT

## 2021-09-09 RX ORDER — LIDOCAINE 40 MG/G
CREAM TOPICAL ONCE
Status: CANCELLED | OUTPATIENT
Start: 2021-09-09 | End: 2021-09-09

## 2021-09-09 RX ORDER — GENTAMICIN SULFATE 1 MG/G
OINTMENT TOPICAL ONCE
Status: CANCELLED | OUTPATIENT
Start: 2021-09-09 | End: 2021-09-09

## 2021-09-09 RX ORDER — LIDOCAINE HYDROCHLORIDE 40 MG/ML
SOLUTION TOPICAL ONCE
Status: CANCELLED | OUTPATIENT
Start: 2021-09-09 | End: 2021-09-09

## 2021-09-09 RX ORDER — BETAMETHASONE DIPROPIONATE 0.05 %
OINTMENT (GRAM) TOPICAL ONCE
Status: CANCELLED | OUTPATIENT
Start: 2021-09-09 | End: 2021-09-09

## 2021-09-09 RX ORDER — LIDOCAINE 50 MG/G
OINTMENT TOPICAL ONCE
Status: CANCELLED | OUTPATIENT
Start: 2021-09-09 | End: 2021-09-09

## 2021-09-09 RX ORDER — CLOBETASOL PROPIONATE 0.5 MG/G
OINTMENT TOPICAL ONCE
Status: CANCELLED | OUTPATIENT
Start: 2021-09-09 | End: 2021-09-09

## 2021-09-09 RX ORDER — LIDOCAINE HYDROCHLORIDE 20 MG/ML
JELLY TOPICAL ONCE
Status: CANCELLED | OUTPATIENT
Start: 2021-09-09 | End: 2021-09-09

## 2021-09-09 RX ORDER — LIDOCAINE 40 MG/G
CREAM TOPICAL ONCE
Status: COMPLETED | OUTPATIENT
Start: 2021-09-09 | End: 2021-09-09

## 2021-09-09 RX ORDER — GINSENG 100 MG
CAPSULE ORAL ONCE
Status: CANCELLED | OUTPATIENT
Start: 2021-09-09 | End: 2021-09-09

## 2021-09-09 RX ORDER — BACITRACIN, NEOMYCIN, POLYMYXIN B 400; 3.5; 5 [USP'U]/G; MG/G; [USP'U]/G
OINTMENT TOPICAL ONCE
Status: CANCELLED | OUTPATIENT
Start: 2021-09-09 | End: 2021-09-09

## 2021-09-09 RX ORDER — BACITRACIN ZINC AND POLYMYXIN B SULFATE 500; 1000 [USP'U]/G; [USP'U]/G
OINTMENT TOPICAL ONCE
Status: CANCELLED | OUTPATIENT
Start: 2021-09-09 | End: 2021-09-09

## 2021-09-09 RX ADMIN — LIDOCAINE: 40 CREAM TOPICAL at 08:48

## 2021-09-09 ASSESSMENT — PAIN SCALES - GENERAL
PAINLEVEL_OUTOF10: 0
PAINLEVEL_OUTOF10: 0

## 2021-09-09 NOTE — PLAN OF CARE
Multilayer Compression Wrap   (Not Unna) Below the Knee    NAME:  Nahid Montalvo OF BIRTH:  1935  MEDICAL RECORD NUMBER:  [id number]  DATE:  9/9/2021    Multilayer compression wrap: Removed old Multilayer wrap if indicated and wash leg with mild soap/water. Applied moisturizing agent to dry skin as needed. Applied primary and secondary dressing as ordered. Applied multilayered dressing below the knee to right lower leg. Instructed patient/caregiver not to remove dressing and to keep it clean and dry. Instructed patient/caregiver on complications to report to provider, such as pain, numbness in toes, heavy drainage, and slippage of dressing. Instructed patient on purpose of compression dressing and on activity and exercise recommendations.       Electronically signed by Joe Westbrook RN on 9/9/2021 at 10:01 AM
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
## 2021-09-16 ENCOUNTER — HOSPITAL ENCOUNTER (OUTPATIENT)
Dept: WOUND CARE | Age: 86
Discharge: HOME | End: 2021-09-16
Payer: MEDICARE

## 2021-09-16 VITALS
DIASTOLIC BLOOD PRESSURE: 74 MMHG | HEART RATE: 93 BPM | RESPIRATION RATE: 18 BRPM | SYSTOLIC BLOOD PRESSURE: 146 MMHG | TEMPERATURE: 97.9 F

## 2021-09-16 DIAGNOSIS — I83.212 VARICOSE VEINS OF RIGHT LOWER EXTREMITY WITH INFLAMMATION, WITH ULCER OF CALF WITH FAT LAYER EXPOSED (HCC): Primary | ICD-10-CM

## 2021-09-16 DIAGNOSIS — L97.212 VARICOSE VEINS OF RIGHT LOWER EXTREMITY WITH INFLAMMATION, WITH ULCER OF CALF WITH FAT LAYER EXPOSED (HCC): Primary | ICD-10-CM

## 2021-09-16 PROCEDURE — 11042 DBRDMT SUBQ TIS 1ST 20SQCM/<: CPT

## 2021-09-16 RX ORDER — BETAMETHASONE DIPROPIONATE 0.05 %
OINTMENT (GRAM) TOPICAL ONCE
Status: CANCELLED | OUTPATIENT
Start: 2021-09-16 | End: 2021-09-16

## 2021-09-16 RX ORDER — LIDOCAINE 40 MG/G
CREAM TOPICAL ONCE
Status: CANCELLED | OUTPATIENT
Start: 2021-09-16 | End: 2021-09-16

## 2021-09-16 RX ORDER — GINSENG 100 MG
CAPSULE ORAL ONCE
Status: CANCELLED | OUTPATIENT
Start: 2021-09-16 | End: 2021-09-16

## 2021-09-16 RX ORDER — LIDOCAINE HYDROCHLORIDE 40 MG/ML
SOLUTION TOPICAL ONCE
Status: COMPLETED | OUTPATIENT
Start: 2021-09-16 | End: 2021-09-16

## 2021-09-16 RX ORDER — LIDOCAINE HYDROCHLORIDE 40 MG/ML
SOLUTION TOPICAL ONCE
Status: CANCELLED | OUTPATIENT
Start: 2021-09-16 | End: 2021-09-16

## 2021-09-16 RX ORDER — LIDOCAINE HYDROCHLORIDE 20 MG/ML
JELLY TOPICAL ONCE
Status: CANCELLED | OUTPATIENT
Start: 2021-09-16 | End: 2021-09-16

## 2021-09-16 RX ORDER — GENTAMICIN SULFATE 1 MG/G
OINTMENT TOPICAL ONCE
Status: CANCELLED | OUTPATIENT
Start: 2021-09-16 | End: 2021-09-16

## 2021-09-16 RX ORDER — LIDOCAINE 50 MG/G
OINTMENT TOPICAL ONCE
Status: CANCELLED | OUTPATIENT
Start: 2021-09-16 | End: 2021-09-16

## 2021-09-16 RX ORDER — CLOBETASOL PROPIONATE 0.5 MG/G
OINTMENT TOPICAL ONCE
Status: CANCELLED | OUTPATIENT
Start: 2021-09-16 | End: 2021-09-16

## 2021-09-16 RX ORDER — BACITRACIN, NEOMYCIN, POLYMYXIN B 400; 3.5; 5 [USP'U]/G; MG/G; [USP'U]/G
OINTMENT TOPICAL ONCE
Status: CANCELLED | OUTPATIENT
Start: 2021-09-16 | End: 2021-09-16

## 2021-09-16 RX ORDER — BACITRACIN ZINC AND POLYMYXIN B SULFATE 500; 1000 [USP'U]/G; [USP'U]/G
OINTMENT TOPICAL ONCE
Status: CANCELLED | OUTPATIENT
Start: 2021-09-16 | End: 2021-09-16

## 2021-09-16 RX ADMIN — LIDOCAINE HYDROCHLORIDE: 40 SOLUTION TOPICAL at 08:49

## 2021-09-16 ASSESSMENT — PAIN SCALES - GENERAL
PAINLEVEL_OUTOF10: 0
PAINLEVEL_OUTOF10: 0
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
## 2021-09-23 ENCOUNTER — HOSPITAL ENCOUNTER (OUTPATIENT)
Dept: WOUND CARE | Age: 86
Discharge: HOME | End: 2021-09-23
Payer: MEDICARE

## 2021-09-23 VITALS
HEART RATE: 102 BPM | TEMPERATURE: 97.3 F | RESPIRATION RATE: 18 BRPM | SYSTOLIC BLOOD PRESSURE: 134 MMHG | DIASTOLIC BLOOD PRESSURE: 79 MMHG

## 2021-09-23 DIAGNOSIS — L97.212 VARICOSE VEINS OF RIGHT LOWER EXTREMITY WITH INFLAMMATION, WITH ULCER OF CALF WITH FAT LAYER EXPOSED (HCC): Primary | ICD-10-CM

## 2021-09-23 DIAGNOSIS — I83.212 VARICOSE VEINS OF RIGHT LOWER EXTREMITY WITH INFLAMMATION, WITH ULCER OF CALF WITH FAT LAYER EXPOSED (HCC): Primary | ICD-10-CM

## 2021-09-23 PROCEDURE — 29581 APPL MULTLAYER CMPRN SYS LEG: CPT

## 2021-09-23 PROCEDURE — 15271 SKIN SUB GRAFT TRNK/ARM/LEG: CPT

## 2021-09-23 RX ORDER — LIDOCAINE 40 MG/G
CREAM TOPICAL ONCE
Status: CANCELLED | OUTPATIENT
Start: 2021-09-23 | End: 2021-09-23

## 2021-09-23 RX ORDER — GENTAMICIN SULFATE 1 MG/G
OINTMENT TOPICAL ONCE
Status: CANCELLED | OUTPATIENT
Start: 2021-09-23 | End: 2021-09-23

## 2021-09-23 RX ORDER — LIDOCAINE HYDROCHLORIDE 40 MG/ML
SOLUTION TOPICAL ONCE
Status: COMPLETED | OUTPATIENT
Start: 2021-09-23 | End: 2021-09-23

## 2021-09-23 RX ORDER — GINSENG 100 MG
CAPSULE ORAL ONCE
Status: CANCELLED | OUTPATIENT
Start: 2021-09-23 | End: 2021-09-23

## 2021-09-23 RX ORDER — BETAMETHASONE DIPROPIONATE 0.05 %
OINTMENT (GRAM) TOPICAL ONCE
Status: CANCELLED | OUTPATIENT
Start: 2021-09-23 | End: 2021-09-23

## 2021-09-23 RX ORDER — CLOBETASOL PROPIONATE 0.5 MG/G
OINTMENT TOPICAL ONCE
Status: CANCELLED | OUTPATIENT
Start: 2021-09-23 | End: 2021-09-23

## 2021-09-23 RX ORDER — LIDOCAINE 50 MG/G
OINTMENT TOPICAL ONCE
Status: CANCELLED | OUTPATIENT
Start: 2021-09-23 | End: 2021-09-23

## 2021-09-23 RX ORDER — BACITRACIN, NEOMYCIN, POLYMYXIN B 400; 3.5; 5 [USP'U]/G; MG/G; [USP'U]/G
OINTMENT TOPICAL ONCE
Status: CANCELLED | OUTPATIENT
Start: 2021-09-23 | End: 2021-09-23

## 2021-09-23 RX ORDER — BACITRACIN ZINC AND POLYMYXIN B SULFATE 500; 1000 [USP'U]/G; [USP'U]/G
OINTMENT TOPICAL ONCE
Status: CANCELLED | OUTPATIENT
Start: 2021-09-23 | End: 2021-09-23

## 2021-09-23 RX ORDER — LIDOCAINE HYDROCHLORIDE 40 MG/ML
SOLUTION TOPICAL ONCE
Status: CANCELLED | OUTPATIENT
Start: 2021-09-23 | End: 2021-09-23

## 2021-09-23 RX ORDER — LIDOCAINE HYDROCHLORIDE 20 MG/ML
JELLY TOPICAL ONCE
Status: CANCELLED | OUTPATIENT
Start: 2021-09-23 | End: 2021-09-23

## 2021-09-23 RX ADMIN — LIDOCAINE HYDROCHLORIDE: 40 SOLUTION TOPICAL at 09:10

## 2021-09-23 ASSESSMENT — PAIN SCALES - GENERAL
PAINLEVEL_OUTOF10: 0
PAINLEVEL_OUTOF10: 0
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
## 2021-09-24 ENCOUNTER — TELEPHONE (OUTPATIENT)
Dept: INTERNAL MEDICINE CLINIC | Age: 86
End: 2021-09-24

## 2021-09-24 NOTE — TELEPHONE ENCOUNTER
----- Message from Jb Son sent at 9/24/2021 10:35 AM EDT -----  Subject: Message to Provider    QUESTIONS  Information for Provider? Pt would like to see Dr. Eli Simental sooner than his   scheduled appt of 10/25/21. Please call pt to see if he can get in sooner. ---------------------------------------------------------------------------  --------------  Reji NEUMANN  What is the best way for the office to contact you? OK to leave message on   voicemail  Preferred Call Back Phone Number? [phone]  ---------------------------------------------------------------------------  --------------  SCRIPT ANSWERS  Relationship to Patient?  Self

## 2021-09-30 ENCOUNTER — HOSPITAL ENCOUNTER (OUTPATIENT)
Dept: WOUND CARE | Age: 86
Discharge: HOME | End: 2021-09-30
Payer: MEDICARE

## 2021-09-30 VITALS
RESPIRATION RATE: 18 BRPM | SYSTOLIC BLOOD PRESSURE: 138 MMHG | DIASTOLIC BLOOD PRESSURE: 86 MMHG | TEMPERATURE: 97.9 F | HEART RATE: 83 BPM

## 2021-09-30 DIAGNOSIS — L97.212 VARICOSE VEINS OF RIGHT LOWER EXTREMITY WITH INFLAMMATION, WITH ULCER OF CALF WITH FAT LAYER EXPOSED (HCC): Primary | ICD-10-CM

## 2021-09-30 DIAGNOSIS — I83.212 VARICOSE VEINS OF RIGHT LOWER EXTREMITY WITH INFLAMMATION, WITH ULCER OF CALF WITH FAT LAYER EXPOSED (HCC): Primary | ICD-10-CM

## 2021-09-30 PROCEDURE — 15271 SKIN SUB GRAFT TRNK/ARM/LEG: CPT

## 2021-09-30 RX ORDER — BACITRACIN, NEOMYCIN, POLYMYXIN B 400; 3.5; 5 [USP'U]/G; MG/G; [USP'U]/G
OINTMENT TOPICAL ONCE
Status: CANCELLED | OUTPATIENT
Start: 2021-09-30 | End: 2021-09-30

## 2021-09-30 RX ORDER — BACITRACIN ZINC AND POLYMYXIN B SULFATE 500; 1000 [USP'U]/G; [USP'U]/G
OINTMENT TOPICAL ONCE
Status: CANCELLED | OUTPATIENT
Start: 2021-09-30 | End: 2021-09-30

## 2021-09-30 RX ORDER — LIDOCAINE HYDROCHLORIDE 40 MG/ML
SOLUTION TOPICAL ONCE
Status: COMPLETED | OUTPATIENT
Start: 2021-09-30 | End: 2021-09-30

## 2021-09-30 RX ORDER — GINSENG 100 MG
CAPSULE ORAL ONCE
Status: CANCELLED | OUTPATIENT
Start: 2021-09-30 | End: 2021-09-30

## 2021-09-30 RX ORDER — LIDOCAINE HYDROCHLORIDE 40 MG/ML
SOLUTION TOPICAL ONCE
Status: CANCELLED | OUTPATIENT
Start: 2021-09-30 | End: 2021-09-30

## 2021-09-30 RX ORDER — LIDOCAINE 40 MG/G
CREAM TOPICAL ONCE
Status: CANCELLED | OUTPATIENT
Start: 2021-09-30 | End: 2021-09-30

## 2021-09-30 RX ORDER — BETAMETHASONE DIPROPIONATE 0.05 %
OINTMENT (GRAM) TOPICAL ONCE
Status: CANCELLED | OUTPATIENT
Start: 2021-09-30 | End: 2021-09-30

## 2021-09-30 RX ORDER — CLOBETASOL PROPIONATE 0.5 MG/G
OINTMENT TOPICAL ONCE
Status: CANCELLED | OUTPATIENT
Start: 2021-09-30 | End: 2021-09-30

## 2021-09-30 RX ORDER — LIDOCAINE HYDROCHLORIDE 20 MG/ML
JELLY TOPICAL ONCE
Status: CANCELLED | OUTPATIENT
Start: 2021-09-30 | End: 2021-09-30

## 2021-09-30 RX ORDER — LIDOCAINE 50 MG/G
OINTMENT TOPICAL ONCE
Status: CANCELLED | OUTPATIENT
Start: 2021-09-30 | End: 2021-09-30

## 2021-09-30 RX ORDER — GENTAMICIN SULFATE 1 MG/G
OINTMENT TOPICAL ONCE
Status: CANCELLED | OUTPATIENT
Start: 2021-09-30 | End: 2021-09-30

## 2021-09-30 RX ADMIN — LIDOCAINE HYDROCHLORIDE 5 ML: 40 SOLUTION TOPICAL at 08:49

## 2021-09-30 ASSESSMENT — PAIN SCALES - GENERAL
PAINLEVEL_OUTOF10: 0
PAINLEVEL_OUTOF10: 0

## 2021-09-30 NOTE — PLAN OF CARE
Multilayer Compression Wrap   (Not Unna) Below the Knee    NAME:  Cici Ruiz OF BIRTH:  1935  MEDICAL RECORD NUMBER:  [id number]  DATE:  9/30/2021    Multilayer compression wrap: Removed old Multilayer wrap if indicated and wash leg with mild soap/water. Applied moisturizing agent to dry skin as needed. Applied primary and secondary dressing as ordered. Applied multilayered dressing below the knee to right lower leg. Instructed patient/caregiver not to remove dressing and to keep it clean and dry. Instructed patient/caregiver on complications to report to provider, such as pain, numbness in toes, heavy drainage, and slippage of dressing. Instructed patient on purpose of compression dressing and on activity and exercise recommendations.       Electronically signed by Marian Landis RN on 9/30/2021 at 10:15 AM
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
## 2021-09-30 NOTE — PLAN OF CARE
NuShield Treatment Note    NAME:  Hannah Mcclellan  YOB: 1935  MEDICAL RECORD NUMBER:  [id number]  DATE:  9/30/2021    Goal:  Patient will receive safe and proper application of skin substitute. Patient will comply with caring for dressing, offloading and reporting complications. Expiration date checked immediately prior to use. Package intact prior to use and no damage noted. Nushield was removed from protective sterile packaging by provider and applied to prepared ulcer bed. NuShield applied with notch to Top Upper Right Corner. NuShield was hydrated with sterile normal saline per provider. NuShield was applied to RIGHT LEG and affixed with steri-strips by the provider. Applied nonadherent and GAUZE (Secondary Dressing)   Coban or ace wrap was applied to secure graft and decrease edema. Patient/caregiver was instructed not to remove dressing and to keep it clean and dry. NuShield may be applied a total of 10 times per wound over a 12 week period. Additionally NuShield may only be used every 12 months per wound. Date of first application of NuShield for this current wound is September 23, 2021.       Application # 2 out of 10           Guidelines followed    Electronically signed by Magdiel Guy RN on 9/30/2021 at 12:05 PM

## 2021-10-07 ENCOUNTER — HOSPITAL ENCOUNTER (OUTPATIENT)
Dept: WOUND CARE | Age: 86
Discharge: HOME | End: 2021-10-07
Payer: MEDICARE

## 2021-10-07 VITALS
HEART RATE: 84 BPM | RESPIRATION RATE: 18 BRPM | DIASTOLIC BLOOD PRESSURE: 81 MMHG | SYSTOLIC BLOOD PRESSURE: 125 MMHG | TEMPERATURE: 97.5 F

## 2021-10-07 DIAGNOSIS — I83.212 VARICOSE VEINS OF RIGHT LOWER EXTREMITY WITH INFLAMMATION, WITH ULCER OF CALF WITH FAT LAYER EXPOSED (HCC): Primary | ICD-10-CM

## 2021-10-07 DIAGNOSIS — L97.212 VARICOSE VEINS OF RIGHT LOWER EXTREMITY WITH INFLAMMATION, WITH ULCER OF CALF WITH FAT LAYER EXPOSED (HCC): Primary | ICD-10-CM

## 2021-10-07 PROCEDURE — 11042 DBRDMT SUBQ TIS 1ST 20SQCM/<: CPT

## 2021-10-07 RX ORDER — BETAMETHASONE DIPROPIONATE 0.05 %
OINTMENT (GRAM) TOPICAL ONCE
Status: CANCELLED | OUTPATIENT
Start: 2021-10-07 | End: 2021-10-07

## 2021-10-07 RX ORDER — LIDOCAINE 50 MG/G
OINTMENT TOPICAL ONCE
Status: CANCELLED | OUTPATIENT
Start: 2021-10-07 | End: 2021-10-07

## 2021-10-07 RX ORDER — CLOBETASOL PROPIONATE 0.5 MG/G
OINTMENT TOPICAL ONCE
Status: CANCELLED | OUTPATIENT
Start: 2021-10-07 | End: 2021-10-07

## 2021-10-07 RX ORDER — LIDOCAINE HYDROCHLORIDE 20 MG/ML
JELLY TOPICAL ONCE
Status: CANCELLED | OUTPATIENT
Start: 2021-10-07 | End: 2021-10-07

## 2021-10-07 RX ORDER — GINSENG 100 MG
CAPSULE ORAL ONCE
Status: CANCELLED | OUTPATIENT
Start: 2021-10-07 | End: 2021-10-07

## 2021-10-07 RX ORDER — LIDOCAINE 40 MG/G
CREAM TOPICAL ONCE
Status: CANCELLED | OUTPATIENT
Start: 2021-10-07 | End: 2021-10-07

## 2021-10-07 RX ORDER — BACITRACIN, NEOMYCIN, POLYMYXIN B 400; 3.5; 5 [USP'U]/G; MG/G; [USP'U]/G
OINTMENT TOPICAL ONCE
Status: CANCELLED | OUTPATIENT
Start: 2021-10-07 | End: 2021-10-07

## 2021-10-07 RX ORDER — LIDOCAINE HYDROCHLORIDE 40 MG/ML
SOLUTION TOPICAL ONCE
Status: CANCELLED | OUTPATIENT
Start: 2021-10-07 | End: 2021-10-07

## 2021-10-07 RX ORDER — BACITRACIN ZINC AND POLYMYXIN B SULFATE 500; 1000 [USP'U]/G; [USP'U]/G
OINTMENT TOPICAL ONCE
Status: CANCELLED | OUTPATIENT
Start: 2021-10-07 | End: 2021-10-07

## 2021-10-07 RX ORDER — GENTAMICIN SULFATE 1 MG/G
OINTMENT TOPICAL ONCE
Status: CANCELLED | OUTPATIENT
Start: 2021-10-07 | End: 2021-10-07

## 2021-10-07 RX ORDER — LIDOCAINE 50 MG/G
OINTMENT TOPICAL ONCE
Status: COMPLETED | OUTPATIENT
Start: 2021-10-07 | End: 2021-10-07

## 2021-10-07 RX ADMIN — LIDOCAINE: 50 OINTMENT TOPICAL at 08:52

## 2021-10-07 ASSESSMENT — PAIN SCALES - GENERAL
PAINLEVEL_OUTOF10: 0
PAINLEVEL_OUTOF10: 0

## 2021-10-08 ENCOUNTER — TELEPHONE (OUTPATIENT)
Dept: INTERNAL MEDICINE CLINIC | Age: 86
End: 2021-10-08

## 2021-10-08 NOTE — PROGRESS NOTES
[address]   Progress Note and Procedure Note      Dianelys Stover  MEDICAL RECORD NUMBER:  [id number]  AGE: 81 y.o. GENDER: male  : 1935  EPISODE DATE:  10/7/2021    Subjective:     Chief Complaint   Patient presents with    Wound Check     Follow Up on Right Lower Leg         HISTORY of PRESENT ILLNESS ANURAG Espinoza is a 81 y.o. male who presents today for wound/ulcer evaluation. History of Wound Context: Patient presents today for follow-up of a right lower extremity ulceration. Patient relates he has been utilizing his spanned  on the left and tolerated that much better than the multilayer compression dressing. Patient relates his blood sugars are well controlled.   Wound/Ulcer Pain Timing/Severity: waxing and waning, mild  Quality of pain: aching, burning  Severity:  2 / 10   Modifying Factors: None  Associated Signs/Symptoms: edema and erythema    Ulcer Identification:  Ulcer Type: venous, burn and traumatic    Contributing Factors: edema, venous stasis and diabetes    Acute Wound: N/A    PAST MEDICAL HISTORY        Diagnosis Date    Atrial fibrillation ([address] 2018    Atrial fib    Degeneration of cervical intervertebral disc     Diastolic heart failure ([facility] 2018    Diverticulosis of colon (without mention of hemorrhage) 10/22/2010    Essential hypertension, benign 10/22/2010    [place] (hard of hearing)     Hyperlipidemia 10/22/2010    Mononeuritis of unspecified site 10/22/2010    Osteoarthrosis, unspecified whether generalized or localized, pelvic region and thigh 10/22/2010    Other specified iron deficiency anemias 10/22/2010    Rosacea 10/22/2010    Seborrheic dermatitis 10/22/2010    Type II or unspecified type diabetes mellitus without mention of complication, not stated as uncontrolled 10/22/2010    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 10/22/2010    Unspecified pruritic disorder 10/22/2010 PAST SURGICAL HISTORY    Past Surgical History:   Procedure Laterality Date    CARDIOVASCULAR STRESS TEST  2018    negative    CARPAL TUNNEL RELEASE Right 2018    HAND SURGERY      Left carpel tunnel     HIP SURGERY Bilateral     total hips       FAMILY HISTORY    Family History   Family history unknown: Yes       SOCIAL HISTORY    Social History     Tobacco Use    Smoking status: Former Smoker     Quit date:      Years since quittin.9    Smokeless tobacco: Never Used   Vaping Use    Vaping Use: Never used   Substance Use Topics    Alcohol use: Yes     Alcohol/week: 0.0 - 1.0 standard drinks    Drug use: No       ALLERGIES    Allergies   Allergen Reactions    Levofloxacin      Pt states he doesn't have any allergies. His PCP added this to his chart        MEDICATIONS    Current Outpatient Medications on File Prior to Encounter   Medication Sig Dispense Refill    torsemide (DEMADEX) 20 MG tablet TAKE 1 TABLET BY MOUTH EVERY DAY 90 tablet 0    gabapentin (NEURONTIN) 300 MG capsule Take 3 tablets nightly 90 capsule 3    penicillin v potassium (VEETID) 500 MG tablet Take 1 tablet 4 times daily 40 tablet 0    BASAGLAR KWIKPEN 100 UNIT/ML injection pen USE 25 UNITS EVERY DAY 5 pen 3    ketoconazole (NIZORAL) 2 % cream Apply topically daily. 30 g 1    olopatadine (PATANASE) 0.6 % SOLN nassl soln 2 sprays in each nostril twice daily as needed runny nose 1 Bottle 5    mupirocin (BACTROBAN) 2 % ointment APPLY TOPICALLY 2 (TWO) TIMES DAILY FOR 10 DAYS.  22 g 1    apixaban (ELIQUIS) 5 MG TABS tablet TAKE 1 TABLET BY MOUTH TWICE A  tablet 3    metoprolol succinate (TOPROL XL) 50 MG extended release tablet TAKE 1 TABLET BY MOUTH EVERY DAY 90 tablet 3    DULoxetine (CYMBALTA) 60 MG extended release capsule Take 1 capsule by mouth daily 30 capsule 5    insulin glargine (BASAGLAR KWIKPEN) 100 UNIT/ML injection pen Inject 25 units qd 9 pen 3    insulin glargine (Maria Isabel Senia) going. Patient has intact muscle function including dorsiflexion plantarflexion inversion and eversion on the bilateral lower extremities. 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 Performed by: Charmaine Huang DPM    Consent obtained:  Yes    Time out taken:  Yes    Pain Control: Anesthetic  Anesthetic: 5% Lidocaine Ointment Topical       Debridement: Excisional Debridement    Using #15 blade scalpel and forceps the wound(s)/ulcer(s) was/were debrided down through and including the removal of epidermis, dermis and subcutaneous tissue to prepare the wound for Nushield. Devitalized Tissue Debrided:  fibrin, slough and necrotic/eschar    Pre Debridement Measurements:  Are located in the [place]  Documentation Flow Sheet    Diabetic/Pressure/Non Pressure Ulcers only:  Ulcer: Non-Pressure ulcer, fat layer exposed     Wound/Ulcer #: 1    Post Debridement Measurements:  Wound/Ulcer Descriptions are Pre Debridement except measurements:    Wound 08/12/21 Leg Right; Anterior; Lower #1 Noted 7/22/21 (Active)   Wound Image   10/07/21 0852   Wound Etiology Traumatic 08/12/21 0814   Dressing Status New dressing applied 10/07/21 0948   Wound Cleansed Cleansed with saline 10/07/21 0948   Dressing/Treatment Collagen with Ag; Other (comment) 10/07/21 0948   Offloading for Diabetic Foot Ulcers No offloading required 09/30/21 1013   Wound Length (cm) 0.2 cm 10/07/21 0852   Wound Width (cm) 0.2 cm 10/07/21 0852   Wound Depth (cm) 0.1 cm 10/07/21 0852   Wound Surface Area (cm^2) 0.04 cm^2 10/07/21 0852   Change in Wound Size % (l*w) 99.04 10/07/21 0852   Wound Volume (cm^3) 0.004 cm^3 10/07/21 0852   Wound Healing % 99 10/07/21 0852 Post-Procedure Length (cm) 0.4 cm 10/07/21 0912   Post-Procedure Width (cm) 0.4 cm 10/07/21 0912   Post-Procedure Depth (cm) 0.1 cm 10/07/21 0912   Post-Procedure Surface Area (cm^2) 0.16 cm^2 10/07/21 0912   Post-Procedure Volume (cm^3) 0.016 cm^3 10/07/21 0912   Wound Assessment Granulation tissue;Slough 10/07/21 0852   Drainage Amount Scant 10/07/21 0852   Drainage Description Serosanguinous 10/07/21 0852   Odor None 10/07/21 0852   Shaila-wound Assessment Dry/flaky 10/07/21 0852   Margins Attached edges 10/07/21 0852   Wound Thickness Description not for Pressure Injury Full thickness 10/07/21 0852   Number of days: 56          Total Surface Area Debrided: 0.16 sq cm     Estimated Blood Loss:  Minimal    Hemostasis Achieved:  by pressure    Procedural Pain:  0  / 10     Post Procedural Pain:  0 / 10     Response to treatment:  Well tolerated by patient. Plan:   E/M x30 minutes. A high-level span to  will be utilized to better control edema as patient's ulceration and areas of weeping have healed on the left. A multilayer compression dressing was then applied to control edema. Right mid shin wound was excisionally debrided of fibrotic and nonviable tissue that extends down through and includes the subcutaneous layer to prepare the ulcer base for Nushield. Treatment Note please see attached Discharge Instructions    Written patient dismissal instructions given to patient and signed by patient or POA. Reappoint 1 week for follow-up or sooner if problems develop.     Discharge Instructions       [facility]   Physician Orders and Discharge Instructions  [facility]  [address]   [address] [facility] [address]  Telephone: [phone]      FAX [phone]     NAME: Caitlin [address]ringle St:  1935  MEDICAL RECORD NUMBER:  [id number]  DATE:  10/7/2021     Wound Cleansing:   Do not scrub or use excessive force. Cleanse wound prior to applying a clean dressing with:  [x]???????? Normal Saline  [x]???????? Keep Wound Dry in Shower    []???????? Wound Cleanser   []???????? Cleanse wound with Mild Soap & Water  []???????? May Shower at Discharge   []???????? Other:        Topical Treatments:  Do not apply lotions, creams, or ointments to wound bed unless directed.   []???????? Apply moisturizing lotion to skin surrounding the wound prior to dressing change.  []???????? Apply antifungal ointment to skin surrounding the wound prior to dressing change.  []???????? Apply thin film of moisture barrier ointment to skin immediately around wound.  []???????? Other:                  Dressings:                  Wound Location : RIGHT LOWER LEG      **NUSHIELD #2 APPLIED 9/30/2021**     [x]???????? Apply Primary Dressing:                                          [x]???????? ENDOFORM DAMPENED WITH SALINE                      []????????                                      []???????? Other:    [x]???????? Cover and Secure with:                   [x]???????? Gauze         []???????? Cas           []???????? Roll gauze              []???????? Ace Wrap   []???????? Cover Roll Tape     []???????? ABD                                      []???????? Other:               Avoid contact of tape with skin.  []???????? Change dressing:   []???????? Daily           []???????? Every Other Day    []???????? Three times per week              []???????? Once a week          [x]???????? Do Not Change Dressing              []???????? Other:                            Edema Control:  Apply:  [x]???????? Compression Stocking       []???????? Right Leg     [x]?????? ?? Left Leg              []???????? Tubigrip      []???????? Right Leg Double Layer      []?????? ?? Left Leg Double Layer                                                  []???????? Right Leg Single Layer       []?????? ?? Left Leg Single Layer              [x]???????? SpandaGrip            []???????? Right Leg     [x]?????? ?? Left Leg                                      []?????? ??Low compression 5-10 mm/Hg                                             []?????? ? ? Medium compression 10-20 mm/Hg                                      [x]?????? ? ? High compression  20-30 mm/Hg              every morning immediately when getting up should be applied to affected leg(s) from mid foot to knee making sure to cover the heel.  Remove every night before going to bed.              [x]???????? Elevate leg(s) above the level of the heart when sitting.                 [x]???????? Avoid prolonged standing in one place.                   Compression: COBAN 2  Apply:  [x]???????? Multilayer Compression Wrap Applied in Clinic    [x]???????? RightLeg      []?????? ?? Left Leg              [x]???????? Multi-layer compression.  Do not get leg(s) with wrap wet.  If wraps become too tight call the center or completely remove the wrap.                      [x]???????? Elevate leg(s) above the level of the heart when sitting.                 [x]???????? Avoid prolonged standing in one place.     Off-Loading:   []???????? Off-loading when    []???????? walking       []???????? in bed         []???????? sitting  []???????? Total non-weight bearing  []???????? Right Leg  []???????? Left Leg          [x]???????? Assistive Device     [x]???????? Walker        []???????? Cane           []???????? Wheelchair  []???????? Crutches              []???????? Surgical shoe    []???????? Podus Boot(s)   []???????? Foam Boot(s)  []???????? Roll About              []???????? Cast Boot   []???????? CROW Boot  []???????? Other:                      Dietary:  []???????? Diet as tolerated:     [x]???????? Calorie Diabetic Diet:           []???????? No Added Salt:  [x]???????? Increase Protein:     []???????? Other:                Activity:  [x]???????? Activity as tolerated:  []???????? Patient has no activity restrictions     []???????? Strict Bedrest: []???????? Remain off Work:     []???????? May return to full duty work:                                    []???????? Return to work with [address]     If you are still having pain after you go home:  [x]???????? Elevate the affected limb.    [x]???????? Use over-the-counter medications you would normally use for pain as permitted by your family doctor. [x]???????? For persistent pain not relieved by the above interventions, please call your family doctor.             Return Appointment:  []???????? Wound and dressing supply provider:   []???????? ECF or Home Healthcare:  []???????? Wound Assessment:    []???????? Physician or NP scheduled for Wound Assessment:   [x]???????? Return Appointment: With DR GABRIEL  in  1 Week(s)  []???????? Ordered tests:          Nurse Case Manger: Latrice Means     Electronically signed by Jazmin Pruitt RN on 10/7/2021 at 9:14 AM    [address] [place] [address] Information: Should you experience any significant changes in your wound(s) or have questions about your wound care, please contact the [address] [facility] [phone] [address] 8:00 am - 4:30 pm and Friday 8:00 am - 12:30 pm.  If you need help with your wound outside these hours and cannot wait until we are again available, contact your PCP or go to the hospital emergency room.      PLEASE NOTE: IF YOU ARE UNABLE TO OBTAIN WOUND SUPPLIES, CONTINUE TO USE THE SUPPLIES YOU HAVE AVAILABLE UNTIL YOU ARE ABLE TO [address].  IT IS MOST IMPORTANT TO KEEP THE WOUND COVERED AT ALL TIMES.     Physician Signature:_______________________     Date: ___________ Time:  ____________                                UQ Masha Gomez        Electronically signed by Masha Gomez DPM on 10/8/2021 at 6:42 AM

## 2021-10-08 NOTE — TELEPHONE ENCOUNTER
----- Message from Ruby Neri sent at 10/8/2021  1:55 PM EDT -----  Subject: Message to Provider    QUESTIONS  Information for Provider? Pt wants cream for rash around mouth, its dry,   itchy and red thinks he may have impetigo, wants it called in to [facility] on   [address]  ---------------------------------------------------------------------------  --------------  [address]  What is the best way for the office to contact you? OK to leave message on   voicemail  Preferred Call Back Phone Number? [phone]  ---------------------------------------------------------------------------  --------------  SCRIPT ANSWERS  Relationship to Patient?  Self

## 2021-10-14 ENCOUNTER — HOSPITAL ENCOUNTER (OUTPATIENT)
Dept: WOUND CARE | Age: 86
Discharge: HOME | End: 2021-10-14
Payer: MEDICARE

## 2021-10-14 ENCOUNTER — OFFICE VISIT (OUTPATIENT)
Dept: FAMILY MEDICINE CLINIC | Age: 86
End: 2021-10-14
Payer: MEDICARE

## 2021-10-14 VITALS
SYSTOLIC BLOOD PRESSURE: 124 MMHG | RESPIRATION RATE: 20 BRPM | HEART RATE: 80 BPM | TEMPERATURE: 97.9 F | DIASTOLIC BLOOD PRESSURE: 60 MMHG

## 2021-10-14 VITALS
DIASTOLIC BLOOD PRESSURE: 72 MMHG | BODY MASS INDEX: 30.34 KG/M2 | TEMPERATURE: 97.7 F | WEIGHT: 224 LBS | HEIGHT: 72 IN | HEART RATE: 72 BPM | OXYGEN SATURATION: 98 % | SYSTOLIC BLOOD PRESSURE: 126 MMHG

## 2021-10-14 DIAGNOSIS — I48.20 CHRONIC ATRIAL FIBRILLATION (HCC): ICD-10-CM

## 2021-10-14 DIAGNOSIS — I83.212 VARICOSE VEINS OF RIGHT LOWER EXTREMITY WITH INFLAMMATION, WITH ULCER OF CALF WITH FAT LAYER EXPOSED (HCC): Primary | ICD-10-CM

## 2021-10-14 DIAGNOSIS — L97.212 VARICOSE VEINS OF RIGHT LOWER EXTREMITY WITH INFLAMMATION, WITH ULCER OF CALF WITH FAT LAYER EXPOSED (HCC): Primary | ICD-10-CM

## 2021-10-14 DIAGNOSIS — L97.212 VARICOSE VEINS OF RIGHT LOWER EXTREMITY WITH INFLAMMATION, WITH ULCER OF CALF WITH FAT LAYER EXPOSED (HCC): ICD-10-CM

## 2021-10-14 DIAGNOSIS — Z76.89 ENCOUNTER TO ESTABLISH CARE: Primary | ICD-10-CM

## 2021-10-14 DIAGNOSIS — Z79.4 TYPE 2 DIABETES MELLITUS WITH DIABETIC POLYNEUROPATHY, WITH LONG-TERM CURRENT USE OF INSULIN (HCC): ICD-10-CM

## 2021-10-14 DIAGNOSIS — I50.32 CHRONIC DIASTOLIC HEART FAILURE (HCC): ICD-10-CM

## 2021-10-14 DIAGNOSIS — Z23 NEED FOR INFLUENZA VACCINATION: ICD-10-CM

## 2021-10-14 DIAGNOSIS — I83.212 VARICOSE VEINS OF RIGHT LOWER EXTREMITY WITH INFLAMMATION, WITH ULCER OF CALF WITH FAT LAYER EXPOSED (HCC): ICD-10-CM

## 2021-10-14 DIAGNOSIS — E11.42 TYPE 2 DIABETES MELLITUS WITH DIABETIC POLYNEUROPATHY, WITH LONG-TERM CURRENT USE OF INSULIN (HCC): ICD-10-CM

## 2021-10-14 DIAGNOSIS — I21.4 NSTEMI (NON-ST ELEVATED MYOCARDIAL INFARCTION) (HCC): ICD-10-CM

## 2021-10-14 LAB — HBA1C MFR BLD: 7.5 %

## 2021-10-14 PROCEDURE — 99215 OFFICE O/P EST HI 40 MIN: CPT | Performed by: NURSE PRACTITIONER

## 2021-10-14 PROCEDURE — G8484 FLU IMMUNIZE NO ADMIN: HCPCS | Performed by: NURSE PRACTITIONER

## 2021-10-14 PROCEDURE — G8427 DOCREV CUR MEDS BY ELIG CLIN: HCPCS | Performed by: NURSE PRACTITIONER

## 2021-10-14 PROCEDURE — G0008 ADMIN INFLUENZA VIRUS VAC: HCPCS | Performed by: NURSE PRACTITIONER

## 2021-10-14 PROCEDURE — G8417 CALC BMI ABV UP PARAM F/U: HCPCS | Performed by: NURSE PRACTITIONER

## 2021-10-14 PROCEDURE — 3051F HG A1C>EQUAL 7.0%<8.0%: CPT | Performed by: NURSE PRACTITIONER

## 2021-10-14 PROCEDURE — 1036F TOBACCO NON-USER: CPT | Performed by: NURSE PRACTITIONER

## 2021-10-14 PROCEDURE — 15271 SKIN SUB GRAFT TRNK/ARM/LEG: CPT

## 2021-10-14 PROCEDURE — 83036 HEMOGLOBIN GLYCOSYLATED A1C: CPT | Performed by: NURSE PRACTITIONER

## 2021-10-14 PROCEDURE — 4040F PNEUMOC VAC/ADMIN/RCVD: CPT | Performed by: NURSE PRACTITIONER

## 2021-10-14 PROCEDURE — 1123F ACP DISCUSS/DSCN MKR DOCD: CPT | Performed by: NURSE PRACTITIONER

## 2021-10-14 PROCEDURE — 90694 VACC AIIV4 NO PRSRV 0.5ML IM: CPT | Performed by: NURSE PRACTITIONER

## 2021-10-14 RX ORDER — GENTAMICIN SULFATE 1 MG/G
OINTMENT TOPICAL ONCE
Status: CANCELLED | OUTPATIENT
Start: 2021-10-14 | End: 2021-10-14

## 2021-10-14 RX ORDER — INSULIN GLARGINE 100 [IU]/ML
30 INJECTION, SOLUTION SUBCUTANEOUS NIGHTLY
Qty: 12 PEN | Refills: 3 | Status: SHIPPED | OUTPATIENT
Start: 2021-10-14 | End: 2022-01-25

## 2021-10-14 RX ORDER — LIDOCAINE HYDROCHLORIDE 20 MG/ML
JELLY TOPICAL ONCE
Status: CANCELLED | OUTPATIENT
Start: 2021-10-14 | End: 2021-10-14

## 2021-10-14 RX ORDER — LIDOCAINE HYDROCHLORIDE 40 MG/ML
SOLUTION TOPICAL ONCE
Status: CANCELLED | OUTPATIENT
Start: 2021-10-14 | End: 2021-10-14

## 2021-10-14 RX ORDER — BACITRACIN ZINC AND POLYMYXIN B SULFATE 500; 1000 [USP'U]/G; [USP'U]/G
OINTMENT TOPICAL ONCE
Status: CANCELLED | OUTPATIENT
Start: 2021-10-14 | End: 2021-10-14

## 2021-10-14 RX ORDER — CLOBETASOL PROPIONATE 0.5 MG/G
OINTMENT TOPICAL ONCE
Status: CANCELLED | OUTPATIENT
Start: 2021-10-14 | End: 2021-10-14

## 2021-10-14 RX ORDER — FLASH GLUCOSE SENSOR
1 KIT MISCELLANEOUS
Qty: 6 EACH | Refills: 3 | Status: SHIPPED | OUTPATIENT
Start: 2021-10-14 | End: 2022-10-25

## 2021-10-14 RX ORDER — LIDOCAINE 50 MG/G
OINTMENT TOPICAL ONCE
Status: COMPLETED | OUTPATIENT
Start: 2021-10-14 | End: 2021-10-14

## 2021-10-14 RX ORDER — BETAMETHASONE DIPROPIONATE 0.05 %
OINTMENT (GRAM) TOPICAL ONCE
Status: CANCELLED | OUTPATIENT
Start: 2021-10-14 | End: 2021-10-14

## 2021-10-14 RX ORDER — FLASH GLUCOSE SCANNING READER
1 EACH MISCELLANEOUS 4 TIMES DAILY
Qty: 1 EACH | Refills: 0 | Status: SHIPPED | OUTPATIENT
Start: 2021-10-14 | End: 2022-10-25

## 2021-10-14 RX ORDER — LIDOCAINE 50 MG/G
OINTMENT TOPICAL ONCE
Status: CANCELLED | OUTPATIENT
Start: 2021-10-14 | End: 2021-10-14

## 2021-10-14 RX ORDER — GINSENG 100 MG
CAPSULE ORAL ONCE
Status: CANCELLED | OUTPATIENT
Start: 2021-10-14 | End: 2021-10-14

## 2021-10-14 RX ORDER — BACITRACIN, NEOMYCIN, POLYMYXIN B 400; 3.5; 5 [USP'U]/G; MG/G; [USP'U]/G
OINTMENT TOPICAL ONCE
Status: CANCELLED | OUTPATIENT
Start: 2021-10-14 | End: 2021-10-14

## 2021-10-14 RX ORDER — LIDOCAINE 40 MG/G
CREAM TOPICAL ONCE
Status: CANCELLED | OUTPATIENT
Start: 2021-10-14 | End: 2021-10-14

## 2021-10-14 RX ADMIN — LIDOCAINE: 50 OINTMENT TOPICAL at 10:52

## 2021-10-14 ASSESSMENT — ENCOUNTER SYMPTOMS
SINUS PRESSURE: 0
ABDOMINAL DISTENTION: 0
ABDOMINAL PAIN: 0
WHEEZING: 0
COUGH: 0
VOMITING: 0
DIARRHEA: 0
EYE PAIN: 0
SORE THROAT: 0
NAUSEA: 0
SINUS PAIN: 0
SHORTNESS OF BREATH: 0
EYE DISCHARGE: 0
EYE REDNESS: 0

## 2021-10-14 ASSESSMENT — PAIN SCALES - GENERAL: PAINLEVEL_OUTOF10: 0

## 2021-10-14 NOTE — PROGRESS NOTES
Vaccine Information Sheet, \"Influenza - Inactivated\"  given to Yoko Lane, or parent/legal guardian of  Yoko Lane and verbalized understanding. Patient responses:    Have you ever had a reaction to a flu vaccine? No  Do you have any current illness? No  Have you ever had [place] Syndrome? No  Do you have a serious allergy to any of the follow: Neomycin, Polymyxin, Thimerosal, eggs or egg products? No    Flu vaccine given per order. Please see immunization tab. Risks and benefits explained. Current VIS given.       Immunizations Administered     Name Date Dose Route    Influenza, Quadv, adjuvanted, 65 yrs +, IM, PF (Fluad) 10/14/2021 0.5 mL Intramuscular    Site: Deltoid- Right    Lot: [id number]    [address]: [id number]
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
## 2021-10-14 NOTE — PLAN OF CARE
Multilayer Compression Wrap   (Not Unna) Below the Knee    NAME:  Nahid Chaparro OF BIRTH:  1935  MEDICAL RECORD NUMBER:  [id number]  DATE:  10/14/2021    Multilayer compression wrap: Removed old Multilayer wrap if indicated and wash leg with mild soap/water. Applied moisturizing agent to dry skin as needed. Applied primary and secondary dressing as ordered. Applied multilayered dressing below the knee to right lower leg. Instructed patient/caregiver not to remove dressing and to keep it clean and dry. Instructed patient/caregiver on complications to report to provider, such as pain, numbness in toes, heavy drainage, and slippage of dressing. Instructed patient on purpose of compression dressing and on activity and exercise recommendations.       Electronically signed by Shanon Topete RN on 10/14/2021 at 10:54 AM

## 2021-10-14 NOTE — PATIENT INSTRUCTIONS
A1C 7.5% today. Increase your basaglar to 30 units a day. Decrease the carbs in your diet. We will recheck in 3 months.
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
## 2021-10-21 ENCOUNTER — HOSPITAL ENCOUNTER (OUTPATIENT)
Dept: WOUND CARE | Age: 86
Discharge: HOME | End: 2021-10-21
Payer: MEDICARE

## 2021-10-21 VITALS
TEMPERATURE: 97.5 F | RESPIRATION RATE: 20 BRPM | DIASTOLIC BLOOD PRESSURE: 75 MMHG | SYSTOLIC BLOOD PRESSURE: 140 MMHG | HEART RATE: 85 BPM

## 2021-10-21 DIAGNOSIS — L97.212 VARICOSE VEINS OF RIGHT LOWER EXTREMITY WITH INFLAMMATION, WITH ULCER OF CALF WITH FAT LAYER EXPOSED (HCC): Primary | ICD-10-CM

## 2021-10-21 DIAGNOSIS — I83.212 VARICOSE VEINS OF RIGHT LOWER EXTREMITY WITH INFLAMMATION, WITH ULCER OF CALF WITH FAT LAYER EXPOSED (HCC): Primary | ICD-10-CM

## 2021-10-21 PROCEDURE — 99212 OFFICE O/P EST SF 10 MIN: CPT

## 2021-10-21 RX ORDER — LIDOCAINE 40 MG/G
CREAM TOPICAL ONCE
Status: CANCELLED | OUTPATIENT
Start: 2021-10-21 | End: 2021-10-21

## 2021-10-21 RX ORDER — GINSENG 100 MG
CAPSULE ORAL ONCE
Status: CANCELLED | OUTPATIENT
Start: 2021-10-21 | End: 2021-10-21

## 2021-10-21 RX ORDER — BACITRACIN, NEOMYCIN, POLYMYXIN B 400; 3.5; 5 [USP'U]/G; MG/G; [USP'U]/G
OINTMENT TOPICAL ONCE
Status: CANCELLED | OUTPATIENT
Start: 2021-10-21 | End: 2021-10-21

## 2021-10-21 RX ORDER — CLOBETASOL PROPIONATE 0.5 MG/G
OINTMENT TOPICAL ONCE
Status: CANCELLED | OUTPATIENT
Start: 2021-10-21 | End: 2021-10-21

## 2021-10-21 RX ORDER — LIDOCAINE 50 MG/G
OINTMENT TOPICAL ONCE
Status: CANCELLED | OUTPATIENT
Start: 2021-10-21 | End: 2021-10-21

## 2021-10-21 RX ORDER — BETAMETHASONE DIPROPIONATE 0.05 %
OINTMENT (GRAM) TOPICAL ONCE
Status: CANCELLED | OUTPATIENT
Start: 2021-10-21 | End: 2021-10-21

## 2021-10-21 RX ORDER — LIDOCAINE HYDROCHLORIDE 20 MG/ML
JELLY TOPICAL ONCE
Status: CANCELLED | OUTPATIENT
Start: 2021-10-21 | End: 2021-10-21

## 2021-10-21 RX ORDER — LIDOCAINE HYDROCHLORIDE 40 MG/ML
SOLUTION TOPICAL ONCE
Status: CANCELLED | OUTPATIENT
Start: 2021-10-21 | End: 2021-10-21

## 2021-10-21 RX ORDER — LIDOCAINE HYDROCHLORIDE 40 MG/ML
SOLUTION TOPICAL ONCE
Status: COMPLETED | OUTPATIENT
Start: 2021-10-21 | End: 2021-10-21

## 2021-10-21 RX ORDER — GENTAMICIN SULFATE 1 MG/G
OINTMENT TOPICAL ONCE
Status: CANCELLED | OUTPATIENT
Start: 2021-10-21 | End: 2021-10-21

## 2021-10-21 RX ORDER — BACITRACIN ZINC AND POLYMYXIN B SULFATE 500; 1000 [USP'U]/G; [USP'U]/G
OINTMENT TOPICAL ONCE
Status: CANCELLED | OUTPATIENT
Start: 2021-10-21 | End: 2021-10-21

## 2021-10-21 RX ADMIN — LIDOCAINE HYDROCHLORIDE: 40 SOLUTION TOPICAL at 08:51

## 2021-10-21 ASSESSMENT — PAIN SCALES - GENERAL: PAINLEVEL_OUTOF10: 0

## 2021-10-25 ENCOUNTER — OFFICE VISIT (OUTPATIENT)
Dept: INTERNAL MEDICINE CLINIC | Age: 86
End: 2021-10-25
Payer: MEDICARE

## 2021-10-25 VITALS — HEIGHT: 72 IN | DIASTOLIC BLOOD PRESSURE: 68 MMHG | BODY MASS INDEX: 30.38 KG/M2 | SYSTOLIC BLOOD PRESSURE: 128 MMHG

## 2021-10-25 DIAGNOSIS — I10 PRIMARY HYPERTENSION: ICD-10-CM

## 2021-10-25 DIAGNOSIS — Z79.4 TYPE 2 DIABETES MELLITUS WITH DIABETIC POLYNEUROPATHY, WITH LONG-TERM CURRENT USE OF INSULIN (HCC): ICD-10-CM

## 2021-10-25 DIAGNOSIS — K21.9 GASTROESOPHAGEAL REFLUX DISEASE, UNSPECIFIED WHETHER ESOPHAGITIS PRESENT: ICD-10-CM

## 2021-10-25 DIAGNOSIS — E11.42 TYPE 2 DIABETES MELLITUS WITH DIABETIC POLYNEUROPATHY, WITH LONG-TERM CURRENT USE OF INSULIN (HCC): ICD-10-CM

## 2021-10-25 PROBLEM — M79.604 LEG PAIN, RIGHT: Status: RESOLVED | Noted: 2020-03-24 | Resolved: 2021-10-25

## 2021-10-25 PROCEDURE — 4040F PNEUMOC VAC/ADMIN/RCVD: CPT | Performed by: INTERNAL MEDICINE

## 2021-10-25 PROCEDURE — 1036F TOBACCO NON-USER: CPT | Performed by: INTERNAL MEDICINE

## 2021-10-25 PROCEDURE — 99214 OFFICE O/P EST MOD 30 MIN: CPT | Performed by: INTERNAL MEDICINE

## 2021-10-25 PROCEDURE — G8417 CALC BMI ABV UP PARAM F/U: HCPCS | Performed by: INTERNAL MEDICINE

## 2021-10-25 PROCEDURE — G8427 DOCREV CUR MEDS BY ELIG CLIN: HCPCS | Performed by: INTERNAL MEDICINE

## 2021-10-25 PROCEDURE — 1123F ACP DISCUSS/DSCN MKR DOCD: CPT | Performed by: INTERNAL MEDICINE

## 2021-10-25 PROCEDURE — G8484 FLU IMMUNIZE NO ADMIN: HCPCS | Performed by: INTERNAL MEDICINE

## 2021-10-25 PROCEDURE — 3051F HG A1C>EQUAL 7.0%<8.0%: CPT | Performed by: INTERNAL MEDICINE

## 2021-10-25 ASSESSMENT — ENCOUNTER SYMPTOMS
ABDOMINAL PAIN: 0
ABDOMINAL DISTENTION: 0
TROUBLE SWALLOWING: 0

## 2021-10-25 NOTE — ASSESSMENT & PLAN NOTE
last A1c 7.5. Fairly good control for him. In the last month he has decided to give up all sugar and trying to cut down on sweets.

## 2021-10-25 NOTE — PROGRESS NOTES
SUBJECTIVE:  Patient ID: Prema Meadows is an 81 y.o. male. HPI: Patient here today for the f/u of chronic problems-- see Problem List and associated comments. New issues or complaints include (also see Assessment for more details): Here to just discuss a few issues. Overall he feels like he is stable and doing well. Status post Covid vaccination. Review of Systems   HENT: Negative for trouble swallowing. Cardiovascular: Positive for leg swelling. Negative for chest pain. Gastrointestinal: Negative for abdominal distention and abdominal pain. Burping   Musculoskeletal: Positive for arthralgias. OBJECTIVE:    /68 (Site: Right Upper Arm)   Ht 6' (1.829 m)   BMI 30.38 kg/m²      Physical Exam  Constitutional:       Appearance: He is not ill-appearing. Comments: Using walker   HENT:      Mouth/Throat:      Dentition: Abnormal dentition. Cardiovascular:      Rate and Rhythm: Normal rate. Rhythm irregular. Pulmonary:      Effort: Pulmonary effort is normal. No respiratory distress. Abdominal:      General: Bowel sounds are normal.      Palpations: Abdomen is soft. Musculoskeletal:      Right lower leg: Edema present. Left lower leg: Edema present. Skin:     Coloration: Skin is not pale. Neurological:      General: No focal deficit present. Psychiatric:         Mood and Affect: Mood normal.         Thought Content: Thought content normal.         Judgment: Judgment normal.         ASSESSMENT:       Encounter Diagnoses   Name Primary?  Gastroesophageal reflux disease, unspecified whether esophagitis present     Type 2 diabetes mellitus with diabetic polyneuropathy, with long-term current use of insulin (HCC)     Primary hypertension        Abrasion of leg, right    almost totally healed. Status post evaluation and treatment at the wound center. GERD (gastroesophageal reflux disease)    symptoms control. Some belching.   Probably exacerbated by his hiatal hernia. Diabetes mellitus ([address]    last A1c 7.5. Fairly good control for him. In the last month he has decided to give up all sugar and trying to cut down on sweets. Hypertension    BP well controlled        PLAN:See ASSESSMENT for evaluation & PLAN     No orders of the defined types were placed in this encounter.    , PMH, SH and FH reviewed and noted. Recent and past labs, tests and consultsalso reviewed. Recent or new meds also reviewed.

## 2021-11-01 ENCOUNTER — TELEPHONE (OUTPATIENT)
Dept: OTHER | Facility: CLINIC | Age: 86
End: 2021-11-01

## 2021-12-06 DIAGNOSIS — I50.32 CHRONIC DIASTOLIC HF (HEART FAILURE) (HCC): ICD-10-CM

## 2021-12-06 RX ORDER — TORSEMIDE 20 MG/1
TABLET ORAL
Qty: 90 TABLET | Refills: 0 | Status: SHIPPED | OUTPATIENT
Start: 2021-12-06 | End: 2022-01-17 | Stop reason: SDUPTHER

## 2022-01-07 RX ORDER — METOPROLOL SUCCINATE 50 MG/1
TABLET, EXTENDED RELEASE ORAL
Qty: 30 TABLET | Refills: 0 | Status: SHIPPED | OUTPATIENT
Start: 2022-01-07 | End: 2022-01-17 | Stop reason: SDUPTHER

## 2022-01-17 ENCOUNTER — VIRTUAL VISIT (OUTPATIENT)
Dept: FAMILY MEDICINE CLINIC | Age: 87
End: 2022-01-17
Payer: MEDICARE

## 2022-01-17 DIAGNOSIS — I48.20 CHRONIC ATRIAL FIBRILLATION (HCC): ICD-10-CM

## 2022-01-17 DIAGNOSIS — I10 PRIMARY HYPERTENSION: ICD-10-CM

## 2022-01-17 DIAGNOSIS — E11.42 TYPE 2 DIABETES MELLITUS WITH DIABETIC POLYNEUROPATHY, WITH LONG-TERM CURRENT USE OF INSULIN (HCC): Primary | ICD-10-CM

## 2022-01-17 DIAGNOSIS — I21.4 NSTEMI (NON-ST ELEVATED MYOCARDIAL INFARCTION) (HCC): ICD-10-CM

## 2022-01-17 DIAGNOSIS — Z79.4 TYPE 2 DIABETES MELLITUS WITH DIABETIC POLYNEUROPATHY, WITH LONG-TERM CURRENT USE OF INSULIN (HCC): Primary | ICD-10-CM

## 2022-01-17 DIAGNOSIS — I50.32 CHRONIC DIASTOLIC HF (HEART FAILURE) (HCC): ICD-10-CM

## 2022-01-17 DIAGNOSIS — G62.9 NEUROPATHY: ICD-10-CM

## 2022-01-17 DIAGNOSIS — L97.212 VARICOSE VEINS OF RIGHT LOWER EXTREMITY WITH INFLAMMATION, WITH ULCER OF CALF WITH FAT LAYER EXPOSED (HCC): ICD-10-CM

## 2022-01-17 DIAGNOSIS — I83.212 VARICOSE VEINS OF RIGHT LOWER EXTREMITY WITH INFLAMMATION, WITH ULCER OF CALF WITH FAT LAYER EXPOSED (HCC): ICD-10-CM

## 2022-01-17 PROCEDURE — 1123F ACP DISCUSS/DSCN MKR DOCD: CPT | Performed by: NURSE PRACTITIONER

## 2022-01-17 PROCEDURE — 99214 OFFICE O/P EST MOD 30 MIN: CPT | Performed by: NURSE PRACTITIONER

## 2022-01-17 PROCEDURE — G8484 FLU IMMUNIZE NO ADMIN: HCPCS | Performed by: NURSE PRACTITIONER

## 2022-01-17 PROCEDURE — 1036F TOBACCO NON-USER: CPT | Performed by: NURSE PRACTITIONER

## 2022-01-17 PROCEDURE — 4040F PNEUMOC VAC/ADMIN/RCVD: CPT | Performed by: NURSE PRACTITIONER

## 2022-01-17 PROCEDURE — G8417 CALC BMI ABV UP PARAM F/U: HCPCS | Performed by: NURSE PRACTITIONER

## 2022-01-17 PROCEDURE — G8427 DOCREV CUR MEDS BY ELIG CLIN: HCPCS | Performed by: NURSE PRACTITIONER

## 2022-01-17 RX ORDER — GABAPENTIN 300 MG/1
CAPSULE ORAL
Qty: 90 CAPSULE | Refills: 2 | Status: SHIPPED | OUTPATIENT
Start: 2022-01-17 | End: 2022-03-23 | Stop reason: SDUPTHER

## 2022-01-17 RX ORDER — TORSEMIDE 20 MG/1
TABLET ORAL
Qty: 90 TABLET | Refills: 1 | Status: SHIPPED | OUTPATIENT
Start: 2022-01-17 | End: 2022-04-07

## 2022-01-17 RX ORDER — PEN NEEDLE, DIABETIC 31 GX5/16"
NEEDLE, DISPOSABLE MISCELLANEOUS
Qty: 100 EACH | Refills: 3 | Status: SHIPPED | OUTPATIENT
Start: 2022-01-17

## 2022-01-17 RX ORDER — METOPROLOL SUCCINATE 50 MG/1
TABLET, EXTENDED RELEASE ORAL
Qty: 90 TABLET | Refills: 1 | Status: SHIPPED | OUTPATIENT
Start: 2022-01-17 | End: 2022-06-22

## 2022-01-17 ASSESSMENT — PATIENT HEALTH QUESTIONNAIRE - PHQ9
SUM OF ALL RESPONSES TO PHQ9 QUESTIONS 1 & 2: 0
1. LITTLE INTEREST OR PLEASURE IN DOING THINGS: 0
SUM OF ALL RESPONSES TO PHQ QUESTIONS 1-9: 0
SUM OF ALL RESPONSES TO PHQ QUESTIONS 1-9: 0
2. FEELING DOWN, DEPRESSED OR HOPELESS: 0
SUM OF ALL RESPONSES TO PHQ QUESTIONS 1-9: 0
SUM OF ALL RESPONSES TO PHQ QUESTIONS 1-9: 0

## 2022-01-17 ASSESSMENT — ENCOUNTER SYMPTOMS
ABDOMINAL PAIN: 0
SINUS PRESSURE: 0
DIARRHEA: 0
VOMITING: 0
SORE THROAT: 0
EYE PAIN: 0
ABDOMINAL DISTENTION: 0
NAUSEA: 0
WHEEZING: 0
EYE DISCHARGE: 0
COUGH: 0
EYE REDNESS: 0
SINUS PAIN: 0
SHORTNESS OF BREATH: 0

## 2022-01-17 NOTE — PROGRESS NOTES
tablet, R-1Normal      Return in about 3 months (around 4/17/2022) for Annual Wellness Visit/Fasting Labs. SUBJECTIVE/OBJECTIVE:  ANURAG Wise presents today with his wife via telephone for diabetes follow-up. Denies any particular complaints today. Since last being seen, he increased his Basaglar to 27 units nightly. He has been checking his blood sugar at home. Typically runs in the low 100s to 120. He states that the past few days, he was taking some honey due to an infected tooth. He was not thinking about the effects on his sugar, so did run high the past few days. Overall he has been trying to watch what he eats. Denies any episodes of severe hyper or hypoglycemia. He is on Eliquis. Continues to take gabapentin for neuropathy. States that the medication works very well for him. It does make him hyper at times, but the benefit of the medication far outweighs the side effects. He continues to follow with cardiology. No new updates at this time. Overall states he has been feeling well. Trying to stay at home to reduce COVID-19 risk. He is agreeable to coming to obtain his A1c. Review of Systems   Constitutional: Negative for chills and fever. HENT: Negative for ear discharge, ear pain, hearing loss, sinus pressure, sinus pain and sore throat. Eyes: Negative for pain, discharge and redness. Respiratory: Negative for cough, shortness of breath and wheezing. Cardiovascular: Negative for chest pain and palpitations. Gastrointestinal: Negative for abdominal distention, abdominal pain, diarrhea, nausea and vomiting. Genitourinary: Negative for dysuria and hematuria. Musculoskeletal: Negative for myalgias. Skin: Negative for rash. Neurological: Negative for dizziness, weakness, light-headedness, numbness and headaches. Psychiatric/Behavioral: Negative for decreased concentration, dysphoric mood and sleep disturbance. The patient is not nervous/anxious.         Patient-Reported Vitals 1/17/2022   Patient-Reported Weight 222LBS   Patient-Reported Height 6'        Physical Exam  Constitutional:       General: He is not in acute distress. Appearance: Normal appearance. He is obese. Pulmonary:      Effort: Pulmonary effort is normal.   Neurological:      Mental Status: He is alert and oriented to person, place, and time. Psychiatric:         Mood and Affect: Mood normal.         Behavior: Behavior normal.         Thought Content: Thought content normal.         Judgment: Judgment normal.             On this date 1/17/2022 I have spent 30 minutes reviewing previous notes, test results and face to face (virtual) with the patient discussing the diagnosis and importance of compliance with the treatment plan as well as documenting on the day of the visit. Jeannine Cook was evaluated through a synchronous (real-time) audio-video encounter. The patient (or guardian if applicable) is aware that this is a billable service. Verbal consent to proceed has been obtained within the past 12 months. The visit was conducted pursuant to the emergency declaration under the [address] [place] [address] authority and the [employer] General Act. Patient identification was verified, and a caregiver was present when appropriate. The patient was located in a state where the provider was credentialed to provide care. This dictation was generated by voice recognition computer software. Although all attempts are made to edit the dictation for accuracy, there may be errors in the transcription that are not intended. An electronic signature was used to authenticate this note.     --Agnieszka Tillman, MARIA FERNANDA - LEROY

## 2022-01-21 ENCOUNTER — NURSE ONLY (OUTPATIENT)
Dept: FAMILY MEDICINE CLINIC | Age: 87
End: 2022-01-21
Payer: MEDICARE

## 2022-01-21 DIAGNOSIS — E11.42 TYPE 2 DIABETES MELLITUS WITH DIABETIC POLYNEUROPATHY, WITH LONG-TERM CURRENT USE OF INSULIN (HCC): ICD-10-CM

## 2022-01-21 DIAGNOSIS — Z79.4 TYPE 2 DIABETES MELLITUS WITH DIABETIC POLYNEUROPATHY, WITH LONG-TERM CURRENT USE OF INSULIN (HCC): ICD-10-CM

## 2022-01-21 LAB — HBA1C MFR BLD: 6.6 %

## 2022-01-21 PROCEDURE — 83036 HEMOGLOBIN GLYCOSYLATED A1C: CPT | Performed by: NURSE PRACTITIONER

## 2022-01-23 DIAGNOSIS — Z79.4 TYPE 2 DIABETES MELLITUS WITH DIABETIC POLYNEUROPATHY, WITH LONG-TERM CURRENT USE OF INSULIN (HCC): ICD-10-CM

## 2022-01-23 DIAGNOSIS — E11.42 TYPE 2 DIABETES MELLITUS WITH DIABETIC POLYNEUROPATHY, WITH LONG-TERM CURRENT USE OF INSULIN (HCC): ICD-10-CM

## 2022-01-26 RX ORDER — INSULIN GLARGINE 100 [IU]/ML
INJECTION, SOLUTION SUBCUTANEOUS
Qty: 12 PEN | Refills: 3 | Status: SHIPPED | OUTPATIENT
Start: 2022-01-26 | End: 2022-04-12 | Stop reason: SDUPTHER

## 2022-03-23 ENCOUNTER — OFFICE VISIT (OUTPATIENT)
Dept: FAMILY MEDICINE CLINIC | Age: 87
End: 2022-03-23
Payer: MEDICARE

## 2022-03-23 VITALS
BODY MASS INDEX: 30.38 KG/M2 | HEART RATE: 96 BPM | OXYGEN SATURATION: 97 % | RESPIRATION RATE: 18 BRPM | DIASTOLIC BLOOD PRESSURE: 80 MMHG | HEIGHT: 72 IN | SYSTOLIC BLOOD PRESSURE: 120 MMHG

## 2022-03-23 DIAGNOSIS — M19.019 ARTHRITIS OF SHOULDER: ICD-10-CM

## 2022-03-23 DIAGNOSIS — I50.32 CHRONIC DIASTOLIC CONGESTIVE HEART FAILURE (HCC): ICD-10-CM

## 2022-03-23 DIAGNOSIS — Z12.5 SCREENING FOR MALIGNANT NEOPLASM OF PROSTATE: ICD-10-CM

## 2022-03-23 DIAGNOSIS — Z79.4 TYPE 2 DIABETES MELLITUS WITH DIABETIC POLYNEUROPATHY, WITH LONG-TERM CURRENT USE OF INSULIN (HCC): ICD-10-CM

## 2022-03-23 DIAGNOSIS — E11.42 TYPE 2 DIABETES MELLITUS WITH DIABETIC POLYNEUROPATHY, WITH LONG-TERM CURRENT USE OF INSULIN (HCC): ICD-10-CM

## 2022-03-23 DIAGNOSIS — E55.9 VITAMIN D DEFICIENCY: ICD-10-CM

## 2022-03-23 DIAGNOSIS — G62.9 NEUROPATHY: ICD-10-CM

## 2022-03-23 DIAGNOSIS — H10.403 CHRONIC BACTERIAL CONJUNCTIVITIS OF BOTH EYES: ICD-10-CM

## 2022-03-23 DIAGNOSIS — L72.9 CYST OF BUTTOCKS: ICD-10-CM

## 2022-03-23 DIAGNOSIS — R53.82 CHRONIC FATIGUE: Primary | ICD-10-CM

## 2022-03-23 LAB
A/G RATIO: 1.4 (ref 1.1–2.2)
ALBUMIN SERPL-MCNC: 4 G/DL (ref 3.4–5)
ALP BLD-CCNC: 102 U/L (ref 40–129)
ALT SERPL-CCNC: 13 U/L (ref 10–40)
ANION GAP SERPL CALCULATED.3IONS-SCNC: 17 MMOL/L (ref 3–16)
AST SERPL-CCNC: 20 U/L (ref 15–37)
BASOPHILS ABSOLUTE: 0 K/UL (ref 0–0.2)
BASOPHILS RELATIVE PERCENT: 0.4 %
BILIRUB SERPL-MCNC: 0.9 MG/DL (ref 0–1)
BUN BLDV-MCNC: 20 MG/DL (ref 7–20)
CALCIUM SERPL-MCNC: 9.4 MG/DL (ref 8.3–10.6)
CHLORIDE BLD-SCNC: 100 MMOL/L (ref 99–110)
CO2: 24 MMOL/L (ref 21–32)
CREAT SERPL-MCNC: 1.2 MG/DL (ref 0.8–1.3)
EOSINOPHILS ABSOLUTE: 0.1 K/UL (ref 0–0.6)
EOSINOPHILS RELATIVE PERCENT: 1.8 %
GFR AFRICAN AMERICAN: >60
GFR NON-AFRICAN AMERICAN: 57
GLUCOSE BLD-MCNC: 154 MG/DL (ref 70–99)
HCT VFR BLD CALC: 39 % (ref 40.5–52.5)
HEMOGLOBIN: 13 G/DL (ref 13.5–17.5)
LYMPHOCYTES ABSOLUTE: 1.1 K/UL (ref 1–5.1)
LYMPHOCYTES RELATIVE PERCENT: 18.5 %
MCH RBC QN AUTO: 31.6 PG (ref 26–34)
MCHC RBC AUTO-ENTMCNC: 33.4 G/DL (ref 31–36)
MCV RBC AUTO: 94.6 FL (ref 80–100)
MONOCYTES ABSOLUTE: 0.6 K/UL (ref 0–1.3)
MONOCYTES RELATIVE PERCENT: 10 %
NEUTROPHILS ABSOLUTE: 4.1 K/UL (ref 1.7–7.7)
NEUTROPHILS RELATIVE PERCENT: 69.3 %
PDW BLD-RTO: 15.7 % (ref 12.4–15.4)
PLATELET # BLD: 197 K/UL (ref 135–450)
PMV BLD AUTO: 8.7 FL (ref 5–10.5)
POTASSIUM SERPL-SCNC: 4.4 MMOL/L (ref 3.5–5.1)
PRO-BNP: 3458 PG/ML (ref 0–449)
PROSTATE SPECIFIC ANTIGEN: 0.26 NG/ML (ref 0–4)
RBC # BLD: 4.13 M/UL (ref 4.2–5.9)
SODIUM BLD-SCNC: 141 MMOL/L (ref 136–145)
TOTAL PROTEIN: 6.9 G/DL (ref 6.4–8.2)
VITAMIN B-12: 750 PG/ML (ref 211–911)
VITAMIN D 25-HYDROXY: 35.5 NG/ML
WBC # BLD: 5.9 K/UL (ref 4–11)

## 2022-03-23 PROCEDURE — 1036F TOBACCO NON-USER: CPT | Performed by: NURSE PRACTITIONER

## 2022-03-23 PROCEDURE — 36415 COLL VENOUS BLD VENIPUNCTURE: CPT | Performed by: NURSE PRACTITIONER

## 2022-03-23 PROCEDURE — G8417 CALC BMI ABV UP PARAM F/U: HCPCS | Performed by: NURSE PRACTITIONER

## 2022-03-23 PROCEDURE — 99215 OFFICE O/P EST HI 40 MIN: CPT | Performed by: NURSE PRACTITIONER

## 2022-03-23 PROCEDURE — G8427 DOCREV CUR MEDS BY ELIG CLIN: HCPCS | Performed by: NURSE PRACTITIONER

## 2022-03-23 PROCEDURE — 3044F HG A1C LEVEL LT 7.0%: CPT | Performed by: NURSE PRACTITIONER

## 2022-03-23 PROCEDURE — 1123F ACP DISCUSS/DSCN MKR DOCD: CPT | Performed by: NURSE PRACTITIONER

## 2022-03-23 PROCEDURE — 4040F PNEUMOC VAC/ADMIN/RCVD: CPT | Performed by: NURSE PRACTITIONER

## 2022-03-23 PROCEDURE — G8484 FLU IMMUNIZE NO ADMIN: HCPCS | Performed by: NURSE PRACTITIONER

## 2022-03-23 RX ORDER — GABAPENTIN 300 MG/1
CAPSULE ORAL
Qty: 270 CAPSULE | Refills: 1 | Status: SHIPPED | OUTPATIENT
Start: 2022-03-23 | End: 2022-08-30

## 2022-03-23 RX ORDER — POLYMYXIN B SULFATE AND TRIMETHOPRIM 1; 10000 MG/ML; [USP'U]/ML
1 SOLUTION OPHTHALMIC EVERY 4 HOURS
Qty: 10 ML | Refills: 0 | Status: SHIPPED | OUTPATIENT
Start: 2022-03-23 | End: 2022-04-02

## 2022-03-23 ASSESSMENT — ENCOUNTER SYMPTOMS
NAUSEA: 0
EYE PAIN: 0
EYE REDNESS: 1
EYE DISCHARGE: 0
SINUS PRESSURE: 0
SINUS PAIN: 0
COUGH: 0
VOMITING: 0
WHEEZING: 0
BACK PAIN: 0
ABDOMINAL PAIN: 0
DIARRHEA: 0
SHORTNESS OF BREATH: 1
ABDOMINAL DISTENTION: 0
SORE THROAT: 0

## 2022-03-23 NOTE — PROGRESS NOTES
Gricelda Stover (:  1935) is a 81 y.o. male,Established patient, here for evaluation of the following chief complaint(s):  Fatigue (PT IS FEELING VERY FATIGUE AND WOULD LIKE TO DISCUSS THIS) and Other (PT C/O LUMP ON HIS BONE AND SORE ON HIS BUTTOCKS HE WANTS LOOKED AT )      ASSESSMENT/PLAN:  1. Chronic fatigue  -Labs are being ordered to further evaluate. The patient was also encouraged to follow-up with his cardiologist.  Further intervention pending results. -     Vitamin D 25 Hydroxy; Future  -     Brain Natriuretic Peptide; Future  -     Comprehensive Metabolic Panel; Future  -     Vitamin B12; Future  -     CBC with Auto Differential; Future  2. Chronic diastolic congestive heart failure ([address]  -Patient has known history of congestive heart failure. Takes torsemide and compression stockings. Given shortness of breath with exertion has worsened lately, will check BMP. The patient was also encouraged to contact his cardiologist.  -     Brain Natriuretic Peptide; Future  3. Chronic bacterial conjunctivitis of both eyes  -Patient has conjunctivitis in both eyes. Polytrim drops are being sent to the pharmacy. Educated on medication use as well as side effects. Follow-up as needed if no improvement. -     trimethoprim-polymyxin b (POLYTRIM) 93640-6.1 UNIT/ML-% ophthalmic solution; Place 1 drop into both eyes every 4 hours for 10 days, Disp-10 mL, R-0Normal  4. Arthritis of shoulder  -Patient has intermittent flareups of arthritis in her shoulders. Rx is being sent for diclofenac gel. Educated on medication use as well as side effects. Follow-up as needed if no improvement. Did discuss Ortho referral, but patient declines today. -     diclofenac sodium (VOLTAREN) 1 % GEL; Apply 2 g topically 4 times daily, Topical, 4 TIMES DAILY Starting Wed 3/23/2022, Disp-350 g, R-3, Normal  5. Neuropathy  -Controlled on gabapentin.   Patient does have issues with sleep and feeling hyper when he takes the 900 mg at night. Encouraged the patient to try 300 mg 3 times daily instead to see if this both helps continue to manage his neuropathy while also making side effects more manageable. Can follow-up at next appointment in a month. -     gabapentin (NEURONTIN) 300 MG capsule; Take 3 tablets nightly, Disp-270 capsule, R-1Normal  6. Cyst of buttocks  -The patient has a sebaceous cyst to his right buttock which is already opened up. No signs of infection. No erythema. Educated the patient that this would need intervention by dermatologist or surgeon to prevent recurrence. Patient will look into a dermatologist  7. Vitamin D deficiency  -Check vitamin D to rule out as a cause of fatigue  -     Vitamin D 25 Hydroxy; Future  8. Type 2 diabetes mellitus with diabetic polyneuropathy, with long-term current use of insulin ([facility]  -Patient has diabetes follow-up scheduled next month. Check A1c at that time. Continue gabapentin.  -     gabapentin (NEURONTIN) 300 MG capsule; Take 3 tablets nightly, Disp-270 capsule, R-1Normal  9. Screening for malignant neoplasm of prostate  -     PSA Screening; Future      Return in about 4 weeks (around 4/20/2022) for Annual Wellness Visit/Diabetes follow-up. SUBJECTIVE/OBJECTIVE:  ANURAG Naylor presents today with multiple complaints. First, he states that he is having significant fatigue. He states he has had this for quite some time, but he does feel like it has worsened with time. Has a known history of heart failure. He currently takes torsemide and wears compression stockings. Also on metoprolol and Eliquis. He states that any bit of exertion will leave him short of breath. He has not had an appointment with his cardiologist in quite some time and does intend on scheduling an appointment with them. He is wondering if lab work should be performed to evaluate this.     The patient is also inquiring about a lump to the right side of his chest.  He states that it is not bothersome or painful, but he noticed it in the morning to have it checked. It is right over his ribs. Denies any redness. Denies any change in size. He does note that at one point he had a fall and did break some ribs. Unsure if it is related to this. Patient is also inquiring about his eyes. States that he occasionally gets redness in the corners. He had previously been given polymyxin drops for this with his previous PCP and is wondering if he could have these prescribed as they have flared up the past few days. Denies any fevers or chills. Denies any change in vision. Denies any eye pain. Patient is inquiring about arthritis in his shoulders. States that he currently uses a cream on them. Wondering if there is something else that he can use for them. Just occasionally have flareups of pain. The patient continues to take gabapentin for neuropathy. States this works very well for him. He is inquiring whether his refills could be given in a longer amount of time then monthly. He currently takes 3 at night but is curious about trying 3 times a day instead to see if this helps with sleeping issues overnight and feeling hyper. Finally, the patient would like to discuss a cyst to his buttock. States it has been present for a few months. Comes and goes. His wife is been playing, sucking on it. Wondering if he should use something else or if anything needs to be done for this. Review of Systems   Constitutional: Positive for fatigue. Negative for chills and fever. HENT: Negative for ear discharge, ear pain, hearing loss, sinus pressure, sinus pain and sore throat. Eyes: Positive for redness. Negative for pain and discharge. Respiratory: Positive for shortness of breath. Negative for cough and wheezing. On exertion   Cardiovascular: Negative for chest pain and palpitations.    Gastrointestinal: Negative for abdominal distention, abdominal pain, diarrhea, nausea and vomiting. Genitourinary: Negative for dysuria and hematuria. Musculoskeletal: Positive for arthralgias and gait problem. Negative for back pain, joint swelling, myalgias, neck pain and neck stiffness. Skin: Negative for rash. Cyst to right buttock   Neurological: Negative for dizziness, weakness, light-headedness, numbness and headaches. Psychiatric/Behavioral: Positive for sleep disturbance. Negative for decreased concentration, dysphoric mood, self-injury and suicidal ideas. The patient is not nervous/anxious. Physical Exam  Vitals reviewed. Constitutional:       General: He is not in acute distress. Appearance: Normal appearance. He is obese. HENT:      Head: Normocephalic and atraumatic. Right Ear: Tympanic membrane, ear canal and external ear normal.      Left Ear: Tympanic membrane, ear canal and external ear normal.      Nose: Nose normal.      Mouth/Throat:      Mouth: Mucous membranes are moist.      Pharynx: Oropharynx is clear. No posterior oropharyngeal erythema. Eyes:      General: No scleral icterus. Right eye: No discharge. Left eye: No discharge. Extraocular Movements: Extraocular movements intact. Pupils: Pupils are equal, round, and reactive to light. Comments: Redness to the medial aspect of the conjunctivae bilaterally   Neck:      Vascular: No carotid bruit. Cardiovascular:      Rate and Rhythm: Normal rate. Rhythm irregular. Pulses: Normal pulses. Heart sounds: Normal heart sounds. No murmur heard. No gallop. Pulmonary:      Effort: Pulmonary effort is normal.      Breath sounds: Normal breath sounds. No wheezing. Abdominal:      General: Bowel sounds are normal.      Palpations: Abdomen is soft. Tenderness: There is no abdominal tenderness. There is no guarding or rebound. Musculoskeletal:         General: No swelling or tenderness. Cervical back: Normal range of motion and neck supple.       Right lower leg: Edema present. Left lower leg: Edema present. Comments: Slightly limited range of motion of bilateral shoulders. No tenderness. Trace nonpitting edema of the bilateral lower extremities. Patient is wearing compression stockings. Lymphadenopathy:      Cervical: No cervical adenopathy. Skin:     General: Skin is warm and dry. Capillary Refill: Capillary refill takes less than 2 seconds. Comments: Small cyst to the right buttock. Already open and healing. No erythema. No drainage. Neurological:      Mental Status: He is alert and oriented to person, place, and time. Mental status is at baseline. Psychiatric:         Mood and Affect: Mood normal.         Behavior: Behavior normal.         Thought Content: Thought content normal.         Judgment: Judgment normal.           On this date 3/23/2022 I have spent 45 minutes reviewing previous notes, test results and face to face with the patient discussing the diagnosis and importance of compliance with the treatment plan as well as documenting on the day of the visit. This dictation was generated by voice recognition computer software. Although all attempts are made to edit the dictation for accuracy, there may be errors in the transcription that are not intended. An electronic signature was used to authenticate this note.     --Chris Scherer, MARIA FERNANDA - CNP

## 2022-03-24 ENCOUNTER — PATIENT MESSAGE (OUTPATIENT)
Dept: FAMILY MEDICINE CLINIC | Age: 87
End: 2022-03-24

## 2022-04-06 ENCOUNTER — OFFICE VISIT (OUTPATIENT)
Dept: FAMILY MEDICINE CLINIC | Age: 87
End: 2022-04-06
Payer: MEDICARE

## 2022-04-06 VITALS
HEART RATE: 54 BPM | BODY MASS INDEX: 30.38 KG/M2 | RESPIRATION RATE: 22 BRPM | SYSTOLIC BLOOD PRESSURE: 120 MMHG | DIASTOLIC BLOOD PRESSURE: 72 MMHG | HEIGHT: 72 IN

## 2022-04-06 DIAGNOSIS — S46.911A MUSCLE STRAIN OF RIGHT UPPER EXTREMITY, INITIAL ENCOUNTER: Primary | ICD-10-CM

## 2022-04-06 PROCEDURE — 4040F PNEUMOC VAC/ADMIN/RCVD: CPT | Performed by: NURSE PRACTITIONER

## 2022-04-06 PROCEDURE — 99213 OFFICE O/P EST LOW 20 MIN: CPT | Performed by: NURSE PRACTITIONER

## 2022-04-06 PROCEDURE — 1036F TOBACCO NON-USER: CPT | Performed by: NURSE PRACTITIONER

## 2022-04-06 PROCEDURE — 1123F ACP DISCUSS/DSCN MKR DOCD: CPT | Performed by: NURSE PRACTITIONER

## 2022-04-06 PROCEDURE — G8427 DOCREV CUR MEDS BY ELIG CLIN: HCPCS | Performed by: NURSE PRACTITIONER

## 2022-04-06 PROCEDURE — G8417 CALC BMI ABV UP PARAM F/U: HCPCS | Performed by: NURSE PRACTITIONER

## 2022-04-06 ASSESSMENT — ENCOUNTER SYMPTOMS
WHEEZING: 0
SHORTNESS OF BREATH: 0

## 2022-04-06 NOTE — PROGRESS NOTES
Ofelia Mccormick (:  1935) is a 81 y.o. male,Established patient, here for evaluation of the following chief complaint(s):  Pain (PAIN IN RIGHT ARM X6 DAYS AGO BLUE GOES DOWN INTO HAND, PULLED SOMETHING PULLING DRAWER OUT )      ASSESSMENT/PLAN:  1. Muscle strain of right upper extremity, initial encounter  -Presentation is consistent with a muscle strain of the right upper extremity that is resolving on its own. Educated to the patient that any bruising that would have occurred due to this injury could have been exacerbated by Eliquis. Likely the cause of the bruising all the way down the arm. Seems to be improving on its own. Encouraged RICE. Educated on signs and symptoms of compartment syndrome as well as clot. Follow-up as needed. Return in 2 weeks (on 2022) for Annual Wellness Visit/Diabetes follow-up. SUBJECTIVE/OBJECTIVE:  ANURAG Kaur presents today for evaluation of his right arm. He states that 6 days ago he was pulling a drawer out and felt a sharp pain in his right upper arm extending into his chest.  He believes he pulled a muscle. He states that shortly after this he started developing bruising all the way down his right arm along with some swelling. He has been applying ice frequently to the area and feels that has gotten better, but his family wanted him to be evaluated. He denies any numbness or tingling. Denies any limits to his range of motion. Denies any significant pain at this point aside from an occasional dull pain to his armpit. Patient did want a update on the status of his fatigue/shortness of breath. He does have an echocardiogram scheduled with cardiology tomorrow. Noted on previous labs that his BNP was greater than 3000 and have recommended that he contact them. Review of Systems   Constitutional: Negative for chills and fever. Respiratory: Negative for shortness of breath and wheezing.     Cardiovascular: Negative for chest pain, palpitations and leg swelling. Musculoskeletal: Positive for myalgias. Skin:        Bruising to the right arm   Neurological: Negative for dizziness, weakness, light-headedness, numbness and headaches. Psychiatric/Behavioral: Negative for decreased concentration, dysphoric mood, self-injury, sleep disturbance and suicidal ideas. The patient is not nervous/anxious. Physical Exam  Constitutional:       General: He is not in acute distress. Appearance: Normal appearance. He is obese. Cardiovascular:      Rate and Rhythm: Bradycardia present. Rhythm irregular. Pulses: Normal pulses. Heart sounds: Normal heart sounds. No murmur heard. No gallop. Pulmonary:      Effort: Pulmonary effort is normal.      Breath sounds: Normal breath sounds. No wheezing. Musculoskeletal:         General: Swelling present. No tenderness. Normal range of motion. Comments: Swelling with extensive bruising to the right upper extremity. Full range of motion. No tenderness. Skin:     Findings: Bruising present. Neurological:      Mental Status: He is alert and oriented to person, place, and time. Psychiatric:         Mood and Affect: Mood normal.         Behavior: Behavior normal.         Thought Content: Thought content normal.         Judgment: Judgment normal.               This dictation was generated by voice recognition computer software. Although all attempts are made to edit the dictation for accuracy, there may be errors in the transcription that are not intended. An electronic signature was used to authenticate this note.     --MARIA FERNANDA Murcia - CNP

## 2022-04-07 ENCOUNTER — OFFICE VISIT (OUTPATIENT)
Dept: CARDIOLOGY CLINIC | Age: 87
End: 2022-04-07
Payer: MEDICARE

## 2022-04-07 VITALS
OXYGEN SATURATION: 96 % | HEIGHT: 72 IN | DIASTOLIC BLOOD PRESSURE: 80 MMHG | SYSTOLIC BLOOD PRESSURE: 124 MMHG | BODY MASS INDEX: 30.07 KG/M2 | HEART RATE: 90 BPM | WEIGHT: 222 LBS

## 2022-04-07 DIAGNOSIS — R06.09 DOE (DYSPNEA ON EXERTION): ICD-10-CM

## 2022-04-07 DIAGNOSIS — I48.20 CHRONIC ATRIAL FIBRILLATION (HCC): Primary | ICD-10-CM

## 2022-04-07 DIAGNOSIS — I50.32 CHRONIC DIASTOLIC HEART FAILURE (HCC): ICD-10-CM

## 2022-04-07 DIAGNOSIS — R53.1 WEAKNESS: ICD-10-CM

## 2022-04-07 DIAGNOSIS — I50.32 CHRONIC DIASTOLIC HF (HEART FAILURE) (HCC): ICD-10-CM

## 2022-04-07 DIAGNOSIS — I25.10 CORONARY ARTERY DISEASE INVOLVING NATIVE CORONARY ARTERY OF NATIVE HEART WITHOUT ANGINA PECTORIS: ICD-10-CM

## 2022-04-07 DIAGNOSIS — I10 ESSENTIAL HYPERTENSION: ICD-10-CM

## 2022-04-07 DIAGNOSIS — E78.5 HYPERLIPIDEMIA, UNSPECIFIED HYPERLIPIDEMIA TYPE: ICD-10-CM

## 2022-04-07 PROCEDURE — 93000 ELECTROCARDIOGRAM COMPLETE: CPT | Performed by: INTERNAL MEDICINE

## 2022-04-07 PROCEDURE — G8417 CALC BMI ABV UP PARAM F/U: HCPCS | Performed by: INTERNAL MEDICINE

## 2022-04-07 PROCEDURE — 99204 OFFICE O/P NEW MOD 45 MIN: CPT | Performed by: INTERNAL MEDICINE

## 2022-04-07 PROCEDURE — 1123F ACP DISCUSS/DSCN MKR DOCD: CPT | Performed by: INTERNAL MEDICINE

## 2022-04-07 PROCEDURE — G8427 DOCREV CUR MEDS BY ELIG CLIN: HCPCS | Performed by: INTERNAL MEDICINE

## 2022-04-07 PROCEDURE — 1036F TOBACCO NON-USER: CPT | Performed by: INTERNAL MEDICINE

## 2022-04-07 PROCEDURE — 4040F PNEUMOC VAC/ADMIN/RCVD: CPT | Performed by: INTERNAL MEDICINE

## 2022-04-07 RX ORDER — TORSEMIDE 20 MG/1
TABLET ORAL
Qty: 42 TABLET | Refills: 3 | Status: SHIPPED | OUTPATIENT
Start: 2022-04-07 | End: 2022-05-11

## 2022-04-07 NOTE — PATIENT INSTRUCTIONS
[facility] [place]    [address] [facility], [place], [address]   Phone [phone]) Work on a low salt diet  2) Start to slowly increase walking daily at home   3) Continue Demadex/torsemide 20 mg daily except on Monday, Wednesday and Friday take 20 mg twice daily (no later than 3 or 4pm)       About  [facility] ([employer]) is a nonprofit organization dedicated to enhancing quality of life for older adults, people with disabilities, their families and caregivers. We promote choice, independence, dignity and well-being through a range of services that help people remain in their homes for as long as possible. Founded in [address], [employer] is part of the national aging services network and is a state-designated Area Agency on [address], serving individuals across a multi-county region. As an Area Aging on Agency, we are responsible for planning, coordinating and administrating local, state and federally-funded programs and services for older adults, people with disabilities and caregivers in our planning and service area: [facility] and [facility]. Patient Education        Learning About Low-Sodium Foods  What foods are low in sodium? The foods you eat contain nutrients, such as vitamins and minerals. Sodium is a nutrient. Your body needs the right amount to stay healthy and work as it should. You can use the list below to help you make choices about which foodsto eat.   Fruits   Fresh, frozen, canned, or dried fruit  Vegetables   Fresh or frozen vegetables, with no added salt   Canned vegetables, low-sodium or with no added salt  Grains   Bagels without salted tops   Cereal with no added salt   Corn tortillas   Crackers with no added salt   Oatmeal, cooked without salt   Popcorn with no salt   Pasta and noodles, cooked without salt   Rice, cooked without salt   Unsalted pretzels  Dairy and dairy alternatives  [employer], unsalted   Cream cheese   Ice cream   Milk   Soy milk  Meats and other protein foods   Beans and peas, canned with no salt   Eggs   Fresh fish (not smoked)   Fresh meats (not smoked or cured)   Nuts and nut butter, prepared without salt   Poultry, not packaged with sodium solution   Tofu, unseasoned   Tuna, canned without salt  Seasonings   Garlic   Herbs and spices   Lemon juice   Mustard   Olive oil   Salt-free seasoning mixes   Vinegar  Work with your doctor to find out how much of this nutrient you need. Dependingon your health, you may need more or less of it in your diet. Where can you learn more? Go to https://Fiixpepiceweb.Runner. org and sign in to your [employer] account. Enter N836 in the RentPost box to learn more about \"Learning About Low-Sodium Foods. \"     If you do not have an account, please click on the \"Sign Up Now\" link. Current as of: September 8, 2021               Content Version: 13.2  © 2006-2022 [employer]. Care instructions adapted under license by [facility] ([facility]). If you have questions about a medical condition or this instruction, always ask your healthcare professional. [place] [address] any warranty or liability for your use of this information. Patient Education        Walking for Exercise: Care Instructions  Your Care Instructions     Walking is one of the easiest ways to get the exercise you need for good health. A brisk, 30-minute walk each day can help you feel better and have more energy. It can help you lower your risk of disease. Walking can help you keepyour bones strong and your heart healthy. Check with your doctor before you start a walking plan if you have heart problems, other health issues, or you have not been active in a long time. Follow your doctor's instructions for safe levels of exercise. Follow-up care is a key part of your treatment and safety.  Be sure to make and go to all appointments, and call your doctor if you are having problems. It's also a good idea to know your test results and keep alist of the medicines you take. How can you care for yourself at home? Getting started  [facility] slowly and set a short-term goal. For example, walk for 5 or 10 minutes every day.  Bit by bit, increase the amount you walk every day. Try for at least 30 minutes on most days of the week. You also may want to swim, bike, or do other activities.  If finding enough time is a problem, it's fine to be active in shorter periods of time throughout your day.  To get the heart-healthy benefits of walking, you need to walk briskly enough to increase your heart rate and breathing, but not so fast that you can't talk comfortably.  Wear comfortable shoes that fit well and provide good support for your feet and ankles. Staying with your plan   After you've made walking a habit, set a longer-term goal. You may want to set a goal of walking briskly for longer or walking farther. Experts say to do 2½ hours (150 minutes) of moderate activity a week. A faster heartbeat is what defines moderate-level activity.  To stay motivated, walk with friends, coworkers, or pets.  Use a phone nico or pedometer to track your steps each day. Set a goal to increase your steps. When you reach that goal, set a higher goal.   If the weather keeps you from walking outside, go for walks at the mall with a friend. Local schools and churches may have indoor gyms where you can walk. Fitting a walk into your workday  [facility] several blocks away from work, or get off the bus a few stops early.  Use the stairs instead of the elevator, at least for a few floors.  Suggest holding meetings with colleagues during a walk inside or outside the building.  Use the restroom that is the farthest from your desk or workstation.  Use your morning and afternoon breaks to take quick 15 minutes walks.   Staying safe   Know your surroundings. Walk in a well-lighted, safe place. If it's dark, walk with a partner. Wear light-colored clothing. If you can, buy a vest or jacket that reflects light.  Carry a cell phone for emergencies.  Drink plenty of water. Take a water bottle with you when you walk. This is very important if it is hot out.  Be careful not to slip on wet or icy ground. You can buy \"grippers\" for your shoes to help keep you from slipping.  Pay attention to your walking surface. Use sidewalks and paths.  If you have health issues such as asthma, COPD, or heart problems, or if you haven't been active for a long time, check with your doctor before you start a new activity. Where can you learn more? Go to https://Cloud Floormelisaeb.healthFeedjit. org and sign in to your [employer] account. Enter R159 in the [employer] box to learn more about \"Walking for Exercise: Care Instructions. \"     If you do not have an account, please click on the \"Sign Up Now\" link. Current as of: May 12, 2021               Content Version: 13.2  © 5710-0549 [employer]wise, [employer]. Care instructions adapted under license by [facility] ([facility]). If you have questions about a medical condition or this instruction, always ask your healthcare professional. [address] any warranty or liability for your use of this information.

## 2022-04-07 NOTE — PROGRESS NOTES
[address]  Cardiology Progress Note      Bebeto Stover  1935, 81 y.o.      CC: \"I am weak with some SOB when I walk. \"              MARIA FERNANDA Kc - CNP:      HPI:   This is a 81 y.o. male with a history of NSTEMI with non obstructive CAD, HTN, HLD, DM and GERD, who came in for shortness of breath 9/2018. His enzymes were borderline elevated as well as elevated proBNP. The patient gave a history of sore throat a few weeks prior, which would come and go. He was treated with antibiotics. Reported that he noticed increased shortness of breath with activity but no chest pain. He was also found to be in Afib (started on Eliquis). Symptoms were consistent with acute diastolic heart failure and he was discharged home on Coreg, Toprol and Torsemide. Returns today for for routine f/u of AFIB and CHF. Has no new complaints. Uses a cane for assistance. Presents in a wheel chair accompanied by his wife today. He reports severe back pain and on medical therapy. Wife reports he is very sedentary daily and when he walks at home he is weak with some SOB. Wearing compression stockings daily. Patient denies exertional chest pain/pressure, dyspnea at rest, ASHFORD, PND, orthopnea, palpitations, lightheadedness, weight changes, changes in LE edema, and syncope. He admits to medical therapy compliance and tolerating. He denies any abnormal bruising or bleeding.      Past Medical History:   Diagnosis Date    Atrial fibrillation ([facility] [address] 09/2018    Atrial fib    Degeneration of cervical intervertebral disc 91/03/4817    Diastolic heart failure ([facility] [address] 09/2018    Diverticulosis of colon (without mention of hemorrhage) 10/22/2010    Essential hypertension, benign 10/22/2010    [place] (hard of hearing)     Hyperlipidemia 10/22/2010    Mononeuritis of unspecified site 10/22/2010    Osteoarthrosis, unspecified whether generalized or localized, pelvic region and thigh 10/22/2010    Other specified iron deficiency anemias 10/22/2010    Rosacea 10/22/2010    Seborrheic dermatitis 10/22/2010    Type II or unspecified type diabetes mellitus without mention of complication, not stated as uncontrolled 10/22/2010    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 10/22/2010    Unspecified pruritic disorder 10/22/2010      Past Surgical History:   Procedure Laterality Date    CARDIOVASCULAR STRESS TEST  2018    negative    CARPAL TUNNEL RELEASE Right 2018    HAND SURGERY      Left carpel tunnel     HIP SURGERY Bilateral     total hips      Family History   Family history unknown: Yes      Social History     Tobacco Use    Smoking status: Former Smoker     Quit date:      Years since quittin.2    Smokeless tobacco: Never Used   Vaping Use    Vaping Use: Never used   Substance Use Topics    Alcohol use: Never     Alcohol/week: 0.0 standard drinks     Comment: very seldom     Drug use: No     Allergies   Allergen Reactions    Levofloxacin      Pt states he doesn't have any allergies.  His PCP added this to his chart          Review of Systems -   Constitutional: Negative for weight gain/loss; malaise, fever  Respiratory: Negative for Asthma;  cough and hemoptysis  Cardiovascular: Negative for palpitations,dizziness   Gastrointestinal: Negative for abd.pain; constipation/diarrhea;    Genitourinary: Negative for stones; hematuria; frequency hesitancy  Integumentt: Negative for rash or pruritis  Hematologic/lymphatic: Negative for blood dyscrasia; leukemia/lymphoma  Musculoskeletal: Negative for Connective tissue disease  Neurological:  Negative for Seizure   Behavioral/Psych:Negative for Bipolar disorder, Schizophrenia; Dementia  Endocrine: negative for thyroid, parathyroid disease    Physical Examination:    /80   Pulse 90   Ht 6' (1.829 m)   Wt 222 lb (100.7 kg)   SpO2 96%   BMI 30.11 kg/m²    HEENT:  Face: Atraumatic, Conjunctiva: Pink; non icteric, Mucous Memb:  Moist, No thyromegaly or Lymphadenopathy  Respiratory: Resp Assessment: normal, Resp Auscultation: clear  Cardiovascular: Auscultation: nl S1 & S2, Palpation:  Nl PMI; No heaves or thrills, JVP:  normal  Abdomen: Soft, non-tender, Normal bowel sounds,  No organomegaly  Extremities: No Cyanosis or Clubbing  Neurological: Oriented to time, place, and person, Non-anxious  Psychiatric: Normal mood and affect  Skin: Warm and dry,  No rash seen     Outpatient Medications Marked as Taking for the 4/7/22 encounter (Office Visit) with Yen Snider MD   Medication Sig Dispense Refill    diclofenac sodium (VOLTAREN) 1 % GEL Apply 2 g topically 4 times daily 350 g 3    gabapentin (NEURONTIN) 300 MG capsule Take 3 tablets nightly 270 capsule 1    BASAGLAR KWIKPEN 100 UNIT/ML injection pen INJECT 25 UNITS UNDER THE SKIN ONCE DAILY 12 pen 3    apixaban (ELIQUIS) 5 MG TABS tablet TAKE 1 TABLET BY MOUTH TWICE A DAY 60 tablet 5    metoprolol succinate (TOPROL XL) 50 MG extended release tablet TAKE 1 TABLET BY MOUTH DAILY 90 tablet 1    torsemide (DEMADEX) 20 MG tablet TAKE 1 TABLET BY MOUTH EVERY DAY 90 tablet 1    Insulin Pen Needle (B-D ULTRAFINE III SHORT PEN) 31G X 8 MM MISC USE DAILY WITH INSULIN 100 each 3    TRIAMCINOLONE ACETONIDE EX Apply topically      Continuous Blood Gluc Sensor (FREESTYLE BARRON 2 SENSOR) MISC 1 each by Does not apply route every 14 days 6 each 3    Continuous Blood Gluc  (FREESTYLE BARRON 2 READER) KORIN 1 each by Does not apply route 4 times daily 1 each 0         Labs:   Lab Results   Component Value Date    HDL 56 01/15/2021    LDLCALC 92 01/15/2021    TRIG 55 01/15/2021       EKG:  3/8/2019: Controlled AFIB  4/7/22: controlled AFIB     ECHO: 9/25/2018  Mild conc. LVH with no wall motion abnormalities and EF of 60%. The left atrium is mildly dilated. Normal right ventricular size and function. Mild mitral and trivial aortic and pulmonic regurgitation. Inferior vena cava appears dilated. Stress Nuclear: 9/26/2018  Pharmacological Stress/MPI Results:        1. Technically a satisfactory study.    2. Normal pharmacological stress portion of the study.    3. No evidence of Ischemia by Myocardial Perfusion Imaging.    4. Gated Study shows dilated LV with EF of 59 %.       Echo: 5/2021   Patient appears to be in atrial fibrillation. Normal left ventricle size and systolic function with an estimated ejection   fraction of 50-55%. No regional wall motion abnormalities are seen. There is   mildly increased left ventricular wall thickness. Ungraded diastolic   dysfunction (secondary to arrhythmia) with elevated LV filling pressures. The right ventricle is not well visualized but function appears reduced. The left and right atria appear moderately dilated. Moderate mitral and tricuspid regurgitation. Trivial aortic regurgitation. ASSESSMENT AND PLAN:    Chronic Atrial Fibrillation  CHADS Vasc is at least 6 (CHF, HTN, AGE, DM, Vascular Disease)  EKG today shows controlled Afib  He remains asymptomatic  Denies s/s of TIA/CVA, abnormal bruising or bleeding  No longer taking Amiodarone  Continue Metoprolol and Eliquis     Chronic Diastolic Heart Failure  NYHA Class II  5/2021 LVEF 50-55% elevated LV filling pressures. His weight is only up 4# since the last time we saw him 3 years ago. He is leading a very sedentary lifestyle and when he gets up at home to walk, he notes some ASHFORD and weakness. Wearing compression stockings daily   He is able to sleep in bed with one pillow. 3/23/22 BUN/Cr 20/1.2, proBNP 3458. Continue Torsemide and Metoprolol   Work on low salt diet and slowly increase walking program  Continue demadex 20 mg daily except on MWF 20 mg BID. BMP, BNP  in 4 weeks     Essential Hypertension  BP stable today for an octogenarian. Risk factor modification again discussed   BMP 3/23/22    Hyperlipidemia  Continue statin therapy  Denies myalgias.    LDLc 92 1/15/21 LFT wnl 3/23/22     History of NSTEMI  No obstructive disease  No angina since I have last seen him in f/u in 3 years. Continue BB therapy  No ASA secondary to Eliquis for Afib    DM  Managed per PCP    Severe back pain  On medical therapy     Anemia  Abnormal but stable 13.0/39.0 3/2022    Follow up in 4 weeks with labs 1-2 days prior        Thank you very much for allowing me to participate in the care of your patient. Please do not hesitate to contact me if you have any questions. Sincerely,    Renata Du M.D  [facility], [address] [place] [address]  Ph: [phone]  Fax: [phone]    This note was scribed in the presence of Dr. Rea Lara MD by Rita Suazo RN. Physician Attestation:  The scribes documentation has been prepared under my direction and personally reviewed by me in its entirety. I confirm that the note above accurately reflects all work, treatment, procedures, and medical decision making performed by me.

## 2022-04-12 DIAGNOSIS — E11.42 TYPE 2 DIABETES MELLITUS WITH DIABETIC POLYNEUROPATHY, WITH LONG-TERM CURRENT USE OF INSULIN (HCC): ICD-10-CM

## 2022-04-12 DIAGNOSIS — Z79.4 TYPE 2 DIABETES MELLITUS WITH DIABETIC POLYNEUROPATHY, WITH LONG-TERM CURRENT USE OF INSULIN (HCC): ICD-10-CM

## 2022-04-12 RX ORDER — INSULIN GLARGINE 100 [IU]/ML
INJECTION, SOLUTION SUBCUTANEOUS
Qty: 12 PEN | Refills: 1 | Status: ON HOLD | OUTPATIENT
Start: 2022-04-12 | End: 2022-10-31 | Stop reason: HOSPADM

## 2022-04-19 NOTE — PROGRESS NOTES
sedimentation rate 10/22/2010    Essential hypertension, benign 10/22/2010    [place] (hard of hearing)     Hyperlipidemia 10/22/2010    Lumbago 10/22/2010    Mononeuritis of unspecified site 10/22/2010    Neoplasm of uncertain behavior of skin 10/22/2010    Onychia and paronychia of toe 10/22/2010    Osteoarthrosis, unspecified whether generalized or localized, pelvic region and thigh 10/22/2010    Other specified iron deficiency anemias 10/22/2010    Rheumatic chorea 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      Left carpel tunnel     HIP SURGERY Bilateral     total hips     Family History   Problem Relation Age of Onset    Family history unknown: Yes     Social History   Substance Use Topics    Smoking status: Former Smoker     Quit date: 1973    Smokeless tobacco: Never Used    Alcohol use 0.0 - 0.6 oz/week       Allergies   Allergen Reactions    Levofloxacin      Pt states he doesn't have any allergies. His PCP added this to his chart      Current Outpatient Prescriptions   Medication Sig Dispense Refill    gabapentin (NEURONTIN) 300 MG capsule Take 300 mg by mouth 3 times daily. Augustine Bunn amiodarone (CORDARONE) 200 MG tablet Take 1 tablet by mouth 2 times daily 30 tablet 3    atorvastatin (LIPITOR) 10 MG tablet Take 1 tablet by mouth nightly 30 tablet 3    metoprolol succinate (TOPROL XL) 50 MG extended release tablet Take 1 tablet by mouth daily 30 tablet 3    torsemide (DEMADEX) 20 MG tablet Take 1 tablet by mouth self-care

## 2022-04-20 ENCOUNTER — OFFICE VISIT (OUTPATIENT)
Dept: FAMILY MEDICINE CLINIC | Age: 87
End: 2022-04-20

## 2022-04-20 VITALS
HEIGHT: 72 IN | HEART RATE: 80 BPM | TEMPERATURE: 98.7 F | BODY MASS INDEX: 30.11 KG/M2 | OXYGEN SATURATION: 97 % | DIASTOLIC BLOOD PRESSURE: 74 MMHG | SYSTOLIC BLOOD PRESSURE: 106 MMHG

## 2022-04-20 DIAGNOSIS — M51.9 LUMBAR DISC DISEASE: ICD-10-CM

## 2022-04-20 DIAGNOSIS — R53.82 CHRONIC FATIGUE: ICD-10-CM

## 2022-04-20 DIAGNOSIS — Z79.4 TYPE 2 DIABETES MELLITUS WITH DIABETIC POLYNEUROPATHY, WITH LONG-TERM CURRENT USE OF INSULIN (HCC): ICD-10-CM

## 2022-04-20 DIAGNOSIS — E11.42 TYPE 2 DIABETES MELLITUS WITH DIABETIC POLYNEUROPATHY, WITH LONG-TERM CURRENT USE OF INSULIN (HCC): ICD-10-CM

## 2022-04-20 DIAGNOSIS — R53.1 GENERALIZED WEAKNESS: ICD-10-CM

## 2022-04-20 DIAGNOSIS — G62.9 NEUROPATHY: ICD-10-CM

## 2022-04-20 DIAGNOSIS — Z00.00 MEDICARE ANNUAL WELLNESS VISIT, SUBSEQUENT: Primary | ICD-10-CM

## 2022-04-20 DIAGNOSIS — I87.8 VENOUS STASIS OF BOTH LOWER EXTREMITIES: ICD-10-CM

## 2022-04-20 DIAGNOSIS — R29.6 FREQUENT FALLS: ICD-10-CM

## 2022-04-20 DIAGNOSIS — I50.32 CHRONIC DIASTOLIC HEART FAILURE (HCC): ICD-10-CM

## 2022-04-20 PROCEDURE — 3044F HG A1C LEVEL LT 7.0%: CPT | Performed by: NURSE PRACTITIONER

## 2022-04-20 PROCEDURE — G0439 PPPS, SUBSEQ VISIT: HCPCS | Performed by: NURSE PRACTITIONER

## 2022-04-20 PROCEDURE — 1123F ACP DISCUSS/DSCN MKR DOCD: CPT | Performed by: NURSE PRACTITIONER

## 2022-04-20 PROCEDURE — 4040F PNEUMOC VAC/ADMIN/RCVD: CPT | Performed by: NURSE PRACTITIONER

## 2022-04-20 ASSESSMENT — LIFESTYLE VARIABLES: HOW OFTEN DO YOU HAVE A DRINK CONTAINING ALCOHOL: NEVER

## 2022-04-20 ASSESSMENT — PATIENT HEALTH QUESTIONNAIRE - PHQ9
SUM OF ALL RESPONSES TO PHQ QUESTIONS 1-9: 0
SUM OF ALL RESPONSES TO PHQ QUESTIONS 1-9: 0
SUM OF ALL RESPONSES TO PHQ9 QUESTIONS 1 & 2: 0
2. FEELING DOWN, DEPRESSED OR HOPELESS: 0
SUM OF ALL RESPONSES TO PHQ QUESTIONS 1-9: 0
1. LITTLE INTEREST OR PLEASURE IN DOING THINGS: 0
SUM OF ALL RESPONSES TO PHQ QUESTIONS 1-9: 0

## 2022-04-20 NOTE — PROGRESS NOTES
Medicare Annual Wellness Visit    Amy Blackman is here for Medicare AWV (MEDICARE AWV )    Assessment & Plan   Medicare annual wellness visit, subsequent  -Health care topics addressed as below  -Patient recently had labs. Check cholesterol with lab work and a few weeks.  -Encourage vaccination  -Follow-up in 3 months for diabetes. Too soon to do A1c today. Frequent falls  -Patient has had 3 falls in the past 3 weeks. Significant weakness in the legs. Ambulating via walker. Difficulty with transition from chair to the walker. It would be beneficial for the patient to receive home PT/OT to work on fall strategies as well as bed strength. Patient is on agreement with this. Will place referral.  Generalized weakness  -Patient has had 3 falls in the past 3 weeks. Significant weakness in the legs. Ambulating via walker. Difficulty with transition from chair to the walker. It would be beneficial for the patient to receive home PT/OT to work on fall strategies as well as bed strength. Patient is on agreement with this. Will place referral.  Chronic diastolic heart failure ([facility] [address]  -Follows cardiology. Has an appointment with them on 5/5. Would like to have labs performed on 5/3 in anticipation of his appointment. Can obtain here at a labs only appointment. Placing order so that they can be performed in office. Also ordering a lipid since this has not been done in quite some time.  -Patient has had 3 falls in the past 3 weeks. Significant weakness in the legs. Ambulating via walker. Difficulty with transition from chair to the walker. It would be beneficial for the patient to receive home PT/OT to work on fall strategies as well as bed strength. Patient is on agreement with this. Will place referral.  -     Basic Metabolic Panel; Future  -     Brain Natriuretic Peptide; Future  -     Lipid Panel;  Future  Type 2 diabetes mellitus with diabetic polyneuropathy, with long-term current use of insulin ([address]  -Too soon to do A1c today. Previous A1c 6.6%. Continue medication as ordered. Follow-up in 3 months. Neuropathy  -Given generalized weakness along with neuropathy and lack of sensation in the feet, patient's been experiencing an increase in falls. Now ambulating via walker but does feel very weak and unsteady. Appropriate for home PT/OT as listed above. Lumbar disc disease  -The patient has significant chronic pain issues which are limiting his ability to be mobile. Ambulating via walker. This is led to an increase in falls. Appropriate for PT/OT. Chronic fatigue  -The patient's chronic fatigue is causing the patient to become weak sooner than normal.  He has had increasing falls because of this. Appropriate for PT/OT. Given weakness and difficulty with transportation, appropriate for home care. Venous stasis of both lower extremities  -Patient has significant swelling of both of his lower extremities. This is limiting his ability to ambulate. Using a walker. Appropriate for PT/OT as listed above. Recommendations for Preventive Services Due: see orders and patient instructions/AVS.  Recommended screening schedule for the next 5-10 years is provided to the patient in written form: see Patient Instructions/AVS.     Return in 3 months (on 7/20/2022) for Fasting labs on 5/3 and Follow-up diabetes in 3 months. 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 He still has concerns regarding weakness. He had recently been seen regarding this. Was diagnosed with a CHF exacerbation. Went to cardiology. He had his torsemide increased to twice daily 3 times a week and once daily the other days of the week. He does note that he has been urinating quite a bit. He has not noticed a significant difference with his shortness of breath and weakness. He continues to ambulate with a walker.   It is noted that he states he is fallen 3 times in the past 3 weeks. He states most recent when he got too fast out of bed and his legs gave out. He is inquiring about possible home PT and OT. Does have an appointment with Dr. Sydney Hinkle on 5/5. Cardiologist would like him to get labs a few days before his appointment. He is inquiring whether he can have these performed in office at that time. Continues to take his diabetic regimen without issue. States that the gabapentin helped significantly with his nerve pain. Patient's complete Health Risk Assessment and screening values have been reviewed and are found in Flowsheets. The following problems were reviewed today and where indicated follow up appointments were made and/or referrals ordered.     Positive Risk Factor Screenings with Interventions:    Fall Risk:  Do you feel unsteady or are you worried about falling? : (!) yes  2 or more falls in past year?: (!) yes  Fall with injury in past year?: no     Fall Risk Interventions:    · Home safety tips provided  · Physical therapy referral ordered for strength and balance training              General Health and ACP:  General  In general, how would you say your health is?: (!) Poor  In the past 7 days, have you experienced any of the following: New or Increased Pain, New or Increased Fatigue, Loneliness, Social Isolation, Stress or Anger?: (!) Yes  Select all that apply: (!) Stress  Do you get the social and emotional support that you need?: Yes  Do you have a Living Will?: Yes    Advance Directives     Power of  Living Will ACP-Advance Directive ACP-Power of     Not on File Not on File Not on File Not on File      General Health Risk Interventions:  · Poor self-assessment of health status: patient advised to follow-up in this office for further evaluation and treatment of diabetes/generalized weakness within 3 month(s)  · Stress: regular exercise recommended- 3-5 times per week, 30-45 minutes per session, referring for home PT/OT to help increase activity/strength    Health Habits/Nutrition:     Physical Activity: Inactive    Days of Exercise per Week: 0 days    Minutes of Exercise per Session: 0 min     Have you lost any weight without trying in the past 3 months?: (!) Yes     Have you seen the dentist within the past year?: N/A - wear dentures    Health Habits/Nutrition Interventions:  · Inadequate physical activity:  Home PT/OT  · Patient is on extra diuretic at this time. Educated that this will lead to weight loss    Hearing/Vision:  Do you or your family notice any trouble with your hearing that hasn't been managed with hearing aids?: (!) Yes  Do you have difficulty driving, watching TV, or doing any of your daily activities because of your eyesight?: (!) Yes  Have you had an eye exam within the past year?: (!) No  No exam data present    Hearing/Vision Interventions:  · Hearing concerns:  patient declines any further evaluation/treatment for hearing issues, admits he does not wear hearing aids  · Vision concerns:  patient encouraged to make appointment with his/her eye specialist    Safety:  Do you have working smoke detectors?: Yes  Do you have any tripping hazards - loose or unsecured carpets or rugs?: (!) Yes  Do you have any tripping hazards - clutter in doorways, halls, or stairs?: No  Do you have either shower bars, grab bars, non-slip mats or non-slip surfaces in your shower or bathtub?: Yes  Do all of your stairways have a railing or banister?: Yes  Do you always fasten your seatbelt when you are in a car?: Yes    Safety Interventions:  · Home safety tips provided           Objective   Vitals:    04/20/22 1121   BP: 106/74   Site: Left Upper Arm   Position: Sitting   Cuff Size: Medium Adult   Pulse: 80   Temp: 98.7 °F (37.1 °C)   TempSrc: Temporal   SpO2: 97%   Height: 6' (1.829 m)      Body mass index is 30.11 kg/m².         General Appearance: alert and oriented to person, place and time, well developed and well- nourished, in no acute distress  Skin: warm and dry, no rash or erythema  Head: normocephalic and atraumatic  Eyes: pupils equal, round, and reactive to light, extraocular eye movements intact, conjunctivae normal  ENT: tympanic membrane, external ear and ear canal normal bilaterally, nose without deformity, nasal mucosa and turbinates normal without polyps  Neck: supple and non-tender without mass, no thyromegaly or thyroid nodules, no cervical lymphadenopathy  Pulmonary/Chest: clear to auscultation bilaterally- no wheezes, rales or rhonchi, normal air movement, no respiratory distress  Cardiovascular: Irregular, normal rate, normal S1 and S2, no murmurs, rubs, clicks, or gallops, distal pulses intact, no carotid bruits  Abdomen: soft, non-tender, non-distended, normal bowel sounds, no masses or organomegaly  Extremities: no cyanosis, clubbing or edema  Musculoskeletal: Bilateral lower extremity edema. Ambulating via walker. Generalized weakness. Difficulties getting out of chair. Normal range of motion, no joint swelling, deformity  Neurologic: reflexes normal and symmetric, no cranial nerve deficit, gait, coordination and speech normal       Allergies   Allergen Reactions    Levofloxacin      Pt states he doesn't have any allergies. His PCP added this to his chart      Prior to Visit Medications    Medication Sig Taking?  Authorizing Provider   insulin glargine (BASAGLAR KWIKPEN) 100 UNIT/ML injection pen INJECT 25 UNITS UNDER THE SKIN ONCE DAILY Yes MARIA FERNANDA Sarabia CNP   torsemide (DEMADEX) 20 MG tablet 20 mg daily except on MWF 20 mg BID Yes Axel Ball MD   diclofenac sodium (VOLTAREN) 1 % GEL Apply 2 g topically 4 times daily Yes MARIA FERNANDA Zamora CNP   gabapentin (NEURONTIN) 300 MG capsule Take 3 tablets nightly Yes MARIA FERNANDA Zamora CNP   apixaban (ELIQUIS) 5 MG TABS tablet TAKE 1 TABLET BY MOUTH TWICE A DAY Yes MARIA FERNANDA Zamora CNP   metoprolol succinate (TOPROL XL) 50 MG extended release tablet TAKE 1 TABLET BY MOUTH DAILY Yes MARIA FERNANDA Lynn CNP   Insulin Pen Needle (B-D ULTRAFINE III SHORT PEN) 31G X 8 MM MISC USE DAILY WITH INSULIN Yes MARIA FERNANDA Lynn CNP   TRIAMCINOLONE ACETONIDE EX Apply topically Yes Historical Provider, MD   Continuous Blood Gluc Sensor (FREESTYLE BARRON 2 SENSOR) MISC 1 each by Does not apply route every 14 days Yes MARIA FERNANDA Lynn CNP   Continuous Blood Gluc  (FREESTYLE BARRON 2 READER) KORIN 1 each by Does not apply route 4 times daily Yes MARIA FERNANDA Lynn CNP       CareTeam (Including outside providers/suppliers regularly involved in providing care):   Patient Care Team:  MARIA FERNANDA Lynn CNP as PCP - General (Nurse Practitioner)  MARIA FERNANDA Lynn CNP as PCP - [facility] Empaneled Provider    Reviewed and updated this visit:  Tobacco  Allergies  Meds  Problems  Med Hx  Surg Hx  Soc Hx  Fam Hx

## 2022-04-20 NOTE — PATIENT INSTRUCTIONS
Personalized Preventive Plan for Chacho Boss - 4/20/2022  Medicare offers a range of preventive health benefits. Some of the tests and screenings are paid in full while other may be subject to a deductible, co-insurance, and/or copay. Some of these benefits include a comprehensive review of your medical history including lifestyle, illnesses that may run in your family, and various assessments and screenings as appropriate. After reviewing your medical record and screening and assessments performed today your provider may have ordered immunizations, labs, imaging, and/or referrals for you. A list of these orders (if applicable) as well as your Preventive Care list are included within your After Visit Summary for your review. Other Preventive Recommendations:    · A preventive eye exam performed by an eye specialist is recommended every 1-2 years to screen for glaucoma; cataracts, macular degeneration, and other eye disorders. · A preventive dental visit is recommended every 6 months. · Try to get at least 150 minutes of exercise per week or 10,000 steps per day on a pedometer . · Order or download the FREE \"Exercise & Physical Activity: Your Everyday Guide\" from [employer]. Call 6-633.287.1965 or search [employer] online. · You need 8566-6751 mg of calcium and 3018-8713 IU of vitamin D per day. It is possible to meet your calcium requirement with diet alone, but a vitamin D supplement is usually necessary to meet this goal.  · When exposed to the sun, use a sunscreen that protects against both UVA and UVB radiation with an SPF of 30 or greater. Reapply every 2 to 3 hours or after sweating, drying off with a towel, or swimming. · Always wear a seat belt when traveling in a car. Always wear a helmet when riding a bicycle or motorcycle.

## 2022-05-03 ENCOUNTER — NURSE ONLY (OUTPATIENT)
Dept: FAMILY MEDICINE CLINIC | Age: 87
End: 2022-05-03
Payer: MEDICARE

## 2022-05-03 DIAGNOSIS — I50.32 CHRONIC DIASTOLIC HEART FAILURE (HCC): ICD-10-CM

## 2022-05-03 LAB
ANION GAP SERPL CALCULATED.3IONS-SCNC: 13 MMOL/L (ref 3–16)
BUN BLDV-MCNC: 22 MG/DL (ref 7–20)
CALCIUM SERPL-MCNC: 9.6 MG/DL (ref 8.3–10.6)
CHLORIDE BLD-SCNC: 96 MMOL/L (ref 99–110)
CHOLESTEROL, TOTAL: 169 MG/DL (ref 0–199)
CO2: 30 MMOL/L (ref 21–32)
CREAT SERPL-MCNC: 1.2 MG/DL (ref 0.8–1.3)
GFR AFRICAN AMERICAN: >60
GFR NON-AFRICAN AMERICAN: 57
GLUCOSE BLD-MCNC: 79 MG/DL (ref 70–99)
HDLC SERPL-MCNC: 54 MG/DL (ref 40–60)
LDL CHOLESTEROL CALCULATED: 104 MG/DL
POTASSIUM SERPL-SCNC: 4 MMOL/L (ref 3.5–5.1)
PRO-BNP: 4563 PG/ML (ref 0–449)
SODIUM BLD-SCNC: 139 MMOL/L (ref 136–145)
TRIGL SERPL-MCNC: 56 MG/DL (ref 0–150)
VLDLC SERPL CALC-MCNC: 11 MG/DL

## 2022-05-03 PROCEDURE — 36415 COLL VENOUS BLD VENIPUNCTURE: CPT | Performed by: NURSE PRACTITIONER

## 2022-05-05 ENCOUNTER — OFFICE VISIT (OUTPATIENT)
Dept: CARDIOLOGY CLINIC | Age: 87
End: 2022-05-05
Payer: MEDICARE

## 2022-05-05 VITALS
BODY MASS INDEX: 30.11 KG/M2 | HEIGHT: 72 IN | HEART RATE: 86 BPM | DIASTOLIC BLOOD PRESSURE: 80 MMHG | OXYGEN SATURATION: 99 % | SYSTOLIC BLOOD PRESSURE: 138 MMHG

## 2022-05-05 DIAGNOSIS — E78.5 HYPERLIPIDEMIA LDL GOAL <70: ICD-10-CM

## 2022-05-05 DIAGNOSIS — I25.2 HX OF NON-ST ELEVATION MYOCARDIAL INFARCTION (NSTEMI): ICD-10-CM

## 2022-05-05 DIAGNOSIS — I10 ESSENTIAL HYPERTENSION: ICD-10-CM

## 2022-05-05 DIAGNOSIS — I48.20 CHRONIC A-FIB (HCC): Primary | ICD-10-CM

## 2022-05-05 DIAGNOSIS — I50.32 CHRONIC DIASTOLIC HEART FAILURE (HCC): ICD-10-CM

## 2022-05-05 PROCEDURE — 1123F ACP DISCUSS/DSCN MKR DOCD: CPT | Performed by: INTERNAL MEDICINE

## 2022-05-05 PROCEDURE — 4040F PNEUMOC VAC/ADMIN/RCVD: CPT | Performed by: INTERNAL MEDICINE

## 2022-05-05 PROCEDURE — G8427 DOCREV CUR MEDS BY ELIG CLIN: HCPCS | Performed by: INTERNAL MEDICINE

## 2022-05-05 PROCEDURE — 99214 OFFICE O/P EST MOD 30 MIN: CPT | Performed by: INTERNAL MEDICINE

## 2022-05-05 PROCEDURE — G8417 CALC BMI ABV UP PARAM F/U: HCPCS | Performed by: INTERNAL MEDICINE

## 2022-05-05 PROCEDURE — 93000 ELECTROCARDIOGRAM COMPLETE: CPT | Performed by: INTERNAL MEDICINE

## 2022-05-05 PROCEDURE — 1036F TOBACCO NON-USER: CPT | Performed by: INTERNAL MEDICINE

## 2022-05-05 NOTE — PROGRESS NOTES
[address]  Cardiology Progress Note      Monica Singleton  1935, 81 y.o.      CC: \" My wife says I get dehydrated. \"              MARIA FERNANDA Mayorga - CNP:      HPI:   This is a 81 y.o. male who is seen for chronic A. fib and diastolic heart failure. I saw him last time when he is was more short of breath. We increase his loop diuretic to 1 tablet twice a day 3 days a week. Returns today for for routine f/u of AFIB and CHF. Patient presents in a wheelchair. States he feels a \"little bet better. \" He has \"good and bad days. \" Reports that he has been able to work in the garage at times. He sleeps in a bed and has no difficulty sleeping. He is able to lay flat to sleep. He uses a cane and walker at home. Wears compression stockings. He is compliant with medication and takes torsemide 20 mg daily with an additional dose on MWF. Wife feels that he is dehydrated and encourages him to drink more water.      Past Medical History:   Diagnosis Date    Atrial fibrillation ([facility] [address] 09/2018    Atrial fib    Degeneration of cervical intervertebral disc 95/68/4636    Diastolic heart failure ([facility] [address] 09/2018    Diverticulosis of colon (without mention of hemorrhage) 10/22/2010    Essential hypertension, benign 10/22/2010    [place] (hard of hearing)     Hyperlipidemia 10/22/2010    Mononeuritis of unspecified site 10/22/2010    Osteoarthrosis, unspecified whether generalized or localized, pelvic region and thigh 10/22/2010    Other specified iron deficiency anemias 10/22/2010    Rosacea 10/22/2010    Seborrheic dermatitis 10/22/2010    Type II or unspecified type diabetes mellitus without mention of complication, not stated as uncontrolled 10/22/2010    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 10/22/2010    Unspecified pruritic disorder 10/22/2010      Past Surgical History:   Procedure Laterality Date    CARDIOVASCULAR STRESS TEST  09/2018    negative    CARPAL TUNNEL RELEASE Right 2018    HAND SURGERY      Left carpel tunnel     HIP SURGERY Bilateral     total hips      Family History   Family history unknown: Yes      Social History     Tobacco Use    Smoking status: Former Smoker     Quit date:      Years since quittin.3    Smokeless tobacco: Never Used   Vaping Use    Vaping Use: Never used   Substance Use Topics    Alcohol use: Never     Alcohol/week: 0.0 standard drinks     Comment: very seldom     Drug use: No     Allergies   Allergen Reactions    Levofloxacin      Pt states he doesn't have any allergies. His PCP added this to his chart          Review of Systems -   Constitutional: Negative for weight gain/loss; malaise, fever  Respiratory: Negative for Asthma;  cough and hemoptysis  Cardiovascular: Negative for palpitations,dizziness   Gastrointestinal: Negative for abd.pain; constipation/diarrhea;    Genitourinary: Negative for stones; hematuria; frequency hesitancy  Integumentt: Negative for rash or pruritis  Hematologic/lymphatic: Negative for blood dyscrasia; leukemia/lymphoma  Musculoskeletal: Negative for Connective tissue disease  Neurological:  Negative for Seizure   Behavioral/Psych:Negative for Bipolar disorder, Schizophrenia; Dementia  Endocrine: negative for thyroid, parathyroid disease    Physical Examination:    /80 (Site: Left Upper Arm, Position: Sitting)   Pulse 86   Ht 6' (1.829 m)   SpO2 99%   BMI 30.11 kg/m²    HEENT:  Face: Atraumatic, Conjunctiva: Pink; non icteric, Mucous Memb:  Moist, No thyromegaly or Lymphadenopathy  Respiratory: Resp Assessment: normal, Resp Auscultation: clear  Cardiovascular: Auscultation: nl S1 & S2, Palpation:  Nl PMI;  No heaves or thrills, JVP:  normal  Abdomen: Soft, non-tender, Normal bowel sounds,  No organomegaly  Extremities: No Cyanosis or Clubbing  Neurological: Oriented to time, place, and person, Non-anxious  Psychiatric: Normal mood and affect  Skin: Warm and dry,  No rash seen     Outpatient Medications Marked as Taking for the 5/5/22 encounter (Office Visit) with Axel Ball MD   Medication Sig Dispense Refill    insulin glargine (BASAGLAR KWIKPEN) 100 UNIT/ML injection pen INJECT 25 UNITS UNDER THE SKIN ONCE DAILY 12 pen 1    torsemide (DEMADEX) 20 MG tablet 20 mg daily except on MWF 20 mg BID 42 tablet 3    diclofenac sodium (VOLTAREN) 1 % GEL Apply 2 g topically 4 times daily 350 g 3    gabapentin (NEURONTIN) 300 MG capsule Take 3 tablets nightly 270 capsule 1    apixaban (ELIQUIS) 5 MG TABS tablet TAKE 1 TABLET BY MOUTH TWICE A DAY 60 tablet 5    metoprolol succinate (TOPROL XL) 50 MG extended release tablet TAKE 1 TABLET BY MOUTH DAILY 90 tablet 1    Insulin Pen Needle (B-D ULTRAFINE III SHORT PEN) 31G X 8 MM MISC USE DAILY WITH INSULIN 100 each 3    TRIAMCINOLONE ACETONIDE EX Apply topically      Continuous Blood Gluc Sensor (FREESTYLE BARRON 2 SENSOR) MISC 1 each by Does not apply route every 14 days 6 each 3    Continuous Blood Gluc  (FREESTYLE BARRON 2 READER) KORIN 1 each by Does not apply route 4 times daily 1 each 0         Labs:   Lab Results   Component Value Date    HDL 54 05/03/2022    LDLCALC 104 05/03/2022    TRIG 56 05/03/2022       EKG:  3/8/2019: Controlled AFIB  4/7/22: controlled AFIB   5/5/22: controlled Afib, RBBB    ECHO: 9/25/2018  Mild conc. LVH with no wall motion abnormalities and EF of 60%. The left atrium is mildly dilated. Normal right ventricular size and function. Mild mitral and trivial aortic and pulmonic regurgitation. Inferior vena cava appears dilated. Stress Nuclear: 9/26/2018  Pharmacological Stress/MPI Results:        1. Technically a satisfactory study.    2. Normal pharmacological stress portion of the study.    3. No evidence of Ischemia by Myocardial Perfusion Imaging.    4. Gated Study shows dilated LV with EF of 59 %.       Echo: 5/2021   Patient appears to be in atrial fibrillation.    Normal left ventricle size and systolic function with an estimated ejection   fraction of 50-55%. No regional wall motion abnormalities are seen. There is   mildly increased left ventricular wall thickness. Ungraded diastolic   dysfunction (secondary to arrhythmia) with elevated LV filling pressures. The right ventricle is not well visualized but function appears reduced. The left and right atria appear moderately dilated. Moderate mitral and tricuspid regurgitation. Trivial aortic regurgitation. ASSESSMENT AND PLAN:    Chronic Atrial Fibrillation  CHADS Vasc is at least 6 (CHF, HTN, AGE, DM, Vascular Disease)  EKG today shows controlled Afib  He remains asymptomatic  Denies s/s of TIA/CVA, abnormal bruising or bleeding  Continue Metoprolol and Eliquis     Chronic Diastolic Heart Failure  NYHA Class II  5/2021 LVEF 50-55% elevated LV filling pressures. Continue torsemide and metoprolol   Slowly increase walking program  BNP elevated on 5/3/22 (4563) Continue torsemide 20 mg daily with an additional dose of 20 mg MWF   I have encouraged him to reduce fluid intake and work on a low sodium diet  Reviewed CMP completed on 5/3/22; Creat 1.2    Essential Hypertension  Goal BP <140/90  Today's BP is controlled   Risk factor modification again discussed   Reviewed BMP completed 5/3/22    Hyperlipidemia  Continue statin therapy  Denies myalgias.  (5/3/22)     History of NSTEMI  No obstructive disease  No angina since I have last seen him in f/u in 3 years. Continue BB therapy  No ASA secondary to Eliquis for Afib    DM  Managed per PCP    Severe back pain  On medical therapy     Anemia  Abnormal but stable 13.0/39.0 3/2022      Follow up in 3 months        Thank you very much for allowing me to participate in the care of your patient. Please do not hesitate to contact me if you have any questions.       Sincerely,    Kaylan Acosta M.D  [address]  [facility], [address] [place] [address]  Ph: [phone]  Fax: [phone]    This note was scribed in the presence of Dr. Luisito Hall MD by Shay Santillan RN  Physician Attestation:  The scribes documentation has been prepared under my direction and personally reviewed by me in its entirety. I confirm that the note above accurately reflects all work, treatment, procedures, and medical decision making performed by me.

## 2022-05-05 NOTE — PATIENT INSTRUCTIONS
Patient Education        Fluid Restriction: Care Instructions  Your Care Instructions     A buildup of fluid in the body can cause low sodium levels in the blood. It may also cause symptoms such as swelling and pain. Your doctor may suggest that you limit liquids, including foods that contain a lot of liquid. Limiting liquidsis called fluid restriction. Keeping track of the amount of fluids you take in may help you feel better. Your doctor will tell you how much fluid you can have in a day. Follow-up care is a key part of your treatment and safety. Be sure to make and go to all appointments, and call your doctor if you are having problems. It's also a good idea to know your test results and keep alist of the medicines you take. How can you care for yourself at home?  Find a way of tracking the fluids you take in that works for you. Here are two methods you can try:  ? Write down how much you drink throughout the day. ? Keep a container filled with the amount of liquid allowed for the day. As you drink liquids during the day, such as a 6-ounce cup of coffee, pour that same amount out of the container. When the container is empty, you've had your liquid for the day.  Count any foods that will melt (such as ice cream, gelatin, or flavored ice treats) or liquid foods (such as soup) as part of your fluids for the day. Also count the liquid in canned fruits and vegetables as part of your daily intake, or drain them well before serving.  Space your liquids throughout the day. Then you won't be tempted to drink more than the amount your doctor recommends.  To relieve thirst without taking in extra water, try chewing gum, sucking on hard candy (sugarless if you have diabetes), or rinsing your mouth with water and spitting it out. Where can you learn more? Go to https://[employer]uel.[employer]. org and sign in to your [employer] account.  Enter T523 in the [employer] box to learn more about \"Fluid Restriction: Care Instructions. \"     If you do not have an account, please click on the \"Sign Up Now\" link. Current as of: January 10, 2022               Content Version: 13.2  © 2006-2022 [employer] Inc[employer]. Care instructions adapted under license by [facility] ([facility]). If you have questions about a medical condition or this instruction, always ask your healthcare professional. [address] any warranty or liability for your use of this information. 1. Continue all medications  2. Reduce fluid and sodium intake  3. Repeat labs in 1-2 months  4.  Return to the office in 3 months

## 2022-05-06 ENCOUNTER — TELEPHONE (OUTPATIENT)
Dept: FAMILY MEDICINE CLINIC | Age: 87
End: 2022-05-06

## 2022-05-06 NOTE — TELEPHONE ENCOUNTER
Pt wife is calling to let us know that no one has called them about home care for pt. Pt falls a lot   Please call     Please speak slowing and clearly when we call them. Pt and spouse are hard of hearing.

## 2022-05-11 ENCOUNTER — TELEPHONE (OUTPATIENT)
Dept: FAMILY MEDICINE CLINIC | Age: 87
End: 2022-05-11

## 2022-05-11 DIAGNOSIS — I50.32 CHRONIC DIASTOLIC HF (HEART FAILURE) (HCC): ICD-10-CM

## 2022-05-11 RX ORDER — TORSEMIDE 20 MG/1
TABLET ORAL
Qty: 90 TABLET | Refills: 3 | Status: SHIPPED | OUTPATIENT
Start: 2022-05-11 | End: 2022-06-15

## 2022-05-11 NOTE — TELEPHONE ENCOUNTER
PT CALLING TO SEE IF JOSE A EVER HAD A CHANCE TO CALL A HOME CARE PLACE FOR HIM - HE HASN'T HEARD FROM ANYONE YET - HE STATED ABOUT 1 MONTH AGO YOU WERE GOING TO CALL SOME ONE FOR HIM      PT @  [phone]

## 2022-05-11 NOTE — TELEPHONE ENCOUNTER
Last OV: 5/5/22  Next OV: 8/11/22  Last refill:4/7/22  Most recent Labs: 5/3/22  Last EKG (if needed):5/5/22

## 2022-05-11 NOTE — TELEPHONE ENCOUNTER
THIS DOES NOT GO TO A PROVIDER, THERE IS A REFERRAL SCANNED IN AND PREVIOUS MESSAGES ABOUT THE SAME THING. [place]

## 2022-05-12 NOTE — TELEPHONE ENCOUNTER
RE FAXED REFERRAL INFORMATION. ATTEMPTED TO CONTACT PATIENT, SOMEONE PICKED UP BUT DID NOT ANSWER, THEN HUNG UP, IF PATIENT CALLS BACK PLEASE LET HIM KNOW I RE SENT THE INFORMATION WITH A NOTE FOR THEM TO CALL AND SET UP WITH HOME CARE.  [place]

## 2022-06-15 DIAGNOSIS — I50.32 CHRONIC DIASTOLIC HF (HEART FAILURE) (HCC): ICD-10-CM

## 2022-06-15 RX ORDER — TORSEMIDE 20 MG/1
TABLET ORAL
Qty: 180 TABLET | Refills: 3 | Status: SHIPPED | OUTPATIENT
Start: 2022-06-15

## 2022-06-15 NOTE — TELEPHONE ENCOUNTER
Last OV: 5/5/22  Next OV: 8/11/22  Last refill:5/11/22  Most recent Labs: 5/3/22  Last EKG (if needed):5/5/22 Schedule for 5.45 pm next Tuesday 11/14/17

## 2022-06-22 DIAGNOSIS — I10 PRIMARY HYPERTENSION: ICD-10-CM

## 2022-06-22 DIAGNOSIS — I21.4 NSTEMI (NON-ST ELEVATED MYOCARDIAL INFARCTION) (HCC): ICD-10-CM

## 2022-06-22 RX ORDER — METOPROLOL SUCCINATE 50 MG/1
TABLET, EXTENDED RELEASE ORAL
Qty: 90 TABLET | Refills: 1 | Status: SHIPPED | OUTPATIENT
Start: 2022-06-22

## 2022-07-01 DIAGNOSIS — G89.4 CHRONIC PAIN SYNDROME: Primary | ICD-10-CM

## 2022-07-08 ENCOUNTER — TELEPHONE (OUTPATIENT)
Dept: FAMILY MEDICINE CLINIC | Age: 87
End: 2022-07-08

## 2022-07-12 ENCOUNTER — TELEPHONE (OUTPATIENT)
Dept: FAMILY MEDICINE CLINIC | Age: 87
End: 2022-07-12

## 2022-07-12 NOTE — TELEPHONE ENCOUNTER
[facility] is calling to let us know that Heriberto marshall is PT for today. He had several other appointment. She will schedule for sometime Friday.  JUST FYI

## 2022-07-15 ENCOUNTER — TELEPHONE (OUTPATIENT)
Dept: FAMILY MEDICINE CLINIC | Age: 87
End: 2022-07-15

## 2022-07-15 NOTE — TELEPHONE ENCOUNTER
Julianne from [facility] is calling to let us know that Lillie Jean-Baptiste cancel his PT appointment for today.  JUST GI

## 2022-07-19 ENCOUNTER — TELEPHONE (OUTPATIENT)
Dept: FAMILY MEDICINE CLINIC | Age: 87
End: 2022-07-19

## 2022-07-19 NOTE — TELEPHONE ENCOUNTER
GI for dr. Lan Colón from [facility] called and stated he has a skin tear to his left shin. They will monitor it and dress it.

## 2022-07-25 ENCOUNTER — OFFICE VISIT (OUTPATIENT)
Dept: FAMILY MEDICINE CLINIC | Age: 87
End: 2022-07-25
Payer: MEDICARE

## 2022-07-25 VITALS
HEART RATE: 74 BPM | SYSTOLIC BLOOD PRESSURE: 114 MMHG | BODY MASS INDEX: 30.07 KG/M2 | DIASTOLIC BLOOD PRESSURE: 62 MMHG | WEIGHT: 222 LBS | OXYGEN SATURATION: 100 % | HEIGHT: 72 IN

## 2022-07-25 DIAGNOSIS — I48.20 CHRONIC ATRIAL FIBRILLATION (HCC): ICD-10-CM

## 2022-07-25 DIAGNOSIS — Z79.4 TYPE 2 DIABETES MELLITUS WITH DIABETIC POLYNEUROPATHY, WITH LONG-TERM CURRENT USE OF INSULIN (HCC): Primary | ICD-10-CM

## 2022-07-25 DIAGNOSIS — I50.32 CHRONIC DIASTOLIC HEART FAILURE (HCC): ICD-10-CM

## 2022-07-25 DIAGNOSIS — G89.4 CHRONIC PAIN SYNDROME: ICD-10-CM

## 2022-07-25 DIAGNOSIS — E11.42 TYPE 2 DIABETES MELLITUS WITH DIABETIC POLYNEUROPATHY, WITH LONG-TERM CURRENT USE OF INSULIN (HCC): Primary | ICD-10-CM

## 2022-07-25 DIAGNOSIS — R53.82 CHRONIC FATIGUE: ICD-10-CM

## 2022-07-25 LAB
ANION GAP SERPL CALCULATED.3IONS-SCNC: 11 MMOL/L (ref 3–16)
BUN BLDV-MCNC: 28 MG/DL (ref 7–20)
CALCIUM SERPL-MCNC: 8.9 MG/DL (ref 8.3–10.6)
CHLORIDE BLD-SCNC: 101 MMOL/L (ref 99–110)
CO2: 32 MMOL/L (ref 21–32)
CREAT SERPL-MCNC: 1.1 MG/DL (ref 0.8–1.3)
GFR AFRICAN AMERICAN: >60
GFR NON-AFRICAN AMERICAN: >60
GLUCOSE BLD-MCNC: 133 MG/DL (ref 70–99)
HBA1C MFR BLD: 7 %
POTASSIUM SERPL-SCNC: 3.9 MMOL/L (ref 3.5–5.1)
PRO-BNP: 5105 PG/ML (ref 0–449)
SODIUM BLD-SCNC: 144 MMOL/L (ref 136–145)
TSH REFLEX: 2.31 UIU/ML (ref 0.27–4.2)

## 2022-07-25 PROCEDURE — G8417 CALC BMI ABV UP PARAM F/U: HCPCS | Performed by: NURSE PRACTITIONER

## 2022-07-25 PROCEDURE — 1123F ACP DISCUSS/DSCN MKR DOCD: CPT | Performed by: NURSE PRACTITIONER

## 2022-07-25 PROCEDURE — 83036 HEMOGLOBIN GLYCOSYLATED A1C: CPT | Performed by: NURSE PRACTITIONER

## 2022-07-25 PROCEDURE — G8427 DOCREV CUR MEDS BY ELIG CLIN: HCPCS | Performed by: NURSE PRACTITIONER

## 2022-07-25 PROCEDURE — 3051F HG A1C>EQUAL 7.0%<8.0%: CPT | Performed by: NURSE PRACTITIONER

## 2022-07-25 PROCEDURE — 36415 COLL VENOUS BLD VENIPUNCTURE: CPT | Performed by: NURSE PRACTITIONER

## 2022-07-25 PROCEDURE — 1036F TOBACCO NON-USER: CPT | Performed by: NURSE PRACTITIONER

## 2022-07-25 PROCEDURE — 99214 OFFICE O/P EST MOD 30 MIN: CPT | Performed by: NURSE PRACTITIONER

## 2022-07-25 ASSESSMENT — ENCOUNTER SYMPTOMS
SINUS PRESSURE: 0
ABDOMINAL DISTENTION: 0
SHORTNESS OF BREATH: 0
COUGH: 0
WHEEZING: 0
VOMITING: 0
BACK PAIN: 1
ABDOMINAL PAIN: 0
EYE REDNESS: 0
SORE THROAT: 0
EYE PAIN: 0
DIARRHEA: 0
SINUS PAIN: 0
NAUSEA: 0
EYE DISCHARGE: 0

## 2022-07-25 NOTE — PROGRESS NOTES
MOUTH TWICE A DAY, Disp-60 tablet, R-2Normal      Return in about 3 months (around 10/25/2022) for Follow-up diabetes. SUBJECTIVE/OBJECTIVE:  HPI  Eduardo Mena presents today for diabetes follow-up. He does have a few concerns today. He has been receiving home care including physical therapy. He does feel this has helped him move a little more, but he still experiences significant weakness. He still is having trouble transitioning to and from the car. Has trouble transitioning to and from the bed. The home care group has made some adjustments to his home including adjusting his commode as well as his walker which she does feel has helped his functional status. Initial evaluation by PT led to a recommendation of 4 weeks. The patient believes that he would benefit from a more extended physical therapy. He continues to have issues with tiredness. He feels like all of his muscles are tense, especially in his legs. He does state that his cardiologist increased his torsemide to 2 a day. He is wondering if his potassium is off. Would like it checked today if possible. Would also like any other blood work that seems appropriate given the fatigue. He continues to take gabapentin for neuropathy. He states that he will not be able to live without it. Helps quite a bit. He is trying to balance taking it to help while also avoiding worsening fatigue due to the medication. He currently takes 1 capsule 3 times a day. States that this seems to be a good balance. He states that he also has pain in his joints. He knows that this is expected with age, but he states that the arthritis in his hands and shoulders specifically make it hard to use a walker. This is also part of the reason why he would like to have more physical therapy and Occupational Therapy at home. Eduardo Mena otherwise has been doing well. Continues to check his blood sugar at home. Typically runs within his therapeutic range.   He continues with 25

## 2022-08-02 ENCOUNTER — TELEPHONE (OUTPATIENT)
Dept: FAMILY MEDICINE CLINIC | Age: 87
End: 2022-08-02

## 2022-08-02 NOTE — TELEPHONE ENCOUNTER
PER JOSE A PATIENT WOULD NEED TO BE EVALUATED AT HOSP IF WEAK. CALLED AND LMOM FOR MARTHA AND ADVISED. DEANN

## 2022-08-02 NOTE — TELEPHONE ENCOUNTER
Legs gave out on him yesterday, he did go to the floor, but no injuries. His legs feel very heavy and weak. Please give her a call back.      [facility] - [address] - PHONE Serafin Foley. [phone]

## 2022-08-05 ENCOUNTER — TELEPHONE (OUTPATIENT)
Dept: FAMILY MEDICINE CLINIC | Age: 87
End: 2022-08-05

## 2022-08-11 ENCOUNTER — OFFICE VISIT (OUTPATIENT)
Dept: CARDIOLOGY CLINIC | Age: 87
End: 2022-08-11
Payer: MEDICARE

## 2022-08-11 VITALS — OXYGEN SATURATION: 96 % | DIASTOLIC BLOOD PRESSURE: 70 MMHG | SYSTOLIC BLOOD PRESSURE: 110 MMHG

## 2022-08-11 DIAGNOSIS — I50.32 CHRONIC DIASTOLIC HF (HEART FAILURE) (HCC): Primary | ICD-10-CM

## 2022-08-11 DIAGNOSIS — E78.5 HYPERLIPIDEMIA LDL GOAL <70: ICD-10-CM

## 2022-08-11 DIAGNOSIS — I21.4 NSTEMI (NON-ST ELEVATED MYOCARDIAL INFARCTION) (HCC): ICD-10-CM

## 2022-08-11 DIAGNOSIS — I48.20 CHRONIC A-FIB (HCC): ICD-10-CM

## 2022-08-11 DIAGNOSIS — I10 ESSENTIAL HYPERTENSION: ICD-10-CM

## 2022-08-11 PROCEDURE — 1123F ACP DISCUSS/DSCN MKR DOCD: CPT | Performed by: INTERNAL MEDICINE

## 2022-08-11 PROCEDURE — 99214 OFFICE O/P EST MOD 30 MIN: CPT | Performed by: INTERNAL MEDICINE

## 2022-08-11 PROCEDURE — 93000 ELECTROCARDIOGRAM COMPLETE: CPT | Performed by: INTERNAL MEDICINE

## 2022-08-11 PROCEDURE — 1036F TOBACCO NON-USER: CPT | Performed by: INTERNAL MEDICINE

## 2022-08-11 PROCEDURE — G8417 CALC BMI ABV UP PARAM F/U: HCPCS | Performed by: INTERNAL MEDICINE

## 2022-08-11 PROCEDURE — G8427 DOCREV CUR MEDS BY ELIG CLIN: HCPCS | Performed by: INTERNAL MEDICINE

## 2022-08-11 NOTE — PROGRESS NOTES
[address]  Cardiology Progress Note      Lakeisha Stover  1935, 81 y.o.      CC: \" I've been well. \"              Orly Stone, MARIA FERNANDA - CNP:      HPI:   This is a 81 y.o. male with a history of NSTEMI with non obstructive CAD, HTN, HLD, DM and GERD, who came in for shortness of breath 9/2018. His enzymes were borderline elevated as well as elevated proBNP. The patient gave a history of sore throat a few weeks prior, which would come and go. He was treated with antibiotics. Reported that he noticed increased shortness of breath with activity but no chest pain. He was also found to be in Afib (started on Eliquis). Symptoms were consistent with acute diastolic heart failure and he was discharged home on Coreg, Toprol and Torsemide. Patient returns today accompanied by his wife. Patient states he has been well. Wife states he has not been compliant with sodium restriction and eats TV dinners all the time. She feels worn out trying to manage his medications and health care. Patient states he cannot survive without gabapentin and feels he takes all medication as prescribed.      Past Medical History:   Diagnosis Date    Atrial fibrillation ([facility] 09/2018    Atrial fib    Degeneration of cervical intervertebral disc 29/30/8311    Diastolic heart failure ([facility] [address] 09/2018    Diverticulosis of colon (without mention of hemorrhage) 10/22/2010    Essential hypertension, benign 10/22/2010    [place] (hard of hearing)     Hyperlipidemia 10/22/2010    Mononeuritis of unspecified site 10/22/2010    Osteoarthrosis, unspecified whether generalized or localized, pelvic region and thigh 10/22/2010    Other specified iron deficiency anemias 10/22/2010    Rosacea 10/22/2010    Seborrheic dermatitis 10/22/2010    Type II or unspecified type diabetes mellitus without mention of complication, not stated as uncontrolled 10/22/2010    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 10/22/2010    Unspecified pruritic disorder 10/22/2010      Past Surgical History:   Procedure Laterality Date    CARDIOVASCULAR STRESS TEST  2018    negative    CARPAL TUNNEL RELEASE Right 2018    HAND SURGERY      Left carpel tunnel     HIP SURGERY Bilateral     total hips      Family History   Family history unknown: Yes      Social History     Tobacco Use    Smoking status: Former     Types: Cigarettes     Quit date:      Years since quittin.6    Smokeless tobacco: Never   Vaping Use    Vaping Use: Never used   Substance Use Topics    Alcohol use: Never     Alcohol/week: 0.0 standard drinks     Comment: very seldom     Drug use: No     Allergies   Allergen Reactions    Levofloxacin      Pt states he doesn't have any allergies. His PCP added this to his chart          Review of Systems -   Constitutional: Negative for weight gain/loss; malaise, fever  Respiratory: Negative for Asthma;  cough and hemoptysis  Cardiovascular: Negative for palpitations,dizziness   Gastrointestinal: Negative for abd.pain; constipation/diarrhea;    Genitourinary: Negative for stones; hematuria; frequency hesitancy  Integumentt: Negative for rash or pruritis  Hematologic/lymphatic: Negative for blood dyscrasia; leukemia/lymphoma  Musculoskeletal: Negative for Connective tissue disease  Neurological:  Negative for Seizure   Behavioral/Psych:Negative for Bipolar disorder, Schizophrenia; Dementia  Endocrine: negative for thyroid, parathyroid disease    Physical Examination:    /70 (Site: Right Upper Arm, Position: Sitting, Cuff Size: Medium Adult)   SpO2 96%    HEENT:  Face: Atraumatic, Conjunctiva: Pink; non icteric, Mucous Memb:  Moist, No thyromegaly or Lymphadenopathy  Respiratory: Resp Assessment: normal, Resp Auscultation: clear  Cardiovascular: Auscultation: nl S1 & S2, Palpation:  Nl PMI;  No heaves or thrills, JVP:  normal  Abdomen: Soft, non-tender, Normal bowel sounds,  No organomegaly  Extremities: No Cyanosis or Clubbing  Neurological: Oriented to time, place, and person, Non-anxious  Psychiatric: Normal mood and affect  Skin: Warm and dry,  No rash seen     No outpatient medications have been marked as taking for the 8/11/22 encounter (Office Visit) with Kari Thompson MD.         Labs:   Lab Results   Component Value Date/Time    HDL 54 05/03/2022 09:16 AM    LDLCALC 104 05/03/2022 09:16 AM    TRIG 56 05/03/2022 09:16 AM       EKG:  3/8/2019: Controlled AFIB  4/7/22: controlled AFIB   5/5/22: controlled Afib, RBBB    ECHO: 9/25/2018  Mild conc. LVH with no wall motion abnormalities and EF of 60%. The left atrium is mildly dilated. Normal right ventricular size and function. Mild mitral and trivial aortic and pulmonic regurgitation. Inferior vena cava appears dilated. Stress Nuclear: 9/26/2018  Pharmacological Stress/MPI Results:        1. Technically a satisfactory study. 2. Normal pharmacological stress portion of the study. 3. No evidence of Ischemia by Myocardial Perfusion Imaging. 4. Gated Study shows dilated LV with EF of 59 %. Echo: 5/2021   Patient appears to be in atrial fibrillation. Normal left ventricle size and systolic function with an estimated ejection   fraction of 50-55%. No regional wall motion abnormalities are seen. There is   mildly increased left ventricular wall thickness. Ungraded diastolic   dysfunction (secondary to arrhythmia) with elevated LV filling pressures. The right ventricle is not well visualized but function appears reduced. The left and right atria appear moderately dilated. Moderate mitral and tricuspid regurgitation. Trivial aortic regurgitation. ASSESSMENT AND PLAN:    Chronic Atrial Fibrillation  CHADS Vasc is at least 6 (CHF, HTN, AGE, DM, Vascular Disease)  Denies s/s of TIA/CVA, abnormal bruising or bleeding  Continue Metoprolol and Eliquis     Chronic Diastolic Heart Failure  NYHA Class II  5/2021 LVEF 50-55% elevated LV filling pressures. Continue torsemide and metoprolol   Slowly increase walking program  BNP elevated >5000 (7/2022)  Increase torsemide to 20 mg BID. Can cut back to 20 mg daily with 20 mg BID on MWF only after 10 days. Repeat renal panel in 10 days. I have discussed cutting back on gabapentin as this is contributing to his fluid retention. Patient states he needs the gabapentin. I have encouraged him to reduce fluid intake and work on a low sodium diet. Essential Hypertension  Goal BP <140/90  Risk factor modification again discussed   Reviewed BMP completed 7/25/22    Hyperlipidemia  Continue statin therapy  Denies myalgias.  (5/3/22)     History of NSTEMI  No obstructive disease  No angina since I have last seen him in f/u in 3 years. Continue BB therapy  No ASA secondary to Eliquis for Afib    DM  Managed per PCP    Severe back pain  On medical therapy     Anemia  Abnormal but stable 13.0/39.0 3/2022    Patient denies any complaints to me; however his wife says that he is complaining all the time that he is tired. He has no orthopnea. Sleeps in the bed. Plan to check his proBNP for CHF. We will increase the torsemide to 20 mg twice daily and see how he does. Follow up in 2 months        Thank you very much for allowing me to participate in the care of your patient. Please do not hesitate to contact me if you have any questions. Sincerely,    Kaylie Gresham M.D  [facility], [address] [place] [address]  Ph: [phone]  Fax: [phone]    This note was scribed in the presence of Dr. Kaylie rGesham MD by Anadi Llamas, RN  Physician Attestation:  The scribes documentation has been prepared under my direction and personally reviewed by me in its entirety. I confirm that the note above accurately reflects all work, treatment, procedures, and medical decision making performed by me.

## 2022-08-11 NOTE — PATIENT INSTRUCTIONS
Increase torsemide to 1 tablet twice a day for the next 10 days. Get repeat labs after 10 days to assess kidney function and potassium level then resume torsemide 1 tablet daily except on Mon, Wed, Friday take 1 tablet twice daily.

## 2022-08-17 DIAGNOSIS — I50.32 CHRONIC DIASTOLIC HEART FAILURE (HCC): Primary | ICD-10-CM

## 2022-08-19 ENCOUNTER — TELEPHONE (OUTPATIENT)
Dept: FAMILY MEDICINE CLINIC | Age: 87
End: 2022-08-19

## 2022-08-22 ENCOUNTER — NURSE ONLY (OUTPATIENT)
Dept: FAMILY MEDICINE CLINIC | Age: 87
End: 2022-08-22
Payer: MEDICARE

## 2022-08-22 DIAGNOSIS — I50.32 CHRONIC DIASTOLIC HEART FAILURE (HCC): ICD-10-CM

## 2022-08-22 LAB
ALBUMIN SERPL-MCNC: 3.5 G/DL (ref 3.4–5)
ANION GAP SERPL CALCULATED.3IONS-SCNC: 19 MMOL/L (ref 3–16)
BUN BLDV-MCNC: 28 MG/DL (ref 7–20)
CALCIUM SERPL-MCNC: 9 MG/DL (ref 8.3–10.6)
CHLORIDE BLD-SCNC: 99 MMOL/L (ref 99–110)
CO2: 24 MMOL/L (ref 21–32)
CREAT SERPL-MCNC: 1.4 MG/DL (ref 0.8–1.3)
GFR AFRICAN AMERICAN: 58
GFR NON-AFRICAN AMERICAN: 48
GLUCOSE BLD-MCNC: 180 MG/DL (ref 70–99)
PHOSPHORUS: 3.5 MG/DL (ref 2.5–4.9)
POTASSIUM SERPL-SCNC: 4 MMOL/L (ref 3.5–5.1)
SODIUM BLD-SCNC: 142 MMOL/L (ref 136–145)

## 2022-08-22 PROCEDURE — 36415 COLL VENOUS BLD VENIPUNCTURE: CPT | Performed by: NURSE PRACTITIONER

## 2022-08-23 ENCOUNTER — TELEPHONE (OUTPATIENT)
Dept: FAMILY MEDICINE CLINIC | Age: 87
End: 2022-08-23

## 2022-08-23 NOTE — TELEPHONE ENCOUNTER
[facility] is calling    Pt is difficult to schedule. Pt told them he did not need nursing. She never did go see him. He will need another referral if he needs to be seen. Pt and OT have discharged him.

## 2022-08-25 ENCOUNTER — TELEPHONE (OUTPATIENT)
Dept: CARDIOLOGY CLINIC | Age: 87
End: 2022-08-25

## 2022-08-25 NOTE — TELEPHONE ENCOUNTER
Please review BMP and advise on further recommendations. Increase torsemide to 20 mg BID. Can cut back to 20 mg daily with 20 mg BID on MWF only after 10 days.  Repeat renal panel in 10 days per office note 8/11/2022

## 2022-08-25 NOTE — TELEPHONE ENCOUNTER
Hiral Velazquez called in this morning he wants to know if his EKG results was back and how he wanted his medcation to be taken now     Hiral Velazquez can be reached at [phone]

## 2022-08-25 NOTE — TELEPHONE ENCOUNTER
For his creatinine is up to 1.4 from a baseline of 1.1    I would like him to take torsemide 20 mg daily  In addition I want her to take torsemide 20 mg twice daily Monday Wednesday and Friday

## 2022-08-25 NOTE — TELEPHONE ENCOUNTER
Returned call to patient and made aware that Dr. Rosio Martinez looked at his EKG when he came in for his appointment. Patient said that he had lab work drawn at his PCP's office because it was more convenient. Patient would like Dr. Rosio Martinez to review and let him know if he wants to make any changes in his medication. Thank you.

## 2022-08-26 NOTE — TELEPHONE ENCOUNTER
Called patient and let him know the recommendations. He verbalzied understanding. Encouraged him to call with any increased shortness of breath, weight gain or swelling.

## 2022-08-29 DIAGNOSIS — G62.9 NEUROPATHY: ICD-10-CM

## 2022-08-29 DIAGNOSIS — E11.42 TYPE 2 DIABETES MELLITUS WITH DIABETIC POLYNEUROPATHY, WITH LONG-TERM CURRENT USE OF INSULIN (HCC): ICD-10-CM

## 2022-08-29 DIAGNOSIS — Z79.4 TYPE 2 DIABETES MELLITUS WITH DIABETIC POLYNEUROPATHY, WITH LONG-TERM CURRENT USE OF INSULIN (HCC): ICD-10-CM

## 2022-08-30 DIAGNOSIS — H10.403 CHRONIC BACTERIAL CONJUNCTIVITIS OF BOTH EYES: ICD-10-CM

## 2022-08-30 RX ORDER — GABAPENTIN 300 MG/1
CAPSULE ORAL
Qty: 270 CAPSULE | Refills: 1 | Status: ON HOLD | OUTPATIENT
Start: 2022-08-30 | End: 2022-10-31 | Stop reason: HOSPADM

## 2022-08-31 RX ORDER — POLYMYXIN B SULFATE AND TRIMETHOPRIM 1; 10000 MG/ML; [USP'U]/ML
1 SOLUTION OPHTHALMIC EVERY 4 HOURS
Qty: 10 ML | Refills: 0 | OUTPATIENT
Start: 2022-08-31 | End: 2022-09-10

## 2022-09-01 ENCOUNTER — TELEPHONE (OUTPATIENT)
Dept: CARDIOLOGY CLINIC | Age: 87
End: 2022-09-01

## 2022-09-01 NOTE — TELEPHONE ENCOUNTER
Dr. Harish Sykes received a new patient referral for venous insufficiency and chronic venous ulcers from Dr. Marleen Krueger. Patient follows with Dr. Nilesh Estrella for his routine cardiology needs. Left message with Collette Shallow to see if he is interested in seeing Dr. Harish Sykes for further treatment options for the venous insufficiency/ulcers. If so will schedule an appointment with Dr. Harish Sykes.

## 2022-09-02 NOTE — TELEPHONE ENCOUNTER
Costa Waterman returned call and states he would like to follow up with Dr. Leticia Oleary for evaluation of venous insufficiency. Appointment scheduled.

## 2022-09-12 DIAGNOSIS — I50.32 CHRONIC DIASTOLIC HF (HEART FAILURE) (HCC): ICD-10-CM

## 2022-09-13 RX ORDER — TORSEMIDE 20 MG/1
TABLET ORAL
Qty: 90 TABLET | Refills: 1 | OUTPATIENT
Start: 2022-09-13

## 2022-09-13 NOTE — TELEPHONE ENCOUNTER
LV 22 WITH TR FOR DM NV  NONE     Disp Refills Start End    torsemide (DEMADEX) 20 MG tablet 180 tablet 3 6/15/2022     Si mg daily except on MWF 20 mg BID    Sent to pharmacy as:  Torsemide 20 MG Oral Tablet [facility])    E-Prescribing Status: Receipt confirmed by pharmacy (6/15/2022  0:35 AM EDT)    DUPLICATE REQUEST

## 2022-09-19 ENCOUNTER — OFFICE VISIT (OUTPATIENT)
Dept: CARDIOLOGY CLINIC | Age: 87
End: 2022-09-19
Payer: MEDICARE

## 2022-09-19 VITALS
BODY MASS INDEX: 30.11 KG/M2 | DIASTOLIC BLOOD PRESSURE: 80 MMHG | SYSTOLIC BLOOD PRESSURE: 124 MMHG | HEART RATE: 76 BPM | HEIGHT: 72 IN | OXYGEN SATURATION: 96 %

## 2022-09-19 DIAGNOSIS — M79.89 SWELLING OF BOTH LOWER EXTREMITIES: ICD-10-CM

## 2022-09-19 DIAGNOSIS — I48.20 CHRONIC A-FIB (HCC): Primary | ICD-10-CM

## 2022-09-19 DIAGNOSIS — I87.2 VENOUS INSUFFICIENCY OF BOTH LOWER EXTREMITIES: ICD-10-CM

## 2022-09-19 PROCEDURE — G8427 DOCREV CUR MEDS BY ELIG CLIN: HCPCS | Performed by: INTERNAL MEDICINE

## 2022-09-19 PROCEDURE — 1036F TOBACCO NON-USER: CPT | Performed by: INTERNAL MEDICINE

## 2022-09-19 PROCEDURE — 93000 ELECTROCARDIOGRAM COMPLETE: CPT | Performed by: INTERNAL MEDICINE

## 2022-09-19 PROCEDURE — 99214 OFFICE O/P EST MOD 30 MIN: CPT | Performed by: INTERNAL MEDICINE

## 2022-09-19 PROCEDURE — G8417 CALC BMI ABV UP PARAM F/U: HCPCS | Performed by: INTERNAL MEDICINE

## 2022-09-19 PROCEDURE — 1123F ACP DISCUSS/DSCN MKR DOCD: CPT | Performed by: INTERNAL MEDICINE

## 2022-09-19 NOTE — PROGRESS NOTES
Cardiology Consultation     Hansa Gore  1935    PCP: MARIA FERNANDA Red CNP  Referring Physician: Dr. Ruben Limon  Reason for Referral: Venous insufficiency   Chief Complaint:   Chief Complaint   Patient presents with    New Patient     Referral  x venous insufficiency       Subjective:   History of Present Illness: The patient is 81 y.o. male with a past medical history significant for nonobstructive CAD, hypertension, hyperlipidemia, type 2 diabetes and GERD. He follows with my partner Dr. Tad Velasquez for general cardiology. He presents today as referral from Dr. Ruben Limon regarding lower extremity swelling. He has been following with Dr. Ruben Limon for podiatry. He reports lower extremity swelling that has been present for several weeks. He has been wearing compression stockings daily. He reports being sedentary daily and does not participate in much exertion. He presents in a wheelchair today to aid in ambulation. He denies chest pain with rest or exertion. His breathing has been comfortable without shortness of breath. He reports medication compliance and is tolerating.      Past Medical History:   Diagnosis Date    Atrial fibrillation ([facility] [address] 09/2018    Atrial fib    Degeneration of cervical intervertebral disc 43/57/0418    Diastolic heart failure ([address] 09/2018    Diverticulosis of colon (without mention of hemorrhage) 10/22/2010    Essential hypertension, benign 10/22/2010    [place] (hard of hearing)     Hyperlipidemia 10/22/2010    Mononeuritis of unspecified site 10/22/2010    Osteoarthrosis, unspecified whether generalized or localized, pelvic region and thigh 10/22/2010    Other specified iron deficiency anemias 10/22/2010    Rosacea 10/22/2010    Seborrheic dermatitis 10/22/2010    Type II or unspecified type diabetes mellitus without mention of complication, not stated as uncontrolled 10/22/2010    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 10/22/2010    Unspecified pruritic disorder 10/22/2010     Past Surgical History:   Procedure Laterality Date    CARDIOVASCULAR STRESS TEST  2018    negative    CARPAL TUNNEL RELEASE Right 2018    HAND SURGERY      Left carpel tunnel     HIP SURGERY Bilateral     total hips     Family History   Family history unknown: Yes     Social History     Tobacco Use    Smoking status: Former     Types: Cigarettes     Quit date:      Years since quittin.7    Smokeless tobacco: Never   Vaping Use    Vaping Use: Never used   Substance Use Topics    Alcohol use: Never     Alcohol/week: 0.0 standard drinks     Comment: very seldom     Drug use: No       Allergies   Allergen Reactions    Levofloxacin      Pt states he doesn't have any allergies. His PCP added this to his chart      Current Outpatient Medications   Medication Sig Dispense Refill    gabapentin (NEURONTIN) 300 MG capsule TAKE 3 TABLETS BY MOUTH NIGHTLY 270 capsule 1    diclofenac (VOLTAREN) 50 MG EC tablet Take 1 tablet by mouth in the morning and 1 tablet at noon and 1 tablet in the evening. Take with meals.  90 tablet 0    apixaban (ELIQUIS) 5 MG TABS tablet TAKE 1 TABLET BY MOUTH TWICE A DAY 60 tablet 2    metoprolol succinate (TOPROL XL) 50 MG extended release tablet TAKE 1 TABLET BY MOUTH EVERY DAY 90 tablet 1    torsemide (DEMADEX) 20 MG tablet 20 mg daily except on MWF 20 mg  tablet 3    insulin glargine (BASAGLAR KWIKPEN) 100 UNIT/ML injection pen INJECT 25 UNITS UNDER THE SKIN ONCE DAILY 12 pen 1    diclofenac sodium (VOLTAREN) 1 % GEL Apply 2 g topically 4 times daily 350 g 3    Insulin Pen Needle (B-D ULTRAFINE III SHORT PEN) 31G X 8 MM MISC USE DAILY WITH INSULIN 100 each 3    TRIAMCINOLONE ACETONIDE EX Apply topically      Continuous Blood Gluc Sensor (FREESTYLE BARRON 2 SENSOR) MISC 1 each by Does not apply route every 14 days 6 each 3    Continuous Blood Gluc  (FREESTYLE BARRON 2 READER) KORIN 1 each by Does not apply route 4 times daily 1 each 0     No current facility-administered medications for this visit. Review of Systems:  Constitutional: No unanticipated weight loss. There's been no change in energy level, sleep pattern, or activity level. No fevers, chills. Eyes: No visual changes or diplopia. No scleral icterus. ENT: No Headaches, hearing loss or vertigo. No mouth sores or sore throat. Cardiovascular: as reviewed in HPI  Respiratory: No cough or wheezing, no sputum production. No hemoptysis. Gastrointestinal: No abdominal pain, appetite loss, blood in stools. No change in bowel or bladder habits. Genitourinary: No dysuria, trouble voiding, or hematuria. Musculoskeletal:  No gait disturbance, no joint complaints. Integumentary: No rash or pruritis. Neurological: No headache, diplopia, change in muscle strength, numbness or tingling. Psychiatric: No anxiety or depression. Endocrine: No temperature intolerance. No excessive thirst, fluid intake, or urination. No tremor. Hematologic/Lymphatic: No abnormal bruising or bleeding, blood clots or swollen lymph nodes. Allergic/Immunologic: No nasal congestion or hives. Physical Exam:   /80   Pulse 76   Ht 6' (1.829 m)   SpO2 96%   BMI 30.11 kg/m²   Wt Readings from Last 3 Encounters:   07/25/22 222 lb (100.7 kg)   04/07/22 222 lb (100.7 kg)   10/14/21 224 lb (101.6 kg)     Constitutional: He is oriented to person, place, and time. He appears well-developed and well-nourished. In no acute distress. Head: Normocephalic and atraumatic. Pupils equal and round. Neck: Neck supple. No JVP or carotid bruit appreciated. No mass and no thyromegaly present. No lymphadenopathy present. Cardiovascular: Normal rate. Normal heart sounds. Exam reveals no gallop and no friction rub. No murmur heard. Pulmonary/Chest: Effort normal and breath sounds normal. No respiratory distress. He has no wheezes, rhonchi or rales. Abdominal: Soft, non-tender.  Bowel sounds are normal. He exhibits no function   Instructed to elevate legs when sitting, ambulate as tolerated and use compression stockings     2) CAD   Nonobstructive CAD   No symptoms of angina   Follows with Dr. Laurent Kearns     3) Hyperlipidemia  LDL goal <70 mg/dL   5/3/22     Follow up in 3 months         Thank you very much for allowing me to participate in the care of your patient. Please do not hesitate to contact me if you have any questions. Ba Mina MD [address] [facility], Interventional Cardiology, and Peripheral Vascular Disease   [address]   Ph: [phone]  Fax: [phone]      This note was scribed in the presence of Ba Mina MD by Che Monroe RN. Physician Attestation:  The scribes documentation has been prepared under my direction and personally reviewed by me in its entirety. I confirm the note above accurately reflects all work, treatment, procedures, and medical decision making performed by me.     Electronically signed by Hiral Mckeon MD on 10/2/2022 at 3:53 PM

## 2022-09-23 DIAGNOSIS — I48.20 CHRONIC A-FIB (HCC): ICD-10-CM

## 2022-09-23 LAB
ANION GAP SERPL CALCULATED.3IONS-SCNC: 13 MMOL/L (ref 3–16)
BUN BLDV-MCNC: 34 MG/DL (ref 7–20)
CALCIUM SERPL-MCNC: 8.9 MG/DL (ref 8.3–10.6)
CHLORIDE BLD-SCNC: 98 MMOL/L (ref 99–110)
CO2: 32 MMOL/L (ref 21–32)
CREAT SERPL-MCNC: 1.3 MG/DL (ref 0.8–1.3)
GFR AFRICAN AMERICAN: >60
GFR NON-AFRICAN AMERICAN: 52
GLUCOSE BLD-MCNC: 239 MG/DL (ref 70–99)
POTASSIUM SERPL-SCNC: 3.5 MMOL/L (ref 3.5–5.1)
SODIUM BLD-SCNC: 143 MMOL/L (ref 136–145)

## 2022-09-30 ENCOUNTER — TELEPHONE (OUTPATIENT)
Dept: CARDIOLOGY CLINIC | Age: 87
End: 2022-09-30

## 2022-09-30 NOTE — TELEPHONE ENCOUNTER
Returned call. Relayed result message from [address] [place] [address]. Verbalized and understanding was confirmed.

## 2022-09-30 NOTE — TELEPHONE ENCOUNTER
Patient should be taking torsemide and appears it was increased to 40 mg daily at his last office visit for worsening LE edema. If the swelling has improved he can reduce his dose back to 20 mg MWF or can reduce to 10 daily and call with any worsening symptoms.

## 2022-09-30 NOTE — TELEPHONE ENCOUNTER
Johnna Taylor called in this afternoon, he states Dr. Cathy Puente put him on two water pills a day for water around his heart and he would like to know if he still needs to take them. He can be reached at [phone].

## 2022-10-10 ENCOUNTER — TELEPHONE (OUTPATIENT)
Dept: CARDIOLOGY CLINIC | Age: 87
End: 2022-10-10

## 2022-10-10 ENCOUNTER — OFFICE VISIT (OUTPATIENT)
Dept: CARDIOLOGY CLINIC | Age: 87
End: 2022-10-10
Payer: MEDICARE

## 2022-10-10 VITALS
HEART RATE: 87 BPM | WEIGHT: 222 LBS | BODY MASS INDEX: 29.42 KG/M2 | HEIGHT: 73 IN | OXYGEN SATURATION: 90 % | DIASTOLIC BLOOD PRESSURE: 74 MMHG | SYSTOLIC BLOOD PRESSURE: 128 MMHG

## 2022-10-10 DIAGNOSIS — E78.5 HYPERLIPIDEMIA LDL GOAL <70: ICD-10-CM

## 2022-10-10 DIAGNOSIS — I89.0 LYMPHEDEMA: ICD-10-CM

## 2022-10-10 DIAGNOSIS — I87.2 VENOUS INSUFFICIENCY OF BOTH LOWER EXTREMITIES: ICD-10-CM

## 2022-10-10 DIAGNOSIS — I21.4 NSTEMI (NON-ST ELEVATED MYOCARDIAL INFARCTION) (HCC): ICD-10-CM

## 2022-10-10 DIAGNOSIS — E11.42 TYPE 2 DIABETES MELLITUS WITH DIABETIC POLYNEUROPATHY, WITH LONG-TERM CURRENT USE OF INSULIN (HCC): ICD-10-CM

## 2022-10-10 DIAGNOSIS — Z79.4 TYPE 2 DIABETES MELLITUS WITH DIABETIC POLYNEUROPATHY, WITH LONG-TERM CURRENT USE OF INSULIN (HCC): ICD-10-CM

## 2022-10-10 DIAGNOSIS — I50.32 CHRONIC DIASTOLIC HF (HEART FAILURE) (HCC): ICD-10-CM

## 2022-10-10 DIAGNOSIS — I10 ESSENTIAL HYPERTENSION: ICD-10-CM

## 2022-10-10 DIAGNOSIS — Z86.2 HISTORY OF ANEMIA: ICD-10-CM

## 2022-10-10 DIAGNOSIS — I48.20 CHRONIC A-FIB (HCC): Primary | ICD-10-CM

## 2022-10-10 PROCEDURE — G8417 CALC BMI ABV UP PARAM F/U: HCPCS | Performed by: INTERNAL MEDICINE

## 2022-10-10 PROCEDURE — 1123F ACP DISCUSS/DSCN MKR DOCD: CPT | Performed by: INTERNAL MEDICINE

## 2022-10-10 PROCEDURE — 3051F HG A1C>EQUAL 7.0%<8.0%: CPT | Performed by: INTERNAL MEDICINE

## 2022-10-10 PROCEDURE — 1036F TOBACCO NON-USER: CPT | Performed by: INTERNAL MEDICINE

## 2022-10-10 PROCEDURE — G8484 FLU IMMUNIZE NO ADMIN: HCPCS | Performed by: INTERNAL MEDICINE

## 2022-10-10 PROCEDURE — 99214 OFFICE O/P EST MOD 30 MIN: CPT | Performed by: INTERNAL MEDICINE

## 2022-10-10 PROCEDURE — 93000 ELECTROCARDIOGRAM COMPLETE: CPT | Performed by: INTERNAL MEDICINE

## 2022-10-10 PROCEDURE — G8427 DOCREV CUR MEDS BY ELIG CLIN: HCPCS | Performed by: INTERNAL MEDICINE

## 2022-10-10 NOTE — PROGRESS NOTES
[facility], [place]  Cardiology Progress Note      Ofelia Arango  1935, 81 y.o.      CC: \" I feel over the weekend. \"              MARIA FERNANDA Smith - CNP:      HPI:   This is a 81 y.o. male with a history of NSTEMI with non obstructive CAD, HTN, HLD, DM and GERD, who came in for shortness of breath 9/2018. His enzymes were borderline elevated as well as elevated proBNP. The patient gave a history of sore throat a few weeks prior, which would come and go. He was treated with antibiotics. Reported that he noticed increased shortness of breath with activity but no chest pain. He was also found to be in Afib (started on Eliquis). Symptoms were consistent with acute diastolic heart failure and he was discharged home on Coreg, Toprol and Torsemide. He was evaluated by Dr. Phylicia Christianson for venous insufficiency on 9/19/22 and instructed to elevated legs, use compression hose/diuretic and ambulate as tolerated. Today, he returns in follow up for management of above issues. Presents in a wheelchair and accompanied by his son-in-law. States he has been performing leg exercises three times daily. States he cut back on gabapentin therapy and has remained compliant with all medications. He reports falling over the weekend. Tripped when getting out of bed and hurt his back. Denies any loss of consciousness.         Past Medical History:   Diagnosis Date    Atrial fibrillation ([facility] [address] 09/2018    Atrial fib    Degeneration of cervical intervertebral disc 00/71/5114    Diastolic heart failure ([facility] [address] 09/2018    Diverticulosis of colon (without mention of hemorrhage) 10/22/2010    Essential hypertension, benign 10/22/2010    [place] (hard of hearing)     Hyperlipidemia 10/22/2010    Mononeuritis of unspecified site 10/22/2010    Osteoarthrosis, unspecified whether generalized or localized, pelvic region and thigh 10/22/2010    Other specified iron deficiency anemias 10/22/2010    Rosacea 10/22/2010    Seborrheic dermatitis 10/22/2010    Type II or unspecified type diabetes mellitus without mention of complication, not stated as uncontrolled 10/22/2010    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 10/22/2010    Unspecified pruritic disorder 10/22/2010      Past Surgical History:   Procedure Laterality Date    CARDIOVASCULAR STRESS TEST  2018    negative    CARPAL TUNNEL RELEASE Right 2018    HAND SURGERY      Left carpel tunnel     HIP SURGERY Bilateral     total hips      Family History   Family history unknown: Yes      Social History     Tobacco Use    Smoking status: Former     Types: Cigarettes     Quit date:      Years since quittin.8    Smokeless tobacco: Never   Vaping Use    Vaping Use: Never used   Substance Use Topics    Alcohol use: Never     Alcohol/week: 0.0 standard drinks     Comment: very seldom     Drug use: No     Allergies   Allergen Reactions    Levofloxacin      Pt states he doesn't have any allergies.  His PCP added this to his chart          Review of Systems -   Constitutional: Negative for weight gain/loss; malaise, fever  Respiratory: Negative for Asthma;  cough and hemoptysis  Cardiovascular: Negative for palpitations,dizziness   Gastrointestinal: Negative for abd.pain; constipation/diarrhea;    Genitourinary: Negative for stones; hematuria; frequency hesitancy  Integumentt: Negative for rash or pruritis  Hematologic/lymphatic: Negative for blood dyscrasia; leukemia/lymphoma  Musculoskeletal: Negative for Connective tissue disease  Neurological:  Negative for Seizure   Behavioral/Psych:Negative for Bipolar disorder, Schizophrenia; Dementia  Endocrine: negative for thyroid, parathyroid disease    Physical Examination:    /74   Pulse 87   Ht 6' 1\" (1.854 m)   Wt 222 lb (100.7 kg)   SpO2 90%   BMI 29.29 kg/m²    HEENT:  Face: Atraumatic, Conjunctiva: Pink; non icteric, Mucous Memb:  Moist, No thyromegaly or Lymphadenopathy  Respiratory: Resp Assessment: normal, Resp Auscultation: clear  Cardiovascular: Auscultation: nl S1 & S2, Palpation:  Nl PMI; No heaves or thrills, JVP:  normal  Abdomen: Soft, non-tender, Normal bowel sounds,  No organomegaly  Extremities: No Cyanosis or Clubbing  Neurological: Oriented to time, place, and person, Non-anxious  Psychiatric: Normal mood and affect  Skin: Warm and dry,  No rash seen     Outpatient Medications Marked as Taking for the 10/10/22 encounter (Office Visit) with Leon Pop MD   Medication Sig Dispense Refill    Elastic Bandages & Supports ([address]) [facility] 1 each by Does not apply route daily as needed (daily thigh high compression stockings (15-20 mmHg)) 1 each 0    gabapentin (NEURONTIN) 300 MG capsule TAKE 3 TABLETS BY MOUTH NIGHTLY 270 capsule 1    diclofenac (VOLTAREN) 50 MG EC tablet Take 1 tablet by mouth in the morning and 1 tablet at noon and 1 tablet in the evening. Take with meals.  90 tablet 0    apixaban (ELIQUIS) 5 MG TABS tablet TAKE 1 TABLET BY MOUTH TWICE A DAY 60 tablet 2    metoprolol succinate (TOPROL XL) 50 MG extended release tablet TAKE 1 TABLET BY MOUTH EVERY DAY 90 tablet 1    torsemide (DEMADEX) 20 MG tablet 20 mg daily except on MWF 20 mg  tablet 3    insulin glargine (BASAGLAR KWIKPEN) 100 UNIT/ML injection pen INJECT 25 UNITS UNDER THE SKIN ONCE DAILY 12 pen 1    diclofenac sodium (VOLTAREN) 1 % GEL Apply 2 g topically 4 times daily 350 g 3    Insulin Pen Needle (B-D ULTRAFINE III SHORT PEN) 31G X 8 MM MISC USE DAILY WITH INSULIN 100 each 3    TRIAMCINOLONE ACETONIDE EX Apply topically      Continuous Blood Gluc Sensor (FREESTYLE BARRON 2 SENSOR) MISC 1 each by Does not apply route every 14 days 6 each 3    Continuous Blood Gluc  (FREESTYLE BARRON 2 READER) KORIN 1 each by Does not apply route 4 times daily 1 each 0         Labs:   Lab Results   Component Value Date/Time    HDL 54 05/03/2022 09:16 AM    LDLCALC 104 05/03/2022 09:16 AM    TRIG 56 05/03/2022 09:16 AM EKG:  3/8/2019: Controlled AFIB  4/7/22: controlled AFIB   5/5/22: controlled Afib, RBBB    ECHO: 9/25/2018  Mild conc. LVH with no wall motion abnormalities and EF of 60%. The left atrium is mildly dilated. Normal right ventricular size and function. Mild mitral and trivial aortic and pulmonic regurgitation. Inferior vena cava appears dilated. Stress Nuclear: 9/26/2018  Pharmacological Stress/MPI Results:        1. Technically a satisfactory study. 2. Normal pharmacological stress portion of the study. 3. No evidence of Ischemia by Myocardial Perfusion Imaging. 4. Gated Study shows dilated LV with EF of 59 %. Echo: 5/2021   Patient appears to be in atrial fibrillation. Normal left ventricle size and systolic function with an estimated ejection   fraction of 50-55%. No regional wall motion abnormalities are seen. There is   mildly increased left ventricular wall thickness. Ungraded diastolic   dysfunction (secondary to arrhythmia) with elevated LV filling pressures. The right ventricle is not well visualized but function appears reduced. The left and right atria appear moderately dilated. Moderate mitral and tricuspid regurgitation. Trivial aortic regurgitation. ASSESSMENT AND PLAN:    Chronic Atrial Fibrillation  CHADS Vasc is at least 6 (CHF, HTN, AGE, DM, Vascular Disease)  Denies s/s of TIA/CVA, abnormal bruising or bleeding  Continue Metoprolol and Eliquis     Chronic Diastolic Heart Failure  NYHA Class II  5/2021 LVEF 50-55% elevated LV filling pressures. Continue torsemide and metoprolol   Slowly increase walking program  BNP elevated >5000 (7/2022)  Current BMP stable with creat of 1/3 (9/2022). I have discussed cutting back on gabapentin as this is contributing to his fluid retention. Patient states he needs the gabapentin and he has cut back on the dose. I have encouraged him to reduce fluid intake and work on a low sodium diet.      Essential Hypertension  Goal BP <140/90  Controlled on today's visit   Risk factor modification again discussed   Stable BMP 9/2022     History of NSTEMI  No obstructive disease  No angina since I have last seen him in f/u in 3 years. Continue BB therapy  No ASA secondary to Eliquis for Afib    DM  Managed per PCP    Anemia  Abnormal but stable 13.0/39.0 3/2022    Venous insufficiency/Lymphedema  Evaluated by Dr. Amos Carrizales and instructed to elevate legs when seated and ambulate as tolerated. Use compression hose and continue diuretic therapy. Again, I encourage him to cut back on gabapentin therapy. Will arrange for pneumatic compression device. Follow up in 6 months        Thank you very much for allowing me to participate in the care of your patient. Please do not hesitate to contact me if you have any questions. Sincerely,    Sharita Jeffery M.D  [facility], [address] [place] [address]  Ph: [phone]  Fax: [phone]      This note was scribed in the presence of Dr. Sharita Jeffery MD by Shankar Young RN  Physician Attestation:  The scribes documentation has been prepared under my direction and personally reviewed by me in its entirety. I confirm that the note above accurately reflects all work, treatment, procedures, and medical decision making performed by me.

## 2022-10-10 NOTE — TELEPHONE ENCOUNTER
Called and spoke to patient, let him know it is okay to take as Dr Sarah Jeffrey prescribed if his swelling is controlled. He verbalized understanding.

## 2022-10-10 NOTE — TELEPHONE ENCOUNTER
Dayanara, called in today she said that she needs to know how Rehanaliana Ayon is suppose to be taking his water pill that Dr. Tomas Jorge wants him to mak it one way and Dr. Nichelle Lainez wants another     Laurent Polo smith be reached at [phone]  Thanks

## 2022-10-18 ENCOUNTER — TELEPHONE (OUTPATIENT)
Dept: CARDIOLOGY CLINIC | Age: 87
End: 2022-10-18

## 2022-10-18 NOTE — TELEPHONE ENCOUNTER
Urszula Mayen called back this morning stating that she was able to reschedule Nahid's leg compression for Wednesday, 10/26/22.       Urszula Mayen can be reached for any reason at: [phone]

## 2022-10-18 NOTE — TELEPHONE ENCOUNTER
Called and spoke to Hershall Pool and let her know that I will contact the rep for the device and get a number that she can call to reschedule. Email sent to [facility]. HBO PROGRESS NOTE  NAME: Armit Holt MEDICAL RECORD NUMBER:  [id number]  AGE: 80 y.o. GENDER: male  : 1952  EPISODE DATE:  2020     Subjective     HBO Treatment Number: 22 out of Total Treatments: 30    HBO Diagnosis:  Indications: Lower Extremity Diabetic Wound ___(site)(Nichole grade 3 RLE)  Safety checks performed prior to treatment. See doc flowsheets for documentation. Objective           Lab Results   Component Value Date/Time    Glucose (POC) 204 (H) 2020 10:00 AM    Glucose (POC) 160 (H) 2020 08:49 AM       Pre treatment Vital Signs       Temp: 97.4 °F (36.3 °C)     Pulse (Heart Rate): 95     Resp Rate: 16   BP: 126/62       Post treatment Vital Signs  Temp: 97 °F (36.1 °C)  Pulse (Heart Rate): 96  Resp Rate: 18  BP: 123/59      Assessment        Physical Exam:  General Appearance: alert    Tympanic Membrane Assessment:  Pre-Treatment Left Left: Grade 0 Right Right: Grade 0  Post-treatment Left   Right        Patient place in a fully body Monoplace serial number Chamber #: [id number]       Treatment Start Time:  Door Closed      Pressure Reached  ROSALINDA Depth: 2.5 ROSALINDA  Number of Air Breaks: None      Decompression Time: 923 Decompression Time   Treatment End Time: 936 Door Opened    Symptoms Noted During Treatment: (bowel urgency)    Length of Treatment: 90 Minutes    Adverse Event: Treatment Completion Status: Treatment Aborted/Not Restarted Due to Adverse Event(bowel urgency)    I was present on these premises and immediately available to furnish assistance & direction throughout the procedure. Plan          Amrit Summers. is a 80 y.o. male  did not successfully complete today's hyperbaric oxygen treatment at [address] In my clinical judgement, ongoing HBO therapy is necessary at this time, given a threat to patient function, limb or life from the current condition.       Supervision and attendance of Hyperbaric Oxygen Therapy provided. Continue HBO treatment as outlined in the treatment plan. Hyperbaric Oxygen: Aysha Din. tolerated Treatment Number: 22 well today without complications.      Electronically signed by Kale Bonilla DPM on 8/18/2020 at 11:20 AM

## 2022-10-18 NOTE — TELEPHONE ENCOUNTER
Gabriela Albright called in today they need to reschedule the person coming out to the house on 10/18/2022 She Stated that they was going to put a machine on Darios legs to help with the swelling and get the water off   They would prefer anytime after 2 or 3:30 this was something that was discussed with doctor Anne Marie Araujo can be reached at [phone]

## 2022-10-21 ENCOUNTER — TELEPHONE (OUTPATIENT)
Dept: CARDIOLOGY CLINIC | Age: 87
End: 2022-10-21

## 2022-10-21 NOTE — TELEPHONE ENCOUNTER
Left message that it is okay to take an extra torsemide over the weekend. Asked to call back on Monday with an update.

## 2022-10-21 NOTE — TELEPHONE ENCOUNTER
Nahid's daughter called and stated that his calf and shins are leaking water and he dose wear compression hose but she wants to know if he needs to cut back on his water intake     [facility]eb[facility] Spine can be reached [phone]

## 2022-10-24 ENCOUNTER — TELEPHONE (OUTPATIENT)
Dept: FAMILY MEDICINE CLINIC | Age: 87
End: 2022-10-24

## 2022-10-24 DIAGNOSIS — I50.32 CHRONIC DIASTOLIC HEART FAILURE (HCC): Primary | ICD-10-CM

## 2022-10-24 DIAGNOSIS — M19.90 ARTHRITIS: ICD-10-CM

## 2022-10-24 NOTE — TELEPHONE ENCOUNTER
[facility] DISCHARGED PATIENT ON 09/01/2022 DUE TO NON COMPLIANCE. SHOULD THIS WAIT FOR JOSE A ON Wednesday?  OR SEND NEW ORDER? DEANN

## 2022-10-24 NOTE — TELEPHONE ENCOUNTER
PAL CALLING AGAIN SHE IS GOING TO HAVE [employer] COME TO THE HOUSE FOR HER AND SHE WILL HAVE THEM COME FOR HUMBLE TOO -- SHE JUST NEEDS APPROVAL FROM JOSE A

## 2022-10-24 NOTE — TELEPHONE ENCOUNTER
PAL CALLING SHE NEEDS [facility] TO RECOMMEND [address]. HE IS WEAK. PAL @  [phone].  OR  [phone]

## 2022-10-24 NOTE — TELEPHONE ENCOUNTER
Patients daughter returned call and said that he his getting the lymphapress set up this week. He is also getting home care ordered by his PCP. Advised her to call with any additional concerns.

## 2022-10-24 NOTE — TELEPHONE ENCOUNTER
I placed a referral to [employer] to see if this works. Rayray Chaudhari can continue to manage this in the long-term.

## 2022-10-25 ENCOUNTER — APPOINTMENT (OUTPATIENT)
Dept: CT IMAGING | Age: 87
DRG: 280 | End: 2022-10-25
Payer: MEDICARE

## 2022-10-25 ENCOUNTER — TELEPHONE (OUTPATIENT)
Dept: FAMILY MEDICINE CLINIC | Age: 87
End: 2022-10-25

## 2022-10-25 ENCOUNTER — APPOINTMENT (OUTPATIENT)
Dept: GENERAL RADIOLOGY | Age: 87
DRG: 280 | End: 2022-10-25
Payer: MEDICARE

## 2022-10-25 ENCOUNTER — TELEPHONE (OUTPATIENT)
Dept: CARDIOLOGY CLINIC | Age: 87
End: 2022-10-25

## 2022-10-25 ENCOUNTER — HOSPITAL ENCOUNTER (INPATIENT)
Age: 87
LOS: 6 days | Discharge: NURSING, REHAB OR LONG-TERM CARE | DRG: 280 | End: 2022-10-31
Attending: EMERGENCY MEDICINE | Admitting: INTERNAL MEDICINE
Payer: MEDICARE

## 2022-10-25 DIAGNOSIS — R60.0 BILATERAL LOWER EXTREMITY EDEMA: ICD-10-CM

## 2022-10-25 DIAGNOSIS — R53.1 GENERALIZED WEAKNESS: Primary | ICD-10-CM

## 2022-10-25 DIAGNOSIS — J90 PLEURAL EFFUSION: ICD-10-CM

## 2022-10-25 DIAGNOSIS — R77.8 ELEVATED TROPONIN: ICD-10-CM

## 2022-10-25 DIAGNOSIS — R79.89 ELEVATED BRAIN NATRIURETIC PEPTIDE (BNP) LEVEL: ICD-10-CM

## 2022-10-25 DIAGNOSIS — R29.6 FREQUENT FALLS: ICD-10-CM

## 2022-10-25 PROBLEM — I50.33 ACUTE ON CHRONIC DIASTOLIC HEART FAILURE (HCC): Status: ACTIVE | Noted: 2022-10-25

## 2022-10-25 LAB
A/G RATIO: 0.9 (ref 1.1–2.2)
ALBUMIN SERPL-MCNC: 3.4 G/DL (ref 3.4–5)
ALP BLD-CCNC: 148 U/L (ref 40–129)
ALT SERPL-CCNC: 16 U/L (ref 10–40)
ANION GAP SERPL CALCULATED.3IONS-SCNC: 12 MMOL/L (ref 3–16)
APTT: 149.1 SEC (ref 23–34.3)
APTT: 43.9 SEC (ref 23–34.3)
AST SERPL-CCNC: 27 U/L (ref 15–37)
BACTERIA: NORMAL /HPF
BASOPHILS ABSOLUTE: 0 K/UL (ref 0–0.2)
BASOPHILS RELATIVE PERCENT: 0.4 %
BILIRUB SERPL-MCNC: 1.4 MG/DL (ref 0–1)
BILIRUBIN URINE: NEGATIVE
BLOOD, URINE: NEGATIVE
BUN BLDV-MCNC: 41 MG/DL (ref 7–20)
CALCIUM SERPL-MCNC: 9.4 MG/DL (ref 8.3–10.6)
CHLORIDE BLD-SCNC: 97 MMOL/L (ref 99–110)
CLARITY: CLEAR
CO2: 30 MMOL/L (ref 21–32)
COLOR: YELLOW
CREAT SERPL-MCNC: 1 MG/DL (ref 0.8–1.3)
EKG DIAGNOSIS: NORMAL
EKG Q-T INTERVAL: 428 MS
EKG QRS DURATION: 120 MS
EKG QTC CALCULATION (BAZETT): 484 MS
EKG R AXIS: -83 DEGREES
EKG T AXIS: 68 DEGREES
EKG VENTRICULAR RATE: 77 BPM
EOSINOPHILS ABSOLUTE: 0.1 K/UL (ref 0–0.6)
EOSINOPHILS RELATIVE PERCENT: 1.5 %
EPITHELIAL CELLS, UA: 0 /HPF (ref 0–5)
GFR SERPL CREATININE-BSD FRML MDRD: >60 ML/MIN/{1.73_M2}
GLUCOSE BLD-MCNC: 143 MG/DL (ref 70–99)
GLUCOSE BLD-MCNC: 143 MG/DL (ref 70–99)
GLUCOSE BLD-MCNC: 192 MG/DL (ref 70–99)
GLUCOSE BLD-MCNC: 207 MG/DL (ref 70–99)
GLUCOSE URINE: NEGATIVE MG/DL
HCT VFR BLD CALC: 38.7 % (ref 40.5–52.5)
HEMOGLOBIN: 12.5 G/DL (ref 13.5–17.5)
HYALINE CASTS: 0 /LPF (ref 0–8)
KETONES, URINE: ABNORMAL MG/DL
LEUKOCYTE ESTERASE, URINE: ABNORMAL
LYMPHOCYTES ABSOLUTE: 0.8 K/UL (ref 1–5.1)
LYMPHOCYTES RELATIVE PERCENT: 10.8 %
MCH RBC QN AUTO: 29.8 PG (ref 26–34)
MCHC RBC AUTO-ENTMCNC: 32.4 G/DL (ref 31–36)
MCV RBC AUTO: 91.9 FL (ref 80–100)
MICROSCOPIC EXAMINATION: YES
MONOCYTES ABSOLUTE: 0.9 K/UL (ref 0–1.3)
MONOCYTES RELATIVE PERCENT: 11.8 %
NEUTROPHILS ABSOLUTE: 5.8 K/UL (ref 1.7–7.7)
NEUTROPHILS RELATIVE PERCENT: 75.5 %
NITRITE, URINE: NEGATIVE
PDW BLD-RTO: 17.1 % (ref 12.4–15.4)
PERFORMED ON: ABNORMAL
PH UA: 7 (ref 5–8)
PLATELET # BLD: 260 K/UL (ref 135–450)
PMV BLD AUTO: 7.9 FL (ref 5–10.5)
POTASSIUM REFLEX MAGNESIUM: 3.7 MMOL/L (ref 3.5–5.1)
PRO-BNP: 5900 PG/ML (ref 0–449)
PROTEIN UA: 30 MG/DL
RBC # BLD: 4.21 M/UL (ref 4.2–5.9)
RBC UA: 3 /HPF (ref 0–4)
SODIUM BLD-SCNC: 139 MMOL/L (ref 136–145)
SPECIFIC GRAVITY UA: 1.02 (ref 1–1.03)
TOTAL PROTEIN: 7.4 G/DL (ref 6.4–8.2)
TROPONIN: 0.08 NG/ML
TROPONIN: 0.09 NG/ML
TROPONIN: 0.09 NG/ML
TROPONIN: 0.1 NG/ML
URINE REFLEX TO CULTURE: ABNORMAL
URINE TYPE: ABNORMAL
UROBILINOGEN, URINE: 1 E.U./DL
WBC # BLD: 7.7 K/UL (ref 4–11)
WBC UA: 1 /HPF (ref 0–5)

## 2022-10-25 PROCEDURE — 70450 CT HEAD/BRAIN W/O DYE: CPT

## 2022-10-25 PROCEDURE — 84484 ASSAY OF TROPONIN QUANT: CPT

## 2022-10-25 PROCEDURE — 72125 CT NECK SPINE W/O DYE: CPT

## 2022-10-25 PROCEDURE — 83036 HEMOGLOBIN GLYCOSYLATED A1C: CPT

## 2022-10-25 PROCEDURE — 80053 COMPREHEN METABOLIC PANEL: CPT

## 2022-10-25 PROCEDURE — 85730 THROMBOPLASTIN TIME PARTIAL: CPT

## 2022-10-25 PROCEDURE — 6360000002 HC RX W HCPCS: Performed by: INTERNAL MEDICINE

## 2022-10-25 PROCEDURE — 2500000003 HC RX 250 WO HCPCS: Performed by: INTERNAL MEDICINE

## 2022-10-25 PROCEDURE — 1200000000 HC SEMI PRIVATE

## 2022-10-25 PROCEDURE — 71045 X-RAY EXAM CHEST 1 VIEW: CPT

## 2022-10-25 PROCEDURE — 99285 EMERGENCY DEPT VISIT HI MDM: CPT

## 2022-10-25 PROCEDURE — 83880 ASSAY OF NATRIURETIC PEPTIDE: CPT

## 2022-10-25 PROCEDURE — 81001 URINALYSIS AUTO W/SCOPE: CPT

## 2022-10-25 PROCEDURE — 6370000000 HC RX 637 (ALT 250 FOR IP): Performed by: INTERNAL MEDICINE

## 2022-10-25 PROCEDURE — 93010 ELECTROCARDIOGRAM REPORT: CPT | Performed by: INTERNAL MEDICINE

## 2022-10-25 PROCEDURE — 85025 COMPLETE CBC W/AUTO DIFF WBC: CPT

## 2022-10-25 PROCEDURE — 93005 ELECTROCARDIOGRAM TRACING: CPT | Performed by: PHYSICIAN ASSISTANT

## 2022-10-25 PROCEDURE — 36415 COLL VENOUS BLD VENIPUNCTURE: CPT

## 2022-10-25 RX ORDER — INSULIN LISPRO 100 [IU]/ML
0-4 INJECTION, SOLUTION INTRAVENOUS; SUBCUTANEOUS NIGHTLY
Status: DISCONTINUED | OUTPATIENT
Start: 2022-10-25 | End: 2022-10-31 | Stop reason: HOSPADM

## 2022-10-25 RX ORDER — METOPROLOL SUCCINATE 50 MG/1
50 TABLET, EXTENDED RELEASE ORAL DAILY
Status: DISCONTINUED | OUTPATIENT
Start: 2022-10-26 | End: 2022-10-31 | Stop reason: HOSPADM

## 2022-10-25 RX ORDER — HEPARIN SODIUM 1000 [USP'U]/ML
4000 INJECTION, SOLUTION INTRAVENOUS; SUBCUTANEOUS PRN
Status: DISCONTINUED | OUTPATIENT
Start: 2022-10-25 | End: 2022-10-25

## 2022-10-25 RX ORDER — SODIUM CHLORIDE 9 MG/ML
INJECTION, SOLUTION INTRAVENOUS PRN
Status: DISCONTINUED | OUTPATIENT
Start: 2022-10-25 | End: 2022-10-31 | Stop reason: HOSPADM

## 2022-10-25 RX ORDER — FUROSEMIDE 10 MG/ML
40 INJECTION INTRAMUSCULAR; INTRAVENOUS 2 TIMES DAILY
Status: DISCONTINUED | OUTPATIENT
Start: 2022-10-26 | End: 2022-10-28

## 2022-10-25 RX ORDER — HEPARIN SODIUM 1000 [USP'U]/ML
2000 INJECTION, SOLUTION INTRAVENOUS; SUBCUTANEOUS PRN
Status: DISCONTINUED | OUTPATIENT
Start: 2022-10-25 | End: 2022-10-25

## 2022-10-25 RX ORDER — SODIUM CHLORIDE 0.9 % (FLUSH) 0.9 %
5-40 SYRINGE (ML) INJECTION EVERY 12 HOURS SCHEDULED
Status: DISCONTINUED | OUTPATIENT
Start: 2022-10-25 | End: 2022-10-31 | Stop reason: HOSPADM

## 2022-10-25 RX ORDER — GABAPENTIN 300 MG/1
600 CAPSULE ORAL NIGHTLY
Status: DISCONTINUED | OUTPATIENT
Start: 2022-10-25 | End: 2022-10-29

## 2022-10-25 RX ORDER — ACETAMINOPHEN 650 MG/1
650 SUPPOSITORY RECTAL EVERY 6 HOURS PRN
Status: DISCONTINUED | OUTPATIENT
Start: 2022-10-25 | End: 2022-10-31 | Stop reason: HOSPADM

## 2022-10-25 RX ORDER — INSULIN GLARGINE 100 [IU]/ML
20 INJECTION, SOLUTION SUBCUTANEOUS NIGHTLY
Status: DISCONTINUED | OUTPATIENT
Start: 2022-10-25 | End: 2022-10-28

## 2022-10-25 RX ORDER — ACETAMINOPHEN 325 MG/1
650 TABLET ORAL EVERY 6 HOURS PRN
Status: DISCONTINUED | OUTPATIENT
Start: 2022-10-25 | End: 2022-10-31 | Stop reason: HOSPADM

## 2022-10-25 RX ORDER — DEXTROSE MONOHYDRATE 100 MG/ML
INJECTION, SOLUTION INTRAVENOUS CONTINUOUS PRN
Status: DISCONTINUED | OUTPATIENT
Start: 2022-10-25 | End: 2022-10-31 | Stop reason: HOSPADM

## 2022-10-25 RX ORDER — INSULIN LISPRO 100 [IU]/ML
0-4 INJECTION, SOLUTION INTRAVENOUS; SUBCUTANEOUS
Status: DISCONTINUED | OUTPATIENT
Start: 2022-10-25 | End: 2022-10-31 | Stop reason: HOSPADM

## 2022-10-25 RX ORDER — POLYETHYLENE GLYCOL 3350 17 G/17G
17 POWDER, FOR SOLUTION ORAL DAILY PRN
Status: DISCONTINUED | OUTPATIENT
Start: 2022-10-25 | End: 2022-10-31 | Stop reason: HOSPADM

## 2022-10-25 RX ORDER — HEPARIN SODIUM 1000 [USP'U]/ML
4000 INJECTION, SOLUTION INTRAVENOUS; SUBCUTANEOUS ONCE
Status: COMPLETED | OUTPATIENT
Start: 2022-10-25 | End: 2022-10-25

## 2022-10-25 RX ORDER — HEPARIN SODIUM 1000 [USP'U]/ML
4000 INJECTION, SOLUTION INTRAVENOUS; SUBCUTANEOUS ONCE
Status: COMPLETED | OUTPATIENT
Start: 2022-10-26 | End: 2022-10-26

## 2022-10-25 RX ORDER — HEPARIN SODIUM 10000 [USP'U]/100ML
0-4000 INJECTION, SOLUTION INTRAVENOUS CONTINUOUS
Status: DISCONTINUED | OUTPATIENT
Start: 2022-10-25 | End: 2022-10-28

## 2022-10-25 RX ORDER — FUROSEMIDE 10 MG/ML
40 INJECTION INTRAMUSCULAR; INTRAVENOUS ONCE
Status: COMPLETED | OUTPATIENT
Start: 2022-10-25 | End: 2022-10-25

## 2022-10-25 RX ORDER — MECLIZINE HCL 12.5 MG/1
12.5 TABLET ORAL 3 TIMES DAILY PRN
Status: DISCONTINUED | OUTPATIENT
Start: 2022-10-25 | End: 2022-10-31 | Stop reason: HOSPADM

## 2022-10-25 RX ORDER — SODIUM CHLORIDE 0.9 % (FLUSH) 0.9 %
10 SYRINGE (ML) INJECTION PRN
Status: DISCONTINUED | OUTPATIENT
Start: 2022-10-25 | End: 2022-10-31 | Stop reason: HOSPADM

## 2022-10-25 RX ADMIN — HEPARIN SODIUM 1000 UNITS/HR: 10000 INJECTION, SOLUTION INTRAVENOUS at 15:23

## 2022-10-25 RX ADMIN — INSULIN GLARGINE 20 UNITS: 100 INJECTION, SOLUTION SUBCUTANEOUS at 20:36

## 2022-10-25 RX ADMIN — GABAPENTIN 600 MG: 300 CAPSULE ORAL at 20:35

## 2022-10-25 RX ADMIN — HEPARIN SODIUM 4000 UNITS: 1000 INJECTION INTRAVENOUS; SUBCUTANEOUS at 15:20

## 2022-10-25 RX ADMIN — FUROSEMIDE 40 MG: 10 INJECTION, SOLUTION INTRAMUSCULAR; INTRAVENOUS at 14:47

## 2022-10-25 ASSESSMENT — ENCOUNTER SYMPTOMS
SHORTNESS OF BREATH: 0
ABDOMINAL PAIN: 0
VOMITING: 0
COUGH: 0
COLOR CHANGE: 1
NAUSEA: 0
CONSTIPATION: 0
DIARRHEA: 0

## 2022-10-25 ASSESSMENT — PAIN - FUNCTIONAL ASSESSMENT: PAIN_FUNCTIONAL_ASSESSMENT: NONE - DENIES PAIN

## 2022-10-25 NOTE — ED PROVIDER NOTES
[facility]  eMERGENCY dEPARTMENT eNCOUnter   Physician Attestation    Pt Name: iMke Sexton  MRN: [id number]  Fiorellagfgianna 1935  Date of evaluation: 10/25/22        Physician Note:    This patient was seen by the advanced practice provider. I have personally performed a face to face diagnostic evaluation on this patient and performed a substantive portion of the visit including all aspects of the medical decision making. History: Briefly, 81 y.o. male presents with emergency department because of leg edema. I spoke to the patient's wife. He has been getting progressively weaker and weaker and falling frequently. Apparently he was scheduled to see Dr. Holden Luong for what sounds like being set up for pneumatic compression of his lower extremities. Apparently he just slipped off the bed but according to his wife he has been getting progressively worse. His wife is having trouble being able to take care of him. She does get out of the hospital herself. She does state that he does have some bedsores as she has been trying to take care of. 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 Physical Exam  Constitutional:       Appearance: He is well-developed. He is not diaphoretic. HENT:      Head: Normocephalic and atraumatic.       Right Ear: External ear normal.      Left Ear: External ear normal.   Eyes:      General: No scleral icterus. Right eye: No discharge. Left eye: No discharge. Neck:      Thyroid: No thyromegaly. Vascular: No JVD. Trachea: No tracheal deviation. Cardiovascular:      Rate and Rhythm: Normal rate. Rhythm irregularly irregular. Heart sounds: Normal heart sounds. No murmur heard. No friction rub. No gallop. Comments: Patient has some erythema and warmth of the bilateral lower extremities but more prominent on the left. Pulmonary:      Effort: Pulmonary effort is normal. No respiratory distress. Breath sounds: No stridor. Examination of the left-middle field reveals decreased breath sounds. Examination of the left-lower field reveals decreased breath sounds. Decreased breath sounds present. No wheezing or rales. Abdominal:      General: There is no distension. Palpations: Abdomen is soft. Tenderness: There is no abdominal tenderness. There is no guarding or rebound. Musculoskeletal:         General: No tenderness. Cervical back: Normal range of motion. Right lower leg: 3+ Pitting Edema present. Left lower leg: 3+ Pitting Edema present. Skin:     General: Skin is warm and dry. Findings: No rash (On exposed body surfaces). Neurological:      Mental Status: He is alert and oriented to person, place, and time. Coordination: Coordination normal.   Psychiatric:         Behavior: Behavior normal.         Thought Content: Thought content normal.                 MDM:     EKG visualized preliminarily interpreted by myself shows atrial fibrillation at a rate of 77. Left anterior fascicular block with an axis of -83 as well as a right bundle branch block. No significant change compared to previous.     Results for orders placed or performed during the hospital encounter of 10/25/22   CBC with Auto Differential   Result Value Ref Range    WBC 7.7 4.0 - 11.0 K/uL RBC 4.21 4.20 - 5.90 M/uL    Hemoglobin 12.5 (L) 13.5 - 17.5 g/dL    Hematocrit 38.7 (L) 40.5 - 52.5 %    MCV 91.9 80.0 - 100.0 fL    MCH 29.8 26.0 - 34.0 pg    MCHC 32.4 31.0 - 36.0 g/dL    RDW 17.1 (H) 12.4 - 15.4 %    Platelets 022 402 - 903 K/uL    MPV 7.9 5.0 - 10.5 fL    Neutrophils % 75.5 %    Lymphocytes % 10.8 %    Monocytes % 11.8 %    Eosinophils % 1.5 %    Basophils % 0.4 %    Neutrophils Absolute 5.8 1.7 - 7.7 K/uL    Lymphocytes Absolute 0.8 (L) 1.0 - 5.1 K/uL    Monocytes Absolute 0.9 0.0 - 1.3 K/uL    Eosinophils Absolute 0.1 0.0 - 0.6 K/uL    Basophils Absolute 0.0 0.0 - 0.2 K/uL   Comprehensive Metabolic Panel w/ Reflex to MG   Result Value Ref Range    Sodium 139 136 - 145 mmol/L    Potassium reflex Magnesium 3.7 3.5 - 5.1 mmol/L    Chloride 97 (L) 99 - 110 mmol/L    CO2 30 21 - 32 mmol/L    Anion Gap 12 3 - 16    Glucose 192 (H) 70 - 99 mg/dL    BUN 41 (H) 7 - 20 mg/dL    Creatinine 1.0 0.8 - 1.3 mg/dL    Est, Glom Filt Rate >60 >60    Calcium 9.4 8.3 - 10.6 mg/dL    Total Protein 7.4 6.4 - 8.2 g/dL    Albumin 3.4 3.4 - 5.0 g/dL    Albumin/Globulin Ratio 0.9 (L) 1.1 - 2.2    Total Bilirubin 1.4 (H) 0.0 - 1.0 mg/dL    Alkaline Phosphatase 148 (H) 40 - 129 U/L    ALT 16 10 - 40 U/L    AST 27 15 - 37 U/L   Troponin   Result Value Ref Range    Troponin 0.09 (H) <0.01 ng/mL   Brain Natriuretic Peptide   Result Value Ref Range    Pro-BNP 5,900 (H) 0 - 449 pg/mL   Urinalysis with Reflex to Culture    Specimen: Urine   Result Value Ref Range    Color, UA Yellow Straw/Yellow    Clarity, UA Clear Clear    Glucose, Ur Negative Negative mg/dL    Bilirubin Urine Negative Negative    Ketones, Urine TRACE (A) Negative mg/dL    Specific Gravity, UA 1.021 1.005 - 1.030    Blood, Urine Negative Negative    pH, UA 7.0 5.0 - 8.0    Protein, UA 30 (A) Negative mg/dL    Urobilinogen, Urine 1.0 <2.0 E.U./dL    Nitrite, Urine Negative Negative    Leukocyte Esterase, Urine SMALL (A) Negative    Microscopic Examination YES     Urine Type NotGiven     Urine Reflex to Culture Not Indicated    Microscopic Urinalysis   Result Value Ref Range    Bacteria, UA None Seen None Seen /HPF    Hyaline Casts, UA 0 0 - 8 /LPF    WBC, UA 1 0 - 5 /HPF    RBC, UA 3 0 - 4 /HPF    Epithelial Cells, UA 0 0 - 5 /HPF   EKG 12 Lead   Result Value Ref Range    Ventricular Rate 77 BPM    QRS Duration 120 ms    Q-T Interval 428 ms    QTc Calculation (Bazett) 484 ms    R Axis -83 degrees    T Axis 68 degrees    Diagnosis       Atrial fibrillationLeft axis deviationRight bundle branch blockInferior infarct , age undeterminedAnterolateral infarct , age undeterminedAbnormal ECG       CRITICAL CARE  I personally saw the patient and independently provided 0 minutes of non-concurrent critical care out of the total shared critical care time provided. This excludes seperately billable procedures. Critical care time was provided for 0 that required close evaluation and/or intervention with concern for potential patient decompensation. 1. Generalized weakness    2. Frequent falls    3. Pleural effusion    4. Bilateral lower extremity edema    5. Elevated troponin    6. Elevated brain natriuretic peptide (BNP) level          DISPOSITION/PLAN  PATIENT REFERRED TO:  No follow-up provider specified. DISCHARGE MEDICATIONS:  New Prescriptions    No medications on file         No Lo MD        This report has been produced using speech recognition software and may contain errors related to that system including errors in grammar, punctuation, and spelling, as well as words and phrases that may be inappropriate. If there are any questions or concerns please feel free to contact the dictating provider for clarification.        No Lo MD  10/25/22 [id number]

## 2022-10-25 NOTE — PROGRESS NOTES
4 Eyes Skin Assessment     NAME:  Evelin Perkins  YOB: 1935  MEDICAL RECORD NUMBER:  [id number]    The patient is being assess for  Admission    I agree that 2 RN's have performed a thorough Head to Toe Skin Assessment on the patient. ALL assessment sites listed below have been assessed. Areas assessed by both nurses:    Head, Face, Ears, Shoulders, Back, Chest, Arms, Elbows, Hands, Sacrum. Buttock, Coccyx, Ischium, and Legs. Feet and Heels        Does the Patient have a Wound? Yes wound(s) were present on assessment.  LDA wound assessment was Initiated and completed        Theodore Prevention initiated:  Yes   Wound Care Orders initiated:  Yes    Pressure Injury (Stage 3,4, Unstageable, DTI, NWPT, and Complex wounds) if present place referral/consult order under [de-identified] No    New and Established Ostomies if present place consult order under : No      Nurse 1 eSignature: Electronically signed by Moncho Aguila RN on 10/25/22 at [address] PM EDT    **SHARE this note so that the co-signing nurse is able to place an eSignature**    Nurse 2 eSignature: Electronically signed by Markell Agarwal RN on 10/25/22 at [address] PM EDT

## 2022-10-25 NOTE — PROGRESS NOTES
Clinical Pharmacy Note  Heparin Dosing Consult    Mike Sexton is a 81 y.o. male ordered heparin per low dose nomogram by Dr. Paty Raines. Lab Results   Component Value Date/Time    APTT 149.1 10/25/2022 03:33 PM     Lab Results   Component Value Date/Time    HGB 12.5 10/25/2022 12:02 PM    HCT 38.7 10/25/2022 12:02 PM     10/25/2022 12:02 PM       Ht Readings from Last 1 Encounters:   10/25/22 6' 1\" (1.854 m)        Wt Readings from Last 1 Encounters:   10/25/22 235 lb 14.3 oz (107 kg)        Assessment/Plan:  Initial bolus: 4000 units  Initial infusion rate: 4000 units/hr  Next aPTT: 2100 10/25/22    Pharmacy will continue to monitor adjust heparin based on aPTT results using nomogram below:     CAD/STEMI/NSTEMI/UA/AFIB Heparin Nomogram     Initial Bolus: 60 units/kg Max Bolus: 4,000 units       Initial Rate: 12 units/kg/hr Max Initial Rate: 1,000 units/hr     aPTT < 59    Heparin 60 units/kg bolus Increase infusion by 4 units/kg/hr        (maximum 4,000 units)   aPTT 59.1-72.9 Heparin 30 units/kg bolus Increase infusion by 2 units/kg/hr        (maximum 2,000 units)   aPTT     No bolus   No change   aPTT 102.1-109 No bolus   Decrease infusion by 1 units/kg/hr   aPTT 109.1-122.9 No bolus     Decrease infusion by 2 units/kg/hr   aPTT > 123    Hold heparin for 1 hour Decrease infusion by 3 units/kg/hr     Obtain aPTT 6 hours after initial bolus and 6 hours after any dose change until two consecutive therapeutic aPTTs are achieved - then daily.

## 2022-10-25 NOTE — ED TRIAGE NOTES
Pt.as trying to make his way to the toilet, became weak and his wife and daughter lowered him to the ground. Pt. was unable to get up after down. No

## 2022-10-25 NOTE — ED PROVIDER NOTES
EMERGENCY DEPARTMENT ENCOUNTER        Pt Name: Lita Davies  MRN: [id number]  [place] 1935  Date of evaluation: 10/25/2022  Provider: REBEKAH Santoyo  PCP: MARIA FERNANDA Moreno CNP  Note Started: 11:27 AM EDT        I have seen and evaluated this patient with my supervising physician Dr Mikki Mandel   Patient presents with    Fall     Pt. fell prior to making it to the toilet. Wife and daughter helped lower him to the floor. HISTORY OF PRESENT ILLNESS   (Location, Timing/Onset, Context/Setting, Quality, Duration, Modifying Factors, Severity, Associated Signs and Symptoms)  Note limiting factors. Chief Complaint: Kristin Linares is a 81 y.o. male who presents to the ED today with complaints of a fall. He uses a walker, and was on his way into the bathroom and started to feel very weak. He tried to grab onto the sink, and his daughter heard him falter, and was able to get to him quickly and helped him lower himself to the floor. He states he remembers the entire event and did not sustain any injuries in the fall today. EMS was called because of where he was lodged, and states that when they came in to get him off the floor and out of the room he may have gotten a few scrapes as a result of maneuvering about the bathroom and house. States he is here because of the episode of weakness that occurred today, causing him to end up on the ground. He adds that three days he tells me he rolled off the bed right onto the carpeted ground. Denies hitting his head or sustaining injuries in that fall as well. EMS was called that time as well, and they put him back in bed and he was not seen for that fall. However, since he fell again today they opted to have him brought in for evaluation today. Denies chest pain, SOB, abdominal pain, headache, dizziness. Denies urinary symptoms, constipation. He has no further complaints at this time. Nursing Notes were all reviewed and agreed with or any disagreements were addressed in the HPI. REVIEW OF SYSTEMS    (2-9 systems for level 4, 10 or more for level 5)     Review of Systems   Constitutional:  Negative for chills and fever. Respiratory:  Negative for cough and shortness of breath. Cardiovascular:  Negative for chest pain and palpitations. Gastrointestinal:  Negative for abdominal pain, constipation, diarrhea, nausea and vomiting. Genitourinary:  Negative for dysuria, frequency and urgency. Musculoskeletal:  Negative for arthralgias, gait problem and joint swelling. Skin:  Positive for color change and rash. Neurological:  Positive for weakness (generalized). Negative for dizziness, syncope, light-headedness, numbness (no change from his baseline paresthesia) and headaches. Psychiatric/Behavioral:  Negative for agitation and confusion. Positives and Pertinent negatives as per HPI. Except as noted above in the ROS, all other systems were reviewed and negative.        PAST MEDICAL HISTORY     Past Medical History:   Diagnosis Date    Atrial fibrillation ([facility] [address] 09/2018    Atrial fib    Degeneration of cervical intervertebral disc 98/75/9289    Diastolic heart failure ([facility] [address] 09/2018    Diverticulosis of colon (without mention of hemorrhage) 10/22/2010    Essential hypertension, benign 10/22/2010    [place] (hard of hearing)     Hyperlipidemia 10/22/2010    Mononeuritis of unspecified site 10/22/2010    Osteoarthrosis, unspecified whether generalized or localized, pelvic region and thigh 10/22/2010    Other specified iron deficiency anemias 10/22/2010    Rosacea 10/22/2010    Seborrheic dermatitis 10/22/2010    Type II or unspecified type diabetes mellitus without mention of complication, not stated as uncontrolled 10/22/2010    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 10/22/2010    Unspecified pruritic disorder 10/22/2010         SURGICAL HISTORY     Past Surgical History:   Procedure Laterality Date    CARDIOVASCULAR STRESS TEST  09/2018    negative    CARPAL TUNNEL RELEASE Right 05/16/2018    HAND SURGERY      Left carpel tunnel     HIP SURGERY Bilateral     total hips         CURRENTMEDICATIONS       Current Discharge Medication List        CONTINUE these medications which have NOT CHANGED    Details   Elastic Bandages & Supports ([address]) [address] [facility] 1 each by Does not apply route daily as needed (daily thigh high compression stockings (15-20 mmHg))  Qty: 1 each, Refills: 0      gabapentin (NEURONTIN) 300 MG capsule TAKE 3 TABLETS BY MOUTH NIGHTLY  Qty: 270 capsule, Refills: 1    Associated Diagnoses: Neuropathy; Type 2 diabetes mellitus with diabetic polyneuropathy, with long-term current use of insulin ([facility])      diclofenac (VOLTAREN) 50 MG EC tablet Take 1 tablet by mouth in the morning and 1 tablet at noon and 1 tablet in the evening. Take with meals.   Qty: 90 tablet, Refills: 0    Associated Diagnoses: Chronic pain syndrome      apixaban (ELIQUIS) 5 MG TABS tablet TAKE 1 TABLET BY MOUTH TWICE A DAY  Qty: 60 tablet, Refills: 2    Associated Diagnoses: Chronic atrial fibrillation ([facility])      metoprolol succinate (TOPROL XL) 50 MG extended release tablet TAKE 1 TABLET BY MOUTH EVERY DAY  Qty: 90 tablet, Refills: 1    Associated Diagnoses: Primary hypertension; NSTEMI (non-ST elevated myocardial infarction) ([facility])      torsemide (DEMADEX) 20 MG tablet 20 mg daily except on MWF 20 mg BID  Qty: 180 tablet, Refills: 3    Associated Diagnoses: Chronic diastolic HF (heart failure) ([facility])      insulin glargine (BASAGLAR KWIKPEN) 100 UNIT/ML injection pen INJECT 25 UNITS UNDER THE SKIN ONCE DAILY  Qty: 12 pen, Refills: 1    Associated Diagnoses: Type 2 diabetes mellitus with diabetic polyneuropathy, with long-term current use of insulin ([facility])      diclofenac sodium (VOLTAREN) 1 % GEL Apply 2 g topically 4 times daily  Qty: 350 g, Refills: 3    Associated Diagnoses: Arthritis of shoulder      Insulin Pen Needle (B-D ULTRAFINE III SHORT PEN) 31G X 8 MM MISC USE DAILY WITH INSULIN  Qty: 100 each, Refills: 3    Associated Diagnoses: Type 2 diabetes mellitus with diabetic polyneuropathy, with long-term current use of insulin ([facility])               ALLERGIES     Levofloxacin    FAMILYHISTORY       Family History   Family history unknown: Yes          SOCIAL HISTORY       Social History     Tobacco Use    Smoking status: Former     Types: Cigarettes     Quit date:      Years since quittin.8    Smokeless tobacco: Never   Vaping Use    Vaping Use: Never used   Substance Use Topics    Alcohol use: Never     Alcohol/week: 0.0 standard drinks     Comment: very seldom     Drug use: No       SCREENINGS    [place] Coma Scale  Eye Opening: Spontaneous  Best Verbal Response: Oriented  Best Motor Response: Obeys commands  [place] Coma Scale Score: 15        PHYSICAL EXAM    (up to 7 for level 4, 8 or more for level 5)     ED Triage Vitals [10/25/22 1106]   BP Temp Temp Source Heart Rate Resp SpO2 Height Weight   113/72 97.8 °F (36.6 °C) Oral 77 14 97 % 6' 1\" (1.854 m) 235 lb 14.3 oz (107 kg)       Physical Exam  Vitals and nursing note reviewed. Constitutional:       General: He is not in acute distress. Appearance: Normal appearance. He is well-developed. He is not ill-appearing, toxic-appearing or diaphoretic. HENT:      Head: Normocephalic. Contusion present. Right Ear: Tympanic membrane, ear canal and external ear normal. No hemotympanum. Left Ear: Tympanic membrane, ear canal and external ear normal. No hemotympanum. Mouth/Throat:      Mouth: Mucous membranes are moist.      Pharynx: Oropharynx is clear. Eyes:      Conjunctiva/sclera: Conjunctivae normal.      Pupils: Pupils are equal, round, and reactive to light. Cardiovascular:      Rate and Rhythm: Normal rate. Rhythm irregular.    Pulmonary:      Effort: Pulmonary effort is normal. No respiratory distress. Breath sounds: Normal breath sounds. Abdominal:      General: Bowel sounds are normal. There is no distension. Palpations: Abdomen is soft. Tenderness: There is no abdominal tenderness. There is no guarding or rebound. Musculoskeletal:      Cervical back: Normal.      Thoracic back: Normal.      Lumbar back: Normal.      Right hip: No tenderness. Normal range of motion. Left hip: No tenderness. Normal range of motion. Right knee: No deformity. No tenderness. Left knee: No deformity. No tenderness. Right lower leg: Swelling present. No lacerations. Edema present. Left lower leg: Swelling present. No lacerations. Edema present. Comments: Erythema bilateral lower extremities, left > right   Skin:     General: Skin is warm and dry. Neurological:      Mental Status: He is alert. Psychiatric:         Behavior: Behavior normal. Behavior is cooperative.                      DIAGNOSTIC RESULTS   LABS:    Labs Reviewed   CBC WITH AUTO DIFFERENTIAL - Abnormal; Notable for the following components:       Result Value    Hemoglobin 12.5 (*)     Hematocrit 38.7 (*)     RDW 17.1 (*)     Lymphocytes Absolute 0.8 (*)     All other components within normal limits   COMPREHENSIVE METABOLIC PANEL W/ REFLEX TO MG FOR LOW K - Abnormal; Notable for the following components:    Chloride 97 (*)     Glucose 192 (*)     BUN 41 (*)     Albumin/Globulin Ratio 0.9 (*)     Total Bilirubin 1.4 (*)     Alkaline Phosphatase 148 (*)     All other components within normal limits   TROPONIN - Abnormal; Notable for the following components:    Troponin 0.09 (*)     All other components within normal limits   BRAIN NATRIURETIC PEPTIDE - Abnormal; Notable for the following components:    Pro-BNP 5,900 (*)     All other components within normal limits   URINALYSIS WITH REFLEX TO CULTURE - Abnormal; Notable for the following components:    Ketones, Urine TRACE (*)     Protein, UA 30 (*) Leukocyte Esterase, Urine SMALL (*)     All other components within normal limits   TROPONIN - Abnormal; Notable for the following components:    Troponin 0.09 (*)     All other components within normal limits   TROPONIN - Abnormal; Notable for the following components:    Troponin 0.08 (*)     All other components within normal limits   APTT - Abnormal; Notable for the following components:    aPTT 149.1 (*)     All other components within normal limits    Narrative:     Anila Mcdowell tel. [phone],  Coag results called to and read back by Best Plascencia, 10/25/2022 16:06, by  Onesimo Etienne   POCT GLUCOSE - Abnormal; Notable for the following components:    POC Glucose 143 (*)     All other components within normal limits   POCT GLUCOSE - Abnormal; Notable for the following components:    POC Glucose 143 (*)     All other components within normal limits   POCT GLUCOSE - Abnormal; Notable for the following components:    POC Glucose 207 (*)     All other components within normal limits   MICROSCOPIC URINALYSIS   TROPONIN   HEMOGLOBIN A1C   APTT   COMPREHENSIVE METABOLIC PANEL   PHOSPHORUS   CBC WITH AUTO DIFFERENTIAL   MAGNESIUM   LIPID PANEL   POCT GLUCOSE   POCT GLUCOSE   POCT GLUCOSE   POCT GLUCOSE   POCT GLUCOSE   POCT GLUCOSE   POCT GLUCOSE       When ordered only abnormal lab results are displayed. All other labs were within normal range or not returned as of this dictation. EKG: When ordered, EKG's are interpreted by the Emergency Department Physician in the absence of a cardiologist.  Please see their note for interpretation of EKG.     RADIOLOGY:   Non-plain film images such as CT, Ultrasound and MRI are read by the radiologist. Plain radiographic images are visualized and preliminarily interpreted by the ED Provider with the below findings:        Interpretation per the Radiologist below, if available at the time of this note:    CT HEAD WO CONTRAST   Final Result   No acute intracranial abnormality Atrophy and small-vessel ischemic change         CT CERVICAL SPINE WO CONTRAST   Final Result   1. No acute fracture. 2. Incompletely imaged partially loculated left pleural effusion. XR CHEST PORTABLE   Final Result   Large left-sided pleural effusion with adjacent left lung consolidation. It   is uncertain as to whether not wise pre-existing or secondary to the history   of trauma      Right lower lobe airspace disease, either atelectasis or pneumonia. CT CHEST WO CONTRAST    (Results Pending)     No results found.         PROCEDURES   Unless otherwise noted below, none     Procedures    CONSULTS:  IP CONSULT TO DIETITIAN      EMERGENCY DEPARTMENT COURSE and DIFFERENTIAL DIAGNOSIS/MDM:   Vitals:    Vitals:    10/25/22 1600 10/25/22 1630 10/25/22 1745 10/1935   BP: 109/76 112/67 113/70 112/63   Pulse: 79 74 73 81   Resp: 13 12 16 18   Temp:   98 °F (36.7 °C) 97.7 °F (36.5 °C)   TempSrc:   Oral Oral   SpO2:  94% 98% 96%   Weight:       Height:           Patient was given the following medications:  Medications   trimethobenzamide (TIGAN) injection 200 mg (has no administration in time range)   meclizine (ANTIVERT) tablet 12.5 mg (has no administration in time range)   gabapentin (NEURONTIN) capsule 600 mg (has no administration in time range)   insulin glargine (LANTUS) injection vial 20 Units (has no administration in time range)   metoprolol succinate (TOPROL XL) extended release tablet 50 mg (has no administration in time range)   glucose chewable tablet 16 g (has no administration in time range)   dextrose bolus 10% 125 mL (has no administration in time range)     Or   dextrose bolus 10% 250 mL (has no administration in time range)   glucagon (rDNA) injection 1 mg (has no administration in time range)   dextrose 10 % infusion (has no administration in time range)   insulin lispro (HUMALOG) injection vial 0-4 Units (0 Units SubCUTAneous Not Given 10/25/22 1748)   insulin lispro (HUMALOG) injection vial 0-4 Units (has no administration in time range)   sodium chloride flush 0.9 % injection 5-40 mL (has no administration in time range)   sodium chloride flush 0.9 % injection 10 mL (has no administration in time range)   0.9 % sodium chloride infusion (has no administration in time range)   polyethylene glycol (GLYCOLAX) packet 17 g (has no administration in time range)   acetaminophen (TYLENOL) tablet 650 mg (has no administration in time range)     Or   acetaminophen (TYLENOL) suppository 650 mg (has no administration in time range)   furosemide (LASIX) injection 40 mg (has no administration in time range)   heparin 25,000 unit in sodium chloride 0.45% 250 mL (premix) infusion (1,000 Units/hr IntraVENous New Bag 10/25/22 1523)   furosemide (LASIX) injection 40 mg (40 mg IntraVENous Given 10/25/22 1447)   heparin (porcine) injection 4,000 Units (4,000 Units IntraVENous Given 10/25/22 1520)         Is this patient to be included in the SEP-1 Core Measure due to severe sepsis or septic shock? No   Exclusion criteria - the patient is NOT to be included for SEP-1 Core Measure due to: Infection is not suspected    ED COURSE & MEDICAL DECISION MAKING    - The patient presented to the ER with complaints of weakness. Vital signs were reviewed. Exam as above. Peripheral IV placed. Labs, Imaging ordered. - Pertinent Labs & Imaging studies reviewed. (See chart for details)   -  Patient seen and evaluated in the emergency department. -  Triage and nursing notes reviewed and incorporated. -  Old chart records reviewed and incorporated. Pt last had echo 5/2021 and had EF 50-55%.   -  Code status: FULL  -   I have seen and evaluated this patient with my supervising physician Dr Jude Tompkins  -  Differential diagnosis includes: kidney stone, pyelonephritis, UTI, appendicitis, bowel obstruction, diverticulitis, hernia, gastritis/gastroenteritis, pancreatitis, cholecystitis, hepatitis, constipation, IBS, IBD, intracranial bleed, stroke, electrolyte disturbance, dehydration, other  -  Work-up included:  See above  - Consults: Hospitalist for admission  -  Results discussed with patient and/or family. Labs show no leukocytosis or concerning anemia. Metabolic panel with chloride of 97, glucose 192, BUN 41. Bilirubin is 1.4, alk phos is 148. Troponin is 0.9, BNP is 5900. Urine with no evidence of infection. . Imaging studies show large left-sided pleural effusion with adjacent left lung consolidation. CT of the head and cervical spine with no acute intracranial abnormality, no acute fracture of the cervical spine. . Please see attending physician's note for EKG interpretation. At this time, we recommend admission, as the patient would benefit from admission for further evaluation and management. . The patient and/or family is agreeable with plan of care and disposition.  -  Disposition:  Admission  - Critical Care: The total critical care time I independently spent while evaluating and treating this patient was 21 minutes. This excludes time spent doing separately billable procedures. This includes time at the bedside, data interpretation, medication management, obtaining critical history from collateral sources if the patient is unable to provide it directly, and physician consultation. Specifics of interventions taken and potentially life-threatening diagnostic considerations are listed above in the medical decision making. If this was a shared visit with an physician, the time in this attestation is non-concurrent critical care time out of the total shared critical care time provided by the physician and myself. FINAL IMPRESSION      1. Generalized weakness    2. Frequent falls    3. Pleural effusion    4. Bilateral lower extremity edema    5. Elevated troponin    6.  Elevated brain natriuretic peptide (BNP) level          DISPOSITION/PLAN   DISPOSITION Admitted 10/25/2022 02:46:38 PM      PATIENT REFERRED TO:  No follow-up provider specified.     DISCHARGE MEDICATIONS:  Current Discharge Medication List          DISCONTINUED MEDICATIONS:  Current Discharge Medication List        STOP taking these medications       TRIAMCINOLONE ACETONIDE EX Comments:   Reason for Stopping:         Continuous Blood Gluc Sensor (FREESTYLE BARRON 2 SENSOR) MISC Comments:   Reason for Stopping:         Continuous Blood Gluc  (FREESTYLE BARRON 2 READER) KORIN Comments:   Reason for Stopping:                      (Please note that portions of this note were completed with a voice recognition program.  Efforts were made to edit the dictations but occasionally words are mis-transcribed.)    REBEKAH Salazar (electronically signed)           Kacie Salazar  10/25/22 2003

## 2022-10-25 NOTE — PROGRESS NOTES
Patient is now in room 4250, VSS on room air, BGS. Tele box applied. Will start Theodore prevention orders. All fall precautions are in place. Patient is A+Ox4- orientation to the room was provided and all questions answered. All needs meet at this time.      Electronically signed by Keron Ritchie RN on 10/25/2022 at 5:56 PM

## 2022-10-25 NOTE — TELEPHONE ENCOUNTER
----- Message from [address] sent at 10/25/2022 11:25 AM EDT -----  Subject: Message to Provider    QUESTIONS  Information for Provider? Patient fell and was sent to the ER. His wife is   hoping he'll be admitted and wanted to send the update to office so   Hasmukh Mccormack was aware. If you need to contact his wife, Sara Robbins, then   you can leave a message if she doesn't answer  ---------------------------------------------------------------------------  --------------  [id number]  [phone]; OK to leave message on voicemail  ---------------------------------------------------------------------------  --------------  SCRIPT ANSWERS  Relationship to Patient? Other  Representative Name? Leonidas Valentine, wife  Is the Representative on the appropriate HIPAA document in Epic?  Yes

## 2022-10-25 NOTE — PROGRESS NOTES
Medication Reconciliation    List of medications patient is currently taking is complete. Source of information: 1. Conversation with patient at bedside                                      2. EPIC records      Allergies  Levofloxacin     Notes regarding home medications:   1. Patient did not receive any of his home medications prior to arrival to the ER today. 2. Patient's Gabapentin dose is 600 mg po QHS.     Viet Simmons [facility], PharmD, BCPS  10/25/2022 3:40 PM

## 2022-10-25 NOTE — H&P
Hospital Medicine History and Physical    10/25/2022    Date of Admission: 10/25/2022    Date of Service: Pt seen/examined on 10/25/2022 and admitted to inpatient. Assessment:  Acute on chronic diastolic heart failure. Generalized weakness, recurrent falls. Left pleural effusion, possibly loculated. Obtain CT-chest.  Abnormal chest x-ray; suspect more atelectasis than pneumonia. Elevated troponin from baseline, possibly type II NSTEMI. Chronic venous stasis dermatitis, lymphedema. History of atrial fibrillation, currently rate controlled. Other comorbidities: history of paroxysmal atrial fibrillation (holding Eliquis), degenerative disc disease, chronic diastolic heart failure, essential hypertension, hyperlipidemia, osteoarthritis, uncontrolled diabetes type 2 with hyperglycemia. Plan:  Hold Eliquis. Start heparin infusion, as patient may need thoracentesis. Start IV Lasix 40 mg twice daily. Maintain on fluid restrictions, monitor fluid in/out. Check echocardiogram.  Trend troponin. Consider pulmonology consult as patient may need thoracentesis. Maintain on fall precautions. Therapy consult to assist with evaluation. May need SNF at discharge. Activities: Up with assist  Prophylaxis: Heparin infusion   Code status: Full code    ==========================================================  Chief complaint:  Chief Complaint   Patient presents with    Fall     Pt. fell prior to making it to the toilet. Wife and daughter helped lower him to the floor. History of Presenting Illness: This is a pleasant 81 y.o. male with history of paroxysmal atrial fibrillation (on chronic anticoagulation with Eliquis), degenerative disc disease, chronic diastolic heart failure, essential hypertension, hyperlipidemia, osteoarthritis, uncontrolled diabetes type 2 with hyperglycemia, who presents to the emergency room with complaints of generalized weakness, recurrent falls at home.   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 Wife also reports difficulty caring for patient at home due to patient's comorbidities. Earlier today, he felt generally weak, did not fall but wife and daughter where able to lower him to the ground. Chest x-ray obtained in the emergency room reveals large left-sided pleural effusion with adjacent consolidation, right lower lobe airspace disease, concerning for atelectasis or pneumonia. Past Medical History:      Diagnosis Date    Atrial fibrillation ([facility] [address] 09/2018    Atrial fib    Degeneration of cervical intervertebral disc 66/70/4350    Diastolic heart failure ([facility] [address] 09/2018    Diverticulosis of colon (without mention of hemorrhage) 10/22/2010    Essential hypertension, benign 10/22/2010    [place] (hard of hearing)     Hyperlipidemia 10/22/2010    Mononeuritis of unspecified site 10/22/2010    Osteoarthrosis, unspecified whether generalized or localized, pelvic region and thigh 10/22/2010    Other specified iron deficiency anemias 10/22/2010    Rosacea 10/22/2010    Seborrheic dermatitis 10/22/2010    Type II or unspecified type diabetes mellitus without mention of complication, not stated as uncontrolled 10/22/2010    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 10/22/2010    Unspecified pruritic disorder 10/22/2010       Past Surgical History:      Procedure Laterality Date    CARDIOVASCULAR STRESS TEST  09/2018    negative    CARPAL TUNNEL RELEASE Right 05/16/2018    HAND SURGERY      Left carpel tunnel     HIP SURGERY Bilateral     total hips       Medications (prior to admission):  Prior to Admission medications    Medication Sig Start Date End Date Taking?  Authorizing Provider   Elastic Bandages & Supports (MEDICAL COMPRESSION STOCKINGS) MISC 1 each by Does not apply route daily as needed (daily thigh high compression stockings (15-20 mmHg)) 9/19/22   Lilliana Phelan MD gabapentin (NEURONTIN) 300 MG capsule TAKE 3 TABLETS BY MOUTH NIGHTLY 8/30/22 10/10/22  MARIA FERNANDA Rhodes CNP   diclofenac (VOLTAREN) 50 MG EC tablet Take 1 tablet by mouth in the morning and 1 tablet at noon and 1 tablet in the evening. Take with meals. 7/25/22   MARIA FERANNDA Ness CNP   apixaban (ELIQUIS) 5 MG TABS tablet TAKE 1 TABLET BY MOUTH TWICE A DAY 7/25/22   MARIA FERNANDA Ness CNP   metoprolol succinate (TOPROL XL) 50 MG extended release tablet TAKE 1 TABLET BY MOUTH EVERY DAY 6/22/22   MARIA FERNANDA Ness CNP   torsemide (DEMADEX) 20 MG tablet 20 mg daily except on MWF 20 mg BID 6/15/22   Julienne Ta MD   insulin glargine (BASAGLAR KWIKPEN) 100 UNIT/ML injection pen INJECT 25 UNITS UNDER THE SKIN ONCE DAILY 4/12/22   MARIA FERANNDA Benito CNP   diclofenac sodium (VOLTAREN) 1 % GEL Apply 2 g topically 4 times daily 3/23/22   MARIA FERNANDA Ness CNP   Insulin Pen Needle (B-D ULTRAFINE III SHORT PEN) 31G X 8 MM MISC USE DAILY WITH INSULIN 1/17/22   MARIA FERNANDA Ness CNP       Allergy(ies):  Levofloxacin    Social History:  TOBACCO:  reports that he quit smoking about 49 years ago. His smoking use included cigarettes. He has never used smokeless tobacco.  ETOH:  reports no history of alcohol use. Family History:      Family history unknown: Yes       Review of Systems:  Pertinent positives are listed in HPI. At least 10-point ROS reviewed and were negative. Vitals and physical examination:  /77   Pulse 75   Temp 97.8 °F (36.6 °C) (Oral)   Resp 11   Ht 6' 1\" (1.854 m)   Wt 235 lb 14.3 oz (107 kg)   SpO2 93%   BMI 31.12 kg/m²   Gen/overall appearance: Not in acute distress. Alert. Oriented x3. Hard of hearing. Head: Normocephalic, atraumatic  Eyes: EOMI, good acuity  ENT: Oral mucosa moist  Neck: +JVD, no thyromegaly  CVS: Nml S1S2, no MRG, RRR  Pulm: Clear bilaterally.  No crackles/wheezes  Gastrointestinal: Soft, NT/ND, +BS  Musculoskeletal: Bilateral lower extremity edema   Neuro: No focal deficit. Moves extremity spontaneously. Psychiatry: Appropriate affect. Not agitated. Skin: Warm, dry with normal turgor. No rash  Capillary refill: Brisk,< 3 seconds   Peripheral Pulses: +2 palpable, equal bilaterally       Labs/imaging/EKG:  CBC:   Recent Labs     10/25/22  1202   WBC 7.7   HGB 12.5*        BMP:    Recent Labs     10/25/22  1203      K 3.7   CL 97*   CO2 30   BUN 41*   CREATININE 1.0   GLUCOSE 192*     Hepatic:   Recent Labs     10/25/22  1203   AST 27   ALT 16   BILITOT 1.4*   ALKPHOS 148*       CT HEAD WO CONTRAST    Result Date: 10/25/2022  EXAMINATION: CT OF THE HEAD WITHOUT CONTRAST  10/25/2022 12:08 pm TECHNIQUE: CT of the head was performed without the administration of intravenous contrast. Automated exposure control, iterative reconstruction, and/or weight based adjustment of the mA/kV was utilized to reduce the radiation dose to as low as reasonably achievable. COMPARISON: 02/13/2020 HISTORY: ORDERING SYSTEM PROVIDED HISTORY: Fall TECHNOLOGIST PROVIDED HISTORY: If patient is on cardiac monitor and/or pulse ox, they may be taken off cardiac monitor and pulse ox, left on O2 if currently on. All monitors reattached when patient returns to room. Has a \"code stroke\" or \"stroke alert\" been called? ->No Reason for exam:->Fall Decision Support Exception - unselect if not a suspected or confirmed emergency medical condition->Emergency Medical Condition (MA) Reason for Exam: fall FINDINGS: BRAIN/VENTRICLES: Ventricles are stable in size, shape, and position. No intracerebral masses are identified. No mass effect. No midline shift. No acute intracranial hemorrhage is seen. There is prominence of the sulci, compatible with atrophy. There is mild periventricular hypodensity seen. No obvious hemorrhage. There is intracranial atherosclerosis.  ORBITS: The visualized portion of the orbits demonstrate no acute abnormality. SINUSES: No significant mastoid opacification noted. There is bowing of the nasal septum. Mild mucosal thickening is seen in the ethmoid air cells. SOFT TISSUES/SKULL:  No acute abnormality of the visualized skull or soft tissues. No acute intracranial abnormality Atrophy and small-vessel ischemic change     CT CERVICAL SPINE WO CONTRAST    Result Date: 10/25/2022  EXAMINATION: CT OF THE CERVICAL SPINE WITHOUT CONTRAST 10/25/2022 12:08 pm: TECHNIQUE: CT of the cervical spine was performed without the administration of intravenous contrast. Multiplanar reformatted images are provided for review. Automated exposure control, iterative reconstruction, and/or weight based adjustment of the mA/kV was utilized to reduce the radiation dose to as low as reasonably achievable. COMPARISON: None available. HISTORY: ORDERING SYSTEM PROVIDED HISTORY: fall TECHNOLOGIST PROVIDED HISTORY: Reason for exam:->fall Decision Support Exception - unselect if not a suspected or confirmed emergency medical condition->Emergency Medical Condition (MA) Reason for Exam: fall FINDINGS: BONES/ALIGNMENT: There is no acute fracture. The 7 cervical vertebral bodies are in anatomic alignment with straightening of the normal cervical lordosis. The craniocervical junction is intact. DEGENERATIVE CHANGES: There is moderate to severe multilevel spondylosis and facet arthropathy. SOFT TISSUES: There is no prevertebral soft tissue swelling. There is an incompletely imaged partially loculated left pleural effusion. 1. No acute fracture. 2. Incompletely imaged partially loculated left pleural effusion.      XR CHEST PORTABLE    Result Date: 10/25/2022  EXAMINATION: ONE XRAY VIEW OF THE CHEST 10/25/2022 11:50 am COMPARISON: 02/11/2009 HISTORY: ORDERING SYSTEM PROVIDED HISTORY: Fall TECHNOLOGIST PROVIDED HISTORY: Reason for exam:->Fall Reason for Exam: Fall FINDINGS: Large left-sided pleural effusion is seen with adjacent left lung consolidation. Heart size is enlarged not well evaluated. There is hazy right basilar opacity Spurring is seen in the spine and shoulder joints     Large left-sided pleural effusion with adjacent left lung consolidation. It is uncertain as to whether not wise pre-existing or secondary to the history of trauma Right lower lobe airspace disease, either atelectasis or pneumonia. EKG: Atrial fibrillation, rate 77 beats per minutes. Diffuse low voltage present. Telemetry monitoring block present. Nonspecific T changes present. No acute ST changes. QTc 484. I reviewed EKG. Discussed with ER CHARLENE.       Thank you MARIA FERNANDA Smith CNP for the opportunity to be involved in this patient's care.    -----------------------------  Agapito Dunham MD  [facility] Hospitalist

## 2022-10-25 NOTE — ED NOTES
Pt. to be admitted to 4N room 4250. Report called to ORESTES Jaimes but room is still listed as \"Dirty/Cleaning\". Will watch for room to flag \"Ready\" for transport to be initiated by RN d/t Heparin infusion.       Adeel Booker RN  10/25/22 5102

## 2022-10-25 NOTE — TELEPHONE ENCOUNTER
DayanaraNahid's wife called into the office this afternoon to report that Stuart Mercado is being admitted into [facility] for a recent fall. She states that this has been his 3rd fall within 3 weeks and fortunately he didn't break any bones it is difficult to pick him up off the floor. The EMT's stated that he has a lot of fluid retention. Dayanara is wanting to know what to do for Stuart Mercado, as far as home care and wants Dr. Dav Chow to be aware that he is bed bound and only walks 10 steps with a walker to the bathroom and cannot keep the strength to stand. Dayanara states that their daughter has also stated that Nahid's wellbeing has gotten worse over the last 2 weeks (Dayanara was a hospital admit for severe DVT). Dayanara also stated that Stuart Mercado had an upcoming appointment for compression equipment for his legs on 10/26.       Dayanara can be reached at: [phone]

## 2022-10-26 ENCOUNTER — APPOINTMENT (OUTPATIENT)
Dept: CT IMAGING | Age: 87
DRG: 280 | End: 2022-10-26
Payer: MEDICARE

## 2022-10-26 LAB
A/G RATIO: 1 (ref 1.1–2.2)
ALBUMIN SERPL-MCNC: 3 G/DL (ref 3.4–5)
ALP BLD-CCNC: 123 U/L (ref 40–129)
ALT SERPL-CCNC: 15 U/L (ref 10–40)
ANION GAP SERPL CALCULATED.3IONS-SCNC: 10 MMOL/L (ref 3–16)
APTT: 102.5 SEC (ref 23–34.3)
APTT: 62.4 SEC (ref 23–34.3)
AST SERPL-CCNC: 21 U/L (ref 15–37)
BASOPHILS ABSOLUTE: 0 K/UL (ref 0–0.2)
BASOPHILS RELATIVE PERCENT: 0.4 %
BILIRUB SERPL-MCNC: 1.2 MG/DL (ref 0–1)
BUN BLDV-MCNC: 36 MG/DL (ref 7–20)
CALCIUM SERPL-MCNC: 8.8 MG/DL (ref 8.3–10.6)
CHLORIDE BLD-SCNC: 101 MMOL/L (ref 99–110)
CHOLESTEROL, TOTAL: 99 MG/DL (ref 0–199)
CO2: 31 MMOL/L (ref 21–32)
CREAT SERPL-MCNC: 0.9 MG/DL (ref 0.8–1.3)
EOSINOPHILS ABSOLUTE: 0.2 K/UL (ref 0–0.6)
EOSINOPHILS RELATIVE PERCENT: 3.2 %
ESTIMATED AVERAGE GLUCOSE: 168.6 MG/DL
GFR SERPL CREATININE-BSD FRML MDRD: >60 ML/MIN/{1.73_M2}
GLUCOSE BLD-MCNC: 119 MG/DL (ref 70–99)
GLUCOSE BLD-MCNC: 138 MG/DL (ref 70–99)
GLUCOSE BLD-MCNC: 194 MG/DL (ref 70–99)
GLUCOSE BLD-MCNC: 79 MG/DL (ref 70–99)
GLUCOSE BLD-MCNC: 85 MG/DL (ref 70–99)
HBA1C MFR BLD: 7.5 %
HCT VFR BLD CALC: 36.7 % (ref 40.5–52.5)
HDLC SERPL-MCNC: 34 MG/DL (ref 40–60)
HEMOGLOBIN: 12.1 G/DL (ref 13.5–17.5)
LDL CHOLESTEROL CALCULATED: 56 MG/DL
LV EF: 43 %
LVEF MODALITY: NORMAL
LYMPHOCYTES ABSOLUTE: 1.2 K/UL (ref 1–5.1)
LYMPHOCYTES RELATIVE PERCENT: 18.1 %
MAGNESIUM: 2.1 MG/DL (ref 1.8–2.4)
MCH RBC QN AUTO: 29.9 PG (ref 26–34)
MCHC RBC AUTO-ENTMCNC: 32.9 G/DL (ref 31–36)
MCV RBC AUTO: 90.9 FL (ref 80–100)
MONOCYTES ABSOLUTE: 0.8 K/UL (ref 0–1.3)
MONOCYTES RELATIVE PERCENT: 12.5 %
NEUTROPHILS ABSOLUTE: 4.4 K/UL (ref 1.7–7.7)
NEUTROPHILS RELATIVE PERCENT: 65.8 %
PDW BLD-RTO: 16.8 % (ref 12.4–15.4)
PERFORMED ON: ABNORMAL
PERFORMED ON: NORMAL
PHOSPHORUS: 3.3 MG/DL (ref 2.5–4.9)
PLATELET # BLD: 228 K/UL (ref 135–450)
PMV BLD AUTO: 8.1 FL (ref 5–10.5)
POTASSIUM SERPL-SCNC: 3.8 MMOL/L (ref 3.5–5.1)
RBC # BLD: 4.04 M/UL (ref 4.2–5.9)
SODIUM BLD-SCNC: 142 MMOL/L (ref 136–145)
TOTAL PROTEIN: 6.1 G/DL (ref 6.4–8.2)
TRIGL SERPL-MCNC: 44 MG/DL (ref 0–150)
VLDLC SERPL CALC-MCNC: 9 MG/DL
WBC # BLD: 6.7 K/UL (ref 4–11)

## 2022-10-26 PROCEDURE — 6360000002 HC RX W HCPCS: Performed by: INTERNAL MEDICINE

## 2022-10-26 PROCEDURE — 6370000000 HC RX 637 (ALT 250 FOR IP): Performed by: INTERNAL MEDICINE

## 2022-10-26 PROCEDURE — 97166 OT EVAL MOD COMPLEX 45 MIN: CPT

## 2022-10-26 PROCEDURE — 85025 COMPLETE CBC W/AUTO DIFF WBC: CPT

## 2022-10-26 PROCEDURE — 80061 LIPID PANEL: CPT

## 2022-10-26 PROCEDURE — 85730 THROMBOPLASTIN TIME PARTIAL: CPT

## 2022-10-26 PROCEDURE — 97530 THERAPEUTIC ACTIVITIES: CPT

## 2022-10-26 PROCEDURE — 71250 CT THORAX DX C-: CPT

## 2022-10-26 PROCEDURE — 80053 COMPREHEN METABOLIC PANEL: CPT

## 2022-10-26 PROCEDURE — 84100 ASSAY OF PHOSPHORUS: CPT

## 2022-10-26 PROCEDURE — 1200000000 HC SEMI PRIVATE

## 2022-10-26 PROCEDURE — 97535 SELF CARE MNGMENT TRAINING: CPT

## 2022-10-26 PROCEDURE — 83735 ASSAY OF MAGNESIUM: CPT

## 2022-10-26 PROCEDURE — 93306 TTE W/DOPPLER COMPLETE: CPT

## 2022-10-26 PROCEDURE — 2500000003 HC RX 250 WO HCPCS: Performed by: INTERNAL MEDICINE

## 2022-10-26 PROCEDURE — 97162 PT EVAL MOD COMPLEX 30 MIN: CPT

## 2022-10-26 PROCEDURE — 94760 N-INVAS EAR/PLS OXIMETRY 1: CPT

## 2022-10-26 PROCEDURE — 36415 COLL VENOUS BLD VENIPUNCTURE: CPT

## 2022-10-26 RX ORDER — HEPARIN SODIUM 1000 [USP'U]/ML
2000 INJECTION, SOLUTION INTRAVENOUS; SUBCUTANEOUS ONCE
Status: COMPLETED | OUTPATIENT
Start: 2022-10-26 | End: 2022-10-26

## 2022-10-26 RX ADMIN — FUROSEMIDE 40 MG: 10 INJECTION, SOLUTION INTRAMUSCULAR; INTRAVENOUS at 09:13

## 2022-10-26 RX ADMIN — METOPROLOL SUCCINATE 50 MG: 50 TABLET, EXTENDED RELEASE ORAL at 09:14

## 2022-10-26 RX ADMIN — HEPARIN SODIUM 2000 UNITS: 1000 INJECTION INTRAVENOUS; SUBCUTANEOUS at 17:20

## 2022-10-26 RX ADMIN — GABAPENTIN 600 MG: 300 CAPSULE ORAL at 20:17

## 2022-10-26 RX ADMIN — FUROSEMIDE 40 MG: 10 INJECTION, SOLUTION INTRAMUSCULAR; INTRAVENOUS at 17:53

## 2022-10-26 RX ADMIN — HEPARIN SODIUM 4000 UNITS: 1000 INJECTION INTRAVENOUS; SUBCUTANEOUS at 00:05

## 2022-10-26 RX ADMIN — HEPARIN SODIUM 1320 UNITS/HR: 10000 INJECTION, SOLUTION INTRAVENOUS at 13:26

## 2022-10-26 RX ADMIN — INSULIN GLARGINE 20 UNITS: 100 INJECTION, SOLUTION SUBCUTANEOUS at 20:18

## 2022-10-26 NOTE — CARE COORDINATION
Case Management Assessment  Initial Evaluation    Date/Time of Evaluation: 10/26/2022 11:23 AM  Assessment Completed by: Tamar Perez RN    If patient is discharged prior to next notation, then this note serves as note for discharge by case management. Patient Name: Reed Burroughs                   YOB: 1935  Diagnosis: Acute on chronic diastolic heart failure (HCC) [I50.33]  Pleural effusion [J90]  Generalized weakness [R53.1]  Elevated troponin [R77.8]  Elevated brain natriuretic peptide (BNP) level [R79.89]  Frequent falls [R29.6]  Bilateral lower extremity edema [R60.0]                   Date / Time: 10/25/2022 10:55 AM    Patient Admission Status: Inpatient   Readmission Risk (Low < 19, Mod (19-27), High > 27): Readmission Risk Score: 13.1    Current PCP: MARIA FERNANDA Smith CNP  PCP verified by CM? Yes    Chart Reviewed: Yes      History Provided by: Patient  Patient Orientation: Alert and Oriented, Person, Place, Situation, Self    Patient Cognition: Alert    Hospitalization in the last 30 days (Readmission):  No    If yes, Readmission Assessment in CM Navigator will be completed. Advance Directives:      Code Status: Full Code   Patient's Primary Decision Maker is: Legal Next of Kin    Primary Decision Maker: Dayanara Stover - Spouse - [phone]    Discharge Planning:    Patient lives with: Spouse/Significant Other, Children Type of Home: House  Primary Care Giver: Self  Patient Support Systems include: Spouse/Significant Other, Children   Current Financial resources: Medicare  Current community resources:    Current services prior to admission: None            Current DME:              Type of Home Care services:  Skilled Therapy    ADLS  Prior functional level: Independent in ADLs/IADLs  Current functional level: Assistance with the following:, Mobility    PT AM-PAC: 12 /24  OT AM-PAC: 17 /24    Family can provide assistance at DC:  Yes  Would you like Case Management to discuss the discharge plan with any other family members/significant others, and if so, who? Yes (wife Dayanara)  Plans to Return to Present Housing: No  Other Identified Issues/Barriers to RETURNING to current housing: weakness  Potential Assistance needed at discharge: N/A            Potential DME:    Patient expects to discharge to: [facility]  for transportation at discharge:      Financial    Payor: Marvin Leslie / Plan: Linda 1978 / Product Type: *No Product type* /     Does insurance require precert for SNF: Yes    Potential assistance Purchasing Medications: No  Meds-to-Beds request: Yes      [id number] [place] - [address] [phone] - [address] [place] [address]  Phone: [phone] Fax: [phone]      Notes:    Factors facilitating achievement of predicted outcomes: Family support, Motivated, Cooperative, and [place]    Barriers to discharge: na    Additional Case Management Notes: from home with wife & daughter, agreeable to SNF prior to returning home, will f/u when a family member is in the room    The Plan for Transition of Care is related to the following treatment goals of Acute on chronic diastolic heart failure (HCC) [I50.33]  Pleural effusion [J90]  Generalized weakness [R53.1]  Elevated troponin [R77.8]  Elevated brain natriuretic peptide (BNP) level [R79.89]  Frequent falls [R29.6]  Bilateral lower extremity edema [X87.1]    IF APPLICABLE: The Patient and/or patient representative Gabe Stahl and his family were provided with a choice of provider and agrees with the discharge plan. Freedom of choice list with basic dialogue that supports the patient's individualized plan of care/goals and shares the quality data associated with the providers was provided to: Patient   Patient Representative Name:       The Patient and/or Patient Representative Agree with the Discharge Plan?  Yes    Den Tong, RN, BSN, Case Manager  [phone]  Electronically signed by Maria R Randle RN on 10/26/2022 at 11:28 AM

## 2022-10-26 NOTE — PROGRESS NOTES
Attempting to record accurate I+O's - patient has male purewick in place - has missed the purewick and soaked bed twice today. Patient has had 800ml out that has been calculated by the purewick - MELCHOR amount of two other urination. BLE wrapped and elevated.      Electronically signed by Elspeth Dubin, RN on 10/26/2022 at 3:49 PM

## 2022-10-26 NOTE — PROGRESS NOTES
Clinical Pharmacy Note  Heparin Dosing       Lab Results   Component Value Date/Time    APTT 62.4 10/26/2022 04:06 PM     Lab Results   Component Value Date/Time    HGB 12.1 10/26/2022 06:28 AM    HCT 36.7 10/26/2022 06:28 AM     10/26/2022 06:28 AM       Current Infusion Rate: 1320 units/hr    Plan:  Bolus: 2000 units   Rate: Increase to 1430 units/hr  Next aPTT: 99458  10/26/22    Pharmacy will continue to monitor and adjust based on aPTT results.       [address] [facility], [facility], [place] 10/26/2022  5:11 PM

## 2022-10-26 NOTE — PROGRESS NOTES
of cervical intervertebral disc 92/40/0482    Diastolic heart failure ([facility] [address] 09/2018    Diverticulosis of colon (without mention of hemorrhage) 10/22/2010    Essential hypertension, benign 10/22/2010    [place] (hard of hearing)     Hyperlipidemia 10/22/2010    Mononeuritis of unspecified site 10/22/2010    Osteoarthrosis, unspecified whether generalized or localized, pelvic region and thigh 10/22/2010    Other specified iron deficiency anemias 10/22/2010    Rosacea 10/22/2010    Seborrheic dermatitis 10/22/2010    Type II or unspecified type diabetes mellitus without mention of complication, not stated as uncontrolled 10/22/2010    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 Any changes have been noted. PHYSICAL EXAM:  /77   Pulse 76   Temp 97.4 °F (36.3 °C) (Oral)   Resp 18   Ht 6' 1\" (1.854 m)   Wt 228 lb 2.8 oz (103.5 kg)   SpO2 96%   BMI 30.10 kg/m²       Intake/Output Summary (Last 24 hours) at 10/26/2022 1030  Last data filed at 10/26/2022 0612  Gross per 24 hour   Intake 59.78 ml   Output 2249 ml   Net -2189.22 ml        General appearance:   No apparent distress, appears stated age. Cooperative. HEENT:  Normocephalic, atraumatic. PERRLA. EOMi. Conjunctivae/corneas clear, no icterus, non-injected. Neck: Supple, with full range of motion. No jugular venous distention. Trachea midline. Respiratory:  Normal respiratory effort. Clear to auscultation, bilaterally without Rales/Wheezes/Rhonchi. Cardiovascular:  Regular rate and rhythm without murmurs, rubs or gallops.   Abdomen: Soft, non-tender, non-distended, without rebound or guarding. Normal bowel sounds. Musculoskeletal:  No clubbing, cyanosis or edema bilaterally. Full range of motion without deformity. Skin: Skin color, texture, turgor normal.  No rashes or lesions. Neurologic:  Neurovascularly intact without any focal sensory/motor deficits. Cranial nerves: II-XII intact, grossly intact. No facial asymmetry, tongue midline. Psychiatric:  Alert and oriented, thought content appropriate  Capillary Refill: Brisk,< 3 seconds   Peripheral Pulses: +2 palpable, equal bilaterally       LABS:    Lab Results   Component Value Date    WBC 6.7 10/26/2022    HGB 12.1 (L) 10/26/2022    HCT 36.7 (L) 10/26/2022    MCV 90.9 10/26/2022     10/26/2022    LYMPHOPCT 18.1 10/26/2022    RBC 4.04 (L) 10/26/2022    MCH 29.9 10/26/2022    MCHC 32.9 10/26/2022    RDW 16.8 (H) 10/26/2022       Lab Results   Component Value Date    CREATININE 0.9 10/26/2022    BUN 36 (H) 10/26/2022     10/26/2022    K 3.8 10/26/2022     10/26/2022    CO2 31 10/26/2022       Lab Results   Component Value Date/Time    MG 2.10 10/26/2022 06:28 AM       Lab Results   Component Value Date    ALT 15 10/26/2022    AST 21 10/26/2022    ALKPHOS 123 10/26/2022    BILITOT 1.2 (H) 10/26/2022        No flowsheet data found. Lab Results   Component Value Date    LABA1C 7.5 10/25/2022       Imaging:  CT CHEST WO CONTRAST   Final Result   Bilateral pleural effusions, large on the left, and moderate on the right. Pleural fluid on the left is loculated. There is thickening of the parietal   pleura on the left, compatible with chronicity to the pleural fluid. There   are areas of increased density seen in the pleural effusion on the left,   likely due to proteinaceous debris and less likely due to hemorrhage or   metastatic implant. Body wall anasarca with the ascites, compatible with fluid overload.          CT HEAD WO CONTRAST   Final Result   No acute intracranial abnormality      Atrophy and small-vessel ischemic change         CT CERVICAL SPINE WO CONTRAST   Final Result   1. No acute fracture. 2. Incompletely imaged partially loculated left pleural effusion. XR CHEST PORTABLE   Final Result   Large left-sided pleural effusion with adjacent left lung consolidation. It   is uncertain as to whether not wise pre-existing or secondary to the history   of trauma      Right lower lobe airspace disease, either atelectasis or pneumonia. Scheduled and prn Medications:    Scheduled Meds:   gabapentin  600 mg Oral Nightly    insulin glargine  20 Units SubCUTAneous Nightly    metoprolol succinate  50 mg Oral Daily    insulin lispro  0-4 Units SubCUTAneous TID WC    insulin lispro  0-4 Units SubCUTAneous Nightly    sodium chloride flush  5-40 mL IntraVENous 2 times per day    furosemide  40 mg IntraVENous BID     Continuous Infusions:   dextrose      sodium chloride      heparin (PORCINE) Infusion 1,320 Units/hr (10/26/22 0858)     PRN Meds:.trimethobenzamide, meclizine, glucose, dextrose bolus **OR** dextrose bolus, glucagon (rDNA), dextrose, sodium chloride flush, sodium chloride, polyethylene glycol, acetaminophen **OR** acetaminophen    Assessment & Plan:        Acute on chronic diastolic heart failure. Anasarca with ascites  - 40mg IV lasix bid for now, received one dose IV lasix 40mg on admission  - home dose torsemide 20mg daily except MWF where he takes 40mg bid   - I/O  - daily wts  - BNP 5,900  - Low sodium diet  - Fluid restriction 48-64 oz  - pt not on ACEi, takes a bb  - echo: 10/26/22  - wears compression stockings at home; states it has been hard to lift his heavy, swollen legs and has been having difficulty walking   There is severe concentric left ventricular hypertrophy. EF 40-45%. There is mild global hypokinesis. Grade III diastolic dysfunction with elevated LV filling pressures. Moderately dilated right ventricle.     Right ventricular systolic function is moderately reduced. Moderate biatrial enlargement. At least moderate mitral regurgitation. Mild tricuspid regurgitation.   - Cardiology Consult        Generalized weakness, recurrent falls.  - PT/OT evals  - placement if needed  - monitor labs  - likely due to fluid overload  -states it is hard for him to  his feet with heavy swollen legs  - hopefully will improve with diuresis    Left pleural effusion, possibly loculated. Obtain CT-chest.  - CT chest : bilateral pleural effusion R > L, pleural fluid on left is loculated. - consult placed to Pulmonology    Abnormal chest x-ray; suspect more atelectasis than pneumonia. - CT as above  - not requiring O2   - Pulm has been consulted for large pleural effusions    Elevated troponin from baseline, possibly type II NSTEMI. - placed on hep gtt by admitting physician,   - could be 2/2 ischemic demand      Chronic venous stasis dermatitis, lymphedema. - wears stockings at home  - here with kerlix/ACE wraps in place      History of atrial fibrillation, currently rate controlled. Anticoagulated with eliquis, but holding with hep gtt. Can restart as indicated by cardiology. May need thoracentesis/      Other comorbidities: history of paroxysmal atrial fibrillation (holding Eliquis), degenerative disc disease, chronic diastolic heart failure, essential hypertension, hyperlipidemia, osteoarthritis, uncontrolled diabetes type 2 with hyperglycemia. Continue current regimen/therapies. Monitor. Adjust medical regimen as appropriate. Body mass index is 30.1 kg/m². The patient and / or the family were informed of the results of any tests, a time was given to answer questions, a plan was proposed and they agreed with plan. DVT ppx: hep       Diet: ADULT DIET; Regular; 4 carb choices (60 gm/meal);  Low Sodium (2 gm); 2000 ml    Consults:  IP CONSULT TO DIETITIAN    DISPO/placement plan: pending    Code Status: Full Code      MARIA FERNANDA Berumen - CNP  10/26/22

## 2022-10-26 NOTE — DISCHARGE INSTR - COC
Continuity of Care Form    Patient Name: Rosa Quiñones   :  1935  MRN:  [id number]    Admit date:  10/25/2022  Discharge date:  10/31/22    Code Status Order: Full Code   Advance Directives:     Admitting Physician:  Michelle Long MD  PCP: Edilberto Wagner, APRN - CNP    Discharging Nurse: [facility] Unit/Room#: U7D-7967/7051-16  Discharging Unit Phone Number: ***    Emergency Contact:   Extended Emergency Contact Information  Primary Emergency Contact: Dayanara Stover  Address: [address] Phone: [phone]  Relation: Spouse  Secondary Emergency Contact: [address] [place] [address] Phone: [phone]  Relation: Child    Past Surgical History:  Past Surgical History:   Procedure Laterality Date    CARDIOVASCULAR STRESS TEST  2018    negative    CARPAL TUNNEL RELEASE Right 2018    HAND SURGERY      Left carpel tunnel     HIP SURGERY Bilateral     total hips       Immunization History:   Immunization History   Administered Date(s) Administered    COVID-19, MODERNA BLUE border, Primary or Immunocompromised, (age 12y+), IM, 100 mcg/0.5mL 2021, 2021, 2021    Influenza 10/25/2010    Influenza Virus Vaccine 10/12/2004, 10/17/2007, 2009, 10/31/2013, 10/23/2014, 2015, 2016    Influenza, FLUAD, (age 72 y+), Adjuvanted, 0.5mL 10/14/2021    Influenza, FLUZONE (age 72 y+), High Dose, 0.7mL 2020    Influenza, High Dose (Fluzone 65 yrs and older) 2011, 2012, 10/31/2013, 10/23/2014, 2015, 2016, 2018, 10/02/2019    Pneumococcal Conjugate 13-valent Nkechi Arango) 2015    Zoster Live (Zostavax) 2011       Active Problems:  Patient Active Problem List   Diagnosis Code    Lumbar disc disease M51.9    Iron deficiency anemia D50.9    Edema R60.9    Degeneration of cervical intervertebral disc M50.30    Hypertension I10    Hyperlipidemia E78.5    Type 2 1340   Wound Width (cm) 2 cm 10/26/22 1340   Wound Depth (cm) 0.1 cm 10/26/22 1340   Wound Surface Area (cm^2) 4 cm^2 10/26/22 1340   Change in Wound Size % (l*w) 69.23 10/26/22 1340   Wound Volume (cm^3) 0.4 cm^3 10/26/22 1340   Wound Assessment Pink/red 10/26/22 1340   Drainage Amount None 10/26/22 0840   Odor None 10/26/22 0840   Shaila-wound Assessment Denuded 10/26/22 1340   Number of days: 0        Elimination:  Continence: Bowel: {YES / NK:92633}  Bladder: {YES / OB:83276}  Urinary Catheter: {Urinary Catheter:050430396}   Colostomy/Ileostomy/Ileal Conduit: {YES / KW:07710}       Date of Last BM: 10/31/22    Intake/Output Summary (Last 24 hours) at 10/26/2022 1503  Last data filed at 10/26/2022 1054  Gross per 24 hour   Intake 299.78 ml   Output 2225 ml   Net -1925.22 ml     I/O last 3 completed shifts: In: 59.8 [I.V.:59.8]  Out: 3530 [Urine:2249]    Safety Concerns:     [address] Safety Concerns:793807862}    Impairments/Disabilities:      [address] Impairments/Disabilities:726160052}    Nutrition Therapy:  Current Nutrition Therapy:   [address] Diet List:590907651}    Routes of Feeding: {CHP DME Other Feedings:495659646}  Liquids: {Slp liquid thickness:43320}  Daily Fluid Restriction: yes - amount   Last Modified Barium Swallow with Video (Video Swallowing Test): {Done Not Done SMQS:914833569}    Treatments at the Time of Hospital Discharge:   Respiratory Treatments: ***  Oxygen Therapy:  {Therapy; copd oxygen:62740}  Ventilator:    { IZZY Vent UVLE:464633264}    Rehab Therapies: {THERAPEUTIC INTERVENTION:8598888556}  Weight Bearing Status/Restrictions: {[facility] Weight Bearin}  Other Medical Equipment (for information only, NOT a DME order):  {EQUIPMENT:057087862}  Other Treatments: ***    Heart Failure Instructions for Daily Management  Patient was treated for acute on chronic combined systolic and diastolic heart failure.   he  will require the following:    Please weigh daily on the same scale and approximately the same time of day. Report weight gain of 3 pounds/day or 5 pounds/week to : [facility] Lillie MENDEZ APRN - [phone], and [facility] [phone]. Please use hospital discharge weight as baseline reference. Please monitor for signs and symptoms of and report to MD:  Worsening Heart Failure: sudden weight gain, shortness of breath, lower extremity or general edema/swelling, abdominal bloating/swelling, inability to lie flat, intolerance to usual activity, or cough (especially at night). Report these finding even if no increase in weight. Dehydration:  having difficulty or a decrease in urination, dizziness, worsening fatigue, or new onset/worsening of generalized weakness. Please continue a LOW SODIUM diet and LIMIT fluid intake to 48 - 64 ounces ( 1.5 - 2 liters) per day. Call Lillie Dos Santos APRSHEYLA - [phone], [facility] MD, and [facility] [phone] and/or Fredy Walker @ [phone] with any questions or concerns. Please continue heart failure education to patient and family/support system. See After Visit Summary for hospital follow up appointment details. Consider spiritual care referral for support and/or completion of advance directives [phone]. Consider: having the facility MD complete required 7 day follow up, palliative care consult for ongoing goals of care, end of life, and/or chronic disease management discussions, and referral to [facility] [phone]) once SNF/HHC complete.       Patient's personal belongings (please select all that are sent with patient):  {GEORGE DME Belongings:616022389}    RN SIGNATURE:  {Esignature:541624009}    CASE MANAGEMENT/SOCIAL WORK SECTION    Inpatient Status Date: ***    Readmission Risk Assessment Score:  Readmission Risk              Risk of Unplanned Readmission:  11           Discharging to Facility/ Agency   Name: Address:  Phone:  Fax:    Dialysis Facility (if applicable)   Name:  Address:  Dialysis Schedule:  Phone:  Fax:    / signature: {Esignature:814154769}    PHYSICIAN SECTION    Prognosis: Good    Condition at Discharge: Stable    Rehab Potential (if transferring to Rehab): Good    Recommended Labs or Other Treatments After Discharge: CBC, BMP, in 1 week    Physician Certification: I certify the above information and transfer of Reed Burroughs  is necessary for the continuing treatment of the diagnosis listed and that he requires [facility] for less 30 days.      Update Admission H&P: No change in H&P    PHYSICIAN SIGNATURE:  Electronically signed by Patsy Henry MD on 10/31/22 at 10:20 AM EDT

## 2022-10-26 NOTE — PROGRESS NOTES
Clinical Pharmacy Note  Heparin Dosing       Lab Results   Component Value Date/Time    APTT 43.9 10/25/2022 09:28 PM     Lab Results   Component Value Date/Time    HGB 12.5 10/25/2022 12:02 PM    HCT 38.7 10/25/2022 12:02 PM     10/25/2022 12:02 PM       Current Infusion Rate: 1000 units/hr    Plan:  Bolus: 4000 units  Rate: increase to 1430 units/hr  Next aPTT: 0600 on 10/26    Pharmacy will continue to monitor and adjust based on aPTT results.     Lauar Restrepo, PharmD  10/25/2022 11:40 PM

## 2022-10-26 NOTE — PLAN OF CARE
Problem: Discharge Planning  Goal: Discharge to home or other facility with appropriate resources  Outcome: Progressing     Problem: Skin/Tissue Integrity  Goal: Absence of new skin breakdown  Description: 1. Monitor for areas of redness and/or skin breakdown  2. Assess vascular access sites hourly  3. Every 4-6 hours minimum:  Change oxygen saturation probe site  4. Every 4-6 hours:  If on nasal continuous positive airway pressure, respiratory therapy assess nares and determine need for appliance change or resting period.   Outcome: Progressing     Problem: Safety - Adult  Goal: Free from fall injury  Outcome: Progressing     Problem: ABCDS Injury Assessment  Goal: Absence of physical injury  Outcome: Progressing     Problem: Respiratory - Adult  Goal: Achieves optimal ventilation and oxygenation  Outcome: Progressing     Problem: Cardiovascular - Adult  Goal: Maintains optimal cardiac output and hemodynamic stability  Outcome: Progressing  Goal: Absence of cardiac dysrhythmias or at baseline  Outcome: Progressing     Problem: Metabolic/Fluid and Electrolytes - Adult  Goal: Electrolytes maintained within normal limits  Outcome: Progressing  Goal: Hemodynamic stability and optimal renal function maintained  Outcome: Progressing     Problem: Hematologic - Adult  Goal: Maintains hematologic stability  Outcome: Progressing

## 2022-10-26 NOTE — PLAN OF CARE
Problem: Discharge Planning  Goal: Discharge to home or other facility with appropriate resources  10/26/2022 1035 by Kellie Vidal RN  Outcome: Progressing     Problem: Skin/Tissue Integrity  Goal: Absence of new skin breakdown  Description: 1. Monitor for areas of redness and/or skin breakdown  2. Assess vascular access sites hourly  3. Every 4-6 hours minimum:  Change oxygen saturation probe site  4. Every 4-6 hours:  If on nasal continuous positive airway pressure, respiratory therapy assess nares and determine need for appliance change or resting period.   10/26/2022 1035 by Kellie Vidal RN  Outcome: Progressing     Problem: Safety - Adult  Goal: Free from fall injury  10/26/2022 1035 by Kellie Vidal RN  Outcome: Progressing     Problem: ABCDS Injury Assessment  Goal: Absence of physical injury  10/26/2022 1035 by Kellie Vidal RN  Outcome: Progressing     Problem: Respiratory - Adult  Goal: Achieves optimal ventilation and oxygenation  10/26/2022 1035 by Kellie Vidal RN  Outcome: Progressing     Problem: Cardiovascular - Adult  Goal: Maintains optimal cardiac output and hemodynamic stability  10/26/2022 1035 by Kellie Vidal RN  Outcome: Progressing     Problem: Cardiovascular - Adult  Goal: Absence of cardiac dysrhythmias or at baseline  10/26/2022 1035 by Kellie Vidal RN  Outcome: Progressing

## 2022-10-26 NOTE — PROGRESS NOTES
Occupational Therapy  Facility/Department: [address] MED SURG  Occupational Therapy Initial Assessment    Name: Finesse Palacios  : 1935  MRN: [id number]  Date of Service: 10/26/2022    Discharge Recommendations:  5-7 sessions per week, Patient would benefit from continued therapy after discharge     Finesse Palacios scored a 17/24 on the AM-PAC ADL Inpatient form. Current research shows that an AM-PAC score of 17 or less is typically not associated with a discharge to the patient's home setting. Based on the patient's AM-PAC score and their current ADL deficits, it is recommended that the patient have 5-7 sessions per week of Occupational Therapy at d/c to increase the patient's independence. At this time, this patient demonstrates complex nursing, medical, and rehabilitative needs, and would benefit from intensive rehabilitation services upon discharge from the Inpatient setting. This patient demonstrates the ability to participate in and benefit from an intensive therapy program with a coordinated interdisciplinary team approach to foster frequent, structured, and documented communication among disciplines, who will work together to establish, prioritize, and achieve treatment goals. Please see assessment section for further patient specific details. If patient discharges prior to next session this note will serve as a discharge summary. Please see below for the latest assessment towards goals. Patient Diagnosis(es): The primary encounter diagnosis was Generalized weakness. Diagnoses of Frequent falls, Pleural effusion, Bilateral lower extremity edema, Elevated troponin, and Elevated brain natriuretic peptide (BNP) level were also pertinent to this visit.   Past Medical History:  has a past medical history of Atrial fibrillation ([address] Degeneration of cervical intervertebral disc, Diastolic heart failure ([address] Diverticulosis of colon (without mention of hemorrhage), Essential hypertension, benign, [place] (hard of hearing), Hyperlipidemia, Mononeuritis of unspecified site, Osteoarthrosis, unspecified whether generalized or localized, pelvic region and thigh, Other specified iron deficiency anemias, Rosacea, Seborrheic dermatitis, Type II or unspecified type diabetes mellitus without mention of complication, not stated as uncontrolled, 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 Past Surgical History:  has a past surgical history that includes hip surgery (Bilateral); Hand surgery; Carpal tunnel release (Right, 05/16/2018); and cardiovascular stress test (09/2018). Treatment Diagnosis: Decreased: ADLs, fxl transfers/mobility      Assessment   Performance deficits / Impairments: Decreased functional mobility ; Decreased ADL status; Decreased balance;Decreased strength;Decreased endurance  Assessment: Pt is a 82 yo M admitted with c/o generalized weakness, recurrent falls at home. PTA, pt lives at home w/ his wife and dtr where he is typically independent in self-care and completes fxl mobility mod I using RW. Pt is below baseline today, requiring min/mod A for bed mobility, mod Ax2 for fxl transfers, and min A for fxl mobility short distance using RW. He required total A LB dressing and setup for seated grooming. Will continue to see on acute in order to address the above deficits and maximize pt's fxl performance. Pt is not safe to return home at this time and demonstrates need for ongoing skilled therapy at d/c; anticipate he would benefit from and be able to tolerate high intensity therapy w/ goal of returning home with his supportive wife and dtr. Treatment Diagnosis: Decreased: ADLs, fxl transfers/mobility  Prognosis: Good  Decision Making: Medium Complexity  REQUIRES OT FOLLOW-UP: Yes  Activity Tolerance  Activity Tolerance: Patient limited by fatigue        Plan   Occupational Therapy Plan  Times Per Week: 3-5  Times Per Day:  Once a day  Current Treatment Recommendations: Strengthening, ROM, Balance training, Endurance training, Functional mobility training, Safety education & training, Self-Care / ADL     Restrictions  Restrictions/Precautions  Restrictions/Precautions: Fall Risk    Subjective   General  Chart Reviewed: Yes  Patient assessed for rehabilitation services?: Yes  Additional Pertinent Hx: per H&P: \"This is a pleasant 81 y.o. male with history of paroxysmal atrial fibrillation (on chronic anticoagulation with Eliquis), degenerative disc disease, chronic diastolic heart failure, essential hypertension, hyperlipidemia, osteoarthritis, uncontrolled diabetes type 2 with hyperglycemia, who presents to the emergency room with complaints of generalized weakness, recurrent falls at home. 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 Wife also reports difficulty caring for patient at home due to patient's comorbidities. Earlier today, he felt generally weak, did not fall but wife and daughter where able to lower him to the ground. Chest x-ray obtained in the emergency room reveals large left-sided pleural effusion with adjacent consolidation, right lower lobe airspace disease, concerning for atelectasis or pneumonia. \"  Family / Caregiver Present: No  Referring Practitioner: Angeline  Diagnosis: Acute on chronic diastolic heart failure  Subjective  Subjective: Pt met b/s for OT eval/tx w/ PT. Pt in bed, pleasant and agreeable to therapy.  Denied pain  General Comment  Comments: Per RN ok to see     Social/Functional History  Social/Functional History  Lives With: Spouse, Daughter (Dtr works from home)  Type of Home: House  Home Layout: Multi-level, Bed/Bath upstairs (quad-level; 6 ELENITA to bed/bath)  Home Access: Stairs to enter with rails  Entrance Stairs - Number of Steps: 4 from the garage; 3 from the front  Bathroom Shower/Tub: Tub/Shower unit (Pt sponge bathes)  Bathroom Toilet: Handicap height  Home Equipment: Tanya Ceja, erik, Cane  Has the patient had two or more falls in the past year or any fall with injury in the past year?: Yes  ADL Assistance: Independent (Sponge bathes, reports difficulty putting on compression stockings)  Homemaking Assistance: Needs assistance (Pt completes light homemaking)  Ambulation Assistance: Independent (using RW)  Transfer Assistance: Independent  Active : No  Patient's  Info: Dtr  Occupation: Retired  Type of Occupation: Supervisor at [address]: All fall risk precautions in place;Call light within reach;Gait belt;Patient at risk for falls; Left in chair;Chair alarm in place;Nurse notified        AROM: Within functional limits  PROM: Within functional limits  Strength: Generally decreased, functional  Coordination: Within functional limits  ADL  Grooming: Setup  Grooming Skilled Clinical Factors: Washed face and performed oral care seated in recliner  LE Dressing: Dependent/Total  LE Dressing Skilled Clinical Factors: To don socks  Additional Comments: Anticipate pt would be independent w/ feeding, SBA UB bathing/dressing, mod/max A LB bathing, mod A toileting based on ROM, strength, endurance this session     Activity Tolerance  Activity Tolerance: Patient tolerated treatment well  Bed mobility  Supine to Sit: Minimal assistance; Moderate assistance  Sit to Supine: Unable to assess (in recliner at end of session)  Scooting: Contact guard assistance  Transfers  Sit to stand: Moderate assistance;2 Person assistance  Stand to sit: Moderate assistance;2 Person assistance  Transfer Comments: From EOB to recliner using RW.  He took 4-5 steps from bed>chair min A using RW w/ cues for safety  Hearing  Hearing: Exceptions to [facility]  Hearing Exceptions: Hard of hearing/hearing concerns  Cognition  Overall Cognitive Status: WFL  Orientation  Overall Orientation Status: Within Functional Limits (Not formally assessed, generally WFL for eval)                  Education Given To: Patient  Education Provided: Role of Therapy; ADL Adaptive Strategies;Transfer Training  Education Provided Comments: d/c recommendations  Education Method: Verbal  Barriers to Learning: Hearing  Education Outcome: Verbalized understanding;Continued education needed          AM-PAC Score  AM-PAC Inpatient Daily Activity Raw Score: 17 (10/26/22 0926)  AM-PAC Inpatient ADL T-Scale Score : 37.26 (10/26/22 0926)  ADL Inpatient CMS 0-100% Score: 50.11 (10/26/22 0926)  ADL Inpatient CMS G-Code Modifier : CK (10/26/22 0926)      Goals  Short Term Goals  Time Frame for Short Term Goals: Prior to d/c  Short Term Goal 1: Pt will complete ADL transfers CGA  Short Term Goal 2: Pt will toilet w/ min A  Short Term Goal 3: Pt will groom sinkside w/ setup  Short Term Goal 4: Pt will bathe w/ min A  Short Term Goal 5: Pt will complete LB dressing min A using AE prn  Long Term Goals  Time Frame for Long Term Goals : LTG=STG  Patient Goals   Patient goals : to go home       Therapy Time   Individual Concurrent Group Co-treatment   Time In 0835         Time Out 0920         Minutes 45         Timed Code Treatment Minutes: 30 Minutes (15 min eval)       Marti Matson OTR/CHARIS [id number]

## 2022-10-26 NOTE — ACP (ADVANCE CARE PLANNING)
Advance Care Planning     Advance Care Planning Activator (Inpatient)  Conversation Note      Date of ACP Conversation: 10/26/2022     Conversation Conducted with: Patient with Decision Making Capacity    ACP Activator: Chelsey Ortega RN    Health Care Decision Maker:     Current Designated Health Care Decision Maker:     Primary Decision Maker: Noreen Ribeiro - [phone]    Today we documented Decision Maker(s) consistent with Legal Next of Kin hierarchy. Care Preferences    Ventilation: \"If you were in your present state of health and suddenly became very ill and were unable to breathe on your own, what would your preference be about the use of a ventilator (breathing machine) if it were available to you? \"      Would the patient desire the use of ventilator (breathing machine)?: yes    \"If your health worsens and it becomes clear that your chance of recovery is unlikely, what would your preference be about the use of a ventilator (breathing machine) if it were available to you? \"     Would the patient desire the use of ventilator (breathing machine)?: unsure      Resuscitation  \"CPR works best to restart the heart when there is a sudden event, like a heart attack, in someone who is otherwise healthy. Unfortunately, CPR does not typically restart the heart for people who have serious health conditions or who are very sick. \"    \"In the event your heart stopped as a result of an underlying serious health condition, would you want attempts to be made to restart your heart (answer \"yes\" for attempt to resuscitate) or would you prefer a natural death (answer \"no\" for do not attempt to resuscitate)? \" yes       [] Yes   [] No   Educated Patient / Judith Shipley regarding differences between Advance Directives and portable DNR orders.     Length of ACP Conversation in minutes:  5 min    Conversation Outcomes:  [x] ACP discussion completed  [] Existing advance directive reviewed with patient; no changes to patient's previously recorded wishes  [] New Advance Directive completed  [] Portable Do Not Rescitate prepared for Provider review and signature  [] POLST/POST/MOLST/MOST prepared for Provider review and signature      Follow-up plan:    [] Schedule follow-up conversation to continue planning  [] Referred individual to Provider for additional questions/concerns   [] Advised patient/agent/surrogate to review completed ACP document and update if needed with changes in condition, patient preferences or care setting    [x] This note routed to one or more involved healthcare providers        Maria R Randle RN, BSN,   [phone]  Electronically signed by Maria R Randle RN on 10/26/2022 at 11:18 AM

## 2022-10-26 NOTE — PROGRESS NOTES
Comprehensive Nutrition Assessment    Type and Reason for Visit:  Initial, Wound    Nutrition Recommendations/Plan:   Continue to monitor while inpatient  Continue 4 carb choice; low sodium diet   Start Maurice BID     Malnutrition Assessment:  Malnutrition Status:  No malnutrition (10/26/22 1310)      Nutrition Assessment:    Wounds. Pt admitted after a fall. Pt has hx of DM, diastolic heart failure, and HTN. Pt wt hx is steady. Pt intake was recorded at 76%-100%. Pt has stage a 3 pressure wound on his buttocks. Will continue to monitor while inpatient, start Maurice BID, and continue 4 carb; low sodium diet. Nutrition Related Findings:    Reviewed labs. BUN- 36 Wound Type: Pressure Injury, Stage III       Current Nutrition Intake & Therapies:    Average Meal Intake: Unable to assess  Average Supplements Intake: None Ordered  ADULT DIET; Regular; 4 carb choices (60 gm/meal); Low Sodium (2 gm); 2000 ml    Anthropometric Measures:  Height: 6' 1\" (185.4 cm)  Ideal Body Weight (IBW): 184 lbs (84 kg)       Current Body Weight: 228 lb 2.8 oz (103.5 kg), 124 % IBW. Current BMI (kg/m2): 30.1        Weight Adjustment For: No Adjustment                 BMI Categories: Obese Class 1 (BMI 30.0-34. 9)    Estimated Daily Nutrient Needs:        Energy (kcal/day): 1553-1863kcals(15-18kcals/kg 103.5kg ABW)     Protein (g/day): 117-134g(1.4-1.6g/kg 83.6kg IBW)     Fluid (ml/day): 1 ml/kcal    Nutrition Diagnosis:   Increased nutrient needs related to acute injury/trauma as evidenced by wounds    Nutrition Interventions:   Food and/or Nutrient Delivery: Continue Current Diet, Start Oral Nutrition Supplement  Nutrition Education/Counseling: No recommendation at this time  Coordination of Nutrition Care: Continue to monitor while inpatient       Goals:     Goals: Meet at least 75% of estimated needs       Nutrition Monitoring and Evaluation:   Behavioral-Environmental Outcomes: None Identified  Food/Nutrient Intake Outcomes: Food and Nutrient Intake, Supplement Intake  Physical Signs/Symptoms Outcomes: Biochemical Data, Weight, Nutrition Focused Physical Findings    Discharge Planning:     Too soon to determine     [address]: [phone]

## 2022-10-26 NOTE — CARE COORDINATION
[facility] Wound Ostomy Continence Nurse  Consult Note       NAME:  Aide Stover  MEDICAL RECORD NUMBER:  [id number]  AGE: 81 y.o. GENDER: male  : 1935  TODAY'S DATE:  10/26/2022    Subjective   Reason for WOCN Evaluation and Assessment: Multiple pressure injuries, BLE weeping (all poa)      Evelin Perkins is a 81 y.o. male referred by:   [] Physician  [x] Nursing  [] Other:     Wound Identification:  Wound Type: pressure  Contributing Factors: chronic pressure, decreased mobility, incontinence of stool, and incontinence of urine    Wound History: All wounds POA.   Current Wound Care Treatment:  N/A    Patient Goal of Care:  [x] Wound Healing  [] Odor Control  [] Palliative Care  [] Pain Control   [] Other:         PAST MEDICAL HISTORY        Diagnosis Date    Atrial fibrillation ([facility] 2018    Atrial fib    Degeneration of cervical intervertebral disc     Diastolic heart failure ([facility] 2018    Diverticulosis of colon (without mention of hemorrhage) 10/22/2010    Essential hypertension, benign 10/22/2010    [place] (hard of hearing)     Hyperlipidemia 10/22/2010    Mononeuritis of unspecified site 10/22/2010    Osteoarthrosis, unspecified whether generalized or localized, pelvic region and thigh 10/22/2010    Other specified iron deficiency anemias 10/22/2010    Rosacea 10/22/2010    Seborrheic dermatitis 10/22/2010    Type II or unspecified type diabetes mellitus without mention of complication, not stated as uncontrolled 10/22/2010    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 10/22/2010    Unspecified pruritic disorder 10/22/2010       PAST SURGICAL HISTORY    Past Surgical History:   Procedure Laterality Date    CARDIOVASCULAR STRESS TEST  2018    negative    CARPAL TUNNEL RELEASE Right 2018    HAND SURGERY      Left carpel tunnel     HIP SURGERY Bilateral     total hips       FAMILY HISTORY    Family History   Family history unknown: Yes       SOCIAL HISTORY    Social History     Tobacco Use    Smoking status: Former     Types: Cigarettes     Quit date:      Years since quittin.8    Smokeless tobacco: Never   Vaping Use    Vaping Use: Never used   Substance Use Topics    Alcohol use: Never     Alcohol/week: 0.0 standard drinks     Comment: very seldom     Drug use: No       ALLERGIES    Allergies   Allergen Reactions    Levofloxacin      Pt states he doesn't have any allergies. His PCP added this to his chart        MEDICATIONS    No current facility-administered medications on file prior to encounter. Current Outpatient Medications on File Prior to Encounter   Medication Sig Dispense Refill    Elastic Bandages & Supports (MEDICAL COMPRESSION STOCKINGS) MISC 1 each by Does not apply route daily as needed (daily thigh high compression stockings (15-20 mmHg)) 1 each 0    gabapentin (NEURONTIN) 300 MG capsule TAKE 3 TABLETS BY MOUTH NIGHTLY (Patient taking differently: Take 600 mg by mouth at bedtime.) 270 capsule 1    diclofenac (VOLTAREN) 50 MG EC tablet Take 1 tablet by mouth in the morning and 1 tablet at noon and 1 tablet in the evening. Take with meals.  90 tablet 0    apixaban (ELIQUIS) 5 MG TABS tablet TAKE 1 TABLET BY MOUTH TWICE A DAY 60 tablet 2    metoprolol succinate (TOPROL XL) 50 MG extended release tablet TAKE 1 TABLET BY MOUTH EVERY DAY 90 tablet 1    torsemide (DEMADEX) 20 MG tablet 20 mg daily except on MWF 20 mg  tablet 3    insulin glargine (BASAGLAR KWIKPEN) 100 UNIT/ML injection pen INJECT 25 UNITS UNDER THE SKIN ONCE DAILY 12 pen 1    diclofenac sodium (VOLTAREN) 1 % GEL Apply 2 g topically 4 times daily 350 g 3    Insulin Pen Needle (B-D ULTRAFINE III SHORT PEN) 31G X 8 MM MISC USE DAILY WITH INSULIN 100 each 3       Objective    /64   Pulse 84   Temp 97.8 °F (36.6 °C) (Oral)   Resp 18   Ht 6' 1\" (1.854 m)   Wt 228 lb 2.8 oz (103.5 kg)   SpO2 98%   BMI 30.10 kg/m²     LABS:  WBC:    Lab Results   Component Value Date/Time    WBC 6.7 10/26/2022 06:28 AM     H/H:    Lab Results   Component Value Date/Time    HGB 12.1 10/26/2022 06:28 AM    HCT 36.7 10/26/2022 06:28 AM     PTT:    Lab Results   Component Value Date/Time    APTT 102.5 10/26/2022 06:28 AM   [APTT}  PT/INR:  No results found for: PROTIME, INR  HgBA1c:    Lab Results   Component Value Date/Time    LABA1C 7.5 10/25/2022 06:06 PM       Assessment   Theodore Risk Score: Theodore Scale Score: 15    Patient Active Problem List   Diagnosis Code    Lumbar disc disease M51.9    Iron deficiency anemia D50.9    Edema R60.9    Degeneration of cervical intervertebral disc M50.30    Hypertension I10    Hyperlipidemia E78.5    Type 2 diabetes mellitus with diabetic polyneuropathy, with long-term current use of insulin ([facility]) E11.42, Z79.4    GERD (gastroesophageal reflux disease) K21.9    Neuropathy G62.9    Arthritis M19.90    Bilateral carpal tunnel syndrome G56.03    NSTEMI (non-ST elevated myocardial infarction) ([facility]) H54.8    Diastolic heart failure ([facility]) I50.30    Chronic atrial fibrillation (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) I50.33       Measurements:  Wound 10/25/22 Buttocks Left (Active)   Wound Image   10/26/22 1340   Wound Etiology Pressure Stage 3 10/26/22 1340   Dressing/Treatment Triad hydro/zinc oxide-based hydrophilic paste 97/42/86 5374   Wound Length (cm) 2 cm 10/26/22 1340   Wound Width (cm) 2 cm 10/26/22 1340   Wound Depth (cm) 0.1 cm 10/26/22 1340   Wound Surface Area (cm^2) 4 cm^2 10/26/22 1340   Change in Wound Size % (l*w) 69.23 10/26/22 1340   Wound Volume (cm^3) 0.4 cm^3 10/26/22 1340   Wound Assessment Pink/red 10/26/22 1340   Drainage Amount None 10/26/22 0840   Odor None 10/26/22 0840   Shaila-wound Assessment Denuded 10/26/22 1340   Number of days: 0     RLE    LLE    R ischium    Stage 3 L buttock  Pt awake and alert on specialty bed. Pt has BLE redness with erythema. Dried venous ulcers with periwound edema. BLE wrapped with kerlix ace from foot to knee. Pt has stage 3 to L buttock, full thickness wound to R ischium. Pt incontinent of urine when turned. Triad barrier applied. Plan   Plan of Care:   BLE: kerlix, ace wrap from foot to knee (if wounds open, adaptic to wound base)  L and R buttock: triad barrier 2x daily, BLUE BARRIER WIPES ONLY  -turn and reposition every 2 hours  -chair cushion, encourage to reposition self frequently while in chair  -elevate heels, apply liquid barrier film twice daily; heel protectors  Will continue to follow. Specialty Bed Required : Yes   [x] Low Air Loss   [] Pressure Redistribution  [] Fluid Immersion  [] Bariatric  [] Total Pressure Relief  [] Other:     Current Diet: ADULT DIET; Regular; 4 carb choices (60 gm/meal);  Low Sodium (2 gm); 2000 ml  Dietician consult:  Yes    Discharge Plan:  Placement for patient upon discharge: skilled nursing    Patient appropriate for Outpatient [address]: No    Referrals:  []   [] [facility]  [] Supplies  [] Other    Patient/Caregiver Teaching:  Level of patient/caregiver understanding able to:   [] Indicates understanding       [] Needs reinforcement  [] Unsuccessful      [x] Verbal Understanding  [] Demonstrated understanding       [] No evidence of learning  [] Refused teaching         [] N/A       Electronically signed by Tha Duval RN, CWOCN on 10/26/2022 at 1:42 PM

## 2022-10-26 NOTE — PROGRESS NOTES
Physical Therapy  Facility/Department: [address] MED SURG  Physical Therapy Initial Assessment    Name: Khalida Hamilton  : 1935  MRN: [id number]  Date of Service: 10/26/2022    Discharge Recommendations:  Therapy recommended at discharge, Patient able to tolerate 3hrs of therapy a day, Continue to assess pending progress    Khalida Hamilton scored a 12/24 on the AM-PAC short mobility form. Current research shows that an AM-PAC score of 17 or less is typically not associated with a discharge to the patient's home setting. Based on the patient's AM-PAC score and their current functional mobility deficits, it is recommended that the patient have 5-7 sessions per week of Physical Therapy at d/c to increase the patient's independence. At this time, this patient demonstrates complex nursing, medical, and rehabilitative needs, and would benefit from intensive rehabilitation services upon discharge from the Inpatient setting. This patient demonstrates the ability to participate in and benefit from an intensive therapy program with a coordinated interdisciplinary team approach to foster frequent, structured, and documented communication among disciplines, who will work together to establish, prioritize, and achieve treatment goals. Please see assessment section for further patient specific details. If patient discharges prior to next session this note will serve as a discharge summary. Please see below for the latest assessment towards goals. Patient Diagnosis(es): The primary encounter diagnosis was Generalized weakness. Diagnoses of Frequent falls, Pleural effusion, Bilateral lower extremity edema, Elevated troponin, and Elevated brain natriuretic peptide (BNP) level were also pertinent to this visit.   Past Medical History:  has a past medical history of Atrial fibrillation ([address] Degeneration of cervical intervertebral disc, Diastolic heart failure ([address] Diverticulosis of colon (without mention of hemorrhage), Essential hypertension, benign, [place] (hard of hearing), Hyperlipidemia, Mononeuritis of unspecified site, Osteoarthrosis, unspecified whether generalized or localized, pelvic region and thigh, Other specified iron deficiency anemias, Rosacea, Seborrheic dermatitis, Type II or unspecified type diabetes mellitus without mention of complication, not stated as uncontrolled, 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 Past Surgical History:  has a past surgical history that includes hip surgery (Bilateral); Hand surgery; Carpal tunnel release (Right, 05/16/2018); and cardiovascular stress test (09/2018). Assessment   Body Structures, Functions, Activity Limitations Requiring Skilled Therapeutic Intervention: Decreased functional mobility ; Decreased ADL status; Decreased strength;Decreased endurance;Decreased balance;Decreased posture  Assessment: Pt is an 81 y.o. male who presents to the emergency room on 10/25/22 with complaints of generalized weakness, recurrent falls at home. Pt diagnosed with acute on chronic CHF. Prior to admission, pt living in home setting with spouse and dtr, independent with ADLs and ambulation with RW. Pt currently functioning well below baseline.  Recommend continued skilled therapy 5-7x/week to maximize independence and safety with functional mobility  Treatment Diagnosis: impaired mobility  Therapy Prognosis: Fair;Good  Decision Making: Medium Complexity  History: see above  Exam: see below  Clinical Presentation: evolving  Barriers to Learning: [place]  Requires PT Follow-Up: Yes  Activity Tolerance  Activity Tolerance: Patient tolerated treatment well     Plan   Physcial Therapy Plan  General Plan: 3-5 times per week  Current Treatment Recommendations: Strengthening, Balance training, Functional mobility training, Transfer training, Endurance training, Gait training, Neuromuscular re-education, Patient/Caregiver education & training, Safety education & training, Equipment evaluation, education, & procurement, Therapeutic activities  Safety Devices  Type of Devices: All fall risk precautions in place, Call light within reach, Gait belt, Patient at risk for falls, Left in chair, Chair alarm in place, Nurse notified     Restrictions  Restrictions/Precautions  Restrictions/Precautions: Fall Risk     Subjective   General  Chart Reviewed: Yes  Patient assessed for rehabilitation services?: Yes  Additional Pertinent Hx: Pt is an 81 y.o. male who presents to the emergency room on 10/25/22 with complaints of generalized weakness, recurrent falls at home. Pt diagnosed with acute on chronic CHF.   Response To Previous Treatment: Not applicable  Family / Caregiver Present: No  Referring Practitioner: David Marx MD  Referral Date : 10/25/22  Diagnosis: acute on chronic CHF  Follows Commands: Within Functional Limits  Subjective  Subjective: Pt is agreeable to PT         Social/Functional History  Social/Functional History  Lives With: Spouse, Daughter (Dtr works from home)  Type of Home: House  Home Layout: Multi-level, Bed/Bath upstairs (quad-level; 6 ELENITA to bed/bath)  Home Access: Stairs to enter with rails  Entrance Stairs - Number of Steps: 4 from the garage; 3 from the front  Bathroom Shower/Tub: Tub/Shower unit (Pt sponge bathes)  Bathroom Toilet: Handicap height  Home Equipment: erik Mckeon Cankaitlin  Has the patient had two or more falls in the past year or any fall with injury in the past year?: Yes  ADL Assistance: Independent (Sponge bathes, reports difficulty putting on compression stockings)  Homemaking Assistance: Needs assistance (Pt completes light homemaking)  Ambulation Assistance: Independent (using RW)  Transfer Assistance: Independent  Active : No  Patient's  Info: Dtr  Occupation: Retired  Type of Occupation: Supervisor at [employer]: Exceptions to [address] Exceptions: Hard of hearing/hearing concerns      Objective   Gross Assessment  Strength: Generally decreased, functional     Bed mobility  Supine to Sit: Minimal assistance; Moderate assistance  Sit to Supine: Unable to assess (in recliner at end of session)  Scooting: Contact guard assistance  Transfers  Sit to Stand: Moderate Assistance;2 Person Assistance  Stand to Sit: Moderate Assistance;2 Person Assistance  Ambulation  Surface: Level tile  Device: Rolling Walker  Assistance: Minimal assistance  Gait Deviations: Slow Estella;Decreased step length;Decreased step height  Distance: 5'  Comments: Pt with progressive trunk and bilateral knee flexion with increased gait distance.      Balance  Posture: Fair  Sitting - Static: Fair;+  Sitting - Dynamic: Fair;+  Standing - Static: Fair (@RW)  Standing - Dynamic: Fair;- (@RW)           AM-PAC Score  AM-PAC Inpatient Mobility Raw Score : 12 (10/26/22 0916)  AM-PAC Inpatient T-Scale Score : 35.33 (10/26/22 0916)  Mobility Inpatient CMS 0-100% Score: 68.66 (10/26/22 0916)  Mobility Inpatient CMS G-Code Modifier : CL (10/26/22 5899)       Goals  Short Term Goals  Time Frame for Short Term Goals: by acute discharge  Short Term Goal 1: bed mobility with CGA  Short Term Goal 2: sit<>stand with CGA  Short Term Goal 3: ambulate > 20' with RW and CGA  Patient Goals   Patient Goals : none stated       Education  Patient Education  Education Given To: Patient  Education Provided: Role of Therapy;Plan of Care  Education Method: Verbal  Barriers to Learning: Hearing  Education Outcome: Verbalized understanding;Continued education needed      Therapy Time   Individual Concurrent Group Co-treatment   Time In 0835         Time Out 0915         Minutes 40         Timed Code Treatment Minutes: 25 Minutes       Tisha Mac, PT

## 2022-10-26 NOTE — PROGRESS NOTES
Clinical Pharmacy Note  Heparin Dosing       Lab Results   Component Value Date/Time    APTT 102.5 10/26/2022 06:28 AM     Lab Results   Component Value Date/Time    HGB 12.1 10/26/2022 06:28 AM    HCT 36.7 10/26/2022 06:28 AM     10/26/2022 06:28 AM       Current Infusion Rate: 1430 units/hr    Plan:  Rate: Decrease to 1320 units/hr  Next aPTT: 1500  10/26/22    Pharmacy will continue to monitor and adjust based on aPTT results.     [facility] Faye [facility]  10/26/2022 8:39 AM

## 2022-10-27 LAB
A/G RATIO: 0.8 (ref 1.1–2.2)
ALBUMIN SERPL-MCNC: 3 G/DL (ref 3.4–5)
ALP BLD-CCNC: 126 U/L (ref 40–129)
ALT SERPL-CCNC: 14 U/L (ref 10–40)
ANION GAP SERPL CALCULATED.3IONS-SCNC: 13 MMOL/L (ref 3–16)
APTT: 111.5 SEC (ref 23–34.3)
APTT: 52.1 SEC (ref 23–34.3)
APTT: 92.9 SEC (ref 23–34.3)
AST SERPL-CCNC: 22 U/L (ref 15–37)
BASOPHILS ABSOLUTE: 0 K/UL (ref 0–0.2)
BASOPHILS RELATIVE PERCENT: 0.3 %
BILIRUB SERPL-MCNC: 1.6 MG/DL (ref 0–1)
BUN BLDV-MCNC: 29 MG/DL (ref 7–20)
CALCIUM SERPL-MCNC: 8.5 MG/DL (ref 8.3–10.6)
CHLORIDE BLD-SCNC: 97 MMOL/L (ref 99–110)
CO2: 29 MMOL/L (ref 21–32)
CREAT SERPL-MCNC: 0.9 MG/DL (ref 0.8–1.3)
EOSINOPHILS ABSOLUTE: 0.1 K/UL (ref 0–0.6)
EOSINOPHILS RELATIVE PERCENT: 1.8 %
GFR SERPL CREATININE-BSD FRML MDRD: >60 ML/MIN/{1.73_M2}
GLUCOSE BLD-MCNC: 160 MG/DL (ref 70–99)
GLUCOSE BLD-MCNC: 161 MG/DL (ref 70–99)
GLUCOSE BLD-MCNC: 175 MG/DL (ref 70–99)
GLUCOSE BLD-MCNC: 75 MG/DL (ref 70–99)
GLUCOSE BLD-MCNC: 87 MG/DL (ref 70–99)
HCT VFR BLD CALC: 36.2 % (ref 40.5–52.5)
HEMOGLOBIN: 11.8 G/DL (ref 13.5–17.5)
LYMPHOCYTES ABSOLUTE: 1.1 K/UL (ref 1–5.1)
LYMPHOCYTES RELATIVE PERCENT: 15 %
MAGNESIUM: 1.9 MG/DL (ref 1.8–2.4)
MCH RBC QN AUTO: 29.6 PG (ref 26–34)
MCHC RBC AUTO-ENTMCNC: 32.5 G/DL (ref 31–36)
MCV RBC AUTO: 91 FL (ref 80–100)
MONOCYTES ABSOLUTE: 1 K/UL (ref 0–1.3)
MONOCYTES RELATIVE PERCENT: 13.6 %
NEUTROPHILS ABSOLUTE: 5.1 K/UL (ref 1.7–7.7)
NEUTROPHILS RELATIVE PERCENT: 69.3 %
PDW BLD-RTO: 17.3 % (ref 12.4–15.4)
PERFORMED ON: ABNORMAL
PERFORMED ON: NORMAL
PHOSPHORUS: 3.3 MG/DL (ref 2.5–4.9)
PLATELET # BLD: 262 K/UL (ref 135–450)
PMV BLD AUTO: 7.9 FL (ref 5–10.5)
POTASSIUM SERPL-SCNC: 3.7 MMOL/L (ref 3.5–5.1)
PRO-BNP: 5041 PG/ML (ref 0–449)
RBC # BLD: 3.98 M/UL (ref 4.2–5.9)
SODIUM BLD-SCNC: 139 MMOL/L (ref 136–145)
TOTAL PROTEIN: 6.7 G/DL (ref 6.4–8.2)
WBC # BLD: 7.4 K/UL (ref 4–11)

## 2022-10-27 PROCEDURE — 85025 COMPLETE CBC W/AUTO DIFF WBC: CPT

## 2022-10-27 PROCEDURE — 6370000000 HC RX 637 (ALT 250 FOR IP): Performed by: INTERNAL MEDICINE

## 2022-10-27 PROCEDURE — 2500000003 HC RX 250 WO HCPCS: Performed by: INTERNAL MEDICINE

## 2022-10-27 PROCEDURE — 6370000000 HC RX 637 (ALT 250 FOR IP): Performed by: NURSE PRACTITIONER

## 2022-10-27 PROCEDURE — 83735 ASSAY OF MAGNESIUM: CPT

## 2022-10-27 PROCEDURE — 36415 COLL VENOUS BLD VENIPUNCTURE: CPT

## 2022-10-27 PROCEDURE — 6360000002 HC RX W HCPCS: Performed by: INTERNAL MEDICINE

## 2022-10-27 PROCEDURE — 97530 THERAPEUTIC ACTIVITIES: CPT

## 2022-10-27 PROCEDURE — 94760 N-INVAS EAR/PLS OXIMETRY 1: CPT

## 2022-10-27 PROCEDURE — 85730 THROMBOPLASTIN TIME PARTIAL: CPT

## 2022-10-27 PROCEDURE — 83880 ASSAY OF NATRIURETIC PEPTIDE: CPT

## 2022-10-27 PROCEDURE — 99223 1ST HOSP IP/OBS HIGH 75: CPT | Performed by: INTERNAL MEDICINE

## 2022-10-27 PROCEDURE — 84100 ASSAY OF PHOSPHORUS: CPT

## 2022-10-27 PROCEDURE — 97535 SELF CARE MNGMENT TRAINING: CPT

## 2022-10-27 PROCEDURE — 2580000003 HC RX 258: Performed by: INTERNAL MEDICINE

## 2022-10-27 PROCEDURE — 80053 COMPREHEN METABOLIC PANEL: CPT

## 2022-10-27 PROCEDURE — 1200000000 HC SEMI PRIVATE

## 2022-10-27 RX ORDER — LANOLIN ALCOHOL/MO/W.PET/CERES
3 CREAM (GRAM) TOPICAL NIGHTLY PRN
Status: DISCONTINUED | OUTPATIENT
Start: 2022-10-27 | End: 2022-10-31 | Stop reason: HOSPADM

## 2022-10-27 RX ORDER — HEPARIN SODIUM 1000 [USP'U]/ML
4000 INJECTION, SOLUTION INTRAVENOUS; SUBCUTANEOUS ONCE
Status: COMPLETED | OUTPATIENT
Start: 2022-10-27 | End: 2022-10-27

## 2022-10-27 RX ORDER — GABAPENTIN 300 MG/1
600 CAPSULE ORAL ONCE
Status: COMPLETED | OUTPATIENT
Start: 2022-10-27 | End: 2022-10-27

## 2022-10-27 RX ADMIN — HEPARIN SODIUM 4000 UNITS: 1000 INJECTION INTRAVENOUS; SUBCUTANEOUS at 18:40

## 2022-10-27 RX ADMIN — GABAPENTIN 600 MG: 300 CAPSULE ORAL at 21:42

## 2022-10-27 RX ADMIN — ACETAMINOPHEN 650 MG: 325 TABLET ORAL at 03:42

## 2022-10-27 RX ADMIN — ACETAMINOPHEN 650 MG: 325 TABLET ORAL at 21:42

## 2022-10-27 RX ADMIN — INSULIN GLARGINE 20 UNITS: 100 INJECTION, SOLUTION SUBCUTANEOUS at 21:47

## 2022-10-27 RX ADMIN — HEPARIN SODIUM 1430 UNITS/HR: 10000 INJECTION, SOLUTION INTRAVENOUS at 09:18

## 2022-10-27 RX ADMIN — FUROSEMIDE 40 MG: 10 INJECTION, SOLUTION INTRAMUSCULAR; INTRAVENOUS at 18:12

## 2022-10-27 RX ADMIN — HEPARIN SODIUM 1220 UNITS/HR: 10000 INJECTION, SOLUTION INTRAVENOUS at 11:25

## 2022-10-27 RX ADMIN — METOPROLOL SUCCINATE 50 MG: 50 TABLET, EXTENDED RELEASE ORAL at 09:08

## 2022-10-27 RX ADMIN — GABAPENTIN 600 MG: 300 CAPSULE ORAL at 09:09

## 2022-10-27 RX ADMIN — SODIUM CHLORIDE, PRESERVATIVE FREE 10 ML: 5 INJECTION INTRAVENOUS at 21:43

## 2022-10-27 RX ADMIN — SODIUM CHLORIDE, PRESERVATIVE FREE 10 ML: 5 INJECTION INTRAVENOUS at 11:31

## 2022-10-27 RX ADMIN — Medication 3 MG: at 21:42

## 2022-10-27 RX ADMIN — HEPARIN SODIUM 1400 UNITS/HR: 10000 INJECTION, SOLUTION INTRAVENOUS at 18:44

## 2022-10-27 RX ADMIN — FUROSEMIDE 40 MG: 10 INJECTION, SOLUTION INTRAMUSCULAR; INTRAVENOUS at 09:09

## 2022-10-27 NOTE — PROGRESS NOTES
Hospital Medicine Progress Note      Admit Date: 10/25/2022       CC: F/U for fall, prior to making it to the toilet, wife and daughter helped lower him to the floor       HPI:  This is a pleasant 81 y.o. male with history of paroxysmal atrial fibrillation (on chronic anticoagulation with Eliquis), degenerative disc disease, chronic diastolic heart failure, essential hypertension, hyperlipidemia, osteoarthritis, uncontrolled diabetes type 2 with hyperglycemia, who presents to the emergency room with complaints of generalized weakness, recurrent falls at home. 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 Wife also reports difficulty caring for patient at home due to patient's comorbidities. Earlier today, he felt generally weak, did not fall but wife and daughter where able to lower him to the ground. Chest x-ray obtained in the emergency room reveals large left-sided pleural effusion with adjacent consolidation, right lower lobe airspace disease, concerning for atelectasis or pneumonia. 10/25: ARU recommended. Cont workup   --- patient on hep gtt from admitting MD. Elevated troponin and CHF hx. Has seen Dr. Kevin Bagley in the past. States he has been changing his water pills the last few times he has been in to see him. States he lowered himself to the floor at home but did not fall. Echo with mild global hypokinesis and EF 40-45% EF. Consult placed to cardiology. Home meds show torsemide 20mg daily except on MWF 20mg bid. He got one dose Iv lasix 40mg on admission. Interval History/Subjective: patient is refusing to have chest tube or thoracentesis done and just wants to try medications, because he says they were able to fix it last time with medications.  I explained to him that what he has on his CT looks to be such that he will ultimately need one of those procedures done that I suspect meds may not be enough re: loculation and pleural effusions, but he is still refusing at this time. Has poor insight into the necessity of treatment, but will monitor and see how he does for now. Cont IV lasix. Pulmonology and cardiology also following. Added melatonin to help him sleep since he had trouble last night. Review of Systems:       The patient denied headaches, visual changes, LOC, SOB, CP, ABD pain, N/V/D, skin changes, new or worsening weakness or neuromuscular deficits. Comprehensive ROS negative except as mentioned above. Past Medical History:        Diagnosis Date    Atrial fibrillation ([address] 09/2018    Atrial fib    Degeneration of cervical intervertebral disc 85/94/0355    Diastolic heart failure ([facility] [address] [facility] 09/2018    Diverticulosis of colon (without mention of hemorrhage) 10/22/2010    Essential hypertension, benign 10/22/2010    [place] (hard of hearing)     Hyperlipidemia 10/22/2010    Mononeuritis of unspecified site 10/22/2010    Osteoarthrosis, unspecified whether generalized or localized, pelvic region and thigh 10/22/2010    Other specified iron deficiency anemias 10/22/2010    Rosacea 10/22/2010    Seborrheic dermatitis 10/22/2010    Type II or unspecified type diabetes mellitus without mention of complication, not stated as uncontrolled 10/22/2010    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 Any changes have been noted.      PHYSICAL EXAM:  /77   Pulse 94   Temp 98 °F (36.7 °C) (Oral)   Resp 18   Ht 6' 1\" (1.854 m)   Wt 228 lb 9.9 oz (103.7 kg)   SpO2 92%   BMI 30.16 kg/m²       Intake/Output Summary (Last 24 hours) at 10/27/2022 0852  Last data filed at 10/27/2022 0031  Gross per 24 hour   Intake 240 ml   Output 2100 ml   Net -1860 ml        General appearance:   No apparent distress, appears stated age. Cooperative. HEENT:  Normocephalic, atraumatic. PERRLA. EOMi. Conjunctivae/corneas clear, no icterus, non-injected. Neck: Supple, with full range of motion. No jugular venous distention. Trachea midline. Respiratory:  Normal respiratory effort. Clear to auscultation, bilaterally without Rales/Wheezes/Rhonchi. Cardiovascular:  Regular rate and rhythm without murmurs, rubs or gallops. Abdomen: Soft, non-tender, non-distended, without rebound or guarding. Normal bowel sounds. Musculoskeletal:  lymphedema; pitting edema slightly better, 1+ bilaterally; Full range of motion without deformity. Skin: Skin color, texture, turgor normal.  No rashes or lesions. Neurologic:  Neurovascularly intact without any focal sensory/motor deficits. Cranial nerves: II-XII intact, grossly intact. No facial asymmetry, tongue midline. Psychiatric:  Alert and oriented, thought content appropriate  Capillary Refill: Brisk,< 3 seconds   Peripheral Pulses: +2 palpable, equal bilaterally       LABS:    Lab Results   Component Value Date    WBC 7.4 10/27/2022    HGB 11.8 (L) 10/27/2022    HCT 36.2 (L) 10/27/2022    MCV 91.0 10/27/2022     10/27/2022    LYMPHOPCT 15.0 10/27/2022    RBC 3.98 (L) 10/27/2022    MCH 29.6 10/27/2022    MCHC 32.5 10/27/2022    RDW 17.3 (H) 10/27/2022       Lab Results   Component Value Date    CREATININE 0.9 10/27/2022    BUN 29 (H) 10/27/2022     10/27/2022    K 3.7 10/27/2022    CL 97 (L) 10/27/2022    CO2 29 10/27/2022       Lab Results   Component Value Date/Time    MG 1.90 10/27/2022 06:43 AM       Lab Results   Component Value Date    ALT 14 10/27/2022    AST 22 10/27/2022    ALKPHOS 126 10/27/2022    BILITOT 1.6 (H) 10/27/2022        No flowsheet data found.     Lab Results   Component Value Date LABA1C 7.5 10/25/2022       Imaging:  CT CHEST WO CONTRAST   Final Result   Bilateral pleural effusions, large on the left, and moderate on the right. Pleural fluid on the left is loculated. There is thickening of the parietal   pleura on the left, compatible with chronicity to the pleural fluid. There   are areas of increased density seen in the pleural effusion on the left,   likely due to proteinaceous debris and less likely due to hemorrhage or   metastatic implant. Body wall anasarca with the ascites, compatible with fluid overload. CT HEAD WO CONTRAST   Final Result   No acute intracranial abnormality      Atrophy and small-vessel ischemic change         CT CERVICAL SPINE WO CONTRAST   Final Result   1. No acute fracture. 2. Incompletely imaged partially loculated left pleural effusion. XR CHEST PORTABLE   Final Result   Large left-sided pleural effusion with adjacent left lung consolidation. It   is uncertain as to whether not wise pre-existing or secondary to the history   of trauma      Right lower lobe airspace disease, either atelectasis or pneumonia.              Scheduled and prn Medications:    Scheduled Meds:   gabapentin  600 mg Oral Once    gabapentin  600 mg Oral Nightly    insulin glargine  20 Units SubCUTAneous Nightly    metoprolol succinate  50 mg Oral Daily    insulin lispro  0-4 Units SubCUTAneous TID WC    insulin lispro  0-4 Units SubCUTAneous Nightly    sodium chloride flush  5-40 mL IntraVENous 2 times per day    furosemide  40 mg IntraVENous BID     Continuous Infusions:   dextrose      sodium chloride      heparin (PORCINE) Infusion 1,430 Units/hr (10/26/22 1721)     PRN Meds:.trimethobenzamide, meclizine, glucose, dextrose bolus **OR** dextrose bolus, glucagon (rDNA), dextrose, sodium chloride flush, sodium chloride, polyethylene glycol, acetaminophen **OR** acetaminophen    Assessment & Plan:      81 yr old male with hx afib, lymphedema, dHF, HTN, DMT2 who is admitted for acute on chronic diastolic heart failure, anasarca and ascites and bilat leg edema currently getting IV lasix. Has bilat pleural effusions and loculated area on the left who really would benefit from chest tube or thoracentesis but is currently refusing these and instead just wants to try medication to pull off fluid. Has very poor insight to the extent of this. He had an elevated troponin likely due to ischemic demand and was started on hep gtt and subsequently, his anticoagulation was held for afib. Pulmonology and cardiology both following. Acute on chronic diastolic heart failure. Anasarca with ascites  - 40mg IV lasix bid for now, received one dose IV lasix 40mg on admission  - home dose torsemide 20mg daily except MWF where he takes 40mg bid   - I/O  - daily wts  - BNP 5,900  - Low sodium diet  - Fluid restriction 48-64 oz  - pt not on ACEi, takes a bb  - echo: 10/26/22  - wears compression stockings at home; states it has been hard to lift his heavy, swollen legs and has been having difficulty walking   There is severe concentric left ventricular hypertrophy. EF 40-45%. There is mild global hypokinesis. Grade III diastolic dysfunction with elevated LV filling pressures. Moderately dilated right ventricle. Right ventricular systolic function is moderately reduced. Moderate biatrial enlargement. At least moderate mitral regurgitation. Mild tricuspid regurgitation.   - Cardiology Consult        Generalized weakness, recurrent falls.  - PT/OT evals  - placement if needed  - monitor labs  - likely due to fluid overload  -states it is hard for him to  his feet with heavy swollen legs  - hopefully will improve with diuresis       Left pleural effusion, possibly loculated. - could be from CHF and/or mass in left upper chest   - CT chest : bilateral pleural effusion R > L, pleural fluid on left is loculated. - likely need for thoracentesis or chest tube.  Pt electing to try medications first and forego any procedures at this time  - consult placed to Pulmonology       Abnormal chest x-ray; suspect more atelectasis than pneumonia. - CT as above  - not requiring O2   - Pulm has been consulted for large pleural effusions       Elevated troponin from baseline, possibly type II NSTEMI. - placed on hep gtt by admitting physician,   - could be 2/2 ischemic demand        Chronic venous stasis dermatitis, lymphedema. - wears stockings at home  - here with kerlix/ACE wraps in place        History of atrial fibrillation, currently rate controlled. Anticoagulated with eliquis, but holding with hep gtt. Can restart as indicated by cardiology. May need thoracentesis/        Other comorbidities: history of paroxysmal atrial fibrillation (holding Eliquis), degenerative disc disease, chronic diastolic heart failure, essential hypertension, hyperlipidemia, osteoarthritis, uncontrolled diabetes type 2 with hyperglycemia. Continue current regimen/therapies. Monitor. Adjust medical regimen as appropriate. Body mass index is 30.16 kg/m². The patient and / or the family were informed of the results of any tests, a time was given to answer questions, a plan was proposed and they agreed with plan. DVT ppx: hep      Diet: ADULT DIET; Regular; 4 carb choices (60 gm/meal); Low Sodium (2 gm); 2000 ml  ADULT ORAL NUTRITION SUPPLEMENT; Lunch, Dinner;  Wound Healing Oral Supplement    Consults:  IP CONSULT TO DIETITIAN  IP CONSULT TO CARDIOLOGY  IP CONSULT TO PULMONOLOGY    DISPO/placement plan: pending    Code Status: Full Code      MARIA FERNANDA Fajardo CNP  10/27/22

## 2022-10-27 NOTE — PROGRESS NOTES
Clinical Pharmacy Note  Heparin Dosing       Lab Results   Component Value Date/Time    APTT 52.1 10/27/2022 04:40 PM     Lab Results   Component Value Date/Time    HGB 11.8 10/27/2022 06:43 AM    HCT 36.2 10/27/2022 06:43 AM     10/27/2022 06:43 AM       Current Infusion Rate: 1220 units/hr    Plan:  Bolus: 4000 units  Rate: increase to 1400 units/hr  Next aPTT: 0100 10/28/22    Pharmacy will continue to monitor and adjust based on aPTT results.     [address] [facility]  10/27/2022 6:31 PM

## 2022-10-27 NOTE — PROGRESS NOTES
Occupational Therapy  Facility/Department: [address] MED SURG  Occupational Therapy Daily Treatment    Name: Josh Saez  : 1935  MRN: [id number]  Date of Service: 10/27/2022    Discharge Recommendations:  Patient would benefit from continued therapy after discharge, 3-5 sessions per week     Josh Saez scored a 14/24 on the AM-PAC ADL Inpatient form. Current research shows that an AM-PAC score of 17 or less is typically not associated with a discharge to the patient's home setting. Based on the patient's AM-PAC score and their current ADL deficits, it is recommended that the patient have 3-5 sessions per week of Occupational Therapy at d/c to increase the patient's independence. Please see assessment section for further patient specific details. If patient discharges prior to next session this note will serve as a discharge summary. Please see below for the latest assessment towards goals. Patient Diagnosis(es): The primary encounter diagnosis was Generalized weakness. Diagnoses of Frequent falls, Pleural effusion, Bilateral lower extremity edema, Elevated troponin, and Elevated brain natriuretic peptide (BNP) level were also pertinent to this visit. Past Medical History:  has a past medical history of Atrial fibrillation ([address] Degeneration of cervical intervertebral disc, Diastolic heart failure ([address] Diverticulosis of colon (without mention of hemorrhage), Essential hypertension, benign, [place] (hard of hearing), Hyperlipidemia, Mononeuritis of unspecified site, Osteoarthrosis, unspecified whether generalized or localized, pelvic region and thigh, Other specified iron deficiency anemias, Rosacea, Seborrheic dermatitis, Type II or unspecified type diabetes mellitus without mention of complication, not stated as uncontrolled, 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   Past Surgical History:  has a past surgical history that includes hip surgery (Bilateral); Hand surgery; Carpal tunnel release (Right, 05/16/2018); and cardiovascular stress test (09/2018). Treatment Diagnosis: Decreased: ADLs, fxl transfers/mobility      Assessment   Performance deficits / Impairments: Decreased functional mobility ; Decreased ADL status; Decreased balance;Decreased strength;Decreased endurance  Assessment: Pt is a 82 yo M admitted with c/o generalized weakness, recurrent falls at home. PTA, pt lives at home w/ his wife and dtr where he is typically independent in self-care and completes fxl mobility mod I using RW. Pt remains below baseline, today limited by worsening B LE pain. He required max A for bed mobility and max Ax2 for partial  shira stedy lift. He required total A for toileting after incontinence, total A LB dressing and changing gown. Based on worsening pain and poor tolerance today, recommend ongoing skilled OT 3-5 times/week at d/c to increase pt's safety and independence before returning home w/ his wife. Treatment Diagnosis: Decreased: ADLs, fxl transfers/mobility  Prognosis: Good  REQUIRES OT FOLLOW-UP: Yes  Activity Tolerance  Activity Tolerance: Patient limited by fatigue;Patient limited by pain  Activity Tolerance Comments: Worsening B LE pain - poor tolerance        Plan   Occupational Therapy Plan  Times Per Week: 3-5  Times Per Day:  Once a day  Current Treatment Recommendations: Strengthening, ROM, Balance training, Endurance training, Functional mobility training, Safety education & training, Self-Care / ADL     Restrictions  Restrictions/Precautions  Restrictions/Precautions: Fall Risk    Subjective   General  Chart Reviewed: Yes  Patient assessed for rehabilitation services?: Yes  Additional Pertinent Hx: per H&P: \"This is a pleasant 81 y.o. male with history of paroxysmal atrial fibrillation (on chronic anticoagulation with Eliquis), degenerative disc disease, chronic diastolic heart failure, essential hypertension, hyperlipidemia, osteoarthritis, uncontrolled diabetes type 2 with hyperglycemia, who presents to the emergency room with complaints of generalized weakness, recurrent falls at home. 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 Wife also reports difficulty caring for patient at home due to patient's comorbidities. Earlier today, he felt generally weak, did not fall but wife and daughter where able to lower him to the ground. Chest x-ray obtained in the emergency room reveals large left-sided pleural effusion with adjacent consolidation, right lower lobe airspace disease, concerning for atelectasis or pneumonia. \"  Family / Caregiver Present: No  Referring Practitioner: Angeline  Diagnosis: Acute on chronic diastolic heart failure  Subjective  Subjective: Pt met b/s for OT cotx w/ PT. Pt in bed, c/o pain in B feet during session but did not rate - seems worse than yesterday  General Comment  Comments: Per RN ok to see    Objective   Safety Devices  Type of Devices: All fall risk precautions in place;Call light within reach;Gait belt;Patient at risk for falls; Left in bed;Bed alarm in place;Nurse notified           ADL  UE Dressing Skilled Clinical Factors: Assist to change soiled gown  LE Dressing: Dependent/Total  LE Dressing Skilled Clinical Factors: To don socks  Toileting: Dependent/Total  Toileting Skilled Clinical Factors: Male purewick on, but pt was saturated w/ urine. Total A for hygiene while pt sat EOB, then finished by rolling in bed     Activity Tolerance  Activity Tolerance: Patient limited by fatigue;Patient limited by endurance; Patient limited by pain  Bed mobility  Supine to Sit: Maximum assistance  Sit to Supine: Maximum assistance;2 Person assistance (in recliner at end of session)  Scooting: Maximal assistance  Transfers  Sit to stand: 2 Person assistance;Maximum assistance  Stand to sit: 2 Person assistance;Maximum assistance  Transfer Comments: Pt unable to stand to RW d/t B foot pain; max Ax2 for partial stance in shira mejia - only able to stand briefly d/t pain  Hearing  Hearing: Exceptions to [facility]  Hearing Exceptions: Hard of hearing/hearing concerns  Cognition  Overall Cognitive Status: Exceptions  Arousal/Alertness: Appropriate responses to stimuli  Following Commands: Follows one step commands consistently  Attention Span: Appears intact  Memory: Appears intact  Safety Judgement: Decreased awareness of need for safety;Decreased awareness of need for assistance  Problem Solving: Decreased awareness of errors  Insights: Decreased awareness of deficits  Initiation: Requires cues for some  Sequencing: Does not require cues  Orientation  Overall Orientation Status: Within Functional Limits (Not formally assessed, generally WFL for eval)                  Education Given To: Patient  Education Provided: Role of Therapy; ADL Adaptive Strategies;Transfer Training;Energy Conservation  Education Method: Verbal  Barriers to Learning: Hearing;Cognition  Education Outcome: Continued education needed          AM-PAC Score  AM-[facility] Inpatient Daily Activity Raw Score: 14 (10/27/22 1321)  AM-PAC Inpatient ADL T-Scale Score : 33.39 (10/27/22 1321)  ADL Inpatient CMS 0-100% Score: 59.67 (10/27/22 1321)  ADL Inpatient CMS G-Code Modifier : CK (10/27/22 1321)    Goals  Short Term Goals  Time Frame for Short Term Goals: Prior to d/c -goals ongoing  Short Term Goal 1: Pt will complete ADL transfers CGA  Short Term Goal 2: Pt will toilet w/ min A  Short Term Goal 3: Pt will groom sinkside w/ setup  Short Term Goal 4: Pt will bathe w/ min A  Short Term Goal 5: Pt will complete LB dressing min A using AE prn  Long Term Goals  Time Frame for Long Term Goals : LTG=STG  Patient Goals   Patient goals : to go home       Therapy Time   Individual Concurrent Group Co-treatment   Time In 1120         Time Out 1200         Minutes 40 Ming He, OTR/L [id number]

## 2022-10-27 NOTE — PROGRESS NOTES
Physical Therapy  Facility/Department: [address] MED SURG  Physical Therapy Treatment Note    Name: Ricky Wright  : 1935  MRN: [id number]  Date of Service: 10/27/2022    Discharge Recommendations:  Continue to assess pending progress, Therapy recommended at discharge    Ricky Wright scored a  on the AM-PAC short mobility form. Current research shows that an AM-PAC score of 17 or less is typically not associated with a discharge to the patient's home setting. Based on the patient's AM-PAC score and their current functional mobility deficits, it is recommended that the patient have 3-5 sessions per week of Physical Therapy at d/c to increase the patient's independence. Please see assessment section for further patient specific details. If patient discharges prior to next session this note will serve as a discharge summary. Please see below for the latest assessment towards goals. Patient Diagnosis(es): The primary encounter diagnosis was Generalized weakness. Diagnoses of Frequent falls, Pleural effusion, Bilateral lower extremity edema, Elevated troponin, and Elevated brain natriuretic peptide (BNP) level were also pertinent to this visit. Past Medical History:  has a past medical history of Atrial fibrillation ([address] Degeneration of cervical intervertebral disc, Diastolic heart failure ([address] Diverticulosis of colon (without mention of hemorrhage), Essential hypertension, benign, [place] (hard of hearing), Hyperlipidemia, Mononeuritis of unspecified site, Osteoarthrosis, unspecified whether generalized or localized, pelvic region and thigh, Other specified iron deficiency anemias, Rosacea, Seborrheic dermatitis, Type II or unspecified type diabetes mellitus without mention of complication, not stated as uncontrolled, 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   Past Surgical History:  has a past surgical history that includes hip surgery (Bilateral); Hand surgery; Carpal tunnel release (Right, 05/16/2018); and cardiovascular stress test (09/2018). Assessment   Body Structures, Functions, Activity Limitations Requiring Skilled Therapeutic Intervention: Decreased functional mobility ; Decreased ADL status; Decreased strength;Decreased endurance;Decreased balance;Decreased posture  Assessment: Pt is an 81 y.o. male who presents to the emergency room on 10/25/22 with complaints of generalized weakness, recurrent falls at home. Pt diagnosed with acute on chronic CHF. Prior to admission, pt living in home setting with spouse and dtr, independent with ADLs and ambulation with RW. Pt currently functioning well below baseline - regression in activity tolerance this date. Pt no longer recommended for high intensity therapy - however he would benefit from continued therapy at a low to moderate intensity. Treatment Diagnosis: impaired mobility  Therapy Prognosis: Fair;Good  Decision Making: Medium Complexity  History: see above  Exam: see below  Clinical Presentation: evolving  Barriers to Learning: [facility]  Activity Tolerance  Activity Tolerance: Patient limited by fatigue;Patient limited by endurance; Patient limited by pain     Plan   Physcial Therapy Plan  General Plan: 3-5 times per week  Current Treatment Recommendations: Strengthening, Balance training, Functional mobility training, Transfer training, Endurance training, Gait training, Neuromuscular re-education, Patient/Caregiver education & training, Safety education & training, Equipment evaluation, education, & procurement, Therapeutic activities  Safety Devices  Type of Devices:  All fall risk precautions in place, Call light within reach, Gait belt, Patient at risk for falls, Left in bed, Bed alarm in place, Nurse notified     Restrictions  Restrictions/Precautions  Restrictions/Precautions: Fall Risk     Subjective   General  Chart Reviewed: Yes  Patient assessed for rehabilitation services?: Yes  Additional Pertinent Hx: Pt is an 81 y.o. male who presents to the emergency room on 10/25/22 with complaints of generalized weakness, recurrent falls at home. Pt diagnosed with acute on chronic CHF. Response To Previous Treatment: Not applicable  Family / Caregiver Present: No  Referring Practitioner: Raven Ford MD  Referral Date : 10/25/22  Diagnosis: acute on chronic CHF  Follows Commands: Within Functional Limits  Subjective  Subjective: Pt is agreeable to PT - reporting increased bilateral foot pain and buttocks pain. Social/Functional History  Social/Functional History  Lives With: Spouse, Daughter (Dtr works from home)  Type of Home: House  Home Layout: Multi-level, Bed/Bath upstairs (quad-level; 6 ELENITA to bed/bath)  Home Access: Stairs to enter with rails  Entrance Stairs - Number of Steps: 4 from the garage; 3 from the front  Bathroom Shower/Tub: Tub/Shower unit (Pt sponge bathes)  Bathroom Toilet: Handicap height  Home Equipment: Sherl Franki, rolling, Cane  Has the patient had two or more falls in the past year or any fall with injury in the past year?: Yes  ADL Assistance: Independent (Sponge bathes, reports difficulty putting on compression stockings)  Homemaking Assistance: Needs assistance (Pt completes light homemaking)  Ambulation Assistance: Independent (using RW)  Transfer Assistance: Independent  Active : No  Patient's  Info: Dtr  Occupation: Retired  Type of Occupation: Supervisor at [employer]: Exceptions to [facility] [employer]  Hearing Exceptions: Hard of hearing/hearing concerns      Cognition   Orientation  Overall Orientation Status: Within Functional Limits (Not formally assessed, generally WFL for eval)  Cognition  Overall Cognitive Status: Exceptions  Arousal/Alertness: Appropriate responses to stimuli  Following Commands:  Follows one step commands consistently  Attention Span: Appears intact  Memory: Appears intact  Safety Judgement: Decreased awareness of need for safety;Decreased awareness of need for assistance  Problem Solving: Decreased awareness of errors  Insights: Decreased awareness of deficits  Initiation: Requires cues for some  Sequencing: Does not require cues     Objective   Gross Assessment  Strength: Generally decreased, functional       Bed mobility  Supine to Sit: Maximum assistance  Sit to Supine: Maximum assistance;2 Person assistance (in recliner at end of session)  Scooting: Maximal assistance  Transfers  Sit to Stand: Maximum Assistance;2 Person Assistance  Stand to Sit: Maximum Assistance;2 Person Assistance  Comment: Pt unable to stand to RW; only able to get to squatting position in stedy. Minimal effort provided by pt.         Balance  Posture: Fair  Sitting - Static: Fair  Sitting - Dynamic: Fair;-           AM-PAC Score  AM-PAC Inpatient Mobility Raw Score : 8 (10/27/22 1319)  AM-PAC Inpatient T-Scale Score : 28.52 (10/27/22 1319)  Mobility Inpatient CMS 0-100% Score: 86.62 (10/27/22 1319)  Mobility Inpatient CMS G-Code Modifier : CM (10/27/22 1319)       Goals  Short Term Goals  Time Frame for Short Term Goals: by acute discharge - all goals ongoing as of 10/27/22  Short Term Goal 1: bed mobility with CGA  Short Term Goal 2: sit<>stand with CGA  Short Term Goal 3: ambulate > 20' with RW and CGA  Patient Goals   Patient Goals : none stated       Education  Patient Education  Education Given To: Patient  Education Provided: Role of Therapy;Plan of Care  Education Method: Verbal  Barriers to Learning: Hearing  Education Outcome: Verbalized understanding;Continued education needed      Therapy Time   Individual Concurrent Group Co-treatment   Time In 1120         Time Out 1200         Minutes 40         Timed Code Treatment Minutes: 40 Minutes       Kenya Henry PT

## 2022-10-27 NOTE — PROGRESS NOTES
Pt c/o pain sharp shooting pain in LLE. Nightshift RN reports gabapentin helps but doesn't last long enough. Call light and bedside within reach. MD messaged.

## 2022-10-27 NOTE — PLAN OF CARE
Problem: Discharge Planning  Goal: Discharge to home or other facility with appropriate resources  Outcome: Progressing     Problem: Skin/Tissue Integrity  Goal: Absence of new skin breakdown  Description: 1. Monitor for areas of redness and/or skin breakdown  2. Assess vascular access sites hourly  3. Every 4-6 hours minimum:  Change oxygen saturation probe site  4. Every 4-6 hours:  If on nasal continuous positive airway pressure, respiratory therapy assess nares and determine need for appliance change or resting period.   Outcome: Progressing     Problem: Safety - Adult  Goal: Free from fall injury  Outcome: Progressing     Problem: ABCDS Injury Assessment  Goal: Absence of physical injury  Outcome: Progressing     Problem: Respiratory - Adult  Goal: Achieves optimal ventilation and oxygenation  Outcome: Progressing     Problem: Metabolic/Fluid and Electrolytes - Adult  Goal: Electrolytes maintained within normal limits  Outcome: Progressing  Goal: Hemodynamic stability and optimal renal function maintained  Outcome: Progressing     Problem: Cardiovascular - Adult  Goal: Maintains optimal cardiac output and hemodynamic stability  Outcome: Progressing  Goal: Absence of cardiac dysrhythmias or at baseline  Outcome: Progressing     Problem: Hematologic - Adult  Goal: Maintains hematologic stability  Outcome: Progressing     Problem: Chronic Conditions and Co-morbidities  Goal: Patient's chronic conditions and co-morbidity symptoms are monitored and maintained or improved  Outcome: Progressing     Problem: Nutrition Deficit:  Goal: Optimize nutritional status  Outcome: Progressing     Problem: Metabolic/Fluid and Electrolytes - Adult  Goal: Electrolytes maintained within normal limits  Outcome: Progressing  Goal: Hemodynamic stability and optimal renal function maintained  Outcome: Progressing     Problem: Nutrition Deficit:  Goal: Optimize nutritional status  Outcome: Progressing

## 2022-10-27 NOTE — PROGRESS NOTES
Pt wife called and was updated on pt poc. Pt wife states she would like pt to go to [address]. This RN notified  of preference.

## 2022-10-27 NOTE — PROGRESS NOTES
Clinical Pharmacy Note  Heparin Dosing       Lab Results   Component Value Date/Time    APTT 111.5 10/27/2022 09:08 AM     Lab Results   Component Value Date/Time    HGB 11.8 10/27/2022 06:43 AM    HCT 36.2 10/27/2022 06:43 AM     10/27/2022 06:43 AM       Current Infusion Rate: 1430 units/hr    Plan:  Rate: 1220 units/hr  Next aPTT: 1700  10/27/22    Pharmacy will continue to monitor and adjust based on aPTT results.     Terry Rodriguez, [address]  10/27/2022 10:38 AM

## 2022-10-27 NOTE — PROGRESS NOTES
Heart Failure Diet Education:    Per hospital protocol or consult, patient being seen for Heart Failure diet guidelines. Learners: [x]Patient []Family []Caregiver [] Other: ______________  Methods: [x]Verbal Education  [x]Handouts  []Teachback     Patient's Lifestyle Questions:   Is HF a new Diagnosis? []Yes [x]No    Has patient had prior education? [x]Yes []No    Who does Grocery shopping and cooking? Pt's wife    Frequency of eating out? N/a    Typical Eating Habits:Breakfast: oatmeal, fruit, cereal, english muffin, or toast. Lunch: Ham sandwich or soup. Dinner: fish with vegetables      Instructed the Patient on:   [x] High/Low Sodium foods    [x] Moderation/portion control  [x] Eating out  [x] Fluid Restriction    [x] Label reading  [] Carb Counting   [] Other:      Educational Materials Provided:   [x] Nutrition Care Manual (NCM) Heart Failure Nutrition Therapy   [x] NCM Sodium (Salt) Content of Foods  [] NCM Sodium Free Flavoring Tips    [] Heart Healthy Eating Label Reading Tips   [] Healthy Eating when Eating Out  [] Controlling Your Fluids   [] Fast Food Guide (from [employer])  [] NCM Carbohydrate Counting for People with Diabetes   [] Managing Your Diabetes Plate Method     -Diet Recommendations: 2000 mg Sodium/day, 64 oz Fluids/day         Monitoring/Evaluation:   -Barriers: Pt's wife does cooking and shopping   -Evaluation of education: Indicates understanding.  -Expected compliance: excellent. Additional Comments: Pt very receptive to diet education. Has had some prior education. I pointed out the high sodium foods such as the soup and sandwiches and stressed moderation and substitution. Pt likes fruits and vegetables, so I emphasized substitution for some of the bread he eats with meals and crackers he has for snacks. Pt pointed out that his wife does the cooking, so I told him to pass along the information.  I emphasized staying away from canned foods and consuming more fresh foods. Pt said he will start reading nutrition labels and I pointed out to him how much sodium is in a low sodium food (<140mg) vs a high sodium food(<300mg). Pt agreed to read handouts.      Total time involved in patient education: 20 minutes        Electronically signed by Tre Zaragoza on 10/27/2022 at 1:11 PM

## 2022-10-27 NOTE — PLAN OF CARE
Problem: Discharge Planning  Goal: Discharge to home or other facility with appropriate resources  10/27/2022 0023 by Justin Sears RN  Outcome: Progressing  10/26/2022 1035 by Rehan Duron RN  Outcome: Progressing     Problem: Skin/Tissue Integrity  Goal: Absence of new skin breakdown  Description: 1. Monitor for areas of redness and/or skin breakdown  2. Assess vascular access sites hourly  3. Every 4-6 hours minimum:  Change oxygen saturation probe site  4. Every 4-6 hours:  If on nasal continuous positive airway pressure, respiratory therapy assess nares and determine need for appliance change or resting period.   10/27/2022 0023 by Justin Sears RN  Outcome: Progressing  10/26/2022 1035 by Rehan Duron RN  Outcome: Progressing     Problem: Safety - Adult  Goal: Free from fall injury  10/27/2022 0023 by Justin Sears RN  Outcome: Progressing  10/26/2022 1035 by Rehan Duron RN  Outcome: Progressing     Problem: ABCDS Injury Assessment  Goal: Absence of physical injury  10/27/2022 0023 by Justin Sears RN  Outcome: Progressing  10/26/2022 1035 by Rehan Duron RN  Outcome: Progressing     Problem: Respiratory - Adult  Goal: Achieves optimal ventilation and oxygenation  10/27/2022 0023 by Justin Sears RN  Outcome: Progressing  10/26/2022 1035 by Rehan Duron RN  Outcome: Progressing     Problem: Cardiovascular - Adult  Goal: Maintains optimal cardiac output and hemodynamic stability  10/27/2022 0023 by Justin Sears RN  Outcome: Progressing  10/26/2022 1035 by Rehan Duron RN  Outcome: Progressing  Goal: Absence of cardiac dysrhythmias or at baseline  10/27/2022 0023 by Justin Sears RN  Outcome: Progressing  10/26/2022 1035 by Rehan Duron RN  Outcome: Progressing     Problem: Metabolic/Fluid and Electrolytes - Adult  Goal: Electrolytes maintained within normal limits  10/27/2022 0023 by Justin Sears RN  Outcome: Progressing  10/26/2022 1035 by Rehan Duron RN  Outcome: Progressing  Goal: Hemodynamic stability and optimal renal function maintained  10/27/2022 0023 by Ladarius Maldonado RN  Outcome: Progressing  10/26/2022 1035 by Angelique Dumont RN  Outcome: Progressing     Problem: Hematologic - Adult  Goal: Maintains hematologic stability  10/27/2022 0023 by Ladarius Maldonado RN  Outcome: Progressing  10/26/2022 1035 by Angelique Dumont RN  Outcome: Progressing     Problem: Chronic Conditions and Co-morbidities  Goal: Patient's chronic conditions and co-morbidity symptoms are monitored and maintained or improved  10/27/2022 0023 by Laadrius Maldonado RN  Outcome: Progressing  10/26/2022 1035 by Angelique Dumont RN  Outcome: Progressing     Problem: Nutrition Deficit:  Goal: Optimize nutritional status  10/27/2022 0023 by Ladarius Maldonado RN  Outcome: Progressing  10/26/2022 1438 by Nuris Santizo RD, LD  Outcome: Progressing

## 2022-10-27 NOTE — CARE COORDINATION
Talked with bedside nurse, wife called in earlier & talked to nurse re: update of pt status & requested referral to [facility]; referral sent. Tano Yadav RN, BSN,   [phone]  Electronically signed by Tano Yadav RN on 10/27/2022 at 2:55 PM     Received call back from Khloe Bach at [facility], they can accept at [facility].     Tano Yadav RN, BSN,   [phone]  Electronically signed by Tano Yadav RN on 10/27/2022 at 3:24 PM

## 2022-10-27 NOTE — PROGRESS NOTES
Clinical Pharmacy Note  Heparin Dosing       Lab Results   Component Value Date/Time    APTT 92.9 10/27/2022 12:39 AM     Lab Results   Component Value Date/Time    HGB 12.1 10/26/2022 06:28 AM    HCT 36.7 10/26/2022 06:28 AM     10/26/2022 06:28 AM       Current Infusion Rate: 1430 units/hr    Plan:  Rate: Continue at 1430 units/hr  Next aPTT: 0700  10/27/22    Pharmacy will continue to monitor and adjust based on aPTT results.     Abdullahi Spain [facility]  10/27/2022 3:12 AM

## 2022-10-27 NOTE — CONSULTS
[facility]  Cardiology Consult  Note      Meenu Porterlatoniaure Genna Ormond  1935, 81 y.o.      CC: \" I feel over the weekend. \"              Mah[place]any Card, APRN - CNP:      HPI:     81year-old patient who came to the hospital because he fell    He has a history of HTN, DM, A. fib heart failure and nonobstructive CAD  According to the ER notes the patient felt weak while walking to the bathroom. He almost fell but his daughter was able to help him for him to the floor is no dizziness presyncope or syncope he claims that he has been weak and has fallen off the bed several times.           Past Medical History:   Diagnosis Date    Atrial fibrillation ([facility] [address] 2018    Atrial fib    Degeneration of cervical intervertebral disc     Diastolic heart failure ([facility] [address] 2018    Diverticulosis of colon (without mention of hemorrhage) 10/22/2010    Essential hypertension, benign 10/22/2010    [place] (hard of hearing)     Hyperlipidemia 10/22/2010    Mononeuritis of unspecified site 10/22/2010    Osteoarthrosis, unspecified whether generalized or localized, pelvic region and thigh 10/22/2010    Other specified iron deficiency anemias 10/22/2010    Rosacea 10/22/2010    Seborrheic dermatitis 10/22/2010    Type II or unspecified type diabetes mellitus without mention of complication, not stated as uncontrolled 10/22/2010    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 10/22/2010    Unspecified pruritic disorder 10/22/2010      Past Surgical History:   Procedure Laterality Date    CARDIOVASCULAR STRESS TEST  2018    negative    CARPAL TUNNEL RELEASE Right 2018    HAND SURGERY      Left carpel tunnel     HIP SURGERY Bilateral     total hips      Family History   Family history unknown: Yes      Social History     Tobacco Use    Smoking status: Former     Types: Cigarettes     Quit date:      Years since quittin.8    Smokeless tobacco: Never   Vaping Use    Vaping Use: Never used   Substance Use Topics    Alcohol use: Never     Alcohol/week: 0.0 standard drinks     Comment: very seldom     Drug use: No     Allergies   Allergen Reactions    Levofloxacin      Pt states he doesn't have any allergies. His PCP added this to his chart          Review of Systems -   Constitutional: Negative for weight gain/loss; malaise, fever  Respiratory: Negative for Asthma;  cough and hemoptysis  Cardiovascular: Negative for palpitations,dizziness   Gastrointestinal: Negative for abd.pain; constipation/diarrhea;    Genitourinary: Negative for stones; hematuria; frequency hesitancy  Integumentt: Negative for rash or pruritis  Hematologic/lymphatic: Negative for blood dyscrasia; leukemia/lymphoma  Musculoskeletal: Negative for Connective tissue disease  Neurological:  Negative for Seizure   Behavioral/Psych:Negative for Bipolar disorder, Schizophrenia; Dementia  Endocrine: negative for thyroid, parathyroid disease    Physical Examination:    /77   Pulse 94   Temp 98 °F (36.7 °C) (Oral)   Resp 18   Ht 6' 1\" (1.854 m)   Wt 228 lb 9.9 oz (103.7 kg)   SpO2 92%   BMI 30.16 kg/m²    HEENT:  Face: Atraumatic, Conjunctiva: Pink; non icteric, Mucous Memb:  Moist, No thyromegaly or Lymphadenopathy  Respiratory: Resp Assessment: normal, Resp Auscultation: clear  Cardiovascular: Auscultation: nl S1 & S2, Palpation:  Nl PMI; No heaves or thrills, JVP:  normal  Abdomen: Soft, non-tender, Normal bowel sounds,  No organomegaly  Extremities: No Cyanosis or Clubbing  Neurological: Oriented to time, place, and person, Non-anxious  Psychiatric: Normal mood and affect  Skin: Warm and dry,  No rash seen     No outpatient medications have been marked as taking for the 10/25/22 encounter [facility] Encounter).          Labs:   Lab Results   Component Value Date/Time    HDL 34 10/26/2022 06:28 AM    LDLCALC 56 10/26/2022 06:28 AM    TRIG 44 10/26/2022 06:28 AM       EKG:  3/8/2019: Controlled AFIB  4/7/22: controlled AFIB   5/5/22: controlled Afib, RBBB    ECHO: 9/25/2018  Mild conc. LVH with no wall motion abnormalities and EF of 60%. The left atrium is mildly dilated. Normal right ventricular size and function. Mild mitral and trivial aortic and pulmonic regurgitation. Inferior vena cava appears dilated. Stress Nuclear: 9/26/2018  Pharmacological Stress/MPI Results:        1. Technically a satisfactory study. 2. Normal pharmacological stress portion of the study. 3. No evidence of Ischemia by Myocardial Perfusion Imaging. 4. Gated Study shows dilated LV with EF of 59 %. Echo: 5/2021   Patient appears to be in atrial fibrillation. Normal left ventricle size and systolic function with an estimated ejection   fraction of 50-55%. No regional wall motion abnormalities are seen. There is   mildly increased left ventricular wall thickness. Ungraded diastolic   dysfunction (secondary to arrhythmia) with elevated LV filling pressures. The right ventricle is not well visualized but function appears reduced. The left and right atria appear moderately dilated. Moderate mitral and tricuspid regurgitation. Trivial aortic regurgitation. ASSESSMENT AND PLAN:      Weakness  Possibly due to diastolic heart failure  3/3163 LVEF 50-55% elevated LV filling pressures. proBNP is elevated 5900  Agree with Lasix    Lower extremity edema  Partly due to sedentary lifestyle as well as use of gabapentin  Much improved with use of IV Lasix.      Chronic Atrial Fibrillation  CHADS Vasc is at least 6 (CHF, HTN, AGE, DM, Vascular Disease)  Denies s/s of TIA/CVA, abnormal bruising or bleeding  Continue Metoprolol and Eliquis

## 2022-10-27 NOTE — PROGRESS NOTES
Physician Progress Note      PATIENT:               Terri Tejeda  CSN #:                  [id number]  :                       1935  ADMIT DATE:       10/25/2022 10:55 AM  DISCH DATE:  RESPONDING  PROVIDER #:        Tessy Marrero CNP          QUERY TEXT:    Patient admitted with CHF. Per Wound care RN note on 10/26/22, noted to also   have pressure ulcer. If possible, please document in progress notes and   discharge summary the location, present on admission status and stage of the   pressure ulcer: The medical record reflects the following:  Risk Factors: chronic pressure, decreased mobility, incontinence of stool, and   incontinence of urine  Clinical Indicators: Per wound care RN pn on 10/26/22- Pressure stage 3 wound   to left buttock POA. Treatment: triad barrier 2x daily, BLUE BARRIER WIPES ONLY   turn and   reposition every 2 hours   chair cushion, encourage to reposition self   frequently while in chair    Stage 1:  Non-blanchable erythema of intact skin  Stage 2:  Abrasion, Blister, Partial-thickness skin loss, with exposed dermis  Stage 3:  Full-thickness skin loss with damage or necrosis of subcutaneous   tissue  Stage 4:  Full-thickness skin & soft tissue loss through to underlying muscle,   tendon or bone  Unstageable: Obscured full-thickness skin & tissue loss    Thank You,  Flores Buckley RN BSN CDS CRCR  [email or web address]  Options provided:  -- Stage 3 Pressure Ulcer of Left Buttock present on admission  -- Other - I will add my own diagnosis  -- Disagree - Not applicable / Not valid  -- Disagree - Clinically unable to determine / Unknown  -- Refer to Clinical Documentation Reviewer    PROVIDER RESPONSE TEXT:    This patient has a Stage 3 pressure ulcer of the Left Buttock which was   present on admission.     Query created by: Roxane Lopez on 10/27/2022 7:52 AM      Electronically signed by:  Tessy Marrero CNP 10/27/2022 6:58 PM

## 2022-10-27 NOTE — CONSULTS
REASON FOR CONSULTATION/CC: pleural effusion      Consult at request of Mel Cardoso MD for pleural effusio     PCP: MARIA FERNANDA Fuentes - CNP  Established Pulmonologist:   None    HISTORY OF PRESENT ILLNESS: Mike Sexton is a 81y.o. year old male with a history of chf who presents with      The patient was brought to the ER secondary to a fall where he was stuck on the floor. EMS helped him up. With continued episode of weakness, he was brought to the emergency room for further evaluation. Chest x-ray followed by images demonstrated bilateral pleural effusions, left greater than right. Lower extremity wounds as pictured in notes with seeping wounds consistent with fluid overload, edema    This note may have been  transcribed using [id number]. Please disregard any translational errors. Assessment:        History of atrial fibrillation-Eliquis at home  History of CHF  History of hypertension  Hyperlipidemia  Osteoarthritis  Diabetes mellitus, type II    Plan:      Hospital Day 2     Bilateral pleural effusions with abnormal radiograph  Patient was an outpatient Eliquis. This was stopped in favor of a heparin drip. Agree with need for thoracentesis versus chest tube  Bilateral pleural effusions with with echocardiogram findings would be strongly suggestive of CHF, left chest demonstrates mass left upper chest and thickening of the pleural lining. Mass could be malignancy versus rounded atelectasis. Given the thickening, chest tube would be favored. This was discussed. Patient was given options but he did not want a procedure at this time. He wants to try medications first.  He was advised several times it would not likely work but will continue with diuresis.         Volume overload  Echocardiogram with a EF of 85%, grade 3 diastolic dysfunction, biatrial enlargement,  Lasix 40 twice daily with good diuresis,              This note was transcribed using Dragon Dictation software. Please disregard any translational errors. Thank you for the consult    [facility]aron[facility]  [phone]             Data:     PAST MEDICAL HISTORY:  Past Medical History:   Diagnosis Date    Atrial fibrillation ([facility] [address] 09/2018    Atrial fib    Degeneration of cervical intervertebral disc 03/78/0330    Diastolic heart failure ([facility] [address] 09/2018    Diverticulosis of colon (without mention of hemorrhage) 10/22/2010    Essential hypertension, benign 10/22/2010    [place] (hard of hearing)     Hyperlipidemia 10/22/2010    Mononeuritis of unspecified site 10/22/2010    Osteoarthrosis, unspecified whether generalized or localized, pelvic region and thigh 10/22/2010    Other specified iron deficiency anemias 10/22/2010    Rosacea 10/22/2010    Seborrheic dermatitis 10/22/2010    Type II or unspecified type diabetes mellitus without mention of complication, not stated as uncontrolled 10/22/2010    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 10/22/2010    Unspecified pruritic disorder 10/22/2010       PAST SURGICAL HISTORY:  Past Surgical History:   Procedure Laterality Date    CARDIOVASCULAR STRESS TEST  09/2018    negative    CARPAL TUNNEL RELEASE Right 05/16/2018    HAND SURGERY      Left carpel tunnel     HIP SURGERY Bilateral     total hips       FAMILY HISTORY:  Family history is unknown by patient. SOCIAL HISTORY:   reports that he quit smoking about 49 years ago. His smoking use included cigarettes.  He has never used smokeless tobacco.    Scheduled Meds:   gabapentin  600 mg Oral Nightly    insulin glargine  20 Units SubCUTAneous Nightly    metoprolol succinate  50 mg Oral Daily    insulin lispro  0-4 Units SubCUTAneous TID WC    insulin lispro  0-4 Units SubCUTAneous Nightly    sodium chloride flush  5-40 mL IntraVENous 2 times per day    furosemide  40 mg IntraVENous BID       Continuous Infusions:   dextrose      sodium chloride      heparin (PORCINE) Infusion 1,430 Units/hr (10/26/22 1721)       PRN Meds:  trimethobenzamide, meclizine, glucose, dextrose bolus **OR** dextrose bolus, glucagon (rDNA), dextrose, sodium chloride flush, sodium chloride, polyethylene glycol, acetaminophen **OR** acetaminophen    ALLERGIES:  Patient is allergic to levofloxacin. REVIEW OF SYSTEMS:  Constitutional: Negative for fever    HENT: Negative for sore throat  Eyes: Negative for redness   Respiratory: Negative for dyspnea, cough  Cardiovascular: Negative for chest pain  Gastrointestinal: Negative for vomiting, diarrhea    Genitourinary: Negative for hematuria   Musculoskeletal: Negative for arthralgias   Skin: Negative for rash  Neurological: Negative for syncope  Hematological: Negative for adenopathy  Psychiatric/Behavorial: Negative for anxiety    Objective:   PHYSICAL EXAM:  Blood pressure 130/67, pulse 93, temperature 97.5 °F (36.4 °C), temperature source Oral, resp. rate 15, height 6' 1\" (1.854 m), weight 228 lb 9.9 oz (103.7 kg), SpO2 95 %.'    Body mass index is 30.16 kg/m². Gen: No distress. Eyes: PERRL. No sclera icterus. No conjunctival injection. ENT: No discharge. Pharynx clear. External appearance of ears and nose normal.  Neck: Trachea midline. No obvious mass. Resp: No accessory muscle use. No crackles. No wheezes. No rhonchi. CV: Regular rate. Regular rhythm. No murmur or rub. No edema. GI: Non-tender. Non-distended. No hernia. Skin: Warm, dry, normal texture and turgor. No nodule on exposed extremities. Lymph: No cervical LAD. No supraclavicular LAD. M/S: No cyanosis. No clubbing. No joint deformity. Neuro: Moves all four extremities. Psych: Oriented x 3. No anxiety. Awake. Alert. Intact judgement and insight.       Data Reviewed:   LABS:  CBC:   Recent Labs     10/25/22  1202 10/26/22  0628   WBC 7.7 6.7   HGB 12.5* 12.1*   HCT 38.7* 36.7*   MCV 91.9 90.9    228     BMP:   Recent Labs     10/25/22  1203 Aortic  valve calcification is seen. No aortic aneurysm. Lungs/pleura: Moderate right-sided pleural effusion is seen. There is  consolidation seen in the right lower lobe adjacent to right-sided pleural  effusion    Scattered areas of septal thickening are seen. Scattered bandlike opacities  are seen in the right lung. Large loculated pleural effusion is seen on the left. There is thickening of  the parietal pleura suggesting chronicity to the pleural fluid. There is collapse and consolidation seen in the left lower lobe and to a  lesser extent the left upper lobe. There are areas of hyperdensity seen  anteriorly in inferiorly in the pleural fluid. Upper Abdomen: There is thickening of the adrenal glands, left greater than  right, similar to prior. There is mild perihepatic ascites. There is mild  perisplenic ascites. Atherosclerotic change seen in upper abdominal aorta. Soft Tissues/Bones: There is body wall anasarca. Spurring is seen in the  spine. Spurring is seen in the shoulder joints. Shoulder effusions are seen  bilaterally    Impression  Bilateral pleural effusions, large on the left, and moderate on the right. Pleural fluid on the left is loculated. There is thickening of the parietal  pleura on the left, compatible with chronicity to the pleural fluid. There  are areas of increased density seen in the pleural effusion on the left,  likely due to proteinaceous debris and less likely due to hemorrhage or  metastatic implant. Body wall anasarca with the ascites, compatible with fluid overload. CTPA: Results for orders placed during the hospital encounter of 09/25/18    CT CHEST PULMONARY EMBOLISM W CONTRAST    Narrative  EXAMINATION:  CTA OF THE CHEST 9/25/2018 2:35 am    TECHNIQUE:  CTA of the chest was performed after the administration of intravenous  contrast.  Multiplanar reformatted images are provided for review. MIP  images are provided for review.  Dose modulation, iterative reconstruction,  and/or weight based adjustment of the mA/kV was utilized to reduce the  radiation dose to as low as reasonably achievable. COMPARISON:  None. HISTORY:  ORDERING SYSTEM PROVIDED HISTORY: CHEST PAIN, ACUTE, PULMONARY EMBOLISM  SUSPECTED  TECHNOLOGIST PROVIDED HISTORY:  Ordering Physician Provided Reason for Exam: Pt to ED with c/o sob that  started 3-4 days ago that has progressively gotten worse. Was seen at urgent  care Sat and started on prednisone and Amoxicillin  Acuity: Acute  Type of Exam: Initial  Relevant Medical/Surgical History: htn, diabetic    FINDINGS:  Pulmonary Arteries: Pulmonary arteries are adequately opacified for  evaluation. No evidence of intraluminal filling defect to suggest pulmonary  embolism. Main pulmonary artery is enlarged measuring approximately 3.6 cm  in diameter. Mediastinum: Mild mediastinal lymphadenopathy. For example, a 14 mm short  axis right paratracheal lymph node. Normal caliber thoracic aorta without  evidence of an acute abnormality. Upper normal heart size. No pericardial  effusion. Mild-to-moderate multivessel coronary calcifications. Segmental  gaseous distention of the esophagus. Lungs/pleura: No pneumothorax. Small bilateral effusions. Interlobular  septal thickening at the lung bases, apices and periphery. Bronchial walls  are diffusely severely thickened. Mild secretions at the aj versus tiny  diverticula. Lingular atelectasis. Upper Abdomen: Scattered hepatic cysts. Fatty replacement of the pancreas. Soft Tissues/Bones: No acute bone or soft tissue abnormality. Impression  Negative for acute pulmonary embolism. Small bilateral pleural effusions with moderate interstitial edema. Mild mediastinal lymphadenopathy is presumably reactive. Enlarged main pulmonary artery may be seen with pulmonary hypertension.       CXR PA/LAT: Results for orders placed during the hospital encounter of 04/09/21    XR CHEST (2 VW)    Narrative  XR CHEST (2 VW)    Indication: SOB (shortness of breath)    COMPARISON: March 10, 2020    Findings: PA and lateral views of the chest were obtained. The heart is stable in size and configuration remaining mildly enlarged. There is atherosclerotic calcification of the aortic arch. Interval development of a small left pleural effusion. The  right costophrenic angle is clear. There is no pneumothorax. No pulmonary consolidative opacity. Impression  Impression: Small left pleural effusion. CXR portable: Results for orders placed during the hospital encounter of 10/25/22    XR CHEST PORTABLE    Narrative  EXAMINATION:  ONE XRAY VIEW OF THE CHEST    10/25/2022 11:50 am    COMPARISON:  02/11/2009    HISTORY:  ORDERING SYSTEM PROVIDED HISTORY: Fall  TECHNOLOGIST PROVIDED HISTORY:  Reason for exam:->Fall  Reason for Exam: Fall    FINDINGS:  Large left-sided pleural effusion is seen with adjacent left lung  consolidation. Heart size is enlarged not well evaluated. There is hazy right basilar  opacity    Spurring is seen in the spine and shoulder joints    Impression  Large left-sided pleural effusion with adjacent left lung consolidation. It  is uncertain as to whether not wise pre-existing or secondary to the history  of trauma    Right lower lobe airspace disease, either atelectasis or pneumonia.

## 2022-10-28 PROBLEM — I42.9 CARDIOMYOPATHY (HCC): Status: ACTIVE | Noted: 2022-10-28

## 2022-10-28 PROBLEM — R93.89 ABNORMAL CT OF THE CHEST: Status: ACTIVE | Noted: 2022-10-28

## 2022-10-28 LAB
A/G RATIO: 0.8 (ref 1.1–2.2)
ALBUMIN SERPL-MCNC: 2.7 G/DL (ref 3.4–5)
ALP BLD-CCNC: 126 U/L (ref 40–129)
ALT SERPL-CCNC: 13 U/L (ref 10–40)
ANION GAP SERPL CALCULATED.3IONS-SCNC: 10 MMOL/L (ref 3–16)
APTT: 139.3 SEC (ref 23–34.3)
APTT: 56.1 SEC (ref 23–34.3)
APTT: 73.7 SEC (ref 23–34.3)
AST SERPL-CCNC: 17 U/L (ref 15–37)
BASOPHILS ABSOLUTE: 0 K/UL (ref 0–0.2)
BASOPHILS RELATIVE PERCENT: 0.7 %
BILIRUB SERPL-MCNC: 1.7 MG/DL (ref 0–1)
BUN BLDV-MCNC: 31 MG/DL (ref 7–20)
CALCIUM SERPL-MCNC: 8.5 MG/DL (ref 8.3–10.6)
CHLORIDE BLD-SCNC: 96 MMOL/L (ref 99–110)
CO2: 30 MMOL/L (ref 21–32)
CREAT SERPL-MCNC: 1.1 MG/DL (ref 0.8–1.3)
EOSINOPHILS ABSOLUTE: 0.2 K/UL (ref 0–0.6)
EOSINOPHILS RELATIVE PERCENT: 2.9 %
GFR SERPL CREATININE-BSD FRML MDRD: >60 ML/MIN/{1.73_M2}
GLUCOSE BLD-MCNC: 106 MG/DL (ref 70–99)
GLUCOSE BLD-MCNC: 139 MG/DL (ref 70–99)
GLUCOSE BLD-MCNC: 176 MG/DL (ref 70–99)
GLUCOSE BLD-MCNC: 53 MG/DL (ref 70–99)
GLUCOSE BLD-MCNC: 56 MG/DL (ref 70–99)
GLUCOSE BLD-MCNC: 87 MG/DL (ref 70–99)
HCT VFR BLD CALC: 34.8 % (ref 40.5–52.5)
HEMOGLOBIN: 11.5 G/DL (ref 13.5–17.5)
LYMPHOCYTES ABSOLUTE: 1.1 K/UL (ref 1–5.1)
LYMPHOCYTES RELATIVE PERCENT: 17.7 %
MAGNESIUM: 2 MG/DL (ref 1.8–2.4)
MCH RBC QN AUTO: 30 PG (ref 26–34)
MCHC RBC AUTO-ENTMCNC: 33 G/DL (ref 31–36)
MCV RBC AUTO: 91 FL (ref 80–100)
MONOCYTES ABSOLUTE: 1.1 K/UL (ref 0–1.3)
MONOCYTES RELATIVE PERCENT: 17 %
NEUTROPHILS ABSOLUTE: 3.9 K/UL (ref 1.7–7.7)
NEUTROPHILS RELATIVE PERCENT: 61.7 %
PDW BLD-RTO: 17 % (ref 12.4–15.4)
PERFORMED ON: ABNORMAL
PERFORMED ON: NORMAL
PHOSPHORUS: 3.8 MG/DL (ref 2.5–4.9)
PLATELET # BLD: 237 K/UL (ref 135–450)
PMV BLD AUTO: 7.8 FL (ref 5–10.5)
POTASSIUM SERPL-SCNC: 3.7 MMOL/L (ref 3.5–5.1)
RBC # BLD: 3.82 M/UL (ref 4.2–5.9)
SODIUM BLD-SCNC: 136 MMOL/L (ref 136–145)
TOTAL PROTEIN: 5.9 G/DL (ref 6.4–8.2)
WBC # BLD: 6.4 K/UL (ref 4–11)

## 2022-10-28 PROCEDURE — 97530 THERAPEUTIC ACTIVITIES: CPT

## 2022-10-28 PROCEDURE — 6370000000 HC RX 637 (ALT 250 FOR IP): Performed by: INTERNAL MEDICINE

## 2022-10-28 PROCEDURE — 87641 MR-STAPH DNA AMP PROBE: CPT

## 2022-10-28 PROCEDURE — 2580000003 HC RX 258: Performed by: INTERNAL MEDICINE

## 2022-10-28 PROCEDURE — 84100 ASSAY OF PHOSPHORUS: CPT

## 2022-10-28 PROCEDURE — 99232 SBSQ HOSP IP/OBS MODERATE 35: CPT | Performed by: INTERNAL MEDICINE

## 2022-10-28 PROCEDURE — 36415 COLL VENOUS BLD VENIPUNCTURE: CPT

## 2022-10-28 PROCEDURE — 85730 THROMBOPLASTIN TIME PARTIAL: CPT

## 2022-10-28 PROCEDURE — 6360000002 HC RX W HCPCS: Performed by: INTERNAL MEDICINE

## 2022-10-28 PROCEDURE — 1200000000 HC SEMI PRIVATE

## 2022-10-28 PROCEDURE — 83735 ASSAY OF MAGNESIUM: CPT

## 2022-10-28 PROCEDURE — 94760 N-INVAS EAR/PLS OXIMETRY 1: CPT

## 2022-10-28 PROCEDURE — 6370000000 HC RX 637 (ALT 250 FOR IP): Performed by: NURSE PRACTITIONER

## 2022-10-28 PROCEDURE — 99233 SBSQ HOSP IP/OBS HIGH 50: CPT | Performed by: INTERNAL MEDICINE

## 2022-10-28 PROCEDURE — 87040 BLOOD CULTURE FOR BACTERIA: CPT

## 2022-10-28 PROCEDURE — 80053 COMPREHEN METABOLIC PANEL: CPT

## 2022-10-28 PROCEDURE — 2500000003 HC RX 250 WO HCPCS: Performed by: INTERNAL MEDICINE

## 2022-10-28 PROCEDURE — 85025 COMPLETE CBC W/AUTO DIFF WBC: CPT

## 2022-10-28 RX ORDER — HEPARIN SODIUM 1000 [USP'U]/ML
2000 INJECTION, SOLUTION INTRAVENOUS; SUBCUTANEOUS ONCE
Status: DISCONTINUED | OUTPATIENT
Start: 2022-10-28 | End: 2022-10-28 | Stop reason: ALTCHOICE

## 2022-10-28 RX ORDER — TORSEMIDE 20 MG/1
20 TABLET ORAL DAILY
Status: DISCONTINUED | OUTPATIENT
Start: 2022-10-28 | End: 2022-10-31 | Stop reason: HOSPADM

## 2022-10-28 RX ADMIN — SODIUM CHLORIDE, PRESERVATIVE FREE 10 ML: 5 INJECTION INTRAVENOUS at 11:02

## 2022-10-28 RX ADMIN — ACETAMINOPHEN 650 MG: 325 TABLET ORAL at 20:11

## 2022-10-28 RX ADMIN — CEFAZOLIN 2000 MG: 2 INJECTION, POWDER, FOR SOLUTION INTRAMUSCULAR; INTRAVENOUS at 20:33

## 2022-10-28 RX ADMIN — Medication 3 MG: at 20:10

## 2022-10-28 RX ADMIN — SODIUM CHLORIDE, PRESERVATIVE FREE 10 ML: 5 INJECTION INTRAVENOUS at 20:11

## 2022-10-28 RX ADMIN — GABAPENTIN 600 MG: 300 CAPSULE ORAL at 20:10

## 2022-10-28 RX ADMIN — TORSEMIDE 20 MG: 20 TABLET ORAL at 17:37

## 2022-10-28 RX ADMIN — APIXABAN 5 MG: 5 TABLET, FILM COATED ORAL at 17:38

## 2022-10-28 RX ADMIN — CEFAZOLIN 2000 MG: 2 INJECTION, POWDER, FOR SOLUTION INTRAMUSCULAR; INTRAVENOUS at 12:10

## 2022-10-28 RX ADMIN — HEPARIN SODIUM 1300 UNITS/HR: 10000 INJECTION, SOLUTION INTRAVENOUS at 07:46

## 2022-10-28 ASSESSMENT — PAIN SCALES - GENERAL: PAINLEVEL_OUTOF10: 5

## 2022-10-28 ASSESSMENT — ENCOUNTER SYMPTOMS
GASTROINTESTINAL NEGATIVE: 1
COLOR CHANGE: 1
SHORTNESS OF BREATH: 1

## 2022-10-28 NOTE — PROGRESS NOTES
[address]  Cardiology Progress  Note      Renata Forrest  1935, 81 y.o.      CC: \" I feel over the weekend. \"              MARIA FERNANDA Perez - CNP:      HPI:     81year-old patient who came to the hospital because he fell    He has a history of HTN, DM, A. fib heart failure and nonobstructive CAD  According to the ER notes the patient felt weak while walking to the bathroom. He almost fell but his daughter was able to help him for him to the floor is no dizziness presyncope or syncope he claims that he has been weak and has fallen off the bed several times.       Interval history  Feeling great  Able to lay flat without shortness of breath  Lower extremity edema also improving  Has declined thoracentesis for large left-sided pleural effusion stating that he had similar problem many years ago which responded to diuretics      Past Medical History:   Diagnosis Date    Atrial fibrillation ([facility] [address] 09/2018    Atrial fib    Degeneration of cervical intervertebral disc 58/84/9739    Diastolic heart failure ([facility] [address] 09/2018    Diverticulosis of colon (without mention of hemorrhage) 10/22/2010    Essential hypertension, benign 10/22/2010    [place] (hard of hearing)     Hyperlipidemia 10/22/2010    Mononeuritis of unspecified site 10/22/2010    Osteoarthrosis, unspecified whether generalized or localized, pelvic region and thigh 10/22/2010    Other specified iron deficiency anemias 10/22/2010    Rosacea 10/22/2010    Seborrheic dermatitis 10/22/2010    Type II or unspecified type diabetes mellitus without mention of complication, not stated as uncontrolled 10/22/2010    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 10/22/2010    Unspecified pruritic disorder 10/22/2010      Past Surgical History:   Procedure Laterality Date    CARDIOVASCULAR STRESS TEST  09/2018    negative    CARPAL TUNNEL RELEASE Right 05/16/2018    HAND SURGERY      Left carpel tunnel     HIP SURGERY Bilateral     total hips Family History   Family history unknown: Yes      Social History     Tobacco Use    Smoking status: Former     Types: Cigarettes     Quit date:      Years since quittin.8    Smokeless tobacco: Never   Vaping Use    Vaping Use: Never used   Substance Use Topics    Alcohol use: Never     Alcohol/week: 0.0 standard drinks     Comment: very seldom     Drug use: No     Allergies   Allergen Reactions    Levofloxacin      Pt states he doesn't have any allergies. His PCP added this to his chart          Review of Systems -   Constitutional: Negative for weight gain/loss; malaise, fever  Respiratory: Negative for Asthma;  cough and hemoptysis  Cardiovascular: Negative for palpitations,dizziness   Gastrointestinal: Negative for abd.pain; constipation/diarrhea;    Genitourinary: Negative for stones; hematuria; frequency hesitancy  Integumentt: Negative for rash or pruritis  Hematologic/lymphatic: Negative for blood dyscrasia; leukemia/lymphoma  Musculoskeletal: Negative for Connective tissue disease  Neurological:  Negative for Seizure   Behavioral/Psych:Negative for Bipolar disorder, Schizophrenia; Dementia  Endocrine: negative for thyroid, parathyroid disease    Physical Examination:    BP (!) 103/47   Pulse 83   Temp 97.5 °F (36.4 °C) (Oral)   Resp 17   Ht 6' 1\" (1.854 m)   Wt 227 lb 15.3 oz (103.4 kg)   SpO2 93%   BMI 30.08 kg/m²    HEENT:  Face: Atraumatic, Conjunctiva: Pink; non icteric, Mucous Memb:  Moist, No thyromegaly or Lymphadenopathy  Respiratory: Resp Assessment: normal, Resp Auscultation: clear  Cardiovascular: Auscultation: nl S1 & S2, Palpation:  Nl PMI;  No heaves or thrills, JVP:  normal  Abdomen: Soft, non-tender, Normal bowel sounds,  No organomegaly  Extremities: No Cyanosis or Clubbing  Neurological: Oriented to time, place, and person, Non-anxious  Psychiatric: Normal mood and affect  Skin: Warm and dry,  No rash seen     No outpatient medications have been marked as taking for the 10/25/22 encounter [facility] Encounter). Labs:   Lab Results   Component Value Date/Time    HDL 34 10/26/2022 06:28 AM    LDLCALC 56 10/26/2022 06:28 AM    TRIG 44 10/26/2022 06:28 AM       EKG:  3/8/2019: Controlled AFIB  4/7/22: controlled AFIB   5/5/22: controlled Afib, RBBB    ECHO: 9/25/2018  Mild conc. LVH with no wall motion abnormalities and EF of 60%. The left atrium is mildly dilated. Normal right ventricular size and function. Mild mitral and trivial aortic and pulmonic regurgitation. Inferior vena cava appears dilated. Stress Nuclear: 9/26/2018  Pharmacological Stress/MPI Results:        1. Technically a satisfactory study. 2. Normal pharmacological stress portion of the study. 3. No evidence of Ischemia by Myocardial Perfusion Imaging. 4. Gated Study shows dilated LV with EF of 59 %. Echo: 5/2021   Patient appears to be in atrial fibrillation. Normal left ventricle size and systolic function with an estimated ejection   fraction of 50-55%. No regional wall motion abnormalities are seen. There is   mildly increased left ventricular wall thickness. Ungraded diastolic   dysfunction (secondary to arrhythmia) with elevated LV filling pressures. The right ventricle is not well visualized but function appears reduced. The left and right atria appear moderately dilated. Moderate mitral and tricuspid regurgitation. Trivial aortic regurgitation. ASSESSMENT AND PLAN:      Weakness  Possibly due to diastolic heart failure  3/9917 LVEF 50-55% elevated LV filling pressures. Able to lay flat  We will switch to torsemide torsemide dose is 20 mg daily and then 20 mg twice daily Monday Wednesday Friday    Lower extremity edema  Partly due to sedentary lifestyle as well as use of gabapentin  Much improved with use of IV Lasix.    Switch to torsemide  Decreased dose of gabapentin    Chronic Atrial Fibrillation  CHADS Vasc is at least 6 (CHF, HTN, AGE, DM, Vascular Disease)  Denies s/s of TIA/CVA, abnormal bruising or bleeding  Continue Metoprolol and Eliquis     Patient declining thoracentesis of May resume Eliquis    Will sign off  Please have patient follow-up with me in about 3 to 4 weeks

## 2022-10-28 NOTE — PROGRESS NOTES
Pharmacist Obdulia Black called to notify of pts aPTT= 139. Instructed to hold Heparin drip for 1 hour. New orders to be placed by Pharmacy. Will continue to monitor.

## 2022-10-28 NOTE — PROGRESS NOTES
Pulmonary Progress Note    CC:  Follow up pleural effusions    Subjective:  Declined chest tube yesterday  On Room air  Afebrile   Said about 4 years ago he was going to have fluid drained from lung. Prior to that he took water pills and the fluid disappeared. This is why he does not want drainage. He says he is better. Less swelling and breathing has improved       Intake/Output Summary (Last 24 hours) at 10/28/2022 1018  Last data filed at 10/28/2022 0600  Gross per 24 hour   Intake 900 ml   Output 1400 ml   Net -500 ml         PHYSICAL EXAM:  Blood pressure 104/68, pulse 77, temperature 97.9 °F (36.6 °C), temperature source Oral, resp. rate 16, height 6' 1\" (1.854 m), weight 227 lb 15.3 oz (103.4 kg), SpO2 92 %.'  Gen: No distress. Eyes: PERRL. No sclera icterus. No conjunctival injection. ENT: No discharge. Pharynx clear. External appearance of ears and nose normal.  Neck: Trachea midline. No obvious mass. Resp: No crackles. No wheezes. No rhonchi. No dullness on percussion. CV: Regular rate. Regular rhythm. No murmur or rub. GI: Non-tender. Non-distended. No hernia. Skin: Venous stasis, stasis dermatitis  Lymph: No cervical LAD. No supraclavicular LAD. M/S: No cyanosis. No clubbing. No joint deformity. Neuro: Moves all four extremities. CN 2-12 tested, no defect noted.   Ext:   + edema    Medications:    Scheduled Meds:   gabapentin  600 mg Oral Nightly    insulin glargine  20 Units SubCUTAneous Nightly    metoprolol succinate  50 mg Oral Daily    insulin lispro  0-4 Units SubCUTAneous TID     insulin lispro  0-4 Units SubCUTAneous Nightly    sodium chloride flush  5-40 mL IntraVENous 2 times per day    furosemide  40 mg IntraVENous BID       Continuous Infusions:   dextrose      sodium chloride      heparin (PORCINE) Infusion 1,300 Units/hr (10/28/22 6150)       PRN Meds:  melatonin, trimethobenzamide, meclizine, glucose, dextrose bolus **OR** dextrose bolus, glucagon (rDNA), dextrose, sodium chloride flush, sodium chloride, polyethylene glycol, acetaminophen **OR** acetaminophen    Labs:  CBC:   Recent Labs     10/26/22  0628 10/27/22  0643 10/28/22  0653   WBC 6.7 7.4 6.4   HGB 12.1* 11.8* 11.5*   HCT 36.7* 36.2* 34.8*   MCV 90.9 91.0 91.0    262 237     BMP:   Recent Labs     10/26/22  0628 10/27/22  0643 10/28/22  0653    139 136   K 3.8 3.7 3.7    97* 96*   CO2 31 29 30   PHOS 3.3 3.3 3.8   BUN 36* 29* 31*   CREATININE 0.9 0.9 1.1     LIVER PROFILE:   Recent Labs     10/26/22  0628 10/27/22  0643 10/28/22  0653   AST 21 22 17   ALT 15 14 13   BILITOT 1.2* 1.6* 1.7*   ALKPHOS 123 126 126     PT/INR: No results for input(s): PROTIME, INR in the last 72 hours. APTT:   Recent Labs     10/27/22  1640 10/28/22  0111 10/28/22  0858   APTT 52.1* 139.3* 73.7*     UA:  Recent Labs     10/25/22  1203   COLORU Yellow   PHUR 7.0   WBCUA 1   RBCUA 3   BACTERIA None Seen   CLARITYU Clear   SPECGRAV 1.021   LEUKOCYTESUR SMALL*   UROBILINOGEN 1.0   BILIRUBINUR Negative   BLOODU Negative   GLUCOSEU Negative     No results for input(s): PH, PCO2, PO2 in the last 72 hours. Films:  Chest imaging reports were reviewed and imaging was reviewed by me and showed no new films     ABG:  None    Cultures:  None    I reviewed the labs and images listed above    Assessment/Plan:   Abnormal CT Chest with effusion and possible underlying mass  Politely declining intervention and says he is better with lasix  Continue with diuresis  Bilateral Pleural Effusions   Lasix bid. Likely will need lasix at [place]  Cardiomyopathy     DVT prophylaxis  Heparin infusion     Will sign off but will arrange follow up with Dr. Jolanta Angel. Probably needs repeat imaging once he is diuresed.        Mesha Carbajal DO  [facility] Pulmonary

## 2022-10-28 NOTE — PROGRESS NOTES
Occupational Therapy  Facility/Department: [address] MED SURG  Occupational Therapy Daily Treatment    Name: Papito Morgan  : 1935  MRN: [id number]  Date of Service: 10/28/2022    Discharge Recommendations:  Patient would benefit from continued therapy after discharge, 3-5 sessions per week     Papito Morgan scored a 13/24 on the AM-PAC ADL Inpatient form. Current research shows that an AM-PAC score of 17 or less is typically not associated with a discharge to the patient's home setting. Based on the patient's AM-PAC score and their current ADL deficits, it is recommended that the patient have 3-5 sessions per week of Occupational Therapy at d/c to increase the patient's independence. Please see assessment section for further patient specific details. If patient discharges prior to next session this note will serve as a discharge summary. Please see below for the latest assessment towards goals. Patient Diagnosis(es): The primary encounter diagnosis was Generalized weakness. Diagnoses of Frequent falls, Pleural effusion, Bilateral lower extremity edema, Elevated troponin, and Elevated brain natriuretic peptide (BNP) level were also pertinent to this visit. Past Medical History:  has a past medical history of Atrial fibrillation ([address] Degeneration of cervical intervertebral disc, Diastolic heart failure ([address] Diverticulosis of colon (without mention of hemorrhage), Essential hypertension, benign, [place] (hard of hearing), Hyperlipidemia, Mononeuritis of unspecified site, Osteoarthrosis, unspecified whether generalized or localized, pelvic region and thigh, Other specified iron deficiency anemias, Rosacea, Seborrheic dermatitis, Type II or unspecified type diabetes mellitus without mention of complication, not stated as uncontrolled, 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   Past Surgical History:  has a past surgical history that includes hip surgery (Bilateral); Hand surgery; Carpal tunnel release (Right, 05/16/2018); and cardiovascular stress test (09/2018). Treatment Diagnosis: Decreased: ADLs, fxl transfers/mobility      Assessment   Performance deficits / Impairments: Decreased functional mobility ; Decreased ADL status; Decreased balance;Decreased strength;Decreased endurance  Assessment: Pt is a 80 yo M admitted with c/o generalized weakness, recurrent falls at home. PTA, pt lives at home w/ his wife and dtr where he is typically independent in self-care and completes fxl mobility mod I using RW. Pt remains far below baseline, continues to be limited by pain, weakness, and self-limiting behavior. He required max Ax1-2 for bed mobility and max Ax2 for partial stand w/ lift equipment. He required total A for toileting d/t incontinence. Continue to recommend ongoing skilled OT 3-5 times/week at d/c to increase pt's safety and independence before returning home w/ his wife. Treatment Diagnosis: Decreased: ADLs, fxl transfers/mobility  Prognosis: Good  REQUIRES OT FOLLOW-UP: Yes  Activity Tolerance  Activity Tolerance: Patient limited by fatigue;Patient limited by pain        Plan   Occupational Therapy Plan  Times Per Week: 3-5  Times Per Day:  Once a day  Current Treatment Recommendations: Strengthening, ROM, Balance training, Endurance training, Functional mobility training, Safety education & training, Self-Care / ADL     Restrictions  Restrictions/Precautions  Restrictions/Precautions: Fall Risk    Subjective   General  Chart Reviewed: Yes  Patient assessed for rehabilitation services?: Yes  Additional Pertinent Hx: per H&P: \"This is a pleasant 81 y.o. male with history of paroxysmal atrial fibrillation (on chronic anticoagulation with Eliquis), degenerative disc disease, chronic diastolic heart failure, essential hypertension, hyperlipidemia, osteoarthritis, uncontrolled diabetes type 2 with hyperglycemia, who presents to the emergency room with complaints of generalized weakness, recurrent falls at home. 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 Wife also reports difficulty caring for patient at home due to patient's comorbidities. Earlier today, he felt generally weak, did not fall but wife and daughter where able to lower him to the ground. Chest x-ray obtained in the emergency room reveals large left-sided pleural effusion with adjacent consolidation, right lower lobe airspace disease, concerning for atelectasis or pneumonia. \"  Family / Caregiver Present: No  Referring Practitioner: Angeline  Diagnosis: Acute on chronic diastolic heart failure  Subjective  Subjective: Pt met b/s for OT cotx w/ PT. Pt in bed, c/o pain in B feet during session but did not rate - somewhat confused thinking events of 2 nights prior happened last night  General Comment  Comments: Per RN ok to see     Social/Functional History  Social/Functional History  Lives With: Spouse, Daughter (Dtr works from home)  Type of Home: House  Home Layout: Multi-level, Bed/Bath upstairs (quad-level; 6 ELENITA to bed/bath)  Home Access: Stairs to enter with rails  Entrance Stairs - Number of Steps: 4 from the garage; 3 from the front  Bathroom Shower/Tub: Tub/Shower unit (Pt sponge bathes)  Bathroom Toilet: Handicap height  Home Equipment: Bag Borrow or Steal, rolling, Cane  Has the patient had two or more falls in the past year or any fall with injury in the past year?: Yes  ADL Assistance: Independent (Sponge bathes, reports difficulty putting on compression stockings)  Homemaking Assistance: Needs assistance (Pt completes light homemaking)  Ambulation Assistance: Independent (using RW)  Transfer Assistance: Independent  Active : No  Patient's  Info: Dtr  Occupation: Retired  Type of Occupation: Supervisor at [address]:  All fall risk precautions in place;Call light within reach;Gait belt;Patient at risk for falls; Left in bed;Bed alarm in place;Nurse notified           ADL  UE Dressing Skilled Clinical Factors: Assist to change soiled gown  Toileting: Dependent/Total  Toileting Skilled Clinical Factors: Male purewick on, but pt/bed saturated w/ urine. Total A for hygiene for changing bed linens while pt stood in [employer]     Activity Tolerance  Activity Tolerance: Patient limited by fatigue;Patient limited by endurance; Patient limited by pain  Bed mobility  Supine to Sit: Maximum assistance  Sit to Supine: Maximum assistance;2 Person assistance (in recliner at end of session)  Scooting: Maximal assistance  Transfers  Sit to stand: 2 Person assistance;Maximum assistance  Stand to sit: 2 Person assistance;Maximum assistance  Transfer Comments: 3 attempts to stand to [employer] - pt unable to bring hips forward enough to place seat of [employer] despite max cueing/encouragement  Hearing  Hearing: Exceptions to [facility]  Hearing Exceptions: Hard of hearing/hearing concerns  Cognition  Overall Cognitive Status: Exceptions  Arousal/Alertness: Appropriate responses to stimuli  Following Commands:  Follows one step commands with increased time  Attention Span: Appears intact  Memory: Decreased recall of recent events;Decreased short term memory  Safety Judgement: Decreased awareness of need for safety;Decreased awareness of need for assistance  Problem Solving: Decreased awareness of errors  Insights: Decreased awareness of deficits  Initiation: Requires cues for some  Sequencing: Requires cues for some  Cognition Comment: Self-limiting  Orientation  Overall Orientation Status: Impaired (Somewhat confused to recent events)                  Education Given To: Patient  Education Provided: Role of Therapy;Transfer Training  Education Provided Comments: importance of OOB  Education Method: Verbal  Barriers to Learning: Hearing;Cognition  Education Outcome: Continued education needed           AM-PAC Score  AM-PAC Inpatient Daily Activity Raw Score: 13 (10/28/22 1502)  AM-PAC Inpatient ADL T-Scale Score : 32.03 (10/28/22 1502)  ADL Inpatient CMS 0-100% Score: 63.03 (10/28/22 1502)  ADL Inpatient CMS G-Code Modifier : CL (10/28/22 1502)      Goals  Short Term Goals  Time Frame for Short Term Goals: Prior to d/c -goals ongoing  Short Term Goal 1: Pt will complete ADL transfers CGA  Short Term Goal 2: Pt will toilet w/ min A  Short Term Goal 3: Pt will groom sinkside w/ setup  Short Term Goal 4: Pt will bathe w/ min A  Short Term Goal 5: Pt will complete LB dressing min A using AE prn  Long Term Goals  Time Frame for Long Term Goals : LTG=STG  Patient Goals   Patient goals : to go home       Therapy Time   Individual Concurrent Group Co-treatment   Time In 1420         Time Out 1500         Minutes [address], OTR/L [id number]

## 2022-10-28 NOTE — PROGRESS NOTES
Clinical Pharmacy Note  Heparin Dosing       Lab Results   Component Value Date/Time    APTT 56.1 10/28/2022 03:15 PM     Lab Results   Component Value Date/Time    HGB 11.5 10/28/2022 06:53 AM    HCT 34.8 10/28/2022 06:53 AM     10/28/2022 06:53 AM       Current Infusion Rate: 1300 units/hr    Plan:  Bolus 2000 units  Rate: 1500 units/hr  Next aPTT: 2230 10/28/22    Pharmacy will continue to monitor and adjust based on aPTT results.

## 2022-10-28 NOTE — CARE COORDINATION
Talked to daughter Lenka Colón over the phone earlier today & explained [employer] can accept but if they want to look at others, there is a CMS star rated list at pt bedside with my name & number on the top. Also explained that pt will not be ready for dc until after the weekend.     Bibi House RN, BSN,   [phone]  Electronically signed by Bibi House RN on 10/28/2022 at 1:12 PM

## 2022-10-28 NOTE — PROGRESS NOTES
Clinical Pharmacy Note  Heparin Dosing       Lab Results   Component Value Date/Time    APTT 139.3 10/28/2022 01:11 AM     Lab Results   Component Value Date/Time    HGB 11.8 10/27/2022 06:43 AM    HCT 36.2 10/27/2022 06:43 AM     10/27/2022 06:43 AM       Current Infusion Rate: 1400 units/hr    Plan:  Hold x  1 hour  Rate: decrease to 1300 units/hr  Next aPTT: 0900 10/28/22    Pharmacy will continue to monitor and adjust based on aPTT results.     Zane Mosquera, [id number] [facility]  10/28/2022 2:48 AM

## 2022-10-28 NOTE — PROGRESS NOTES
Hospital Medicine Progress Note     Date:  10/28/2022    PCP: MARIA FERNANDA Ray CNP (Tel: [phone])    Date of Admission: 10/25/2022    Chief complaint:   Chief Complaint   Patient presents with    Fall     Pt. fell prior to making it to the toilet. Wife and daughter helped lower him to the floor. Brief admission history: 50-year-old male with history of paroxysmal atrial fibrillation (on chronic anticoagulation with Eliquis), degenerative disc disease, chronic diastolic heart failure, essential hypertension, hyperlipidemia, osteoarthritis, uncontrolled diabetes type 2 with hyperglycemia, who presents to the emergency room with complaints of generalized weakness, recurrent falls at home. 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 Wife also reports difficulty caring for patient at home due to patient's comorbidities. Earlier today, he felt generally weak, did not fall but wife and daughter where able to lower him to the ground. Chest x-ray obtained in the emergency room reveals large left-sided pleural effusion with adjacent consolidation, right lower lobe airspace disease, concerning for atelectasis or pneumonia. Assessment/plan:  Acute on chronic diastolic heart failure. Continue IV lasix. Fluid restrictions. Generalized weakness, recurrent falls. Likely secondary to acute medical conditions. Fall precautions. Therapy. Left pleural effusion, possibly loculated. Could be from CHF. Cannot exclude mass. Thoracentesis and chest tube placement recommended but patient refused. Patient understands diuresis alone will likely not be enough. Left lower extremity cellulitis, unclear if POA but not documented. Blood cultures, MRSA nares. Start ancef 10/28. Uncontrolled diabetes 2 (recent A1c 7.5), hypoglycemic the morning of 10/28. Discontinued lantus. Continue SSI.  Hypoglycemic protocol in place.  Elevated troponin from baseline. Likely type II NSTEMI from demand ischemia due to CHF. History of atrial fibrillation, currently rate controlled. Restart eliquis prior to discharge, once off heparin (if thoracentesis will not be done). Abnormal chest x-ray; suspect more atelectasis than pneumonia. Other comorbidities: history of paroxysmal atrial fibrillation, chronic venous stasis dermatitis, degenerative disc disease, chronic diastolic heart failure, essential hypertension, hyperlipidemia, osteoarthritis, uncontrolled diabetes type 2 with hyperglycemia, obesity with BMI 30. Diet: ADULT DIET; Regular; 4 carb choices (60 gm/meal); Low Sodium (2 gm); 2000 ml  ADULT ORAL NUTRITION SUPPLEMENT; Lunch, Dinner; Wound Healing Oral Supplement    Code status: Full Code   ----------  Subjective  No shortness of breath. Objective  Physical exam:  Vitals: /61   Pulse 84   Temp 97.3 °F (36.3 °C) (Oral)   Resp 16   Ht 6' 1\" (1.854 m)   Wt 227 lb 15.3 oz (103.4 kg)   SpO2 94%   BMI 30.08 kg/m²   Gen/overall appearance: Not in acute distress. Alert. Oriented X3  Head: Normocephalic, atraumatic  Eyes: EOMI, good acuity  ENT: Oral mucosa moist  Neck: No JVD, thyromegaly  CVS: Nml S1S2, no MRG, RRR  Pulm: Clear bilaterally. No crackles/wheezes  Gastrointestinal: Soft, NT/ND, +BS  Musculoskeletal: Distal left lower ext erythema. No edema. Warm  Neuro: No focal deficit. Moves extremity spontaneously. Psychiatry: Appropriate affect. Not agitated. Skin: Warm, dry with normal turgor. Capillary refill: Brisk,< 3 seconds   Peripheral Pulses: +2 palpable, equal bilaterally      24HR INTAKE/OUTPUT:    Intake/Output Summary (Last 24 hours) at 10/28/2022 0707  Last data filed at 10/28/2022 0600  Gross per 24 hour   Intake 900 ml   Output 1700 ml   Net -800 ml     I/O last 3 completed shifts: In: 900 [P.O.:900]  Out: 2700 [Urine:2700]  No intake/output data recorded.   Meds:    ceFAZolin  2,000 mg IntraVENous Q8H    gabapentin  600 mg Oral Nightly    metoprolol succinate  50 mg Oral Daily    insulin lispro  0-4 Units SubCUTAneous TID WC    insulin lispro  0-4 Units SubCUTAneous Nightly    sodium chloride flush  5-40 mL IntraVENous 2 times per day    furosemide  40 mg IntraVENous BID     Infusions:    dextrose      sodium chloride      heparin (PORCINE) Infusion 1,300 Units/hr (10/28/22 0304)     PRN Meds: melatonin, trimethobenzamide, meclizine, glucose, dextrose bolus **OR** dextrose bolus, glucagon (rDNA), dextrose, sodium chloride flush, sodium chloride, polyethylene glycol, acetaminophen **OR** acetaminophen    Labs/imaging:  CBC:   Recent Labs     10/25/22  1202 10/26/22  0628 10/27/22  0643   WBC 7.7 6.7 7.4   HGB 12.5* 12.1* 11.8*    228 262     BMP:    Recent Labs     10/25/22  1203 10/26/22  0628 10/27/22  0643    142 139   K 3.7 3.8 3.7   CL 97* 101 97*   CO2 30 31 29   BUN 41* 36* 29*   CREATININE 1.0 0.9 0.9   GLUCOSE 192* 85 75     Hepatic:   Recent Labs     10/25/22  1203 10/26/22  0628 10/27/22  0643   AST 27 21 22   ALT 16 15 14   BILITOT 1.4* 1.2* 1.6*   ALKPHOS 148* 123 126       Larissa Cabral MD  -------------------------------  Rounding hospitalist

## 2022-10-28 NOTE — PROGRESS NOTES
Pt not given morning lasix or metoprolol d/t parameters in MAR. Pt BP trending down 103/47. MD messaged. Lantus was d/c'd per MD note. No new orders.

## 2022-10-28 NOTE — PROGRESS NOTES
Physical Therapy  Facility/Department: [address] MED SURG  Physical Therapy Treatment Note    Name: Reed Burroughs  : 1935  MRN: [id number]  Date of Service: 10/28/2022    Discharge Recommendations:  Continue to assess pending progress, Therapy recommended at discharge    Reed Burroughs scored a 8/24 on the AM-PAC short mobility form. Current research shows that an AM-PAC score of 17 or less is typically not associated with a discharge to the patient's home setting. Based on the patient's AM-PAC score and their current functional mobility deficits, it is recommended that the patient have 3-5 sessions per week of Physical Therapy at d/c to increase the patient's independence. Please see assessment section for further patient specific details. If patient discharges prior to next session this note will serve as a discharge summary. Please see below for the latest assessment towards goals. Patient Diagnosis(es): The primary encounter diagnosis was Generalized weakness. Diagnoses of Frequent falls, Pleural effusion, Bilateral lower extremity edema, Elevated troponin, and Elevated brain natriuretic peptide (BNP) level were also pertinent to this visit. Past Medical History:  has a past medical history of Atrial fibrillation ([address] Degeneration of cervical intervertebral disc, Diastolic heart failure ([address] Diverticulosis of colon (without mention of hemorrhage), Essential hypertension, benign, [place] (hard of hearing), Hyperlipidemia, Mononeuritis of unspecified site, Osteoarthrosis, unspecified whether generalized or localized, pelvic region and thigh, Other specified iron deficiency anemias, Rosacea, Seborrheic dermatitis, Type II or unspecified type diabetes mellitus without mention of complication, not stated as uncontrolled, 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   Past Surgical History:  has a past surgical history that includes hip surgery (Bilateral); Hand surgery; Carpal tunnel release (Right, 05/16/2018); and cardiovascular stress test (09/2018). Assessment   Body Structures, Functions, Activity Limitations Requiring Skilled Therapeutic Intervention: Decreased functional mobility ; Decreased ADL status; Decreased strength;Decreased endurance;Decreased balance;Decreased posture  Assessment: Pt is an 81 y.o. male who presents to the emergency room on 10/25/22 with complaints of generalized weakness, recurrent falls at home. Pt diagnosed with acute on chronic CHF. Prior to admission, pt living in home setting with spouse and dtr, independent with ADLs and ambulation with RW. Pt currently functioning well below baseline - regression in activity tolerance this date. Pt no longer recommended for high intensity therapy - however he would benefit from continued therapy at a low to moderate intensity. Treatment Diagnosis: impaired mobility  Therapy Prognosis: Fair;Good  Decision Making: Medium Complexity  History: see above  Exam: see below  Clinical Presentation: evolving  Barriers to Learning: [facility]  Activity Tolerance  Activity Tolerance: Patient limited by fatigue;Patient limited by endurance; Patient limited by pain     Plan   Physcial Therapy Plan  General Plan: 3-5 times per week  Current Treatment Recommendations: Strengthening, Balance training, Functional mobility training, Transfer training, Endurance training, Gait training, Neuromuscular re-education, Patient/Caregiver education & training, Safety education & training, Equipment evaluation, education, & procurement, Therapeutic activities  Safety Devices  Type of Devices:  All fall risk precautions in place, Call light within reach, Gait belt, Patient at risk for falls, Left in bed, Bed alarm in place, Nurse notified     Restrictions  Restrictions/Precautions  Restrictions/Precautions: Fall Risk     Subjective   General  Chart Reviewed: Yes  Patient assessed for rehabilitation services?: Yes  Additional Pertinent Hx: Pt is an 81 y.o. male who presents to the emergency room on 10/25/22 with complaints of generalized weakness, recurrent falls at home. Pt diagnosed with acute on chronic CHF. Response To Previous Treatment: Not applicable  Family / Caregiver Present: No  Referring Practitioner: Jameel Nugent MD  Referral Date : 10/25/22  Diagnosis: acute on chronic CHF  Follows Commands: Within Functional Limits  Subjective  Subjective: Pt is agreeable to PT - reporting increased bilateral foot pain and buttocks pain. Social/Functional History  Social/Functional History  Lives With: Spouse, Daughter (Dtr works from home)  Type of Home: House  Home Layout: Multi-level, Bed/Bath upstairs (quad-level; 6 ELENITA to bed/bath)  Home Access: Stairs to enter with rails  Entrance Stairs - Number of Steps: 4 from the garage; 3 from the front  Bathroom Shower/Tub: Tub/Shower unit (Pt sponge bathes)  Bathroom Toilet: Handicap height  Home Equipment: Melvia Crofts, rolling, Cane  Has the patient had two or more falls in the past year or any fall with injury in the past year?: Yes  ADL Assistance: Independent (Sponge bathes, reports difficulty putting on compression stockings)  Homemaking Assistance: Needs assistance (Pt completes light homemaking)  Ambulation Assistance: Independent (using RW)  Transfer Assistance: Independent  Active : No  Patient's  Info: Dtr  Occupation: Retired  Type of Occupation: Supervisor at [employer]: Exceptions to [facility]  Hearing Exceptions: Hard of hearing/hearing concerns      Cognition   Orientation  Overall Orientation Status: Impaired (Somewhat confused to recent events)  Cognition  Overall Cognitive Status: Exceptions  Arousal/Alertness: Appropriate responses to stimuli  Following Commands:  Follows one step commands with increased time  Attention Span: Appears intact  Memory: Decreased recall of recent events;Decreased short term memory  Safety Judgement: Decreased awareness of need for safety;Decreased awareness of need for assistance  Problem Solving: Decreased awareness of errors  Insights: Decreased awareness of deficits  Initiation: Requires cues for some  Sequencing: Requires cues for some  Cognition Comment: Self-limiting     Objective   Gross Assessment  Strength: Generally decreased, functional     Bed mobility  Supine to Sit: Maximum assistance;2 Person assistance  Sit to Supine: Maximum assistance;2 Person assistance (in recliner at end of session)  Scooting: Maximal assistance  Transfers  Sit to Stand: Maximum Assistance;2 Person Assistance  Stand to Sit: Maximum Assistance;2 Person Assistance  Comment: Attempted to stand to stedy x 3 trials. Pt keeps trunk flexed - unable to pull seat behind. Moderate effort by pt.         Balance  Posture: Fair  Sitting - Static: Poor;+  Sitting - Dynamic: Poor;+  Standing - Static: Poor           AM-PAC Score  AM-PAC Inpatient Mobility Raw Score : 8 (10/28/22 1501)  AM-PAC Inpatient T-Scale Score : 28.52 (10/28/22 1501)  Mobility Inpatient CMS 0-100% Score: 86.62 (10/28/22 1501)  Mobility Inpatient CMS G-Code Modifier : CM (10/28/22 1501)        Goals  Short Term Goals  Time Frame for Short Term Goals: by acute discharge - all goals ongoing as of 10/28/22  Short Term Goal 1: bed mobility with CGA  Short Term Goal 2: sit<>stand with CGA  Short Term Goal 3: ambulate > 20' with RW and CGA  Patient Goals   Patient Goals : none stated       Education  Patient Education  Education Given To: Patient  Education Provided: Role of Therapy;Plan of Care  Education Method: Verbal  Barriers to Learning: Hearing  Education Outcome: Verbalized understanding;Continued education needed      Therapy Time   Individual Concurrent Group Co-treatment   Time In 1420         Time Out 1500         Minutes 40         Timed Code Treatment Minutes: 40 Minutes       Jackie Rahman, PT

## 2022-10-28 NOTE — PROGRESS NOTES
Pt BS 56 [id number]. Pt given 120 ml apple juice, rechecked at 0808 BS 87.Pt has breakfast tray,bed alarm on, call light and bedside table within reach.

## 2022-10-28 NOTE — PROGRESS NOTES
Clinical Pharmacy Note  Heparin Dosing       Lab Results   Component Value Date/Time    APTT 73.7 10/28/2022 08:58 AM     Lab Results   Component Value Date/Time    HGB 11.5 10/28/2022 06:53 AM    HCT 34.8 10/28/2022 06:53 AM     10/28/2022 06:53 AM       Current Infusion Rate: 1300 units/hr    Plan:  Rate: 1300 units/hr  Next aPTT: 1500 10/28/22    Pharmacy will continue to monitor and adjust based on aPTT results.

## 2022-10-28 NOTE — CONSULTS
PALLIATIVE MEDICINE CONSULTATION     Patient name:Nahid Ortega   JVF:[id number]    :1935  Room/Bed:D6Q-6106/4250-01   LOS: 3 days         Date of consult:10/28/2022    Consult Information  Palliative Medicine Consult performed by: MARIA FERNANDA Jaramillo CNP     Inpatient consult to Palliative Care  Consult performed by: MARIA FERNANDA Jaramillo CNP  Consult ordered by: Justina Beckman MD  Reason for consult: Goals of care      ASSESSMENT/RECOMMENDATIONS     81 y.o. male with left side pleural effusion and increased generalized weakness. Symptom Management:  Shortness of breath -secondary to bilateral pleural effusions. CHF management per cardiology. Does not wish to proceed with thoracentesis at this time. Explained disease process at length, patient does not feel as though he had a good handle on his diet prior to hospitalization. States he plans to make several changes when he returns home. Debility -increasing in nature, wife unsure if she can maintain his care at home. Working with case management for possible SNF. Continue to work with PT and OT. Goals of Care -patient's goal is to return home with his wife and daughter. He enjoys being as active as possible. He feels as though his outpatient provider team has done a great job managing his care, plans to live at least another few years if not more. Discussed CODE STATUS at length, patient understands the risks related to resuscitation. At this point, he would like to remain a full code. Feels as though his wife understands him and off to know when to take him off of machines or stop aggressive measures. Reports having a healthcare power of , daughter Roswell Leyden secondary to wife. Trust his family to make appropriate decisions for him when the time comes. Attempted to contact his wife, left message to return call for further conversation and/or any questions or concerns.     Patient/Family Goals of Care :    Patient's goal is to return home with his wife and daughter. He enjoys being as active as possible. He feels as though his outpatient provider team has done a great job managing his care, plans to live at least another few years if not more. Discussed CODE STATUS at length, patient understands the risks related to resuscitation. At this point, he would like to remain a full code. Feels as though his wife understands him and off to know when to take him off of machines or stop aggressive measures. Reports having a healthcare power of , daughter Tin Harvey secondary to wife. Trust his family to make appropriate decisions for him when the time comes. Attempted to contact his wife, left message to return call for further conversation and/or any questions or concerns. Disposition/Discharge Plan:   Pending  Would benefit from outpatient palliative care if returning home, will continue to follow. Advance Directives:  Surrogate Decision Maker: Florence Del Valle, Wife   Code status:  Full Code    Case discussed with: patient. Thank you for allowing us to participate in the care of this patient. HISTORY     CC: Fall  HPI: The patient is a 81 y.o. male with a history of proximal atrial fibrillation on anticoagulation, chronic diastolic heart failure, hypertension, hyperlipidemia, diabetes mellitus, degenerative disc disease, osteoarthritis, and venous stasis disease. Patient was brought to the emergency room per family after a near fall in the home. He has had generalized weakness and recurrent falls, lives at home with his wife. Daughter is active in care. Struggles with lower extremity dermatitis and lymphedema. Due to patient's increased weakness and comorbidities, wife is finding it more difficult to care for him in the home.   Upon evaluation in the ED, was found to have a left-sided pleural effusion as well as right lower lobe airspace disease and possible atelectasis versus pneumonia. Patient is being managed by internal medicine and cardiology. Currently receiving IV Lasix, on fluid restrictions. Thoracentesis and chest tube placement recommended per pulmonology, patient declined. States he feels diuresis has worked well in the past, does not wish to undergo procedure at this time. His legs have drastically improved, feels as though he is getting stronger. Would like to continue all care as planned. His legs were very swollen and painful up until this morning after being in compression throughout the night, feels much better now that the compression is relieved. Only complaints of shortness of breath when exerting himself at home, mainly \"pushing things outside\". Palliative Medicine SymptomScreening/ROS:    Review of Systems   Constitutional:  Positive for fatigue. Respiratory:  Positive for shortness of breath (on exertion). Gastrointestinal: Negative. Genitourinary: Negative. Musculoskeletal: Negative. Skin:  Positive for color change. Neurological:  Positive for weakness. Psychiatric/Behavioral: Negative. A complete 10 count ROS was obtained. Pertinent positives mentioned above in HPI/ROS. All others if not mentioned are negative. Palliative Performance Scale:     [] 60%  Amb reduced; Sig dz. Can't do hobbies/housework; Intake normal or reduced, Occasional assist; LOC full/confusion   [x] 50%  Mainly sit/lie; Extensive disease. Mainly assist, Intake normal or reduced; Occasional assist; LOC full/confusion   [] 40%  Mainly in bed; Extensive disease; Mainly assist; Intake normal or reduced; Occasional assist; LOC full/confusion   [] 30%  Bed bound, Extensive disease; Total care; Intake reduced; LOC full/confusion   [] 20%  Bed bound; Extensive disease; Total care; Intake minimal; Drowsy/coma   [] 10%  Bed bound; Extensive disease;  Total care; Mouth care only; Drowsy/coma   []  0%   Death       Home med list and hospital medications reviewed in chart as of 10/28/2022     EXAM     Vitals:    10/28/22 1111   BP: (!) 103/47   Pulse: 83   Resp: 17   Temp: 97.5 °F (36.4 °C)   SpO2: 93%       Physical Exam  Cardiovascular:      Rate and Rhythm: Normal rate. Pulmonary:      Effort: Pulmonary effort is normal.   Abdominal:      Palpations: Abdomen is soft. Musculoskeletal:      Right lower leg: No edema. Left lower leg: No edema. Skin:     General: Skin is warm and dry. Comments: Bilateral lower ext erythema, several scabbed over areas below the knee. Neurological:      Mental Status: He is alert and oriented to person, place, and time. Motor: Weakness present. Gait: Gait abnormal.   Psychiatric:         Mood and Affect: Mood normal.         Thought Content:  Thought content normal.         Judgment: Judgment normal.              Current labs in the epic chart reviewed as of 10/28/2022   Review of previous notes, admits, labs, radiology and testing relevant to this consult done in this chart today 10/28/2022      Total time: 70 minutes  >50% of time spent counseling patient at bedside or POA/family member if applicable , reviewing information and discussing care, coordinating with care team  Signed By: Electronically signed by MARIA FERNANDA Samuel CNP on 10/28/2022 at [facility]. [address]   [phone]    October 28, 2022

## 2022-10-29 LAB
A/G RATIO: 0.9 (ref 1.1–2.2)
ALBUMIN SERPL-MCNC: 3 G/DL (ref 3.4–5)
ALP BLD-CCNC: 144 U/L (ref 40–129)
ALT SERPL-CCNC: 12 U/L (ref 10–40)
ANION GAP SERPL CALCULATED.3IONS-SCNC: 11 MMOL/L (ref 3–16)
AST SERPL-CCNC: 22 U/L (ref 15–37)
BASOPHILS ABSOLUTE: 0 K/UL (ref 0–0.2)
BASOPHILS RELATIVE PERCENT: 0.4 %
BILIRUB SERPL-MCNC: 1.4 MG/DL (ref 0–1)
BUN BLDV-MCNC: 32 MG/DL (ref 7–20)
CALCIUM SERPL-MCNC: 8.5 MG/DL (ref 8.3–10.6)
CHLORIDE BLD-SCNC: 96 MMOL/L (ref 99–110)
CO2: 30 MMOL/L (ref 21–32)
CREAT SERPL-MCNC: 1 MG/DL (ref 0.8–1.3)
EOSINOPHILS ABSOLUTE: 0.3 K/UL (ref 0–0.6)
EOSINOPHILS RELATIVE PERCENT: 4.6 %
GFR SERPL CREATININE-BSD FRML MDRD: >60 ML/MIN/{1.73_M2}
GLUCOSE BLD-MCNC: 110 MG/DL (ref 70–99)
GLUCOSE BLD-MCNC: 137 MG/DL (ref 70–99)
GLUCOSE BLD-MCNC: 193 MG/DL (ref 70–99)
GLUCOSE BLD-MCNC: 203 MG/DL (ref 70–99)
GLUCOSE BLD-MCNC: 91 MG/DL (ref 70–99)
GLUCOSE BLD-MCNC: 92 MG/DL (ref 70–99)
GLUCOSE BLD-MCNC: >600 MG/DL (ref 70–99)
HCT VFR BLD CALC: 35 % (ref 40.5–52.5)
HEMOGLOBIN: 11.5 G/DL (ref 13.5–17.5)
LYMPHOCYTES ABSOLUTE: 1 K/UL (ref 1–5.1)
LYMPHOCYTES RELATIVE PERCENT: 15.6 %
MAGNESIUM: 2 MG/DL (ref 1.8–2.4)
MCH RBC QN AUTO: 30.1 PG (ref 26–34)
MCHC RBC AUTO-ENTMCNC: 32.9 G/DL (ref 31–36)
MCV RBC AUTO: 91.4 FL (ref 80–100)
MONOCYTES ABSOLUTE: 0.8 K/UL (ref 0–1.3)
MONOCYTES RELATIVE PERCENT: 13.5 %
MRSA SCREEN RT-PCR: NORMAL
NEUTROPHILS ABSOLUTE: 4.1 K/UL (ref 1.7–7.7)
NEUTROPHILS RELATIVE PERCENT: 65.9 %
PDW BLD-RTO: 17.6 % (ref 12.4–15.4)
PERFORMED ON: ABNORMAL
PERFORMED ON: NORMAL
PHOSPHORUS: 3.3 MG/DL (ref 2.5–4.9)
PLATELET # BLD: 247 K/UL (ref 135–450)
PMV BLD AUTO: 7.9 FL (ref 5–10.5)
POTASSIUM SERPL-SCNC: 3.7 MMOL/L (ref 3.5–5.1)
PRO-BNP: 5109 PG/ML (ref 0–449)
RBC # BLD: 3.83 M/UL (ref 4.2–5.9)
SODIUM BLD-SCNC: 137 MMOL/L (ref 136–145)
TOTAL PROTEIN: 6.3 G/DL (ref 6.4–8.2)
WBC # BLD: 6.2 K/UL (ref 4–11)

## 2022-10-29 PROCEDURE — 6370000000 HC RX 637 (ALT 250 FOR IP): Performed by: INTERNAL MEDICINE

## 2022-10-29 PROCEDURE — 6360000002 HC RX W HCPCS: Performed by: INTERNAL MEDICINE

## 2022-10-29 PROCEDURE — 83880 ASSAY OF NATRIURETIC PEPTIDE: CPT

## 2022-10-29 PROCEDURE — 85025 COMPLETE CBC W/AUTO DIFF WBC: CPT

## 2022-10-29 PROCEDURE — 2580000003 HC RX 258: Performed by: INTERNAL MEDICINE

## 2022-10-29 PROCEDURE — 94760 N-INVAS EAR/PLS OXIMETRY 1: CPT

## 2022-10-29 PROCEDURE — 36415 COLL VENOUS BLD VENIPUNCTURE: CPT

## 2022-10-29 PROCEDURE — 83735 ASSAY OF MAGNESIUM: CPT

## 2022-10-29 PROCEDURE — 84100 ASSAY OF PHOSPHORUS: CPT

## 2022-10-29 PROCEDURE — 1200000000 HC SEMI PRIVATE

## 2022-10-29 PROCEDURE — 80053 COMPREHEN METABOLIC PANEL: CPT

## 2022-10-29 PROCEDURE — 6370000000 HC RX 637 (ALT 250 FOR IP): Performed by: NURSE PRACTITIONER

## 2022-10-29 RX ORDER — GABAPENTIN 300 MG/1
300 CAPSULE ORAL 2 TIMES DAILY
Status: DISCONTINUED | OUTPATIENT
Start: 2022-10-29 | End: 2022-10-30

## 2022-10-29 RX ADMIN — CEFAZOLIN 2000 MG: 2 INJECTION, POWDER, FOR SOLUTION INTRAMUSCULAR; INTRAVENOUS at 15:11

## 2022-10-29 RX ADMIN — CEFAZOLIN 2000 MG: 2 INJECTION, POWDER, FOR SOLUTION INTRAMUSCULAR; INTRAVENOUS at 04:32

## 2022-10-29 RX ADMIN — ACETAMINOPHEN 650 MG: 325 TABLET ORAL at 09:58

## 2022-10-29 RX ADMIN — SODIUM CHLORIDE, PRESERVATIVE FREE 10 ML: 5 INJECTION INTRAVENOUS at 11:02

## 2022-10-29 RX ADMIN — POLYETHYLENE GLYCOL 3350 17 G: 17 POWDER, FOR SOLUTION ORAL at 09:58

## 2022-10-29 RX ADMIN — Medication 3 MG: at 19:52

## 2022-10-29 RX ADMIN — SODIUM CHLORIDE: 9 INJECTION, SOLUTION INTRAVENOUS at 19:51

## 2022-10-29 RX ADMIN — GABAPENTIN 300 MG: 300 CAPSULE ORAL at 16:14

## 2022-10-29 RX ADMIN — TORSEMIDE 20 MG: 20 TABLET ORAL at 08:53

## 2022-10-29 RX ADMIN — CEFAZOLIN 2000 MG: 2 INJECTION, POWDER, FOR SOLUTION INTRAMUSCULAR; INTRAVENOUS at 19:51

## 2022-10-29 RX ADMIN — APIXABAN 5 MG: 5 TABLET, FILM COATED ORAL at 19:52

## 2022-10-29 RX ADMIN — APIXABAN 5 MG: 5 TABLET, FILM COATED ORAL at 08:53

## 2022-10-29 RX ADMIN — METOPROLOL SUCCINATE 50 MG: 50 TABLET, EXTENDED RELEASE ORAL at 08:53

## 2022-10-29 ASSESSMENT — PAIN SCALES - GENERAL: PAINLEVEL_OUTOF10: 9

## 2022-10-29 ASSESSMENT — PAIN DESCRIPTION - FREQUENCY: FREQUENCY: CONTINUOUS

## 2022-10-29 ASSESSMENT — PAIN - FUNCTIONAL ASSESSMENT: PAIN_FUNCTIONAL_ASSESSMENT: PREVENTS OR INTERFERES WITH MANY ACTIVE NOT PASSIVE ACTIVITIES

## 2022-10-29 ASSESSMENT — PAIN DESCRIPTION - ORIENTATION: ORIENTATION: RIGHT;LEFT

## 2022-10-29 ASSESSMENT — PAIN DESCRIPTION - PAIN TYPE: TYPE: NEUROPATHIC PAIN

## 2022-10-29 ASSESSMENT — PAIN DESCRIPTION - ONSET: ONSET: PROGRESSIVE

## 2022-10-29 ASSESSMENT — PAIN DESCRIPTION - LOCATION: LOCATION: HAND;FOOT

## 2022-10-29 NOTE — PLAN OF CARE
Problem: Discharge Planning  Goal: Discharge to home or other facility with appropriate resources  10/29/2022 1544 by Tracy Stephens RN  Outcome: Progressing  10/29/2022 0304 by Onur Frias RN  Outcome: Progressing  Flowsheets (Taken 10/28/2022 2015)  Discharge to home or other facility with appropriate resources:   Identify barriers to discharge with patient and caregiver   Arrange for needed discharge resources and transportation as appropriate   Identify discharge learning needs (meds, wound care, etc)   Refer to discharge planning if patient needs post-hospital services based on physician order or complex needs related to functional status, cognitive ability or social support system     Problem: Skin/Tissue Integrity  Goal: Absence of new skin breakdown  Description: 1. Monitor for areas of redness and/or skin breakdown  2. Assess vascular access sites hourly  3. Every 4-6 hours minimum:  Change oxygen saturation probe site  4. Every 4-6 hours:  If on nasal continuous positive airway pressure, respiratory therapy assess nares and determine need for appliance change or resting period.   10/29/2022 1544 by Tracy Stephens RN  Outcome: Progressing  10/29/2022 0304 by Onur Frias RN  Outcome: Progressing     Problem: ABCDS Injury Assessment  Goal: Absence of physical injury  10/29/2022 1544 by Tracy Stephens RN  Outcome: Progressing  10/29/2022 0304 by Onur Frias RN  Outcome: Progressing  Flowsheets (Taken 10/29/2022 0304)  Absence of Physical Injury: Implement safety measures based on patient assessment     Problem: Respiratory - Adult  Goal: Achieves optimal ventilation and oxygenation  10/29/2022 1544 by Tracy Stephens RN  Outcome: Progressing  10/29/2022 0304 by Onur Frias RN  Outcome: Progressing  Flowsheets (Taken 10/28/2022 2015)  Achieves optimal ventilation and oxygenation:   Assess for changes in respiratory status   Assess for changes in mentation and behavior   Position to facilitate oxygenation and minimize respiratory effort     Problem: Cardiovascular - Adult  Goal: Maintains optimal cardiac output and hemodynamic stability  10/29/2022 1544 by Eric Mtz RN  Outcome: Progressing  10/29/2022 0304 by Alice Benton RN  Outcome: Progressing  Flowsheets (Taken 10/28/2022 2015)  Maintains optimal cardiac output and hemodynamic stability:   Monitor blood pressure and heart rate   Monitor urine output and notify Licensed Independent Practitioner for values outside of normal range   Assess for signs of decreased cardiac output  Goal: Absence of cardiac dysrhythmias or at baseline  10/29/2022 1544 by Eric Mtz RN  Outcome: Progressing  10/29/2022 0304 by Alice Benton RN  Outcome: Progressing  Flowsheets (Taken 10/28/2022 2015)  Absence of cardiac dysrhythmias or at baseline: Monitor cardiac rate and rhythm     Problem: Metabolic/Fluid and Electrolytes - Adult  Goal: Electrolytes maintained within normal limits  10/29/2022 1544 by Eric Mtz RN  Outcome: Progressing  10/29/2022 0304 by Alice Benton RN  Outcome: Progressing  Flowsheets (Taken 10/28/2022 2015)  Electrolytes maintained within normal limits: Monitor labs and assess patient for signs and symptoms of electrolyte imbalances  Goal: Hemodynamic stability and optimal renal function maintained  10/29/2022 1544 by Eric Mtz RN  Outcome: Progressing  10/29/2022 0304 by Alice Benton RN  Outcome: Progressing  Flowsheets (Taken 10/28/2022 2015)  Hemodynamic stability and optimal renal function maintained:   Monitor labs and assess for signs and symptoms of volume excess or deficit   Monitor intake, output and patient weight     Problem: Hematologic - Adult  Goal: Maintains hematologic stability  10/29/2022 1544 by Eric Mtz RN  Outcome: Progressing  10/29/2022 0304 by Alice Benton RN  Outcome: Progressing  Flowsheets (Taken 10/28/2022 2015)  Maintains hematologic stability: Assess for signs and symptoms of bleeding or hemorrhage     Problem: Chronic Conditions and Co-morbidities  Goal: Patient's chronic conditions and co-morbidity symptoms are monitored and maintained or improved  10/29/2022 1544 by Eric Mtz RN  Outcome: Progressing  10/29/2022 0304 by Alice Benton RN  Outcome: Progressing  Flowsheets (Taken 10/28/2022 2015)  Care Plan - Patient's Chronic Conditions and Co-Morbidity Symptoms are Monitored and Maintained or Improved:   Monitor and assess patient's chronic conditions and comorbid symptoms for stability, deterioration, or improvement   Collaborate with multidisciplinary team to address chronic and comorbid conditions and prevent exacerbation or deterioration     Problem: Nutrition Deficit:  Goal: Optimize nutritional status  10/29/2022 1544 by Eric Mtz RN  Outcome: Progressing  10/29/2022 0304 by Alice Benton RN  Outcome: Progressing     Problem: Pain  Goal: Verbalizes/displays adequate comfort level or baseline comfort level  Outcome: Progressing  Flowsheets (Taken 10/29/2022 0958)  Verbalizes/displays adequate comfort level or baseline comfort level: Encourage patient to monitor pain and request assistance

## 2022-10-29 NOTE — PROGRESS NOTES
Hospital Medicine Progress Note     Date:  10/29/2022    PCP: Braulio Officer, MARIA FERNANDA Marrero CNP (Tel: [phone])    Date of Admission: 10/25/2022    Chief complaint:   Chief Complaint   Patient presents with    Fall     Pt. fell prior to making it to the toilet. Wife and daughter helped lower him to the floor. Brief admission history: 60-year-old male with history of paroxysmal atrial fibrillation (on chronic anticoagulation with Eliquis), degenerative disc disease, chronic diastolic heart failure, essential hypertension, hyperlipidemia, osteoarthritis, uncontrolled diabetes type 2 with hyperglycemia, who presents to the emergency room with complaints of generalized weakness, recurrent falls at home. 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 Wife also reports difficulty caring for patient at home due to patient's comorbidities. Earlier today, he felt generally weak, did not fall but wife and daughter where able to lower him to the ground. Chest x-ray obtained in the emergency room reveals large left-sided pleural effusion with adjacent consolidation, right lower lobe airspace disease, concerning for atelectasis or pneumonia. Assessment/plan:  Acute on chronic diastolic heart failure. He was initially on IV lasix, which has since been transitioned to oral torsemide - to take torsemide 20 mg daily (and torsemide 20 mg BID on MWF). Generalized weakness, recurrent falls. Likely secondary to acute medical conditions. Therapy has recommended SNF placement at discharge. Left pleural effusion, possibly loculated. Could be from CHF. Cannot exclude mass. Thoracentesis and chest tube placement recommended but patient refused. Patient understands diuresis alone will likely not be enough. Left lower extremity cellulitis, unclear if POA but not documented. Blood cultures, MRSA nares. Erythema much improved.  Continue ancef (start date 10/28). Uncontrolled diabetes 2 (recent A1c 7.5), hypoglycemic the morning of 10/28. Discontinued lantus on 10/28/2022 due to hypoglycemia. Continue SSI. Hypoglycemic protocol in place. Elevated troponin from baseline. Likely type II NSTEMI from demand ischemia due to CHF. History of atrial fibrillation, currently rate controlled. Continue eliquis. Abnormal chest x-ray; suspect more atelectasis than pneumonia. Other comorbidities: history of paroxysmal atrial fibrillation, chronic venous stasis dermatitis, degenerative disc disease, chronic diastolic heart failure, essential hypertension, hyperlipidemia, osteoarthritis, uncontrolled diabetes type 2 with hyperglycemia, obesity with BMI 30. Disposition. Treating left lower extremity cellulitis - on IV antibiotics. Has improved from CHF standpoint. Placement early next week. Diet: ADULT DIET; Regular; 4 carb choices (60 gm/meal); Low Sodium (2 gm); 2000 ml  ADULT ORAL NUTRITION SUPPLEMENT; Lunch, Dinner; Wound Healing Oral Supplement    Code status: Full Code   ----------  Subjective  No new complaints today. Denies any neds. Objective  Physical exam:  Vitals: /68   Pulse 77   Temp 97.5 °F (36.4 °C) (Oral)   Resp 16   Ht 6' 1\" (1.854 m)   Wt 227 lb 15.3 oz (103.4 kg)   SpO2 95%   BMI 30.08 kg/m²   Gen/overall appearance: Not in acute distress. Alert. Oriented X3  Head: Normocephalic, atraumatic  Eyes: EOMI, good acuity  ENT: Oral mucosa moist  Neck: No JVD, thyromegaly  CVS: Nml S1S2, no MRG, RRR  Pulm: Clear bilaterally. No crackles/wheezes  Gastrointestinal: Soft, NT/ND, +BS  Musculoskeletal: Distal left lower ext erythema, much improved. No edema. Warm  Neuro: No focal deficit. Moves extremity spontaneously. Psychiatry: Appropriate affect. Not agitated. Skin: Warm, dry with normal turgor.    Capillary refill: Brisk,< 3 seconds   Peripheral Pulses: +2 palpable, equal bilaterally      24HR INTAKE/OUTPUT:    Intake/Output Summary (Last 24 hours) at 10/29/2022 0824  Last data filed at 10/29/2022 0505  Gross per 24 hour   Intake 1289.88 ml   Output 1250 ml   Net 39.88 ml       I/O last 3 completed shifts: In: 1861.7 [P.O.:1340; I.V.:421.7; IV Piggyback:100]  Out: 1250 [Urine:1250]  No intake/output data recorded.   Meds:    ceFAZolin  2,000 mg IntraVENous Q8H    apixaban  5 mg Oral BID    torsemide  20 mg Oral Daily    gabapentin  600 mg Oral Nightly    metoprolol succinate  50 mg Oral Daily    insulin lispro  0-4 Units SubCUTAneous TID WC    insulin lispro  0-4 Units SubCUTAneous Nightly    sodium chloride flush  5-40 mL IntraVENous 2 times per day     Infusions:    dextrose      sodium chloride       PRN Meds: melatonin, trimethobenzamide, meclizine, glucose, dextrose bolus **OR** dextrose bolus, glucagon (rDNA), dextrose, sodium chloride flush, sodium chloride, polyethylene glycol, acetaminophen **OR** acetaminophen    Labs/imaging:  CBC:   Recent Labs     10/27/22  0643 10/28/22  0653 10/29/22  0714   WBC 7.4 6.4 6.2   HGB 11.8* 11.5* 11.5*    237 247       BMP:    Recent Labs     10/27/22  0643 10/28/22  0653 10/29/22  0714    136 137   K 3.7 3.7 3.7   CL 97* 96* 96*   CO2 29 30 30   BUN 29* 31* 32*   CREATININE 0.9 1.1 1.0   GLUCOSE 75 53* 91     Hepatic:   Recent Labs     10/27/22  0643 10/28/22  0653 10/29/22  0714   AST 22 17 22   ALT 14 13 12   BILITOT 1.6* 1.7* 1.4*   ALKPHOS 126 126 144*       Anthony Chaudhari MD  -------------------------------  Rounding hospitalist

## 2022-10-29 NOTE — PLAN OF CARE
Problem: Discharge Planning  Goal: Discharge to home or other facility with appropriate resources  Outcome: Progressing  Flowsheets (Taken 10/28/2022 2015)  Discharge to home or other facility with appropriate resources:   Identify barriers to discharge with patient and caregiver   Arrange for needed discharge resources and transportation as appropriate   Identify discharge learning needs (meds, wound care, etc)   Refer to discharge planning if patient needs post-hospital services based on physician order or complex needs related to functional status, cognitive ability or social support system     Problem: Skin/Tissue Integrity  Goal: Absence of new skin breakdown  Description: 1. Monitor for areas of redness and/or skin breakdown  2. Assess vascular access sites hourly  3. Every 4-6 hours minimum:  Change oxygen saturation probe site  4. Every 4-6 hours:  If on nasal continuous positive airway pressure, respiratory therapy assess nares and determine need for appliance change or resting period.   Outcome: Progressing     Problem: Safety - Adult  Goal: Free from fall injury  Outcome: Progressing     Problem: ABCDS Injury Assessment  Goal: Absence of physical injury  Outcome: Progressing     Problem: Respiratory - Adult  Goal: Achieves optimal ventilation and oxygenation  Outcome: Progressing  Flowsheets (Taken 10/28/2022 2015)  Achieves optimal ventilation and oxygenation:   Assess for changes in respiratory status   Assess for changes in mentation and behavior   Position to facilitate oxygenation and minimize respiratory effort     Problem: Cardiovascular - Adult  Goal: Maintains optimal cardiac output and hemodynamic stability  Outcome: Progressing  Flowsheets (Taken 10/28/2022 2015)  Maintains optimal cardiac output and hemodynamic stability:   Monitor blood pressure and heart rate   Monitor urine output and notify Licensed Independent Practitioner for values outside of normal range   Assess for signs of decreased cardiac output  Goal: Absence of cardiac dysrhythmias or at baseline  Outcome: Progressing  Flowsheets (Taken 10/28/2022 2015)  Absence of cardiac dysrhythmias or at baseline: Monitor cardiac rate and rhythm     Problem: Metabolic/Fluid and Electrolytes - Adult  Goal: Electrolytes maintained within normal limits  Outcome: Progressing  Flowsheets (Taken 10/28/2022 2015)  Electrolytes maintained within normal limits: Monitor labs and assess patient for signs and symptoms of electrolyte imbalances  Goal: Hemodynamic stability and optimal renal function maintained  Outcome: Progressing  Flowsheets (Taken 10/28/2022 2015)  Hemodynamic stability and optimal renal function maintained:   Monitor labs and assess for signs and symptoms of volume excess or deficit   Monitor intake, output and patient weight     Problem: Hematologic - Adult  Goal: Maintains hematologic stability  Outcome: Progressing  Flowsheets (Taken 10/28/2022 2015)  Maintains hematologic stability: Assess for signs and symptoms of bleeding or hemorrhage     Problem: Chronic Conditions and Co-morbidities  Goal: Patient's chronic conditions and co-morbidity symptoms are monitored and maintained or improved  Outcome: Progressing  Flowsheets (Taken 10/28/2022 2015)  Care Plan - Patient's Chronic Conditions and Co-Morbidity Symptoms are Monitored and Maintained or Improved:   Monitor and assess patient's chronic conditions and comorbid symptoms for stability, deterioration, or improvement   Collaborate with multidisciplinary team to address chronic and comorbid conditions and prevent exacerbation or deterioration     Problem: Nutrition Deficit:  Goal: Optimize nutritional status  Outcome: Progressing

## 2022-10-29 NOTE — PROGRESS NOTES
Pt c/o pain in hands and feet. Pt medicated with tylenol per MAR. Pt reports has not had BM for 2 days, pt request laxative. Pt medicated with Glycol ax per MAR. Pt repositioned. Call light and bedside table within reach.

## 2022-10-30 LAB
GLUCOSE BLD-MCNC: 120 MG/DL (ref 70–99)
GLUCOSE BLD-MCNC: 146 MG/DL (ref 70–99)
GLUCOSE BLD-MCNC: 152 MG/DL (ref 70–99)
GLUCOSE BLD-MCNC: 180 MG/DL (ref 70–99)
PERFORMED ON: ABNORMAL

## 2022-10-30 PROCEDURE — 6360000002 HC RX W HCPCS: Performed by: INTERNAL MEDICINE

## 2022-10-30 PROCEDURE — 2580000003 HC RX 258: Performed by: INTERNAL MEDICINE

## 2022-10-30 PROCEDURE — 6370000000 HC RX 637 (ALT 250 FOR IP): Performed by: INTERNAL MEDICINE

## 2022-10-30 PROCEDURE — 94760 N-INVAS EAR/PLS OXIMETRY 1: CPT

## 2022-10-30 PROCEDURE — 1200000000 HC SEMI PRIVATE

## 2022-10-30 RX ORDER — GABAPENTIN 100 MG/1
200 CAPSULE ORAL 3 TIMES DAILY
Status: DISCONTINUED | OUTPATIENT
Start: 2022-10-30 | End: 2022-10-31 | Stop reason: HOSPADM

## 2022-10-30 RX ORDER — INSULIN GLARGINE 100 [IU]/ML
7 INJECTION, SOLUTION SUBCUTANEOUS DAILY
Status: DISCONTINUED | OUTPATIENT
Start: 2022-10-31 | End: 2022-10-31 | Stop reason: HOSPADM

## 2022-10-30 RX ORDER — INSULIN GLARGINE 100 [IU]/ML
7 INJECTION, SOLUTION SUBCUTANEOUS ONCE
Status: COMPLETED | OUTPATIENT
Start: 2022-10-30 | End: 2022-10-30

## 2022-10-30 RX ADMIN — APIXABAN 5 MG: 5 TABLET, FILM COATED ORAL at 08:09

## 2022-10-30 RX ADMIN — METOPROLOL SUCCINATE 50 MG: 50 TABLET, EXTENDED RELEASE ORAL at 08:09

## 2022-10-30 RX ADMIN — INSULIN GLARGINE 7 UNITS: 100 INJECTION, SOLUTION SUBCUTANEOUS at 13:52

## 2022-10-30 RX ADMIN — POLYETHYLENE GLYCOL 3350 17 G: 17 POWDER, FOR SOLUTION ORAL at 08:15

## 2022-10-30 RX ADMIN — CEFAZOLIN 2000 MG: 2 INJECTION, POWDER, FOR SOLUTION INTRAMUSCULAR; INTRAVENOUS at 04:25

## 2022-10-30 RX ADMIN — APIXABAN 5 MG: 5 TABLET, FILM COATED ORAL at 21:29

## 2022-10-30 RX ADMIN — GABAPENTIN 200 MG: 100 CAPSULE ORAL at 13:51

## 2022-10-30 RX ADMIN — SODIUM CHLORIDE, PRESERVATIVE FREE 10 ML: 5 INJECTION INTRAVENOUS at 21:30

## 2022-10-30 RX ADMIN — TORSEMIDE 20 MG: 20 TABLET ORAL at 08:09

## 2022-10-30 RX ADMIN — GABAPENTIN 200 MG: 100 CAPSULE ORAL at 21:29

## 2022-10-30 RX ADMIN — CEFAZOLIN 2000 MG: 2 INJECTION, POWDER, FOR SOLUTION INTRAMUSCULAR; INTRAVENOUS at 13:07

## 2022-10-30 RX ADMIN — GABAPENTIN 300 MG: 300 CAPSULE ORAL at 08:09

## 2022-10-30 RX ADMIN — SODIUM CHLORIDE, PRESERVATIVE FREE 10 ML: 5 INJECTION INTRAVENOUS at 08:10

## 2022-10-30 RX ADMIN — CEFAZOLIN 2000 MG: 2 INJECTION, POWDER, FOR SOLUTION INTRAMUSCULAR; INTRAVENOUS at 21:32

## 2022-10-30 ASSESSMENT — PAIN SCALES - GENERAL
PAINLEVEL_OUTOF10: 0

## 2022-10-30 NOTE — PROGRESS NOTES
[facility] Medicine Progress Note     Date:  10/30/2022    PCP: MARIA FERNANDA Blanco CNP (Tel: [phone])    Date of Admission: 10/25/2022    Chief complaint:   Chief Complaint   Patient presents with    Fall     Pt. fell prior to making it to the toilet. Wife and daughter helped lower him to the floor. Brief admission history: 81-year-old male with history of paroxysmal atrial fibrillation (on chronic anticoagulation with Eliquis), degenerative disc disease, chronic diastolic heart failure, essential hypertension, hyperlipidemia, osteoarthritis, uncontrolled diabetes type 2 with hyperglycemia, who presents to the emergency room with complaints of generalized weakness, recurrent falls at home. 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 Wife also reports difficulty caring for patient at home due to patient's comorbidities. Earlier today, he felt generally weak, did not fall but wife and daughter where able to lower him to the ground. Chest x-ray obtained in the emergency room reveals large left-sided pleural effusion with adjacent consolidation, right lower lobe airspace disease, concerning for atelectasis or pneumonia. Assessment/plan:  Acute on chronic diastolic heart failure. He was initially on IV lasix, which has since been transitioned to oral torsemide - to take torsemide 20 mg daily (and torsemide 20 mg BID on MWF). Recheck labs tomorrow. Generalized weakness, recurrent falls. Likely secondary to acute medical conditions. Therapy has recommended SNF placement at discharge. Left pleural effusion, possibly loculated. Could be from CHF. Cannot exclude mass. Thoracentesis and chest tube placement recommended but patient refused. Patient understands diuresis alone will likely not be enough. Left lower extremity cellulitis, unclear if POA but not documented. MRSA nares negative. Blood cultures NGTD. refill: Brisk,< 3 seconds   Peripheral Pulses: +2 palpable, equal bilaterally      24HR INTAKE/OUTPUT:    Intake/Output Summary (Last 24 hours) at 10/30/2022 0529  Last data filed at 10/29/2022 1151  Gross per 24 hour   Intake 240 ml   Output --   Net 240 ml       I/O last 3 completed shifts: In: 1529.9 [P.O.:1280; I.V.:149.9; IV Piggyback:100]  Out: 1250 [Urine:1250]  No intake/output data recorded.   Meds:    gabapentin  300 mg Oral BID    ceFAZolin  2,000 mg IntraVENous Q8H    apixaban  5 mg Oral BID    torsemide  20 mg Oral Daily    metoprolol succinate  50 mg Oral Daily    insulin lispro  0-4 Units SubCUTAneous TID WC    insulin lispro  0-4 Units SubCUTAneous Nightly    sodium chloride flush  5-40 mL IntraVENous 2 times per day     Infusions:    dextrose      sodium chloride 5 mL/hr at 10/29/22 1951     PRN Meds: melatonin, trimethobenzamide, meclizine, glucose, dextrose bolus **OR** dextrose bolus, glucagon (rDNA), dextrose, sodium chloride flush, sodium chloride, polyethylene glycol, acetaminophen **OR** acetaminophen    Labs/imaging:  CBC:   Recent Labs     10/28/22  0653 10/29/22  0714   WBC 6.4 6.2   HGB 11.5* 11.5*    247     BMP:    Recent Labs     10/28/22  0653 10/29/22  0714    137   K 3.7 3.7   CL 96* 96*   CO2 30 30   BUN 31* 32*   CREATININE 1.1 1.0   GLUCOSE 53* 91     Hepatic:   Recent Labs     10/27/22  0643 10/28/22  0653 10/29/22  0714   AST 22 17 22   ALT 14 13 12   BILITOT 1.6* 1.7* 1.4*   ALKPHOS 126 126 144*         Vinnie Dooley MD  -------------------------------  Rounding hospitalist

## 2022-10-30 NOTE — PLAN OF CARE
Problem: Discharge Planning  Goal: Discharge to home or other facility with appropriate resources  Outcome: Progressing     Problem: Skin/Tissue Integrity  Goal: Absence of new skin breakdown  Description: 1. Monitor for areas of redness and/or skin breakdown  2. Assess vascular access sites hourly  3. Every 4-6 hours minimum:  Change oxygen saturation probe site  4. Every 4-6 hours:  If on nasal continuous positive airway pressure, respiratory therapy assess nares and determine need for appliance change or resting period.   Outcome: Progressing     Problem: Safety - Adult  Goal: Free from fall injury  Outcome: Progressing     Problem: ABCDS Injury Assessment  Goal: Absence of physical injury  Outcome: Progressing     Problem: Respiratory - Adult  Goal: Achieves optimal ventilation and oxygenation  Outcome: Progressing     Problem: Cardiovascular - Adult  Goal: Maintains optimal cardiac output and hemodynamic stability  Outcome: Progressing  Goal: Absence of cardiac dysrhythmias or at baseline  Outcome: Progressing     Problem: Metabolic/Fluid and Electrolytes - Adult  Goal: Electrolytes maintained within normal limits  Outcome: Progressing  Goal: Hemodynamic stability and optimal renal function maintained  Outcome: Progressing     Problem: Hematologic - Adult  Goal: Maintains hematologic stability  Outcome: Progressing     Problem: Chronic Conditions and Co-morbidities  Goal: Patient's chronic conditions and co-morbidity symptoms are monitored and maintained or improved  Outcome: Progressing     Problem: Nutrition Deficit:  Goal: Optimize nutritional status  Outcome: Progressing     Problem: Pain  Goal: Verbalizes/displays adequate comfort level or baseline comfort level  Outcome: Progressing

## 2022-10-31 ENCOUNTER — TELEPHONE (OUTPATIENT)
Dept: PULMONOLOGY | Age: 87
End: 2022-10-31

## 2022-10-31 VITALS
HEIGHT: 73 IN | WEIGHT: 227.29 LBS | BODY MASS INDEX: 30.12 KG/M2 | DIASTOLIC BLOOD PRESSURE: 74 MMHG | TEMPERATURE: 98.2 F | OXYGEN SATURATION: 95 % | RESPIRATION RATE: 16 BRPM | SYSTOLIC BLOOD PRESSURE: 118 MMHG | HEART RATE: 78 BPM

## 2022-10-31 LAB
A/G RATIO: 0.7 (ref 1.1–2.2)
ALBUMIN SERPL-MCNC: 2.9 G/DL (ref 3.4–5)
ALP BLD-CCNC: 156 U/L (ref 40–129)
ALT SERPL-CCNC: <5 U/L (ref 10–40)
ANION GAP SERPL CALCULATED.3IONS-SCNC: 9 MMOL/L (ref 3–16)
AST SERPL-CCNC: 20 U/L (ref 15–37)
BASOPHILS ABSOLUTE: 0 K/UL (ref 0–0.2)
BASOPHILS RELATIVE PERCENT: 0.3 %
BILIRUB SERPL-MCNC: 1.2 MG/DL (ref 0–1)
BUN BLDV-MCNC: 30 MG/DL (ref 7–20)
CALCIUM SERPL-MCNC: 9.1 MG/DL (ref 8.3–10.6)
CHLORIDE BLD-SCNC: 99 MMOL/L (ref 99–110)
CO2: 33 MMOL/L (ref 21–32)
CREAT SERPL-MCNC: 1 MG/DL (ref 0.8–1.3)
EOSINOPHILS ABSOLUTE: 0.3 K/UL (ref 0–0.6)
EOSINOPHILS RELATIVE PERCENT: 4 %
GFR SERPL CREATININE-BSD FRML MDRD: >60 ML/MIN/{1.73_M2}
GLUCOSE BLD-MCNC: 108 MG/DL (ref 70–99)
GLUCOSE BLD-MCNC: 142 MG/DL (ref 70–99)
GLUCOSE BLD-MCNC: 167 MG/DL (ref 70–99)
HCT VFR BLD CALC: 35.4 % (ref 40.5–52.5)
HEMOGLOBIN: 11.9 G/DL (ref 13.5–17.5)
LYMPHOCYTES ABSOLUTE: 1 K/UL (ref 1–5.1)
LYMPHOCYTES RELATIVE PERCENT: 14.4 %
MAGNESIUM: 2 MG/DL (ref 1.8–2.4)
MCH RBC QN AUTO: 30.1 PG (ref 26–34)
MCHC RBC AUTO-ENTMCNC: 33.7 G/DL (ref 31–36)
MCV RBC AUTO: 89.4 FL (ref 80–100)
MONOCYTES ABSOLUTE: 0.8 K/UL (ref 0–1.3)
MONOCYTES RELATIVE PERCENT: 12.1 %
NEUTROPHILS ABSOLUTE: 4.8 K/UL (ref 1.7–7.7)
NEUTROPHILS RELATIVE PERCENT: 69.2 %
PDW BLD-RTO: 17.7 % (ref 12.4–15.4)
PERFORMED ON: ABNORMAL
PERFORMED ON: ABNORMAL
PHOSPHORUS: 3 MG/DL (ref 2.5–4.9)
PLATELET # BLD: 294 K/UL (ref 135–450)
PMV BLD AUTO: 8.3 FL (ref 5–10.5)
POTASSIUM SERPL-SCNC: 3.7 MMOL/L (ref 3.5–5.1)
RBC # BLD: 3.96 M/UL (ref 4.2–5.9)
SODIUM BLD-SCNC: 141 MMOL/L (ref 136–145)
TOTAL PROTEIN: 7 G/DL (ref 6.4–8.2)
WBC # BLD: 6.9 K/UL (ref 4–11)

## 2022-10-31 PROCEDURE — 6360000002 HC RX W HCPCS: Performed by: INTERNAL MEDICINE

## 2022-10-31 PROCEDURE — 85025 COMPLETE CBC W/AUTO DIFF WBC: CPT

## 2022-10-31 PROCEDURE — 6370000000 HC RX 637 (ALT 250 FOR IP): Performed by: INTERNAL MEDICINE

## 2022-10-31 PROCEDURE — 36415 COLL VENOUS BLD VENIPUNCTURE: CPT

## 2022-10-31 PROCEDURE — 97530 THERAPEUTIC ACTIVITIES: CPT

## 2022-10-31 PROCEDURE — 94760 N-INVAS EAR/PLS OXIMETRY 1: CPT

## 2022-10-31 PROCEDURE — 83735 ASSAY OF MAGNESIUM: CPT

## 2022-10-31 PROCEDURE — 84100 ASSAY OF PHOSPHORUS: CPT

## 2022-10-31 PROCEDURE — 80053 COMPREHEN METABOLIC PANEL: CPT

## 2022-10-31 PROCEDURE — 97535 SELF CARE MNGMENT TRAINING: CPT

## 2022-10-31 PROCEDURE — 2580000003 HC RX 258: Performed by: INTERNAL MEDICINE

## 2022-10-31 RX ORDER — BISACODYL 10 MG
10 SUPPOSITORY, RECTAL RECTAL DAILY PRN
Qty: 30 SUPPOSITORY | Refills: 0
Start: 2022-10-31 | End: 2022-11-30

## 2022-10-31 RX ORDER — INSULIN GLARGINE 100 [IU]/ML
7 INJECTION, SOLUTION SUBCUTANEOUS DAILY
Qty: 10 ML | Refills: 3
Start: 2022-11-01

## 2022-10-31 RX ORDER — CEPHALEXIN 500 MG/1
500 CAPSULE ORAL EVERY 8 HOURS SCHEDULED
Status: DISCONTINUED | OUTPATIENT
Start: 2022-10-31 | End: 2022-10-31 | Stop reason: HOSPADM

## 2022-10-31 RX ORDER — CEPHALEXIN 500 MG/1
500 CAPSULE ORAL EVERY 8 HOURS SCHEDULED
Qty: 21 CAPSULE | Refills: 0
Start: 2022-10-31 | End: 2022-11-07

## 2022-10-31 RX ORDER — GABAPENTIN 100 MG/1
200 CAPSULE ORAL 3 TIMES DAILY
Qty: 90 CAPSULE | Refills: 0 | Status: SHIPPED | OUTPATIENT
Start: 2022-10-31 | End: 2022-11-30

## 2022-10-31 RX ORDER — INSULIN LISPRO 100 [IU]/ML
0-4 INJECTION, SOLUTION INTRAVENOUS; SUBCUTANEOUS
Qty: 3.5 ML | Refills: 0
Start: 2022-10-31 | End: 2022-11-30

## 2022-10-31 RX ORDER — BISACODYL 10 MG
10 SUPPOSITORY, RECTAL RECTAL DAILY PRN
Status: DISCONTINUED | OUTPATIENT
Start: 2022-10-31 | End: 2022-10-31 | Stop reason: HOSPADM

## 2022-10-31 RX ORDER — BISACODYL 10 MG
10 SUPPOSITORY, RECTAL RECTAL ONCE
Status: COMPLETED | OUTPATIENT
Start: 2022-10-31 | End: 2022-10-31

## 2022-10-31 RX ORDER — GREEN TEA/HOODIA GORDONII 315-12.5MG
1 CAPSULE ORAL 2 TIMES DAILY
Qty: 60 TABLET | Refills: 0
Start: 2022-10-31 | End: 2022-11-30

## 2022-10-31 RX ADMIN — BISACODYL 10 MG: 10 SUPPOSITORY RECTAL at 10:38

## 2022-10-31 RX ADMIN — GABAPENTIN 200 MG: 100 CAPSULE ORAL at 08:48

## 2022-10-31 RX ADMIN — TORSEMIDE 20 MG: 20 TABLET ORAL at 08:48

## 2022-10-31 RX ADMIN — CEPHALEXIN 500 MG: 500 CAPSULE ORAL at 13:32

## 2022-10-31 RX ADMIN — SODIUM CHLORIDE, PRESERVATIVE FREE 10 ML: 5 INJECTION INTRAVENOUS at 08:50

## 2022-10-31 RX ADMIN — GABAPENTIN 200 MG: 100 CAPSULE ORAL at 13:32

## 2022-10-31 RX ADMIN — CEFAZOLIN 2000 MG: 2 INJECTION, POWDER, FOR SOLUTION INTRAMUSCULAR; INTRAVENOUS at 04:09

## 2022-10-31 RX ADMIN — INSULIN GLARGINE 7 UNITS: 100 INJECTION, SOLUTION SUBCUTANEOUS at 08:59

## 2022-10-31 RX ADMIN — APIXABAN 5 MG: 5 TABLET, FILM COATED ORAL at 08:48

## 2022-10-31 RX ADMIN — METOPROLOL SUCCINATE 50 MG: 50 TABLET, EXTENDED RELEASE ORAL at 08:48

## 2022-10-31 ASSESSMENT — PAIN SCALES - GENERAL
PAINLEVEL_OUTOF10: 0

## 2022-10-31 NOTE — PROGRESS NOTES
Nutrition Assessment     Type and Reason for Visit: Reassess    Nutrition Recommendations/Plan:   Continue current diet  Continue Maurice BID     Malnutrition Assessment:  Malnutrition Status: No malnutrition    Nutrition Assessment:  Follow-up. On 4 carb, low sodium diet with 2000ml fluid restriction. Intakes >50% at meals. Pt with stage III pressure injury to buttocks. Maurice supplement in place for additional protein intake. Discharge order noted. Plan to dc to SNF. Nutrition Related Findings:   -4 liters. Glucose 167. +BM 10/30. Wound Type: Pressure Injury, Stage III    Current Nutrition Therapies:    ADULT DIET; Regular; 4 carb choices (60 gm/meal); Low Sodium (2 gm); 2000 ml  ADULT ORAL NUTRITION SUPPLEMENT; Lunch, Dinner;  Wound Healing Oral Supplement    Anthropometric Measures:  Height: 6' 1\" (185.4 cm)  Current Body Wt: 227 lb (103 kg)   BMI: 30    Nutrition Diagnosis:   Increased nutrient needs related to acute injury/trauma as evidenced by wounds    Nutrition Interventions:   Food and/or Nutrient Delivery: Continue Current Diet, Continue Oral Nutrition Supplement  Nutrition Education/Counseling: Education completed  Coordination of Nutrition Care: Continue to monitor while inpatient     Goals:  Previous Goal Met: Goal(s) Achieved  Goals: PO intake 50% or greater     Nutrition Monitoring and Evaluation:   Behavioral-Environmental Outcomes: None Identified  Food/Nutrient Intake Outcomes: Food and Nutrient Intake, Supplement Intake  Physical Signs/Symptoms Outcomes: Skin, Weight, Biochemical Data    Discharge Planning:    Continue current diet     Renata Sequeira RD, MARLO  Contact: [phone]

## 2022-10-31 NOTE — DISCHARGE INSTR - DIET

## 2022-10-31 NOTE — PROGRESS NOTES
Updated patient's wife, Nasra Hawley, about patient. Wife glad patient is able to get rehab and physical therapy. She is concerned about him coming home and him falling again without it.

## 2022-10-31 NOTE — TELEPHONE ENCOUNTER
Needs follow up with Dr Milady Vargas in 2-6 weeks for pleural effusions. Called patients home phone number, but unable to LM and NA. Called patients cell phone number and LM for him to call the office and schedule an appointment.

## 2022-10-31 NOTE — PROGRESS NOTES
Called wife, Mrs Harshal Sommer. Updated that  is ok for discharge. She is extremely hard of hearing. She wanted to know if pt needs rehab - affirmed he does need rehab.

## 2022-10-31 NOTE — CARE COORDINATION
10/31/22 1044   IMM Letter   IMM Letter given to Patient/Family/Significant other/Guardian/POA/by: second IMM given to pt in person & reminded daughter over the phone, pt verbalized Gely Graves RN   IMM Letter date given: 10/31/22   IMM Letter time given: 9212     IMM provided to pt at bedside & duagher via vm by Avelina Koyanagi, RN CM. Education provided to patient, they denied any questions and verbalized understanding. They are aware of 4 hours allotted to determine if they choose to pursue Medicare appeal process.     Avelina Koyanagi, RN, BSN,   [phone]  Electronically signed by Avelina Koyanagi, RN on 10/31/2022 at 10:48 AM

## 2022-10-31 NOTE — PLAN OF CARE
Problem: Discharge Planning  Goal: Discharge to home or other facility with appropriate resources  10/31/2022 0908 by Rena Moreno RN  Outcome: Progressing  Flowsheets (Taken 10/31/2022 0900)  Discharge to home or other facility with appropriate resources: Identify barriers to discharge with patient and caregiver  10/31/2022 0028 by Jennifer Baker RN  Outcome: Progressing  Flowsheets (Taken 10/30/2022 2102)  Discharge to home or other facility with appropriate resources:   Identify barriers to discharge with patient and caregiver   Arrange for needed discharge resources and transportation as appropriate   Identify discharge learning needs (meds, wound care, etc)     Problem: Skin/Tissue Integrity  Goal: Absence of new skin breakdown  Description: 1. Monitor for areas of redness and/or skin breakdown  2. Assess vascular access sites hourly  3. Every 4-6 hours minimum:  Change oxygen saturation probe site  4. Every 4-6 hours:  If on nasal continuous positive airway pressure, respiratory therapy assess nares and determine need for appliance change or resting period.   10/31/2022 0908 by Rena Moreno RN  Outcome: Progressing  10/31/2022 0028 by Jennifer Baker RN  Outcome: Progressing     Problem: Safety - Adult  Goal: Free from fall injury  10/31/2022 0908 by Rena Moreno RN  Outcome: Progressing  10/31/2022 0028 by Jennifer Baker RN  Outcome: Progressing     Problem: ABCDS Injury Assessment  Goal: Absence of physical injury  10/31/2022 0908 by Rena Moreno RN  Outcome: Progressing  10/31/2022 0028 by Jennifer Baker RN  Outcome: Progressing     Problem: Respiratory - Adult  Goal: Achieves optimal ventilation and oxygenation  10/31/2022 0908 by Rena Moreno RN  Outcome: Progressing  Flowsheets (Taken 10/31/2022 0900)  Achieves optimal ventilation and oxygenation: Assess for changes in respiratory status  10/31/2022 0028 by Jennifer Baker RN  Outcome: Progressing  Flowsheets (Taken 10/30/2022 2102)  Achieves optimal ventilation and oxygenation:   Assess for changes in respiratory status   Assess for changes in mentation and behavior   Position to facilitate oxygenation and minimize respiratory effort   Oxygen supplementation based on oxygen saturation or arterial blood gases     Problem: Cardiovascular - Adult  Goal: Maintains optimal cardiac output and hemodynamic stability  10/31/2022 0908 by Shanna Canas RN  Outcome: Progressing  Flowsheets (Taken 10/31/2022 0900)  Maintains optimal cardiac output and hemodynamic stability:   Monitor blood pressure and heart rate   Monitor urine output and notify Licensed Independent Practitioner for values outside of normal range  10/31/2022 0028 by Katya Ramey RN  Outcome: Progressing  [address] (Taken 10/30/2022 2102)  Maintains optimal cardiac output and hemodynamic stability:   Monitor blood pressure and heart rate   Monitor urine output and notify Licensed Independent Practitioner for values outside of normal range   Assess for signs of decreased cardiac output  Goal: Absence of cardiac dysrhythmias or at baseline  10/31/2022 0908 by Shanna Canas RN  Outcome: Progressing  Flowsheets (Taken 10/31/2022 0900)  Absence of cardiac dysrhythmias or at baseline: Monitor cardiac rate and rhythm  10/31/2022 0028 by Katya Ramey RN  Outcome: Progressing  [address] (Taken 10/30/2022 2102)  Absence of cardiac dysrhythmias or at baseline:   Monitor cardiac rate and rhythm   Assess for signs of decreased cardiac output     Problem: Metabolic/Fluid and Electrolytes - Adult  Goal: Electrolytes maintained within normal limits  10/31/2022 0908 by Shanna Canas RN  Outcome: Progressing  Flowsheets (Taken 10/31/2022 0900)  Electrolytes maintained within normal limits:   Monitor labs and assess patient for signs and symptoms of electrolyte imbalances   Administer electrolyte replacement as ordered  10/31/2022 0028 by Katya Ramey RN  Outcome: Progressing  Flowsheets (Taken 10/30/2022 2102)  Electrolytes maintained within normal limits:   Monitor labs and assess patient for signs and symptoms of electrolyte imbalances   Administer electrolyte replacement as ordered   Monitor response to electrolyte replacements, including repeat lab results as appropriate  Goal: Hemodynamic stability and optimal renal function maintained  10/31/2022 0908 by Ángel Storey RN  Outcome: Progressing  Flowsheets (Taken 10/31/2022 0900)  Hemodynamic stability and optimal renal function maintained: Monitor labs and assess for signs and symptoms of volume excess or deficit  10/31/2022 0028 by Roque Bridges RN  Outcome: Progressing  Flowsheets (Taken 10/30/2022 2102)  Hemodynamic stability and optimal renal function maintained:   Monitor labs and assess for signs and symptoms of volume excess or deficit   Monitor intake, output and patient weight   Monitor urine specific gravity, serum osmolarity and serum sodium as indicated or ordered   Encourage oral intake as appropriate     Problem: Hematologic - Adult  Goal: Maintains hematologic stability  10/31/2022 0908 by Ángel Storey RN  Outcome: Progressing  Flowsheets (Taken 10/31/2022 0900)  Maintains hematologic stability: Assess for signs and symptoms of bleeding or hemorrhage  10/31/2022 0028 by Roque Bridges RN  Outcome: Progressing  [address] (Taken 10/30/2022 2102)  Maintains hematologic stability: Assess for signs and symptoms of bleeding or hemorrhage     Problem: Chronic Conditions and Co-morbidities  Goal: Patient's chronic conditions and co-morbidity symptoms are monitored and maintained or improved  10/31/2022 0908 by Ángel Storey RN  Outcome: Progressing  Flowsheets (Taken 10/31/2022 0900)  Care Plan - Patient's Chronic Conditions and Co-Morbidity Symptoms are Monitored and Maintained or Improved: Monitor and assess patient's chronic conditions and comorbid symptoms for stability, deterioration, or improvement  10/31/2022 0028 by Rylee Garcia RN  Outcome: Progressing  [address] (Taken 10/30/2022 2102)  Care Plan - Patient's Chronic Conditions and Co-Morbidity Symptoms are Monitored and Maintained or Improved:   Monitor and assess patient's chronic conditions and comorbid symptoms for stability, deterioration, or improvement   Collaborate with multidisciplinary team to address chronic and comorbid conditions and prevent exacerbation or deterioration   Update acute care plan with appropriate goals if chronic or comorbid symptoms are exacerbated and prevent overall improvement and discharge     Problem: Nutrition Deficit:  Goal: Optimize nutritional status  10/31/2022 0908 by Malgorzata Marcelo RN  Outcome: Progressing  10/31/2022 0028 by Rylee Garcia RN  Outcome: Progressing     Problem: Pain  Goal: Verbalizes/displays adequate comfort level or baseline comfort level  10/31/2022 0908 by Malgorzata Marcelo RN  Outcome: Progressing  Flowsheets (Taken 10/31/2022 [facility] by Rylee Garcia RN)  Verbalizes/displays adequate comfort level or baseline comfort level:   Encourage patient to monitor pain and request assistance   Assess pain using appropriate pain scale   Administer analgesics based on type and severity of pain and evaluate response   Implement non-pharmacological measures as appropriate and evaluate response   Consider cultural and social influences on pain and pain management  10/31/2022 0028 by Rylee Garcia RN  Outcome: Progressing

## 2022-10-31 NOTE — PROGRESS NOTES
Physical Therapy  Facility/Department: [address] MED SURG  Physical Therapy Initial Assessment    Name: Haseeb Arciniega  : 1935  MRN: [id number]  Date of Service: 10/31/2022    Discharge Recommendations:  Continue to assess pending progress, Therapy recommended at discharge    Haseeb Arciniega scored a / on the AM-PAC short mobility form. Current research shows that an AM-PAC score of 17 or less is typically not associated with a discharge to the patient's home setting. Based on the patient's AM-PAC score and their current functional mobility deficits, it is recommended that the patient have 3-5 sessions per week of Physical Therapy at d/c to increase the patient's independence. Please see assessment section for further patient specific details. If patient discharges prior to next session this note will serve as a discharge summary. Please see below for the latest assessment towards goals. Patient Diagnosis(es): The primary encounter diagnosis was Generalized weakness. Diagnoses of Frequent falls, Pleural effusion, Bilateral lower extremity edema, Elevated troponin, and Elevated brain natriuretic peptide (BNP) level were also pertinent to this visit. Past Medical History:  has a past medical history of Atrial fibrillation ([address] Degeneration of cervical intervertebral disc, Diastolic heart failure ([address] Diverticulosis of colon (without mention of hemorrhage), Essential hypertension, benign, [place] (hard of hearing), Hyperlipidemia, Mononeuritis of unspecified site, Osteoarthrosis, unspecified whether generalized or localized, pelvic region and thigh, Other specified iron deficiency anemias, Rosacea, Seborrheic dermatitis, Type II or unspecified type diabetes mellitus without mention of complication, not stated as uncontrolled, 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   Past Surgical History:  has a past surgical history that includes hip surgery (Bilateral); Hand surgery; Carpal tunnel release (Right, 05/16/2018); and cardiovascular stress test (09/2018). Assessment   Body Structures, Functions, Activity Limitations Requiring Skilled Therapeutic Intervention: Decreased functional mobility ; Decreased ADL status; Decreased strength;Decreased endurance;Decreased balance;Decreased posture  Assessment: Pt is an 81 y.o. male who presents to the emergency room on 10/25/22 with complaints of generalized weakness, recurrent falls at home. Pt diagnosed with acute on chronic CHF. Prior to admission, pt living in home setting with spouse and dtr, independent with ADLs and ambulation with RW. Pt currently functioning well below baseline - regression in activity tolerance this date. Pt no longer recommended for high intensity therapy - however he would benefit from continued therapy at a low to moderate intensity. Treatment Diagnosis: impaired mobility  Therapy Prognosis: Fair;Good  Decision Making: Medium Complexity  History: see above  Exam: see below  Clinical Presentation: evolving  Barriers to Learning: [place]  Activity Tolerance  Activity Tolerance: Patient limited by fatigue;Patient limited by endurance; Patient limited by pain     Plan   Physcial Therapy Plan  General Plan: 3-5 times per week  Current Treatment Recommendations: Strengthening, Balance training, Functional mobility training, Transfer training, Endurance training, Gait training, Neuromuscular re-education, Patient/Caregiver education & training, Safety education & training, Equipment evaluation, education, & procurement, Therapeutic activities  Safety Devices  Type of Devices:  All fall risk precautions in place, Call light within reach, Gait belt, Patient at risk for falls, Nurse notified, Left in chair, Chair alarm in place     Restrictions  Restrictions/Precautions  Restrictions/Precautions: Fall Risk     Subjective   General  Chart Reviewed: Yes  Patient assessed for rehabilitation services?: Yes  Additional Pertinent Hx: Pt is an 81 y.o. male who presents to the emergency room on 10/25/22 with complaints of generalized weakness, recurrent falls at home. Pt diagnosed with acute on chronic CHF. Response To Previous Treatment: Not applicable  Family / Caregiver Present: No  Referring Practitioner: Patsy Henry MD  Referral Date : 10/25/22  Diagnosis: acute on chronic CHF  Follows Commands: Within Functional Limits  Subjective  Subjective: Pt is agreeable to PT - reporting numbness in feet and hands - many c/o this date. Social/Functional History  Social/Functional History  Lives With: Spouse, Daughter (Dtr works from home)  Type of Home: House  Home Layout: Multi-level, Bed/Bath upstairs (quad-level; 6 [address] to bed/bath)  Home Access: Stairs to enter with rails  Entrance Stairs - Number of Steps: 4 from the garage; 3 from the front  Bathroom Shower/Tub: Tub/Shower unit (Pt sponge bathes)  Bathroom Toilet: Handicap height  Home Equipment: Sharita Melendez, rolling, Cane  Has the patient had two or more falls in the past year or any fall with injury in the past year?: Yes  ADL Assistance: Independent (Sponge bathes, reports difficulty putting on compression stockings)  Homemaking Assistance: Needs assistance (Pt completes light homemaking)  Ambulation Assistance: Independent (using RW)  Transfer Assistance: Independent  Active : No  Patient's  Info: Dtr  Occupation: Retired  Type of Occupation: Supervisor at [employer]: Exceptions to [facility]  Hearing Exceptions: Hard of hearing/hearing concerns      Cognition   Orientation  Overall Orientation Status: Impaired (Somewhat confused to recent events)  Cognition  Overall Cognitive Status: Exceptions  Arousal/Alertness: Appropriate responses to stimuli  Following Commands:  Follows one step commands with increased time  Attention Span: Appears intact  Memory: Decreased recall of recent events;Decreased short term memory  Safety Judgement: Decreased awareness of need for safety;Decreased awareness of need for assistance  Problem Solving: Decreased awareness of errors  Insights: Decreased awareness of deficits  Initiation: Requires cues for some  Sequencing: Requires cues for some  Cognition Comment: Self-limiting     Objective   Gross Assessment  Strength: Generally decreased, functional     Bed mobility  Supine to Sit: Maximum assistance;2 Person assistance  Sit to Supine: Unable to assess (in recliner at end of session)  Scooting: Maximal assistance  Transfers  Sit to Stand: Maximum Assistance;2 Person Assistance  Stand to Sit: Maximum Assistance;2 Person Assistance        Balance  Posture: Fair  Sitting - Static: Fair  Sitting - Dynamic: Fair  Standing - Static: Poor           AM-PAC Score  AM-PAC Inpatient Mobility Raw Score : 9 (10/31/22 1129)  AM-PAC Inpatient T-Scale Score : 30.55 (10/31/22 1129)  Mobility Inpatient CMS 0-100% Score: 81.38 (10/31/22 1129)  Mobility Inpatient CMS G-Code Modifier : CM (10/31/22 1129)          Goals  Short Term Goals  Time Frame for Short Term Goals: by acute discharge - all goals ongoing as of 10/31/22  Short Term Goal 1: bed mobility with CGA  Short Term Goal 2: sit<>stand with CGA  Short Term Goal 3: ambulate > 20' with RW and CGA  Patient Goals   Patient Goals : none stated       Education  Patient Education  Education Given To: Patient  Education Provided: Role of Therapy;Plan of Care  Education Method: Verbal  Barriers to Learning: Hearing  Education Outcome: Verbalized understanding;Continued education needed      Therapy Time   Individual Concurrent Group Co-treatment   Time In 1050         Time Out 1129         Minutes 39         Timed Code Treatment Minutes: [address]       Pilar Llanos, PT

## 2022-10-31 NOTE — PROGRESS NOTES
Patient discharged to [facility]. Report given to [facility]. Patient cleaned up and IV removed at this time. No further needs.

## 2022-10-31 NOTE — PLAN OF CARE
Problem: Discharge Planning  Goal: Discharge to home or other facility with appropriate resources  Outcome: Progressing  Flowsheets (Taken 10/30/2022 2102)  Discharge to home or other facility with appropriate resources:   Identify barriers to discharge with patient and caregiver   Arrange for needed discharge resources and transportation as appropriate   Identify discharge learning needs (meds, wound care, etc)     Problem: Skin/Tissue Integrity  Goal: Absence of new skin breakdown  Description: 1. Monitor for areas of redness and/or skin breakdown  2. Assess vascular access sites hourly  3. Every 4-6 hours minimum:  Change oxygen saturation probe site  4. Every 4-6 hours:  If on nasal continuous positive airway pressure, respiratory therapy assess nares and determine need for appliance change or resting period.   Outcome: Progressing     Problem: Safety - Adult  Goal: Free from fall injury  Outcome: Progressing     Problem: ABCDS Injury Assessment  Goal: Absence of physical injury  Outcome: Progressing     Problem: Respiratory - Adult  Goal: Achieves optimal ventilation and oxygenation  Outcome: Progressing  Flowsheets (Taken 10/30/2022 2102)  Achieves optimal ventilation and oxygenation:   Assess for changes in respiratory status   Assess for changes in mentation and behavior   Position to facilitate oxygenation and minimize respiratory effort   Oxygen supplementation based on oxygen saturation or arterial blood gases     Problem: Cardiovascular - Adult  Goal: Maintains optimal cardiac output and hemodynamic stability  Outcome: Progressing  Flowsheets (Taken 10/30/2022 2102)  Maintains optimal cardiac output and hemodynamic stability:   Monitor blood pressure and heart rate   Monitor urine output and notify Licensed Independent Practitioner for values outside of normal range   Assess for signs of decreased cardiac output  Goal: Absence of cardiac dysrhythmias or at baseline  Outcome: Progressing  Flowsheets (Taken 10/30/2022 2102)  Absence of cardiac dysrhythmias or at baseline:   Monitor cardiac rate and rhythm   Assess for signs of decreased cardiac output     Problem: Metabolic/Fluid and Electrolytes - Adult  Goal: Electrolytes maintained within normal limits  Outcome: Progressing  Flowsheets (Taken 10/30/2022 2102)  Electrolytes maintained within normal limits:   Monitor labs and assess patient for signs and symptoms of electrolyte imbalances   Administer electrolyte replacement as ordered   Monitor response to electrolyte replacements, including repeat lab results as appropriate  Goal: Hemodynamic stability and optimal renal function maintained  Outcome: Progressing  Flowsheets (Taken 10/30/2022 2102)  Hemodynamic stability and optimal renal function maintained:   Monitor labs and assess for signs and symptoms of volume excess or deficit   Monitor intake, output and patient weight   Monitor urine specific gravity, serum osmolarity and serum sodium as indicated or ordered   Encourage oral intake as appropriate     Problem: Hematologic - Adult  Goal: Maintains hematologic stability  Outcome: Progressing  Flowsheets (Taken 10/30/2022 2102)  Maintains hematologic stability: Assess for signs and symptoms of bleeding or hemorrhage     Problem: Chronic Conditions and Co-morbidities  Goal: Patient's chronic conditions and co-morbidity symptoms are monitored and maintained or improved  Outcome: Progressing  Flowsheets (Taken 10/30/2022 2102)  Care Plan - Patient's Chronic Conditions and Co-Morbidity Symptoms are Monitored and Maintained or Improved:   Monitor and assess patient's chronic conditions and comorbid symptoms for stability, deterioration, or improvement   Collaborate with multidisciplinary team to address chronic and comorbid conditions and prevent exacerbation or deterioration   Update acute care plan with appropriate goals if chronic or comorbid symptoms are exacerbated and prevent overall improvement and discharge     Problem: Nutrition Deficit:  Goal: Optimize nutritional status  Outcome: Progressing     Problem: Pain  Goal: Verbalizes/displays adequate comfort level or baseline comfort level  Outcome: Progressing

## 2022-10-31 NOTE — PROGRESS NOTES
Hospital Medicine Progress Note     Date:  10/31/2022    PCP: MARIA FERNANDA Moreno CNP (Tel: [phone])    Date of Admission: 10/25/2022    Chief complaint:   Chief Complaint   Patient presents with    Fall     Pt. fell prior to making it to the toilet. Wife and daughter helped lower him to the floor. Brief admission history: 81-year-old male with history of paroxysmal atrial fibrillation (on chronic anticoagulation with Eliquis), degenerative disc disease, chronic diastolic heart failure, essential hypertension, hyperlipidemia, osteoarthritis, uncontrolled diabetes type 2 with hyperglycemia, who presents to the emergency room with complaints of generalized weakness, recurrent falls at home. 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 Wife also reports difficulty caring for patient at home due to patient's comorbidities. Earlier today, he felt generally weak, did not fall but wife and daughter where able to lower him to the ground. Chest x-ray obtained in the emergency room reveals large left-sided pleural effusion with adjacent consolidation, right lower lobe airspace disease, concerning for atelectasis or pneumonia. Assessment/plan:  Acute on chronic diastolic heart failure. He was initially on IV lasix, which has since been transitioned to oral torsemide - to take torsemide 20 mg daily (and torsemide 20 mg BID on MWF). Generalized weakness, recurrent falls. Likely secondary to acute medical conditions. Therapy has recommended SNF placement at discharge. Left pleural effusion, possibly loculated. Could be from CHF. Cannot exclude mass. Thoracentesis and chest tube placement recommended but patient refused. Patient understands diuresis alone will likely not be enough. Left lower extremity cellulitis, unclear if POA but not documented. MRSA nares negative. Blood cultures NGTD.  Erythema much improved. Continue ancef (start date 10/28), changed to keflex at discharge to complete in SNF. Uncontrolled diabetes 2 (recent A1c 7.5), hypoglycemic the morning of 10/28. Resume home diabetic medication at discharge. Constipation. Continue bowel regimen. Diabetic neuropathy. Adjusted neurontin to 200 mg TID. Elevated troponin from baseline. Likely type II NSTEMI from demand ischemia due to CHF. History of atrial fibrillation, currently rate controlled. Continue eliquis. Other comorbidities: history of paroxysmal atrial fibrillation, chronic venous stasis dermatitis, degenerative disc disease, chronic diastolic heart failure, essential hypertension, hyperlipidemia, osteoarthritis, uncontrolled diabetes type 2 with hyperglycemia, obesity with BMI 30. Disposition. Stable for discharge to SNF. Diet: ADULT DIET; Regular; 4 carb choices (60 gm/meal); Low Sodium (2 gm); 2000 ml  ADULT ORAL NUTRITION SUPPLEMENT; Lunch, Dinner; Wound Healing Oral Supplement    Code status: Full Code   ----------  Subjective  No new issues. Denies any needs. Objective  Physical exam:  Vitals: /74   Pulse 78   Temp 98.2 °F (36.8 °C) (Oral)   Resp 16   Ht 6' 1\" (1.854 m)   Wt 227 lb 4.7 oz (103.1 kg)   SpO2 95%   BMI 29.99 kg/m²   Gen/overall appearance: Not in acute distress. Alert. Oriented X3  Head: Normocephalic, atraumatic  Eyes: EOMI, good acuity  ENT: Oral mucosa moist  Neck: No JVD, thyromegaly  CVS: Nml S1S2, no MRG, RRR  Pulm: Diminished in lung bases. Clear upper lungs bilaterally. No crackles/wheezes  Gastrointestinal: Soft, NT/ND, +BS  Musculoskeletal: Distal left lower ext erythema, much improved. No edema. Warm  Neuro: No focal deficit. Moves extremity spontaneously. Psychiatry: Appropriate affect. Not agitated. Skin: Warm, dry with normal turgor.    Capillary refill: Brisk,< 3 seconds   Peripheral Pulses: +2 palpable, equal bilaterally      24HR INTAKE/OUTPUT:    Intake/Output Summary (Last 24 hours) at 10/31/2022 1013  Last data filed at 10/31/2022 0940  Gross per 24 hour   Intake 600 ml   Output 300 ml   Net 300 ml       I/O last 3 completed shifts:   In: 240 [P.O.:240]  Out: 300 [Urine:300]  I/O this shift:  In: 360 [P.O.:360]  Out: -   Meds:    bisacodyl  10 mg Rectal Once    gabapentin  200 mg Oral TID    insulin glargine  7 Units SubCUTAneous Daily    ceFAZolin  2,000 mg IntraVENous Q8H    apixaban  5 mg Oral BID    torsemide  20 mg Oral Daily    metoprolol succinate  50 mg Oral Daily    insulin lispro  0-4 Units SubCUTAneous TID WC    insulin lispro  0-4 Units SubCUTAneous Nightly    sodium chloride flush  5-40 mL IntraVENous 2 times per day     Infusions:    dextrose      sodium chloride 5 mL/hr at 10/29/22 1951     PRN Meds: bisacodyl, melatonin, trimethobenzamide, meclizine, glucose, dextrose bolus **OR** dextrose bolus, glucagon (rDNA), dextrose, sodium chloride flush, sodium chloride, polyethylene glycol, acetaminophen **OR** acetaminophen    Labs/imaging:  CBC:   Recent Labs     10/29/22  0714 10/31/22  0748   WBC 6.2 6.9   HGB 11.5* 11.9*    294       BMP:    Recent Labs     10/29/22  0714 10/31/22  0748    141   K 3.7 3.7   CL 96* 99   CO2 30 33*   BUN 32* 30*   CREATININE 1.0 1.0   GLUCOSE 91 108*       Hepatic:   Recent Labs     10/29/22  0714 10/31/22  0748   AST 22 20   ALT 12 <5*   BILITOT 1.4* 1.2*   ALKPHOS 144* 156*         Di Solorzano MD  -------------------------------  Kevyn hospitalist

## 2022-10-31 NOTE — NURSE NAVIGATOR
DC order. Pt has received > 60 mins of HF education. HF dc instructions are on AVS as well as on KIRILL. Plan is for pt to go to SNF and cardiology f/u appt is for 11/11 at 11:00 am with Dr Lynda Hollingsworth. I updated bedside RN and requested she get pt's wt prior to dc so can have a more accurate dry wt.

## 2022-10-31 NOTE — CARE COORDINATION
CASE MANAGEMENT DISCHARGE SUMMARY:    DISCHARGE DATE: 10/31/22    DISCHARGED TO:  Name: [facility]  Address:  [address]   Phone:  [phone]  Fax:  [phone]     TRANSPORTATION: [facility]             TIME: 0622-1376    INSURANCE PRECERT OBTAINED: 15/53/77    ENRICO/PASAAJAVED COMPLETED: 10/31/22    Family & facility aware of pickup time.     Iesha Fortune RN, BSN, Case Management  [phone]    Electronically signed by Iesha Fortune RN on 10/31/2022 at 1:32 PM

## 2022-10-31 NOTE — PROGRESS NOTES
Occupational Therapy  Facility/Department: [address] MED SURG  Occupational Therapy Daily Treatment    Name: Finesse Palacios  : 1935  MRN: [id number]  Date of Service: 10/31/2022    Discharge Recommendations:  Patient would benefit from continued therapy after discharge, 3-5 sessions per week     Finesse Palacios scored a 13/24 on the AM-PAC ADL Inpatient form. Current research shows that an AM-PAC score of 17 or less is typically not associated with a discharge to the patient's home setting. Based on the patient's AM-PAC score and their current ADL deficits, it is recommended that the patient have 3-5 sessions per week of Occupational Therapy at d/c to increase the patient's independence. Please see assessment section for further patient specific details. If patient discharges prior to next session this note will serve as a discharge summary. Please see below for the latest assessment towards goals. Patient Diagnosis(es): The primary encounter diagnosis was Generalized weakness. Diagnoses of Frequent falls, Pleural effusion, Bilateral lower extremity edema, Elevated troponin, and Elevated brain natriuretic peptide (BNP) level were also pertinent to this visit. Past Medical History:  has a past medical history of Atrial fibrillation ([address] Degeneration of cervical intervertebral disc, Diastolic heart failure ([address] Diverticulosis of colon (without mention of hemorrhage), Essential hypertension, benign, [place] (hard of hearing), Hyperlipidemia, Mononeuritis of unspecified site, Osteoarthrosis, unspecified whether generalized or localized, pelvic region and thigh, Other specified iron deficiency anemias, Rosacea, Seborrheic dermatitis, Type II or unspecified type diabetes mellitus without mention of complication, not stated as uncontrolled, 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   Past Surgical History:  has a past surgical history that includes hip surgery (Bilateral); Hand surgery; Carpal tunnel release (Right, 05/16/2018); and cardiovascular stress test (09/2018). Treatment Diagnosis: Decreased: ADLs, fxl transfers/mobility      Assessment   Performance deficits / Impairments: Decreased functional mobility ; Decreased ADL status; Decreased balance;Decreased strength;Decreased endurance  Assessment: Pt is a 80 yo M admitted with c/o generalized weakness, recurrent falls at home. PTA, pt lives at home w/ his wife and dtr where he is typically independent in self-care and completes fxl mobility mod I using RW. Pt remains below baseline, continues to be limited by pain, decreased activity tolerance, and self-limiting behavior. He required max Ax2 for bed mobility and for sit<>stand transfers via shira stedy LIFT. He completed seated grooming w/ setup, total A LB dressing. Continue to recommend ongoing skilled OT 3-5 times/week at d/c to increase pt's safety and independence before returning home w/ his wife. Treatment Diagnosis: Decreased: ADLs, fxl transfers/mobility  Prognosis: Good  REQUIRES OT FOLLOW-UP: Yes  Activity Tolerance  Activity Tolerance: Patient limited by fatigue;Patient limited by pain;Treatment limited secondary to decreased cognition  Activity Tolerance Comments: self-limiting        Plan   Occupational Therapy Plan  Times Per Week: 3-5  Times Per Day:  Once a day  Current Treatment Recommendations: Strengthening, ROM, Balance training, Endurance training, Functional mobility training, Safety education & training, Self-Care / ADL     Restrictions  Restrictions/Precautions  Restrictions/Precautions: Fall Risk    Subjective   General  Chart Reviewed: Yes  Patient assessed for rehabilitation services?: Yes  Additional Pertinent Hx: per H&P: \"This is a pleasant 81 y.o. male with history of paroxysmal atrial fibrillation (on chronic anticoagulation with Eliquis), degenerative disc disease, chronic diastolic heart failure, essential hypertension, hyperlipidemia, osteoarthritis, uncontrolled diabetes type 2 with hyperglycemia, who presents to the emergency room with complaints of generalized weakness, recurrent falls at home. 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 Wife also reports difficulty caring for patient at home due to patient's comorbidities. Earlier today, he felt generally weak, did not fall but wife and daughter where able to lower him to the ground. Chest x-ray obtained in the emergency room reveals large left-sided pleural effusion with adjacent consolidation, right lower lobe airspace disease, concerning for atelectasis or pneumonia. \"  Family / Caregiver Present: No  Referring Practitioner: Angeline  Diagnosis: Acute on chronic diastolic heart failure  Subjective  Subjective: Pt met b/s for OT cotx w/ PT. Pt in bed, agreeable to therapy w/ encouragement.  Many complaints about pain, need to have BM, etc.  General Comment  Comments: Per RN ok to see     Social/Functional History  Social/Functional History  Lives With: Spouse, Daughter (Dtr works from home)  Type of Home: House  Home Layout: Multi-level, Bed/Bath upstairs (quad-level; 6 ELENITA to bed/bath)  Home Access: Stairs to enter with rails  Entrance Stairs - Number of Steps: 4 from the garage; 3 from the front  Bathroom Shower/Tub: Tub/Shower unit (Pt sponge bathes)  Bathroom Toilet: Handicap height  Home Equipment: Walker, rolling, Cane  Has the patient had two or more falls in the past year or any fall with injury in the past year?: Yes  ADL Assistance: Independent (Sponge bathes, reports difficulty putting on compression stockings)  Homemaking Assistance: Needs assistance (Pt completes light homemaking)  Ambulation Assistance: Independent (using RW)  Transfer Assistance: Independent  Active : No  Patient's  Info: Dtr  Occupation: Retired  Type of Occupation: Supervisor at [employer]       Objective   Safety Devices  Type of Devices: All fall risk precautions in place;Call light within reach;Gait belt;Patient at risk for falls;Nurse notified; Left in chair;Chair alarm in place           ADL  Grooming: Setup  Grooming Skilled Clinical Factors: Pt brushed his hair seated in recliner  LE Dressing: Dependent/Total  LE Dressing Skilled Clinical Factors: To don socks  Toileting: Dependent/Total  Toileting Skilled Clinical Factors: Male purewick     Activity Tolerance  Activity Tolerance: Patient limited by fatigue;Patient limited by endurance; Patient limited by pain  Bed mobility  Supine to Sit: Maximum assistance;2 Person assistance  Sit to Supine: Unable to assess (in recliner at end of session)  Scooting: Maximal assistance  Transfers  Sit to stand: 2 Person assistance;Maximum assistance  Stand to sit: 2 Person assistance;Maximum assistance  Transfer Comments: to/from shira mejia. Encouragement for pt to do as much as able  Hearing  Hearing: Exceptions to [facility]  Hearing Exceptions: Hard of hearing/hearing concerns  Cognition  Overall Cognitive Status: Exceptions  Arousal/Alertness: Appropriate responses to stimuli  Following Commands:  Follows one step commands with increased time  Attention Span: Appears intact  Memory: Decreased recall of recent events;Decreased short term memory  Safety Judgement: Decreased awareness of need for safety;Decreased awareness of need for assistance  Problem Solving: Decreased awareness of errors  Insights: Decreased awareness of deficits  Initiation: Requires cues for some  Sequencing: Requires cues for some  Cognition Comment: Self-limiting  Orientation  Overall Orientation Status: Impaired (Somewhat confused to recent events)                  Education Given To: Patient  Education Provided: Role of Therapy;Transfer Training;ADL Adaptive Strategies  Education Provided Comments: importance of OOB  Education Method: Verbal  Barriers to Learning: Hearing;Cognition  Education Outcome: Continued education needed           AM-PAC Score  AM-PAC Inpatient Daily Activity Raw Score: 13 (10/31/22 1131)  AM-PAC Inpatient ADL T-Scale Score : 32.03 (10/31/22 1131)  ADL Inpatient CMS 0-100% Score: 63.03 (10/31/22 1131)  ADL Inpatient CMS G-Code Modifier : CL (10/31/22 1131)        Goals  Short Term Goals  Time Frame for Short Term Goals: Prior to d/c -goals ongoing  Short Term Goal 1: Pt will complete ADL transfers CGA  Short Term Goal 2: Pt will toilet w/ min A  Short Term Goal 3: Pt will groom sinkside w/ setup  Short Term Goal 4: Pt will bathe w/ min A  Short Term Goal 5: Pt will complete LB dressing min A using AE prn  Long Term Goals  Time Frame for Long Term Goals : LTG=STG  Patient Goals   Patient goals : to go home       Therapy Time   Individual Concurrent Group Co-treatment   Time In [facility] 38         Time Out 1130         Minutes [address] [place], [place] [address]

## 2022-10-31 NOTE — CARE COORDINATION
[facility] authorization received via portal:    Immediate approval    Payor approval ID:      Zac Approval ID:  [id number]    Service - Location:  [address] [place] [address] SNF    Dates Approved:  10/31/2022-11/02/2022    NRD:  11/2/22    Elizabeth Salmon Sr.  Administrative Assist, Case Management  [phone]  Electronically signed by Elizabeth Salmon on 10/31/2022 at 12:19 PM

## 2022-11-01 LAB
BLOOD CULTURE, ROUTINE: NORMAL
CULTURE, BLOOD 2: NORMAL

## 2022-11-03 ENCOUNTER — TELEPHONE (OUTPATIENT)
Dept: PULMONOLOGY | Age: 87
End: 2022-11-03

## 2022-11-03 NOTE — TELEPHONE ENCOUNTER
Left  message for patient to call:    Noelle Branch, DO  P [facility] Staff  Needs follow up with Dr Enid Burroughs in 2-6 weeks for pleural effusions.

## 2022-11-06 DIAGNOSIS — I48.20 CHRONIC ATRIAL FIBRILLATION (HCC): ICD-10-CM

## 2022-11-17 NOTE — TELEPHONE ENCOUNTER
Phone call to wife regarding follow up for pleural effusion; which he refused to have the Thoracentesis. She stated he is currently in a rehab. She said he is very difficult to deal with and isn't sure if he will call us back.

## 2022-11-23 ENCOUNTER — TELEPHONE (OUTPATIENT)
Dept: FAMILY MEDICINE CLINIC | Age: 87
End: 2022-11-23

## 2022-11-23 NOTE — TELEPHONE ENCOUNTER
Ofelia from [facility] is trying to schedule a hospital follow up and patient is being discharge tomorrow and needs a appointment within 7 days because that is all the medication they will send home with him. Please give her a call back. She is only in until 4 pm today.

## 2022-11-28 ENCOUNTER — TELEPHONE (OUTPATIENT)
Dept: FAMILY MEDICINE CLINIC | Age: 87
End: 2022-11-28

## 2022-11-28 NOTE — TELEPHONE ENCOUNTER
MESSAGE FROM PATIENTS WIFE IN HER MYCHART:    Can Chandni take the stool softeners that were ordered for me? Can he take benadryl when he can not sleep?     PATIENT WIFE WAS RX THE FOLLOWING        Medication:  docusate sodium (COLACE) 100 MG capsule [2566]  docusate sodium (COLACE) 100 MG capsule [[id number]]     Order Details  Dose, Route, Frequency: As Directed   Dispense Quantity: 180 capsule Refills: 1          Sig: Take 1-2 times a day as needed for constipation to soften the stool           PATIENT WIFE Mike

## 2022-11-28 NOTE — TELEPHONE ENCOUNTER
OK to use docusate 1-2 times daily as needed for constipation. This is a medication available over the counter so this is OK to Aditya Shook. \"   Otherwise sharing of prescriptions would not be allowed. Benadryl is OK to use on a rare occasion for sleep/insomnia but I would not use it on a nightly basis at his age as it can have adverse side effects. Have you tried melatonin 1-10 mg? Available over the counter? Start with low dose if you have not yet tried.

## 2022-12-05 ENCOUNTER — TELEMEDICINE (OUTPATIENT)
Dept: FAMILY MEDICINE CLINIC | Age: 87
End: 2022-12-05
Payer: MEDICARE

## 2022-12-05 DIAGNOSIS — H10.13 ALLERGIC CONJUNCTIVITIS OF BOTH EYES: ICD-10-CM

## 2022-12-05 DIAGNOSIS — J90 PLEURAL EFFUSION ON LEFT: ICD-10-CM

## 2022-12-05 DIAGNOSIS — Z09 HOSPITAL DISCHARGE FOLLOW-UP: Primary | ICD-10-CM

## 2022-12-05 DIAGNOSIS — M19.019 ARTHRITIS OF SHOULDER: ICD-10-CM

## 2022-12-05 DIAGNOSIS — Z79.4 TYPE 2 DIABETES MELLITUS WITH DIABETIC POLYNEUROPATHY, WITH LONG-TERM CURRENT USE OF INSULIN (HCC): ICD-10-CM

## 2022-12-05 DIAGNOSIS — K59.04 CHRONIC IDIOPATHIC CONSTIPATION: ICD-10-CM

## 2022-12-05 DIAGNOSIS — R68.2 DRY MOUTH: ICD-10-CM

## 2022-12-05 DIAGNOSIS — R29.6 RECURRENT FALLS: ICD-10-CM

## 2022-12-05 DIAGNOSIS — E11.42 TYPE 2 DIABETES MELLITUS WITH DIABETIC POLYNEUROPATHY, WITH LONG-TERM CURRENT USE OF INSULIN (HCC): ICD-10-CM

## 2022-12-05 DIAGNOSIS — I48.20 CHRONIC ATRIAL FIBRILLATION (HCC): ICD-10-CM

## 2022-12-05 DIAGNOSIS — I50.33 ACUTE ON CHRONIC DIASTOLIC CHF (CONGESTIVE HEART FAILURE) (HCC): ICD-10-CM

## 2022-12-05 DIAGNOSIS — L03.116 CELLULITIS OF LEFT LOWER EXTREMITY: ICD-10-CM

## 2022-12-05 DIAGNOSIS — I21.4 NSTEMI (NON-ST ELEVATED MYOCARDIAL INFARCTION) (HCC): ICD-10-CM

## 2022-12-05 DIAGNOSIS — I10 PRIMARY HYPERTENSION: ICD-10-CM

## 2022-12-05 PROCEDURE — 1123F ACP DISCUSS/DSCN MKR DOCD: CPT | Performed by: NURSE PRACTITIONER

## 2022-12-05 PROCEDURE — 1111F DSCHRG MED/CURRENT MED MERGE: CPT | Performed by: NURSE PRACTITIONER

## 2022-12-05 PROCEDURE — 99215 OFFICE O/P EST HI 40 MIN: CPT | Performed by: NURSE PRACTITIONER

## 2022-12-05 PROCEDURE — 3051F HG A1C>EQUAL 7.0%<8.0%: CPT | Performed by: NURSE PRACTITIONER

## 2022-12-05 RX ORDER — GABAPENTIN 100 MG/1
200 CAPSULE ORAL 3 TIMES DAILY
Qty: 90 CAPSULE | Refills: 0 | Status: SHIPPED | OUTPATIENT
Start: 2022-12-05 | End: 2023-01-04

## 2022-12-05 RX ORDER — XYLITOL/YERBA SANTA
AEROSOL, SPRAY WITH PUMP (ML) MUCOUS MEMBRANE
Qty: 236 ML | Refills: 0 | Status: SHIPPED | OUTPATIENT
Start: 2022-12-05

## 2022-12-05 RX ORDER — KETOTIFEN FUMARATE 0.35 MG/ML
1 SOLUTION/ DROPS OPHTHALMIC 2 TIMES DAILY
Qty: 10 ML | Refills: 0 | Status: SHIPPED | OUTPATIENT
Start: 2022-12-05 | End: 2022-12-15

## 2022-12-05 RX ORDER — INSULIN GLARGINE 100 [IU]/ML
7 INJECTION, SOLUTION SUBCUTANEOUS NIGHTLY
Qty: 5 ADJUSTABLE DOSE PRE-FILLED PEN SYRINGE | Refills: 1 | Status: SHIPPED | OUTPATIENT
Start: 2022-12-05

## 2022-12-05 RX ORDER — DOCUSATE SODIUM 100 MG/1
100 CAPSULE, LIQUID FILLED ORAL 2 TIMES DAILY PRN
Qty: 180 CAPSULE | Refills: 1 | Status: SHIPPED | OUTPATIENT
Start: 2022-12-05

## 2022-12-05 RX ORDER — INSULIN GLARGINE 100 [IU]/ML
7 INJECTION, SOLUTION SUBCUTANEOUS DAILY
Qty: 10 ML | Refills: 3 | Status: SHIPPED | OUTPATIENT
Start: 2022-12-05 | End: 2022-12-05

## 2022-12-05 RX ORDER — PEN NEEDLE, DIABETIC 31 GX5/16"
NEEDLE, DISPOSABLE MISCELLANEOUS
Qty: 100 EACH | Refills: 3 | Status: SHIPPED | OUTPATIENT
Start: 2022-12-05

## 2022-12-05 RX ORDER — INSULIN LISPRO 100 [IU]/ML
0-4 INJECTION, SOLUTION INTRAVENOUS; SUBCUTANEOUS
Qty: 3.5 ML | Refills: 0 | Status: SHIPPED | OUTPATIENT
Start: 2022-12-05 | End: 2022-12-05

## 2022-12-05 RX ORDER — INSULIN LISPRO 100 [IU]/ML
0-4 INJECTION, SOLUTION INTRAVENOUS; SUBCUTANEOUS
Qty: 5 ADJUSTABLE DOSE PRE-FILLED PEN SYRINGE | Refills: 1 | Status: SHIPPED | OUTPATIENT
Start: 2022-12-05

## 2022-12-05 RX ORDER — METOPROLOL SUCCINATE 50 MG/1
TABLET, EXTENDED RELEASE ORAL
Qty: 90 TABLET | Refills: 1 | Status: SHIPPED | OUTPATIENT
Start: 2022-12-05

## 2022-12-05 NOTE — PROGRESS NOTES
Post-Discharge Transitional Care  Follow Up      Caitlin Stover   YOB: 1935    Date of Office Visit:  12/5/2022  Date of Hospital Admission: 10/25/22  Date of Hospital Discharge: 10/31/22  Risk of hospital readmission (high >=14%. Medium >=10%) :Readmission Risk Score: 14.2      Care management risk score Rising risk (score 2-5) and Complex Care (Scores >=6): No Risk Score On File     Non face to face  following discharge, date last encounter closed (first attempt may have been earlier): *No documented post hospital discharge outreach found in the last 14 days    Call initiated 2 business days of discharge: *No response recorded in the last 14 days    ASSESSMENT/PLAN:   Hospital discharge follow-up  -Complex case. Hospital notes and results reviewed. Medications reconciled. -     MO DISCHARGE MEDS RECONCILED W/ CURRENT OUTPATIENT MED LIST  Acute on chronic diastolic CHF (congestive heart failure) ([facility] [address]  -The patient continues to be significantly debilitated. Continue with home PT, OT, and nursing. He is weekend and maximum assist/dependent for transferring as well as many ADLs. He is a more than appropriate candidate for hospital bed, lift chair, and bedside commode as has been recommended by occupational therapy. We will send orders. He continues to have cardiology on board. Continue torsemide. Highly recommended that they schedule a follow-up appointment with cardiology for further evaluation and management. Type 2 diabetes mellitus with diabetic polyneuropathy, with long-term current use of insulin (HCC)  -Most recent A1c 7.5 in the hospital.  Continue with changes to diabetic regimen which were ordered at the hospital.  Refill evaluate this with Humalog. Continue gabapentin for neuropathy.  -     gabapentin (NEURONTIN) 100 MG capsule; Take 2 capsules by mouth 3 times daily for 30 days. , Disp-90 capsule, R-0Normal  -     insulin glargine (LANTUS SOLOSTAR) 100 UNIT/ML injection pen; Inject 7 Units into the skin nightly, Disp-5 Adjustable Dose Pre-filled Pen Syringe, R-1Normal  -     insulin lispro, 1 Unit Dial, (HUMALOG KWIKPEN) 100 UNIT/ML SOPN; Inject 0-4 Units into the skin 3 times daily (before meals), Disp-5 Adjustable Dose Pre-filled Pen Syringe, R-1Normal  -     Insulin Pen Needle (B-D ULTRAFINE III SHORT PEN) 31G X 8 MM MISC; Disp-100 each, R-3, NormalUSE DAILY WITH INSULIN  Pleural effusion on left  -Declined thoracentesis when in the hospital.  Highly recommended that the patient follow-up with pulmonology. They will contact about possibly doing a virtual appointment. Recurrent falls  -The patient continues to be significantly debilitated. Continue with home PT, OT, and nursing. He is weekend and maximum assist/dependent for transferring as well as many ADLs. He is a more than appropriate candidate for hospital bed, lift chair, and bedside commode as has been recommended by occupational therapy. We will send orders. Cellulitis of left lower extremity  -Unable to assess at appointment today. Encouraged to send a picture to further evaluate. Did complete empiric antibiotics as well as Keflex at discharge. Encouraged continued emollients to the area. Also encouraged compression. Allergic conjunctivitis of both eyes  -Discussed options. We will send ketotifen drops to the pharmacy. Educated on medication use as well as side effects. Follow-up as needed if no improvement. -     ketotifen (ZADITOR) 0.025 % ophthalmic solution; Place 1 drop into both eyes 2 times daily for 10 days, Disp-10 mL, R-0Normal  Dry mouth  -Discussed options. We will send artificial saliva to the pharmacy. Follow-up as needed. -     Artificial Saliva (MOUTH KOTE) SOLN solution; Use as needed for dry mouth up to every 4 hours, Disp-236 mL, R-0Normal  Arthritis of shoulder  -Discussed options. We will send diclofenac gel to the pharmacy. The patient's wife believes that he was not using this.   - diclofenac sodium (VOLTAREN) 1 % GEL; Apply 2 g topically 4 times daily, Topical, 4 TIMES DAILY Starting Mon 12/5/2022, Disp-350 g, R-0, Normal  Chronic idiopathic constipation  -Saw improvement with Colace. Refill.  -     docusate sodium (COLACE) 100 MG capsule; Take 1 capsule by mouth 2 times daily as needed for Constipation, Disp-180 capsule, R-1Normal  Primary hypertension  -Continues to follow cardiology. Refill metoprolol. Encouraged to schedule follow-up with them. -     metoprolol succinate (TOPROL XL) 50 MG extended release tablet; TAKE 1 TABLET BY MOUTH EVERY DAY, Disp-90 tablet, R-1Normal  NSTEMI (non-ST elevated myocardial infarction) (HCC)  -Troponin chronically elevated likely from CHF. Continue metoprolol. [facility] cardiology. Defer management to them. -     metoprolol succinate (TOPROL XL) 50 MG extended release tablet; TAKE 1 TABLET BY MOUTH EVERY DAY, Disp-90 tablet, R-1Normal  Chronic atrial fibrillation (HCC)  -Continues Eliquis. No changes today. Refill.  -     apixaban (ELIQUIS) 5 MG TABS tablet; TAKE 1 TABLET BY MOUTH TWICE A DAY, Disp-60 tablet, R-2Normal    Medical Decision Making: high complexity  Return in about 3 months (around 3/5/2023) for Follow-up diabetes. On this date 12/5/2022 I have spent 60 minutes reviewing previous notes, test results and face to face with the patient discussing the diagnosis and importance of compliance with the treatment plan as well as documenting on the day of the visit. Subjective:   HPI:  Follow up of Hospital problems/diagnosis(es):  Acute on chronic diastolic heart failure  Generalized weakness  Recurrent falls  Left pleural effusion  Left lower extremity cellulitis  Uncontrolled type 2 diabetes  Chronic idiopathic constipation  Diabetic neuropathy  Type II NSTEMI from demand ischemia due to CHF  Atrial fibrillation    Inpatient course: Discharge summary reviewed- see chart.     Interval history/Current status:  Hospital discharge notes and results were reviewed. Medications reconciled. Abhijeet Morris presents today with his wife and daughter virtually for hospital discharge follow-up. He had been admitted to [facility] [place] from 10/25 until 10/31. He was then discharged to [facility] and was discharged on 11/24. She is now home receiving home care with [facility]. Receiving PT, OT, and nursing care. They have multiple concerns today. The patient continues to be significantly debilitated. He is completely dependent to get out of bed. He has been trying to work with OT, but he is limited due to both weakness as well as pain in his shoulder. The occupational therapist has recommended that he get a lift chair, commode, and hospital bed. Without these, the patient's wife has had to perform significant lifting in order to transfer the patient. This is starting to take both a physical and mental toll on the patient's wife. He has a maximum assist with transfers. They are inquiring about something to help with his shoulder pain. He is also experiencing dry mouth and is inquiring about options for this. He is experiencing eye itchiness and would like to be treated for this as well. Inquired about eyedrops specifically. Per the daughter and wife, the cellulitis to his lower extremities has improved quite significantly. Still has some redness. They are agreeable to sending a picture of the legs via [employer]. He did receive empiric and antibiotics in the hospital as well as Keflex upon discharge. She has been trying to apply both Vaseline as well as CeraVe lotion which she feels is helping his legs. He also continues to struggle with constipation. His wife did inquire whether she was able to give him some of her Colace and was instructed to do so. Did see significant improvement with this. Inquiring whether daily Metamucil would be appropriate as well.   He was diagnosed with pleural effusions while at the hospital.  Declined thoracentesis which was recommended by pulmonology. They have not yet scheduled a follow-up with pulmonology. Concerned about getting to the office. They also have not scheduled a follow-up with cardiology. Patient Active Problem List   Diagnosis    Lumbar disc disease    Iron deficiency anemia    Edema    Degeneration of cervical intervertebral disc    Hypertension    Hyperlipidemia    Type 2 diabetes mellitus with diabetic polyneuropathy, with long-term current use of insulin ([facility])    GERD (gastroesophageal reflux disease)    Neuropathy    Arthritis    Bilateral carpal tunnel syndrome    NSTEMI (non-ST elevated myocardial infarction) ([facility])    Diastolic heart failure ([facility])    Chronic atrial fibrillation ([facility])    Chronic fatigue    Bilateral pleural effusion    Vasomotor rhinitis    Venous stasis of both lower extremities    Other seborrheic keratosis    Pulmonary atelectasis    Abrasion of leg, right    Varicose veins of right lower extremity with inflammation, 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 If you have any questions, ask your nurse or doctor.                 START taking these medications      diclofenac sodium 1 % Gel  Commonly known as: VOLTAREN  Apply 2 g topically 4 times daily  Started by: MARIA FERNANDA Fuentes CNP     docusate sodium 100 MG capsule  Commonly known as: COLACE  Take 1 capsule by mouth 2 times daily as needed for Constipation  Started by: MARIA FERNANDA Fuentes CNP     insulin lispro (1 Unit Dial) 100 UNIT/ML Sopn  Commonly known as: HumaLOG KwikPen  Inject 0-4 Units into the skin 3 times daily (before meals)  Replaces: insulin lispro 100 UNIT/ML Soln injection vial  Started by: MARIA FERNANDA Fuentes - CNP     ketotifen 0.025 % ophthalmic solution  Commonly known as: Zaditor  Place 1 drop into both eyes 2 times daily for 10 days  Started by: MARIA FERNANDA Gilmore CNP     Lantus SoloStar 100 UNIT/ML injection pen  Generic drug: insulin glargine  Inject 7 Units into the skin nightly  Replaces: insulin glargine 100 UNIT/ML injection vial  Started by: MARIA FERNANDA Gilmore CNP     mouth kote Soln solution  Use as needed for dry mouth up to every 4 hours  Started by: MARIA FERNANDA Gilmore CNP            CONTINUE taking these medications      apixaban 5 MG Tabs tablet  Commonly known as: Eliquis  TAKE 1 TABLET BY MOUTH TWICE A DAY     B-D ULTRAFINE III SHORT PEN 31G X 8 MM Misc  Generic drug: Insulin Pen Needle  USE DAILY WITH INSULIN     gabapentin 100 MG capsule  Commonly known as: NEURONTIN  Take 2 capsules by mouth 3 times daily for 30 days. Medical Compression Stockings Misc  1 each by Does not apply route daily as needed (daily thigh high compression stockings (15-20 mmHg))     metoprolol succinate 50 MG extended release tablet  Commonly known as: TOPROL XL  TAKE 1 TABLET BY MOUTH EVERY DAY     torsemide 20 MG tablet  Commonly known as: DEMADEX  20 mg daily except on MWF 20 mg BID            STOP taking these medications      insulin glargine 100 UNIT/ML injection vial  Commonly known as: LANTUS  Replaced by: Lantus SoloStar 100 UNIT/ML injection pen  Stopped by: MARIA FERNANDA Gilmore CNP     insulin lispro 100 UNIT/ML Soln injection vial  Commonly known as: HUMALOG  Replaced by: insulin lispro (1 Unit Dial) 100 UNIT/ML Sopn  Stopped by: MARIA FERNANDA Gilmore CNP               Where to Get Your Medications        These medications were sent to [place] [address], [place] [address] [phone] Amena Rogers [address] [place] [address]      Phone: [phone]   apixaban 5 MG Tabs tablet  B-D ULTRAFINE III SHORT PEN 31G X 8 MM Misc  diclofenac sodium 1 % Gel  docusate sodium 100 MG capsule  gabapentin 100 MG capsule  insulin lispro (1 Unit Dial) 100 UNIT/ML Sopn  ketotifen 0.025 % ophthalmic solution  Lantus SoloStar 100 UNIT/ML injection pen  metoprolol succinate 50 MG extended release tablet  mouth kote Soln solution           Medications marked \"taking\" at this time  Outpatient Medications Marked as Taking for the 12/5/22 encounter (Telemedicine) with Saad Brenda APRN - CNP   Medication Sig Dispense Refill    Artificial Saliva (MOUTH KOTE) SOLN solution Use as needed for dry mouth up to every 4 hours 236 mL 0    ketotifen (ZADITOR) 0.025 % ophthalmic solution Place 1 drop into both eyes 2 times daily for 10 days 10 mL 0    diclofenac sodium (VOLTAREN) 1 % GEL Apply 2 g topically 4 times daily 350 g 0    docusate sodium (COLACE) 100 MG capsule Take 1 capsule by mouth 2 times daily as needed for Constipation 180 capsule 1    metoprolol succinate (TOPROL XL) 50 MG extended release tablet TAKE 1 TABLET BY MOUTH EVERY DAY 90 tablet 1    apixaban (ELIQUIS) 5 MG TABS tablet TAKE 1 TABLET BY MOUTH TWICE A DAY 60 tablet 2    gabapentin (NEURONTIN) 100 MG capsule Take 2 capsules by mouth 3 times daily for 30 days. 90 capsule 0    insulin glargine (LANTUS SOLOSTAR) 100 UNIT/ML injection pen Inject 7 Units into the skin nightly 5 Adjustable Dose Pre-filled Pen Syringe 1    insulin lispro, 1 Unit Dial, (HUMALOG KWIKPEN) 100 UNIT/ML SOPN Inject 0-4 Units into the skin 3 times daily (before meals) 5 Adjustable Dose Pre-filled Pen Syringe 1    Insulin Pen Needle (B-D ULTRAFINE III SHORT PEN) 31G X 8 MM MISC USE DAILY WITH INSULIN 100 each 3        Medications patient taking as of now reconciled against medications ordered at time of hospital discharge: Yes    A comprehensive review of systems was negative except for what was noted in the HPI. Objective: There were no vitals taken for this visit. Physical exam was unable to be performed given nature of visit.   Patient is alert and

## 2022-12-08 ENCOUNTER — TELEPHONE (OUTPATIENT)
Dept: FAMILY MEDICINE CLINIC | Age: 87
End: 2022-12-08

## 2022-12-09 ENCOUNTER — TELEPHONE (OUTPATIENT)
Dept: FAMILY MEDICINE CLINIC | Age: 87
End: 2022-12-09

## 2022-12-09 NOTE — TELEPHONE ENCOUNTER
Steve Hyde with speech therapy from [facility] completed the speech eval and would like an order for speech therapy for cognition goals

## 2022-12-12 ENCOUNTER — TELEPHONE (OUTPATIENT)
Dept: FAMILY MEDICINE CLINIC | Age: 87
End: 2022-12-12

## 2022-12-12 NOTE — TELEPHONE ENCOUNTER
SENT NEW ORDER TO A [facility] AT [address] [place] [address] NUMBER: [phone].  FOR LIFT RECLINER, [facility] BED, STAIR LIFT AND BEDSIDE COMMODE. DEANN

## 2022-12-15 ENCOUNTER — TELEPHONE (OUTPATIENT)
Dept: FAMILY MEDICINE CLINIC | Age: 87
End: 2022-12-15

## 2022-12-15 DIAGNOSIS — E11.42 TYPE 2 DIABETES MELLITUS WITH DIABETIC POLYNEUROPATHY, WITH LONG-TERM CURRENT USE OF INSULIN (HCC): ICD-10-CM

## 2022-12-15 DIAGNOSIS — Z79.4 TYPE 2 DIABETES MELLITUS WITH DIABETIC POLYNEUROPATHY, WITH LONG-TERM CURRENT USE OF INSULIN (HCC): ICD-10-CM

## 2022-12-15 NOTE — TELEPHONE ENCOUNTER
Bonnie from [employer] is  calling to get a prescription to have on file for   Cardiac meals. The RX can say this pt will benefit from cardiac meals.     Fax :  [phone],

## 2022-12-16 ENCOUNTER — TELEPHONE (OUTPATIENT)
Dept: FAMILY MEDICINE CLINIC | Age: 87
End: 2022-12-16

## 2022-12-16 RX ORDER — GABAPENTIN 100 MG/1
200 CAPSULE ORAL 3 TIMES DAILY
Qty: 90 CAPSULE | Refills: 0 | OUTPATIENT
Start: 2022-12-16 | End: 2023-01-15

## 2022-12-16 NOTE — TELEPHONE ENCOUNTER
Pt insurance Company was suppose to send in the RX. But they have not.    [phone] [employer]  Fax [phone] nae Raines    Pt needs a lift chair, stair lift, transport chair, hospital bed.  She would like Bruno to send this in for them. Since the insurance has not.    .

## 2022-12-19 ENCOUNTER — TELEPHONE (OUTPATIENT)
Dept: FAMILY MEDICINE CLINIC | Age: 87
End: 2022-12-19

## 2022-12-19 ENCOUNTER — TELEPHONE (OUTPATIENT)
Dept: CARDIOLOGY CLINIC | Age: 87
End: 2022-12-19

## 2022-12-19 DIAGNOSIS — B37.0 THRUSH: Primary | ICD-10-CM

## 2022-12-19 RX ORDER — COLCHICINE 0.6 MG/1
0.6 TABLET ORAL DAILY
Qty: 15 TABLET | Refills: 0 | Status: SHIPPED | OUTPATIENT
Start: 2022-12-19

## 2022-12-19 RX ORDER — ALLOPURINOL 100 MG/1
100 TABLET ORAL DAILY
Qty: 30 TABLET | Refills: 5 | Status: SHIPPED | OUTPATIENT
Start: 2022-12-19

## 2022-12-19 NOTE — TELEPHONE ENCOUNTER
Nahid's wife called in this morning, she would like to speak with someone concerning his medications, his water pills. She can be reached at [phone].

## 2022-12-19 NOTE — TELEPHONE ENCOUNTER
Dr. Kevin Bagley last saw patient in office on 10/10/22 then again 10/25/22 while hospitalized. Per wife, patient was transferred to rehab and developed COVID-19. He was in rehab for >23 days. He is now home and has participated in PT. Wife states patient is now back on normal dose of torsemide and receiving low sodium meals. Wife is trying to monitor fluid / sodium intake. However, she is concerned about LE edema, with 1 foot more swollen than the other. Also states the patient is in excruciating pain and having difficulty standing/walking. She is concerned for gout. It was very difficult to gather more information as Mrs. Chase Meredith sounded fearful while we were speaking and stated \" I don't know what questions to ask. \"

## 2022-12-19 NOTE — TELEPHONE ENCOUNTER
Rx sent to [facility] and [place]. Swish as long as possible and then SWALLOW the medication.

## 2022-12-19 NOTE — TELEPHONE ENCOUNTER
Graciela Vinod called Dr. Chelsea Alvarenga over the weekend. Chandni could not bear to have his right foot touched or put any type of pressure on it. Dr. Chelsea Alvarenga thought it could be  a developing ulcer, arthritis or gout. I have learned that thrush can lead to gout. He had the same problem with his foot about 5 or 6 days ago and I gave him the thrush med because inside of mouth was a milky colored. He swallowed it twice and the pain went away but came back extremely bad last night. I discontinued it. Henrique Get said he would be okay, My daughter said while in the hospital they gave her oral meds for the thrush. Is it possible to give him that. The med I have was not under refrigeration so I stopped after 2 dosage but started it again because something is better than nothing. Nurse said he has thrush.

## 2022-12-19 NOTE — TELEPHONE ENCOUNTER
MESSAGE FROM PATIENTS SPOUSE FROM [facility]:   Shelby GARCIA [facility] (supporting Daniel Slaughter, APRN - CNP) 2 days ago     When the nurse from [facility] was here she said Chandni has thrush. Can you please order medication for him. Thank you for all of the help. Chandni right heel and the muscles in the shin are very painful and his foot is swollen. We are icing it. The [employer] was here last night because he fell The ankle was not related to the fall. April, the nurse said she thinks it is because he does not elevate the foot and pressure from the bed is causing the pain and tenderness. . I do put a pillow under it when he is lying down but at night he kicks it off. I am putting the gel you ordered on it.

## 2022-12-22 ENCOUNTER — TELEPHONE (OUTPATIENT)
Dept: CARDIOLOGY CLINIC | Age: 87
End: 2022-12-22

## 2022-12-22 NOTE — TELEPHONE ENCOUNTER
Jaimecammy Stephenson is having an allergic reaction to the medications that Dr. Annemarie Jean-Baptiste prescribed for his gout. He felt the itchiness yesterday and today he is stating that both his hands are swollen and he has a rash on his stomach. Marielle Stephenson states it's under his skin, no visible markings but he can feel it.          Please contact at: [phone]

## 2022-12-22 NOTE — TELEPHONE ENCOUNTER
Spoke with patient and instructed to stop medication and to follow up with his PCP for management. Instructed he may take benadryl or an antihistamine cream for itching if he would like. Patient v/u.

## 2023-01-09 ENCOUNTER — TELEPHONE (OUTPATIENT)
Dept: FAMILY MEDICINE CLINIC | Age: 88
End: 2023-01-09

## 2023-01-09 NOTE — TELEPHONE ENCOUNTER
Pt spouse is calling to see if we have a visiting dr to come into the home. Pt is home bound. They can not take him to the ER because she can not get pt into the car and up the stairs. Pt foot is very very swollen, may be gout again. Do you want to have a phone visit with him?

## 2023-01-09 NOTE — TELEPHONE ENCOUNTER
----- Message from [facility] sent at 1/9/2023  1:46 PM EST -----  Subject: Message to Provider    QUESTIONS  Information for Provider? Pt wife is calling to let the office know that   pt daughter Mary Hanson) send a picture of pt foot through my chart. It   wouldn't allow the picture to be sent to [employer]. It would   only send the picture to [employer]. Please call pt back if there are   any questions.   ---------------------------------------------------------------------------  --------------  [id number]  [phone]; Do not leave any message, patient will call back for answer  ---------------------------------------------------------------------------  --------------  SCRIPT ANSWERS  Relationship to Patient? Other  Representative Name? wife   Is the Representative on the appropriate HIPAA document in Epic?  Yes

## 2023-01-09 NOTE — TELEPHONE ENCOUNTER
PATIENT IS SEEING [facility] TOMORROW. NO NEED TO CALL BACK TONIGHT. KAREN FAXED INFORMATION TO VISITING PHYSICIANS FOR THIS PATIENT AS WELL.  SC

## 2023-01-09 NOTE — TELEPHONE ENCOUNTER
Spoke with pt wife, she made a virtual for tomorrow with [facility], but states she would like to have a dr come to her home still. Please advise about visiting physicians.

## 2023-01-09 NOTE — TELEPHONE ENCOUNTER
Reviewed.  No obvious gout but will discuss at visit tomorrow if visiting physicians unable to see patient first

## 2023-01-09 NOTE — TELEPHONE ENCOUNTER
If she thinks it's bad enough that he needs to go to the ER they should call a squad. Would they be able to take a picture and send via [employer]? I don't know that we have appointments today. Unfortunately we do not do home visits.

## 2023-01-10 ENCOUNTER — TELEMEDICINE (OUTPATIENT)
Dept: FAMILY MEDICINE CLINIC | Age: 88
End: 2023-01-10
Payer: MEDICARE

## 2023-01-10 DIAGNOSIS — E11.42 TYPE 2 DIABETES MELLITUS WITH DIABETIC POLYNEUROPATHY, WITH LONG-TERM CURRENT USE OF INSULIN (HCC): ICD-10-CM

## 2023-01-10 DIAGNOSIS — M10.072 ACUTE IDIOPATHIC GOUT OF LEFT ANKLE: Primary | ICD-10-CM

## 2023-01-10 DIAGNOSIS — I50.33 ACUTE ON CHRONIC DIASTOLIC CHF (CONGESTIVE HEART FAILURE) (HCC): ICD-10-CM

## 2023-01-10 DIAGNOSIS — L97.212 VARICOSE VEINS OF RIGHT LOWER EXTREMITY WITH INFLAMMATION, WITH ULCER OF CALF WITH FAT LAYER EXPOSED (HCC): ICD-10-CM

## 2023-01-10 DIAGNOSIS — Z79.4 TYPE 2 DIABETES MELLITUS WITH DIABETIC POLYNEUROPATHY, WITH LONG-TERM CURRENT USE OF INSULIN (HCC): ICD-10-CM

## 2023-01-10 DIAGNOSIS — B35.3 TINEA PEDIS OF LEFT FOOT: ICD-10-CM

## 2023-01-10 DIAGNOSIS — I83.212 VARICOSE VEINS OF RIGHT LOWER EXTREMITY WITH INFLAMMATION, WITH ULCER OF CALF WITH FAT LAYER EXPOSED (HCC): ICD-10-CM

## 2023-01-10 DIAGNOSIS — I48.20 CHRONIC ATRIAL FIBRILLATION (HCC): ICD-10-CM

## 2023-01-10 PROCEDURE — 1123F ACP DISCUSS/DSCN MKR DOCD: CPT | Performed by: NURSE PRACTITIONER

## 2023-01-10 PROCEDURE — 99214 OFFICE O/P EST MOD 30 MIN: CPT | Performed by: NURSE PRACTITIONER

## 2023-01-10 RX ORDER — KETOCONAZOLE 20 MG/G
CREAM TOPICAL
Qty: 30 G | Refills: 1 | Status: SHIPPED | OUTPATIENT
Start: 2023-01-10

## 2023-01-10 RX ORDER — COLCHICINE 0.6 MG/1
0.6 TABLET ORAL 2 TIMES DAILY
Qty: 18 TABLET | Refills: 0 | Status: SHIPPED | OUTPATIENT
Start: 2023-01-10

## 2023-01-10 RX ORDER — ALLOPURINOL 100 MG/1
TABLET ORAL
Qty: 30 TABLET | Refills: 5 | Status: SHIPPED | OUTPATIENT
Start: 2023-01-10

## 2023-01-10 SDOH — ECONOMIC STABILITY: TRANSPORTATION INSECURITY
IN THE PAST 12 MONTHS, HAS THE LACK OF TRANSPORTATION KEPT YOU FROM MEDICAL APPOINTMENTS OR FROM GETTING MEDICATIONS?: YES

## 2023-01-10 SDOH — ECONOMIC STABILITY: FOOD INSECURITY: WITHIN THE PAST 12 MONTHS, THE FOOD YOU BOUGHT JUST DIDN'T LAST AND YOU DIDN'T HAVE MONEY TO GET MORE.: OFTEN TRUE

## 2023-01-10 SDOH — ECONOMIC STABILITY: TRANSPORTATION INSECURITY
IN THE PAST 12 MONTHS, HAS LACK OF TRANSPORTATION KEPT YOU FROM MEETINGS, WORK, OR FROM GETTING THINGS NEEDED FOR DAILY LIVING?: YES

## 2023-01-10 SDOH — ECONOMIC STABILITY: FOOD INSECURITY: WITHIN THE PAST 12 MONTHS, YOU WORRIED THAT YOUR FOOD WOULD RUN OUT BEFORE YOU GOT MONEY TO BUY MORE.: OFTEN TRUE

## 2023-01-10 ASSESSMENT — ENCOUNTER SYMPTOMS
BACK PAIN: 0
COLOR CHANGE: 0
CHEST TIGHTNESS: 0
ABDOMINAL DISTENTION: 0
EYE DISCHARGE: 0
DIARRHEA: 0
CONSTIPATION: 0
SINUS PRESSURE: 0
SINUS PAIN: 0
SHORTNESS OF BREATH: 0
NAUSEA: 0
COUGH: 0
ABDOMINAL PAIN: 0

## 2023-01-10 ASSESSMENT — PATIENT HEALTH QUESTIONNAIRE - PHQ9
1. LITTLE INTEREST OR PLEASURE IN DOING THINGS: 0
SUM OF ALL RESPONSES TO PHQ QUESTIONS 1-9: 0
2. FEELING DOWN, DEPRESSED OR HOPELESS: 0
SUM OF ALL RESPONSES TO PHQ QUESTIONS 1-9: 0
SUM OF ALL RESPONSES TO PHQ9 QUESTIONS 1 & 2: 0
SUM OF ALL RESPONSES TO PHQ QUESTIONS 1-9: 0
SUM OF ALL RESPONSES TO PHQ QUESTIONS 1-9: 0

## 2023-01-10 ASSESSMENT — SOCIAL DETERMINANTS OF HEALTH (SDOH): HOW HARD IS IT FOR YOU TO PAY FOR THE VERY BASICS LIKE FOOD, HOUSING, MEDICAL CARE, AND HEATING?: SOMEWHAT HARD

## 2023-01-10 NOTE — PROGRESS NOTES
Kellie Epstein (:  1935) is a 81 y.o. male,Established patient, here for evaluation of the following chief complaint(s): Gout (Gout flare up)      Kellie Epsteni, was evaluated through a synchronous (real-time) audio, picture encounter. The patient (or guardian if applicable) is aware that this is a billable service, which includes applicable co-pays. This Virtual Visit was conducted with patient's (and/or legal guardian's) consent. The visit was conducted pursuant to the emergency declaration under the [facility], [address] [place] [address] authority and the [employer] Act. Patient identification was verified, and a caregiver was present when appropriate. The patient was located at home in a state where the provider was licensed to provide care. Patient identification was verified at the start of the visit: Yes    ASSESSMENT/PLAN:  1. Acute idiopathic gout of left ankle  -     colchicine (COLCRYS) 0.6 MG tablet;  Take 1 tablet by mouth 2 times daily, Disp-18 tablet, R-0Normal  Use colchicine twice daily only during an acute episode and then stop colchicine and start allopurinol that cardiologist Dr. Agnieszka Aguiar sent in today, updated patient's wife at Dr. Agnieszka Aguiar just sent this in and confirm the pharmacy that it was sent to, no need to send this again as it was sent in there are additional refills available on it  Discussed with his wife this cannot be started during an acute episode of gout, discussed with review of the picture this may not be gout and it could just be pain from how swollen he appears, will treat for gout since the colchicine seems to be helping as he has some left from his past episode that Dr. Agnieszka Aguiar had treated for him but he had not completed the treatment and still had leftover medication  Stressed that they cannot start allopurinol during an acute episode  Discussed purine restricted diet, elevate legs to help with swelling, Epson salt soaks to help with swelling, compression stockings  Discussed review of image, with flakiness on feet and itchiness, could also be a fungal infection as well, wife does not feel this is the case, discussed treating with antifungal cream, see diagnoses below, will send cream for patient to trial  2. Acute on chronic diastolic CHF (congestive heart failure) [facility])   Patient follows with cardiologist Dr. Annemarie Jean-Baptiste   Wife manages his care   He is on metoprolol 50 mg   Eliquis 5 mg   He is also on torsemide 20 mg, wife admittedly says he only takes the torsemide a few days a week because he feels like he does not like how often he pees when he takes the torsemide, reports a lot of fatigue he has a, she has to pee reports a lot of fatigue because he likely has uncontrolled heart failure which is because he is fluid overloaded because he is not taking his torsemide regularly discussed this with patient wife  3. Type 2 diabetes mellitus with diabetic polyneuropathy, with long-term current use of insulin (HCC)   Follow with Janeen eMnsah CNP PCP for diabetes on lispro insulin 3 times a day and Lantus   Check blood sugars regularly   Most recent A1c was 7.5, 3 months ago  4. Chronic atrial fibrillation (HCC)   On Eliquis, follows with cardiology, also on metoprolol most recent heart rate was stable at 78 bpm and blood pressure was well controlled when last checked in October  5. 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  Tinea pedis of left foot  -     ketoconazole (NIZORAL) 2 % cream; Apply topically daily to feet as needed for itching, peeling, Disp-30 g, R-1, Normal   Discussed with wife I do not feel his flakiness on his feet is only from dry foot, she says she uses cerave cream and would like to just use that, I recommend trying an antifungal cream, it looks like they have had numerous ones sent in the past.     Return in about 3 months (around 4/21/2023) for Annual Wellness Visit, Fasting Labs. SUBJECTIVE/OBJECTIVE:  HPI    Chief Complaint   Patient presents with    Gout     Gout flare up     Gout Flare Up  Reviewed image from yesterday, foot and ankle are swollen, skin is flaky. Not obvious gout from image. Will discuss further with family. Patient wife called wondering where I was but I was running behind on visits. Tried calling 5 times at the 27 minute rolando of the visit and the number listed for the visit could not accept calls. As of [phone], home phone still not working. Called pt mobile phone and got through to pt. Patient wife on phone now. Says this is the best they could do in sending the picture. Pt on colchicine and allopurinol for gout. Always feeling very tired. Has itchy skin. Some days has days where he feels great, other days very tired. Some days walks really well, other days has really rough days. Says his feet are not typically swollen like this. Says the medicine is helping. Was only elevating feet when he was swelling. Currently taking colchicine. Does not have a full prescription because he had taken it in past      Wants a lightweight wheelchair to [employer] CNP said he took care of medical equipment this yesterday. Patient feels like she is getting the run around. Review of Systems   Constitutional:  Negative for activity change, appetite change, fatigue, fever and unexpected weight change. HENT:  Negative for congestion, ear pain, sinus pressure and sinus pain. Eyes:  Negative for discharge and visual disturbance. Respiratory:  Negative for cough, chest tightness and shortness of breath.     Cardiovascular:  Negative for chest pain, palpitations and leg swelling. Gastrointestinal:  Negative for abdominal distention, abdominal pain, constipation, diarrhea and nausea. Endocrine: Negative for cold intolerance, heat intolerance, polydipsia, polyphagia and polyuria. Genitourinary:  Negative for decreased urine volume, difficulty urinating, dysuria, flank pain, frequency and urgency. Musculoskeletal:  Positive for arthralgias and gait problem. Negative for back pain, joint swelling, myalgias and neck pain. Skin:  Negative for color change, rash and wound. Allergic/Immunologic: Negative for food allergies and immunocompromised state. Neurological:  Negative for dizziness, tremors, speech difficulty, weakness, light-headedness, numbness and headaches. Hematological:  Negative for adenopathy. Does not bruise/bleed easily. Psychiatric/Behavioral:  Negative for confusion, decreased concentration, self-injury, sleep disturbance and suicidal ideas. The patient is not nervous/anxious.       Patient-Reported Vitals 1/17/2022   Patient-Reported Weight 222LBS   Patient-Reported Height 6'            [INSTRUCTIONS:  \"[x]\" Indicates a positive item  \"[]\" Indicates a negative item  -- DELETE ALL ITEMS NOT EXAMINED]    Constitutional: [x] Appears well-developed and well-nourished [x] No apparent distress      [] Abnormal -     Mental status: [x] Alert and awake  [x] Oriented to person/place/time [x] Able to follow commands    [] Abnormal -           Skin:        [] No significant exanthematous lesions or discoloration noted on facial skin         [x] Abnormal - reviewed image from picture sent, red, swollen, flaky            Psychiatric:       [x] Normal Affect [] Abnormal -        [x] No Hallucinations    Other pertinent observable physical exam findings:-      On this date 1/10/2023 I have spent 38 minutes reviewing previous notes, test results and face to face (virtual) with the patient discussing the diagnosis and importance of compliance with the treatment plan as well as documenting on the day of the visit. Care Gaps Addressed  TDAP vaccine recommended- call insurance to discuss coverage  Call insurance company to discuss coverage for shingles vaccine (Shingrix) 2 dose series   PNA vaccine recommended  COVID booster recommended  Flu vaccine recommended   AWV due spring 2023  PHQ UTD 2023      I have reviewed patient's pertinent medical history, relevant laboratory and imaging studies, and past/future health maintenance. Discussed with the patient the importance of adhering to their current medication regimen as directed. Advised the patient that they should continue to work on eating a healthy balanced diet and staying active by exercising within their personal limits. Orders as listed above. Patient was advised to keep future appointments with their respective specialty care team(s). Patient had the opportunity to ask questions, all of which were answered to the best of my ability and with patient satisfaction. Patient understands and is agreeable with the care plan following today's visit. Patient is to schedule an appointment for any new or worsening symptoms. Go to ER for significant shortness of breath, chest pain, or uncontrolled pain or fever. I discussed with patient the risk and benefits of any medications that were prescribed today. I verified that the patient understands their medications, labs, and/or procedures. The patient is doing well with current medication regimen and does not have any barriers to adherence. The patient's self-management abilities are good. Follow Up in 3 Months for AWV and fasting labs    Hansa Gore is a 81 y.o. male being evaluated by a Virtual Visit (video visit) encounter to address concerns as mentioned above. A caregiver was present when appropriate.  Due to this being a TeleHealth encounter (During [place] [address] public health emergency), evaluation of the following organ systems was limited: Vitals/Constitutional/EENT/Resp/CV/GI//MS/Neuro/Skin/Heme-Lymph-Imm. Pursuant to the emergency declaration under the [facility], [address] [place] [address] and the [employer] Act, this Virtual Visit was conducted with patient's (and/or legal guardian's) consent, to reduce the patient's risk of exposure to COVID-19 and provide necessary medical care. The patient (and/or legal guardian) has also been advised to contact this office for worsening conditions or problems, and seek emergency medical treatment and/or call 911 if deemed necessary. Services were provided through a video synchronous discussion virtually to substitute for in-person clinic visit. Patient was located at home and provider was located in office or at home. An electronic signature was used to authenticate this note. --Christophe Ramirez, MARIA FERNANDA - LEROY     Neelam Jack is a 81 y.o. male evaluated via telephone on 1/10/2023 for Gout (Gout flare up)  . Documentation:  I communicated with the patient and/or health care decision maker about plan of care and medications. Details of this discussion including any medical advice provided: option for visiting physicians, information was faxed about patient yesterday- this would be a good option for pt especially for follow up    Total Time: minutes: 21-30 minutes+, 38 total minutes    Neelam Jack was evaluated through a synchronous (real-time) audio encounter. Patient identification was verified at the start of the visit. He (or guardian if applicable) is aware that this is a billable service, which includes applicable co-pays. This visit was conducted with the patient's (and/or legal guardian's) verbal consent. He has not had a related appointment within my department in the past 7 days or scheduled within the next 24 hours.    The patient was located at Home: [place] [id number].  The provider was located at [facility] (Appt Dept): [address] [facility] [place],  [address] Note: not billable if this call serves to triage the patient into an appointment for the relevant concern    MARIA FERNANDA Gant - CNP    Please note that all or part of this note was generated utilizing dragon dictation speech recognition software. Although every effort was made to ensure the accuracy of this automated transcription, some errors in transcription may have occurred. Occasionally, words are mistranscribed and despite editing, the text may contain inaccuracies due to incorrect word recognition. If further clarification is needed please contact the office at [phone].

## 2023-01-19 DIAGNOSIS — Z79.4 TYPE 2 DIABETES MELLITUS WITH DIABETIC POLYNEUROPATHY, WITH LONG-TERM CURRENT USE OF INSULIN (HCC): ICD-10-CM

## 2023-01-19 DIAGNOSIS — E11.42 TYPE 2 DIABETES MELLITUS WITH DIABETIC POLYNEUROPATHY, WITH LONG-TERM CURRENT USE OF INSULIN (HCC): ICD-10-CM

## 2023-01-19 RX ORDER — GABAPENTIN 100 MG/1
200 CAPSULE ORAL 3 TIMES DAILY
Qty: 180 CAPSULE | Refills: 0 | Status: SHIPPED | OUTPATIENT
Start: 2023-01-19 | End: 2023-02-18

## 2023-01-30 ENCOUNTER — TELEPHONE (OUTPATIENT)
Dept: FAMILY MEDICINE CLINIC | Age: 88
End: 2023-01-30

## 2023-01-30 NOTE — TELEPHONE ENCOUNTER
Pt  wife is calling to say that [facility] told her that we are not sending the right information to them. They need notations saying Pt  legs are weak, he has had gout, Neuropathy, pt has fallen unsteady on feet, hard time walking. Pt can only walk very short distances maybe 4 steps. He can use a walker but only a short while. Pt does not want to switch doctors--pt has a home care  [facility]  and [address] [place] [address] (1-13-23) comes (  on aging). [facility] said they can fill out the paperwork but they would need to be pt Primary Provider. Please call pt wife    Graham Valladares told pt wife that if you have gout and neuropathy that it is usually taken care of. We need to use these on pt paperwork.

## 2023-01-30 NOTE — TELEPHONE ENCOUNTER
We have been going through his wife for this information the whole time and keep getting told different things. I have mentioned most of this in his notes. CALL [employer] [facility].

## 2023-01-31 NOTE — TELEPHONE ENCOUNTER
CALLED [facility]. SPOKE TO A REP. SAID THEY WERE WAITING ON CHART NOTES. ADVISED THAT CHART NOTES HAVE BEEN FAXED OVER 10+ TIMES WITH THE FORMS THEY REQUESTED. THEN SHE STATED QUESTION 6 NEEDED TO BE FIXED. ADVISED THAT HAD BEEN DONE, FIXED AND FAXED BACK AND SENT WITH CHART NOTES AGAIN AS WELL. SHE FOUND THE CHART NOTES. THEN STATED SHE NEEDED TO FIND THE CHART NOTES FOR THE BED. I ADVISED THAT THE CHART NOTES ARE THE SAME FOR ALL THE REQUESTED EQUIPMENT. CHART NOTES FOR 12/05 VISIT. PUT ME ON HOLD FOR 30 MINUTES. THEN HUNG UP ON ME. I CALLED BACK NUMEROUS TIMES AND ONLY WAS GIVEN THE OPTION TO LEAVE A  FOR A CALL BACK.  DEANN

## 2023-02-01 NOTE — TELEPHONE ENCOUNTER
CALLED [facility] 4 TIMES TODAY, FIRST 2 TIMES JUST WOULD LET ME LEAVVE A VM, DUE TO THE FACT I HAVE LEFT SEVERAL MESSAGES WITH NO CALL BACK I DID NOT. CALLED THEIR 1800 NUMBER AND THEY COULDN'T HELP ME BECAUSE IT WASN'T A ONLINE ORDER? CALLED THE SALE TEAM AND SPOKE TO SOMEONE WHO HAS TALKED TO THU MULTIPLE TIMES. THE CHART NOTES SPECIFICALLY STATE WHY HE NEEDS A WHEELCHAIR. HE IS ESCALATING THIS TO MANAGEMENT AND PROMISED A CALL BACK TO ME TODAY.  DEANN

## 2023-02-03 ENCOUNTER — TELEPHONE (OUTPATIENT)
Dept: FAMILY MEDICINE CLINIC | Age: 88
End: 2023-02-03

## 2023-02-03 NOTE — TELEPHONE ENCOUNTER
SUNDEEP CALLED BACK AND STATED PT DOES NOT WANT THE CARDIAC DIET I TOLD HER TO CONSULT WITH PT CARDIOLOGIST BUT HIS PCP IS RECOMMENDING STAYING ON THIS DIET. Nick Ferrera

## 2023-02-03 NOTE — TELEPHONE ENCOUNTER
Pt is wanting to switch his meals from Cardiac meals to regular meal.  Sodium for cardiac is 750 and regular meals 1200 . They need a new RX  if this is ok .       Please fax to [phone]

## 2023-02-07 NOTE — TELEPHONE ENCOUNTER
CALLED AGAIN, LMOM TO CALL OFFICE BACK. THAT I NEEDED A PHONE CALL BACK ASAP.  [address] Rosanna Carmen IS THE CONTACT IN WHICH I WAS TRANSFERRED THAT [facility]. Aundria Moritz

## 2023-02-09 NOTE — TELEPHONE ENCOUNTER
CALLED [employer]. SPOKE TO Teresita Valle. THEY ARE ORDERING HIM THE WHEELCHAIR. THEY ARE PAYING FOR IT THRU [employer] SO NOTHING HAS TO GO THRU INSURANCE. THEY DO NOT NEED A SCRIPT. Teresita Valle ADVISED THAT THEY ARE DOING HOME MODIFICATIONS ON THE PATIENT HOUSE TO HELP WITH [employer]. INCLUDING A STAIR LIFT AND RAMP FOR FRONT OF THE HOUSE STEPS. Teresita Valle IS ALSO IN CHARGE OF THE CHRONIC CARE MANAGEMENT PROGRAM [employer] IS ENROLLED IN AS IS [facility]. CHARLOTTE HAMMER IS [facility] CONTACT. HER NUMBER IS : [phone].      WILL CONTINUE TO GO THRU WITH [facility] ORDER. DEANN

## 2023-02-10 NOTE — TELEPHONE ENCOUNTER
AFTER MANY MANY ATTEMPTS IN CALLING [employer], EVEN TRYING DIFFERENT DEPARTMENTS. MANY VM'S LEFT AND ABSOLUTELY NONE RETURNED. I HIT THE OPTION FOR BILLING AND WAS FINALLY ABLE TO TALK TO A PERSON. WHEELCHAIR IS DONE/APPROVED AND READY TO BE PICKED UP AT THE STORE OR AVAIL FOR ARRANGEMENT FOR DELIVERY. LIFT CHAIR IS NOT BILLABLE TO THE INSURANCE. AND HAS TO BE SELF PAY, TAX IS TAKEN OFF IF THEY GET A SCRIPT. WHICH THEY HAVE, THEY ACKNOWLEDGED THIS. AND PATIENT HAS LIFT CHAIR IN HOME. THEY STILL OWE 18 DOLLARS FOR A WARRANTY [place] FOR THE CHAIR. [facility] BED: IF WE FAX OVER CHART NOTES AGAIN WITH MORE DETAILED INFO ON WHY HE NEEDS THE BED, CANT USE A WEDGE, NEEDS TO BE UPRIGHT ECT. PLEASE LET ME KNOW WHEN YOU ADDEND CHART NOTE AND WILL FAX.  DEANN

## 2023-02-10 NOTE — TELEPHONE ENCOUNTER
Updated and signed. Thanks for all of the hard work as I know this has been frustrating for everyone.

## 2023-02-17 ENCOUNTER — TELEPHONE (OUTPATIENT)
Dept: CARDIOLOGY CLINIC | Age: 88
End: 2023-02-17

## 2023-02-17 DIAGNOSIS — I50.32 CHRONIC DIASTOLIC HEART FAILURE (HCC): Primary | ICD-10-CM

## 2023-02-17 NOTE — TELEPHONE ENCOUNTER
Jorge from [facility] [employer] called stating that Kaley Louis would liket o be switched to a Regular diet instead of the Cardiac diet. Jorge stated the content to the regular diet is 1200 and the cardiac diet is 750. If Dr. Sarah Jeffrey is okay with changing his diet, please send an updated order to: [phone]. Jorge stated that if no changes are to be made, please call her so she can update Nahid.     Jorge's callback: [phone]

## 2023-02-20 DIAGNOSIS — I48.20 CHRONIC ATRIAL FIBRILLATION (HCC): ICD-10-CM

## 2023-02-21 DIAGNOSIS — M10.072 ACUTE IDIOPATHIC GOUT OF LEFT ANKLE: ICD-10-CM

## 2023-02-21 DIAGNOSIS — M19.019 ARTHRITIS OF SHOULDER: ICD-10-CM

## 2023-02-21 RX ORDER — COLCHICINE 0.6 MG/1
TABLET ORAL
Qty: 18 TABLET | Refills: 0 | Status: SHIPPED | OUTPATIENT
Start: 2023-02-21

## 2023-02-21 NOTE — TELEPHONE ENCOUNTER
LV 1/10/23 WITH CB FOR GOUT NV NONE  Return in about 3 months (around 4/21/2023) for Annual Wellness Visit, Fasting Labs.

## 2023-02-27 NOTE — TELEPHONE ENCOUNTER
Patient calling stating that he had a fall a week ago and was sent to ER and is still having pain in his rib area. Patient asking if it is still normal to have pain and bruising a week later. Patient also asking if he can put heat or ice on the area.
Patient informed.
Discharged

## 2023-03-13 ENCOUNTER — TELEPHONE (OUTPATIENT)
Dept: FAMILY MEDICINE CLINIC | Age: 88
End: 2023-03-13

## 2023-03-13 DIAGNOSIS — R11.0 NAUSEA: Primary | ICD-10-CM

## 2023-03-13 NOTE — TELEPHONE ENCOUNTER
Patient wife called nurse triage stating patient c/o nauea, weakness, and stomach upset. She has no way to bring him in here  to get checked out. She is asking that Autumn Jason please prescribe something for nausea that does not cause constipation to see if this helps and can get him to eat.  She states he is not eating much at all. sc

## 2023-03-14 RX ORDER — ONDANSETRON 4 MG/1
4 TABLET, FILM COATED ORAL 3 TIMES DAILY PRN
Qty: 30 TABLET | Refills: 0 | Status: SHIPPED | OUTPATIENT
Start: 2023-03-14

## 2023-03-14 NOTE — TELEPHONE ENCOUNTER
Sent zofran to [facility]. Can take up to 3 times daily as needed. It can cause constipation in some people if not drinking enough fluids but the goal is for him to be able to keep fluids down. Other nausea medications can cause sedation and confusion and I do not want that for him.

## 2023-03-16 DIAGNOSIS — H10.13 ALLERGIC CONJUNCTIVITIS OF BOTH EYES: ICD-10-CM

## 2023-03-16 RX ORDER — KETOTIFEN FUMARATE 0.35 MG/ML
1 SOLUTION/ DROPS OPHTHALMIC 2 TIMES DAILY
Qty: 10 ML | Refills: 0 | Status: SHIPPED | OUTPATIENT
Start: 2023-03-16 | End: 2023-03-26

## 2023-03-20 DIAGNOSIS — H10.13 ALLERGIC CONJUNCTIVITIS OF BOTH EYES: ICD-10-CM

## 2023-03-21 ENCOUNTER — TELEPHONE (OUTPATIENT)
Dept: FAMILY MEDICINE CLINIC | Age: 88
End: 2023-03-21

## 2023-03-21 DIAGNOSIS — Z79.4 TYPE 2 DIABETES MELLITUS WITH DIABETIC POLYNEUROPATHY, WITH LONG-TERM CURRENT USE OF INSULIN (HCC): ICD-10-CM

## 2023-03-21 DIAGNOSIS — E11.42 TYPE 2 DIABETES MELLITUS WITH DIABETIC POLYNEUROPATHY, WITH LONG-TERM CURRENT USE OF INSULIN (HCC): ICD-10-CM

## 2023-03-21 RX ORDER — KETOTIFEN FUMARATE 0.35 MG/ML
1 SOLUTION/ DROPS OPHTHALMIC 2 TIMES DAILY
Qty: 10 ML | Refills: 0 | OUTPATIENT
Start: 2023-03-21 | End: 2023-03-31

## 2023-03-21 RX ORDER — INSULIN GLARGINE 100 [IU]/ML
7 INJECTION, SOLUTION SUBCUTANEOUS NIGHTLY
Qty: 5 ADJUSTABLE DOSE PRE-FILLED PEN SYRINGE | Refills: 1 | Status: SHIPPED | OUTPATIENT
Start: 2023-03-21

## 2023-03-21 RX ORDER — INSULIN GLARGINE 100 [IU]/ML
INJECTION, SOLUTION SUBCUTANEOUS
Status: CANCELLED | OUTPATIENT
Start: 2023-03-21

## 2023-03-21 RX ORDER — INSULIN LISPRO 100 [IU]/ML
0-4 INJECTION, SOLUTION INTRAVENOUS; SUBCUTANEOUS
Qty: 5 ADJUSTABLE DOSE PRE-FILLED PEN SYRINGE | Refills: 1 | Status: SHIPPED | OUTPATIENT
Start: 2023-03-21

## 2023-03-21 NOTE — TELEPHONE ENCOUNTER
I sent lantus and humalog. See orders only. I responded to her on her advise request in her account asking for an update on how much he is taking. I'm assuming he has been doing the lantus as that's what has been refilled for him, but need to verify and update units to ensure he is getting enough. If he is doing basaglar instead, let me know, but lantus really is better.

## 2023-03-21 NOTE — TELEPHONE ENCOUNTER
MESSAGE FROM WIFE ON MYCHART: Irais Mckay,  would you please call in [facility] [place] [employer] (his diabetic medicine). The druggist said that he should have another box here but we do not, he would not refill it, and Chandni really needs it. He said he has about 4 days left. Either they did not give us the entire prescription  to us or we did not pick it up because it has been such a bad time for us and then returned it to stock. Please advise. Thank you. LAST VISIT VV WITH CB ON 01/10/2023 ACUTE, NEXT VISIT NONE. PENDED MED. BUT IT SAYS STOP TAKING AT DISCHARGE? NOT ON CURRENT MED LIST.   DEANN

## 2023-03-24 ENCOUNTER — TELEPHONE (OUTPATIENT)
Dept: FAMILY MEDICINE CLINIC | Age: 88
End: 2023-03-24

## 2023-03-24 NOTE — TELEPHONE ENCOUNTER
This is the numbers that were prescribed while in the hospital. Typically when changing to lantus, it is recommended to half the dose, so I would do 12 initially but increase until fasting sugar in the morning is consistently less than 140.

## 2023-03-24 NOTE — TELEPHONE ENCOUNTER
Pt called in asking for clarification of his Lantus. He states it is the same he was taking, which was basaglar and he was taking 24 units of that. However the order for Lantus says 7 units. He wants to make sure that is correct and not supposed to be higher.

## 2023-04-04 ENCOUNTER — TELEPHONE (OUTPATIENT)
Dept: FAMILY MEDICINE CLINIC | Age: 88
End: 2023-04-04

## 2023-04-04 DIAGNOSIS — E11.42 TYPE 2 DIABETES MELLITUS WITH DIABETIC POLYNEUROPATHY, WITH LONG-TERM CURRENT USE OF INSULIN (HCC): Primary | ICD-10-CM

## 2023-04-04 DIAGNOSIS — Z79.4 TYPE 2 DIABETES MELLITUS WITH DIABETIC POLYNEUROPATHY, WITH LONG-TERM CURRENT USE OF INSULIN (HCC): Primary | ICD-10-CM

## 2023-04-04 RX ORDER — DIMETHICONE 13 MG/ML
1 LOTION TOPICAL 4 TIMES DAILY
Qty: 500 EACH | Refills: 3 | Status: SHIPPED | OUTPATIENT
Start: 2023-04-04

## 2023-04-04 RX ORDER — PEN NEEDLE, DIABETIC 31 GX5/16"
1 NEEDLE, DISPOSABLE MISCELLANEOUS 4 TIMES DAILY
Qty: 500 EACH | Refills: 3 | Status: SHIPPED | OUTPATIENT
Start: 2023-04-04

## 2023-04-04 RX ORDER — GLUCOSAMINE HCL/CHONDROITIN SU 500-400 MG
CAPSULE ORAL
Qty: 500 STRIP | Refills: 3 | Status: SHIPPED | OUTPATIENT
Start: 2023-04-04

## 2023-04-04 NOTE — TELEPHONE ENCOUNTER
They are wanting a new glucose monitor - could you please write a script for one. Nothing to expensive    [id number] [address] - [address]  Baldemar Owusu [phone] -  [phone]    Dayanara @  [phone] - let her know

## 2023-04-04 NOTE — TELEPHONE ENCOUNTER
TR I PENDED THESE WITH CORRECT SIG NEEDED FOR INSURANCE.  PLEASE SIGN OR CORRECT HOW MANY TIMES A DAY HE IS TESTING. DEANN

## 2023-04-06 ENCOUNTER — TELEPHONE (OUTPATIENT)
Dept: CARDIOLOGY CLINIC | Age: 88
End: 2023-04-06

## 2023-04-06 ENCOUNTER — APPOINTMENT (OUTPATIENT)
Dept: GENERAL RADIOLOGY | Age: 88
End: 2023-04-06
Payer: MEDICARE

## 2023-04-06 ENCOUNTER — HOSPITAL ENCOUNTER (INPATIENT)
Age: 88
LOS: 2 days | Discharge: HOME | End: 2023-04-08
Attending: EMERGENCY MEDICINE | Admitting: INTERNAL MEDICINE
Payer: MEDICARE

## 2023-04-06 DIAGNOSIS — R79.89 ELEVATED BRAIN NATRIURETIC PEPTIDE (BNP) LEVEL: ICD-10-CM

## 2023-04-06 DIAGNOSIS — Z71.89 GOALS OF CARE, COUNSELING/DISCUSSION: ICD-10-CM

## 2023-04-06 DIAGNOSIS — E11.42 TYPE 2 DIABETES MELLITUS WITH DIABETIC POLYNEUROPATHY, WITH LONG-TERM CURRENT USE OF INSULIN (HCC): Primary | ICD-10-CM

## 2023-04-06 DIAGNOSIS — R06.02 SHORTNESS OF BREATH: Primary | ICD-10-CM

## 2023-04-06 DIAGNOSIS — Z86.79 HISTORY OF HEART FAILURE: ICD-10-CM

## 2023-04-06 DIAGNOSIS — R79.0 LOW MAGNESIUM LEVEL: ICD-10-CM

## 2023-04-06 DIAGNOSIS — R53.1 GENERALIZED WEAKNESS: ICD-10-CM

## 2023-04-06 DIAGNOSIS — R53.83 OTHER FATIGUE: ICD-10-CM

## 2023-04-06 DIAGNOSIS — R77.8 ELEVATED TROPONIN: ICD-10-CM

## 2023-04-06 DIAGNOSIS — Z79.4 TYPE 2 DIABETES MELLITUS WITH DIABETIC POLYNEUROPATHY, WITH LONG-TERM CURRENT USE OF INSULIN (HCC): Primary | ICD-10-CM

## 2023-04-06 LAB
ALBUMIN SERPL-MCNC: 3.2 G/DL (ref 3.4–5)
ALP SERPL-CCNC: 154 U/L (ref 40–129)
ALT SERPL-CCNC: 10 U/L (ref 10–40)
ANION GAP SERPL CALCULATED.3IONS-SCNC: 11 MMOL/L (ref 3–16)
AST SERPL-CCNC: 18 U/L (ref 15–37)
BASOPHILS # BLD: 0.1 K/UL (ref 0–0.2)
BASOPHILS NFR BLD: 0.8 %
BILIRUB DIRECT SERPL-MCNC: 0.6 MG/DL (ref 0–0.3)
BILIRUB INDIRECT SERPL-MCNC: 0.4 MG/DL (ref 0–1)
BILIRUB SERPL-MCNC: 1 MG/DL (ref 0–1)
BUN SERPL-MCNC: 21 MG/DL (ref 7–20)
CALCIUM SERPL-MCNC: 8.9 MG/DL (ref 8.3–10.6)
CHLORIDE SERPL-SCNC: 98 MMOL/L (ref 99–110)
CO2 SERPL-SCNC: 31 MMOL/L (ref 21–32)
CREAT SERPL-MCNC: 0.9 MG/DL (ref 0.8–1.3)
DEPRECATED RDW RBC AUTO: 16.5 % (ref 12.4–15.4)
EOSINOPHIL # BLD: 0.2 K/UL (ref 0–0.6)
EOSINOPHIL NFR BLD: 2.7 %
GFR SERPLBLD CREATININE-BSD FMLA CKD-EPI: >60 ML/MIN/{1.73_M2}
GLUCOSE BLD-MCNC: 167 MG/DL (ref 70–99)
GLUCOSE SERPL-MCNC: 116 MG/DL (ref 70–99)
HCT VFR BLD AUTO: 37.3 % (ref 40.5–52.5)
HGB BLD-MCNC: 12.3 G/DL (ref 13.5–17.5)
LYMPHOCYTES # BLD: 1 K/UL (ref 1–5.1)
LYMPHOCYTES NFR BLD: 14.8 %
MAGNESIUM SERPL-MCNC: 1.7 MG/DL (ref 1.8–2.4)
MCH RBC QN AUTO: 31.2 PG (ref 26–34)
MCHC RBC AUTO-ENTMCNC: 32.9 G/DL (ref 31–36)
MCV RBC AUTO: 94.8 FL (ref 80–100)
MONOCYTES # BLD: 0.6 K/UL (ref 0–1.3)
MONOCYTES NFR BLD: 9.4 %
NEUTROPHILS # BLD: 4.9 K/UL (ref 1.7–7.7)
NEUTROPHILS NFR BLD: 72.3 %
NT-PROBNP SERPL-MCNC: 6785 PG/ML (ref 0–449)
PERFORMED ON: ABNORMAL
PLATELET # BLD AUTO: 330 K/UL (ref 135–450)
PMV BLD AUTO: 8.5 FL (ref 5–10.5)
POTASSIUM SERPL-SCNC: 3.5 MMOL/L (ref 3.5–5.1)
PROT SERPL-MCNC: 7.7 G/DL (ref 6.4–8.2)
RBC # BLD AUTO: 3.94 M/UL (ref 4.2–5.9)
SODIUM SERPL-SCNC: 140 MMOL/L (ref 136–145)
TROPONIN T SERPL-MCNC: 0.11 NG/ML
WBC # BLD AUTO: 6.8 K/UL (ref 4–11)

## 2023-04-06 PROCEDURE — 84484 ASSAY OF TROPONIN QUANT: CPT

## 2023-04-06 PROCEDURE — 6370000000 HC RX 637 (ALT 250 FOR IP): Performed by: INTERNAL MEDICINE

## 2023-04-06 PROCEDURE — 80048 BASIC METABOLIC PNL TOTAL CA: CPT

## 2023-04-06 PROCEDURE — 85025 COMPLETE CBC W/AUTO DIFF WBC: CPT

## 2023-04-06 PROCEDURE — 71046 X-RAY EXAM CHEST 2 VIEWS: CPT

## 2023-04-06 PROCEDURE — 83880 ASSAY OF NATRIURETIC PEPTIDE: CPT

## 2023-04-06 PROCEDURE — 6360000002 HC RX W HCPCS: Performed by: EMERGENCY MEDICINE

## 2023-04-06 PROCEDURE — 93005 ELECTROCARDIOGRAM TRACING: CPT | Performed by: EMERGENCY MEDICINE

## 2023-04-06 PROCEDURE — 83036 HEMOGLOBIN GLYCOSYLATED A1C: CPT

## 2023-04-06 PROCEDURE — 1200000000 HC SEMI PRIVATE

## 2023-04-06 PROCEDURE — 80076 HEPATIC FUNCTION PANEL: CPT

## 2023-04-06 PROCEDURE — 83735 ASSAY OF MAGNESIUM: CPT

## 2023-04-06 RX ORDER — SODIUM CHLORIDE 0.9 % (FLUSH) 0.9 %
10 SYRINGE (ML) INJECTION PRN
Status: DISCONTINUED | OUTPATIENT
Start: 2023-04-06 | End: 2023-04-08 | Stop reason: HOSPADM

## 2023-04-06 RX ORDER — ACETAMINOPHEN 650 MG/1
650 SUPPOSITORY RECTAL EVERY 6 HOURS PRN
Status: DISCONTINUED | OUTPATIENT
Start: 2023-04-06 | End: 2023-04-08 | Stop reason: HOSPADM

## 2023-04-06 RX ORDER — MAGNESIUM SULFATE IN WATER 40 MG/ML
2000 INJECTION, SOLUTION INTRAVENOUS PRN
Status: DISCONTINUED | OUTPATIENT
Start: 2023-04-06 | End: 2023-04-08 | Stop reason: HOSPADM

## 2023-04-06 RX ORDER — SODIUM CHLORIDE 9 MG/ML
INJECTION, SOLUTION INTRAVENOUS PRN
Status: DISCONTINUED | OUTPATIENT
Start: 2023-04-06 | End: 2023-04-08 | Stop reason: HOSPADM

## 2023-04-06 RX ORDER — FUROSEMIDE 10 MG/ML
40 INJECTION INTRAMUSCULAR; INTRAVENOUS 2 TIMES DAILY
Status: DISCONTINUED | OUTPATIENT
Start: 2023-04-07 | End: 2023-04-08

## 2023-04-06 RX ORDER — ALLOPURINOL 100 MG/1
100 TABLET ORAL DAILY
Status: DISCONTINUED | OUTPATIENT
Start: 2023-04-07 | End: 2023-04-08 | Stop reason: HOSPADM

## 2023-04-06 RX ORDER — DEXTROSE MONOHYDRATE 100 MG/ML
INJECTION, SOLUTION INTRAVENOUS CONTINUOUS PRN
Status: DISCONTINUED | OUTPATIENT
Start: 2023-04-06 | End: 2023-04-08 | Stop reason: HOSPADM

## 2023-04-06 RX ORDER — FAMOTIDINE 20 MG/1
20 TABLET, FILM COATED ORAL 2 TIMES DAILY
COMMUNITY
Start: 2023-04-04

## 2023-04-06 RX ORDER — ACETAMINOPHEN 325 MG/1
650 TABLET ORAL EVERY 6 HOURS PRN
Status: DISCONTINUED | OUTPATIENT
Start: 2023-04-06 | End: 2023-04-08 | Stop reason: HOSPADM

## 2023-04-06 RX ORDER — GABAPENTIN 300 MG/1
300 CAPSULE ORAL NIGHTLY
COMMUNITY

## 2023-04-06 RX ORDER — POTASSIUM CHLORIDE 7.45 MG/ML
10 INJECTION INTRAVENOUS PRN
Status: DISCONTINUED | OUTPATIENT
Start: 2023-04-06 | End: 2023-04-06 | Stop reason: SDUPTHER

## 2023-04-06 RX ORDER — FAMOTIDINE 20 MG/1
20 TABLET, FILM COATED ORAL 2 TIMES DAILY
Status: DISCONTINUED | OUTPATIENT
Start: 2023-04-06 | End: 2023-04-08 | Stop reason: HOSPADM

## 2023-04-06 RX ORDER — POLYETHYLENE GLYCOL 3350 17 G/17G
17 POWDER, FOR SOLUTION ORAL DAILY PRN
Status: DISCONTINUED | OUTPATIENT
Start: 2023-04-06 | End: 2023-04-08 | Stop reason: HOSPADM

## 2023-04-06 RX ORDER — INSULIN GLARGINE 100 [IU]/ML
7 INJECTION, SOLUTION SUBCUTANEOUS NIGHTLY
Status: DISCONTINUED | OUTPATIENT
Start: 2023-04-06 | End: 2023-04-08 | Stop reason: HOSPADM

## 2023-04-06 RX ORDER — POTASSIUM CHLORIDE 20 MEQ/1
40 TABLET, EXTENDED RELEASE ORAL PRN
Status: DISCONTINUED | OUTPATIENT
Start: 2023-04-06 | End: 2023-04-08 | Stop reason: HOSPADM

## 2023-04-06 RX ORDER — PROMETHAZINE HYDROCHLORIDE 25 MG/1
12.5 TABLET ORAL EVERY 6 HOURS PRN
Status: DISCONTINUED | OUTPATIENT
Start: 2023-04-06 | End: 2023-04-08 | Stop reason: HOSPADM

## 2023-04-06 RX ORDER — SODIUM CHLORIDE 0.9 % (FLUSH) 0.9 %
10 SYRINGE (ML) INJECTION EVERY 12 HOURS SCHEDULED
Status: DISCONTINUED | OUTPATIENT
Start: 2023-04-06 | End: 2023-04-08 | Stop reason: HOSPADM

## 2023-04-06 RX ORDER — FUROSEMIDE 10 MG/ML
60 INJECTION INTRAMUSCULAR; INTRAVENOUS ONCE
Status: COMPLETED | OUTPATIENT
Start: 2023-04-06 | End: 2023-04-06

## 2023-04-06 RX ORDER — DOCUSATE SODIUM 100 MG/1
100 CAPSULE, LIQUID FILLED ORAL 2 TIMES DAILY PRN
Status: DISCONTINUED | OUTPATIENT
Start: 2023-04-06 | End: 2023-04-08 | Stop reason: HOSPADM

## 2023-04-06 RX ORDER — METOPROLOL SUCCINATE 50 MG/1
50 TABLET, EXTENDED RELEASE ORAL NIGHTLY
Status: DISCONTINUED | OUTPATIENT
Start: 2023-04-06 | End: 2023-04-08 | Stop reason: HOSPADM

## 2023-04-06 RX ORDER — ENOXAPARIN SODIUM 100 MG/ML
40 INJECTION SUBCUTANEOUS NIGHTLY
Status: DISCONTINUED | OUTPATIENT
Start: 2023-04-06 | End: 2023-04-06

## 2023-04-06 RX ORDER — GABAPENTIN 300 MG/1
300 CAPSULE ORAL NIGHTLY
Status: DISCONTINUED | OUTPATIENT
Start: 2023-04-06 | End: 2023-04-08 | Stop reason: HOSPADM

## 2023-04-06 RX ORDER — ONDANSETRON 2 MG/ML
4 INJECTION INTRAMUSCULAR; INTRAVENOUS EVERY 6 HOURS PRN
Status: DISCONTINUED | OUTPATIENT
Start: 2023-04-06 | End: 2023-04-08 | Stop reason: HOSPADM

## 2023-04-06 RX ORDER — INSULIN LISPRO 100 [IU]/ML
0-4 INJECTION, SOLUTION INTRAVENOUS; SUBCUTANEOUS NIGHTLY
Status: DISCONTINUED | OUTPATIENT
Start: 2023-04-06 | End: 2023-04-08 | Stop reason: HOSPADM

## 2023-04-06 RX ORDER — POTASSIUM CHLORIDE 7.45 MG/ML
10 INJECTION INTRAVENOUS PRN
Status: DISCONTINUED | OUTPATIENT
Start: 2023-04-06 | End: 2023-04-08 | Stop reason: HOSPADM

## 2023-04-06 RX ORDER — INSULIN LISPRO 100 [IU]/ML
0-8 INJECTION, SOLUTION INTRAVENOUS; SUBCUTANEOUS
Status: DISCONTINUED | OUTPATIENT
Start: 2023-04-07 | End: 2023-04-08 | Stop reason: HOSPADM

## 2023-04-06 RX ADMIN — FUROSEMIDE 60 MG: 10 INJECTION, SOLUTION INTRAMUSCULAR; INTRAVENOUS at 17:14

## 2023-04-06 RX ADMIN — APIXABAN 5 MG: 5 TABLET, FILM COATED ORAL at 21:01

## 2023-04-06 RX ADMIN — GABAPENTIN 300 MG: 300 CAPSULE ORAL at 21:01

## 2023-04-06 RX ADMIN — INSULIN GLARGINE 7 UNITS: 100 INJECTION, SOLUTION SUBCUTANEOUS at 21:01

## 2023-04-06 RX ADMIN — METOPROLOL SUCCINATE 50 MG: 50 TABLET, EXTENDED RELEASE ORAL at 21:01

## 2023-04-06 RX ADMIN — FAMOTIDINE 20 MG: 20 TABLET, FILM COATED ORAL at 21:01

## 2023-04-06 ASSESSMENT — LIFESTYLE VARIABLES
HOW MANY STANDARD DRINKS CONTAINING ALCOHOL DO YOU HAVE ON A TYPICAL DAY: PATIENT DOES NOT DRINK
HOW OFTEN DO YOU HAVE A DRINK CONTAINING ALCOHOL: NEVER

## 2023-04-06 ASSESSMENT — PAIN - FUNCTIONAL ASSESSMENT: PAIN_FUNCTIONAL_ASSESSMENT: NONE - DENIES PAIN

## 2023-04-06 NOTE — ED TRIAGE NOTES
Pt has been getting progressively weak over the last week. He went to his home MD and had a chest Xray. The results came back today and they say \"He has a lot of water on his lungs and needs to go to the ER. \" Pt states he was newly started and a water pill.

## 2023-04-06 NOTE — CARE COORDINATION
SW attempted to see patient, in testing, will see patient if time allows.
representative Purvi Keyes and his family were provided with a choice of provider and agrees with the discharge plan. Freedom of choice list with basic dialogue that supports the patient's individualized plan of care/goals and shares the quality data associated with the providers was provided to:     Patient Representative Name:       The Patient and/or Patient Representative Agree with the Discharge Plan?       Dionicio Sears [facility]  Case Management Department  Ph: [phone] Fax: [phone]

## 2023-04-06 NOTE — ACP (ADVANCE CARE PLANNING)
Advance Care Planning     Advance Care Planning Activator (Inpatient)  Conversation Note      Date of ACP Conversation: 4/6/2023     Conversation Conducted with: Patient with Decision Making Capacity    ACP Activator: Todd Liang, [address]:     Current Designated Health Care Decision Maker:     Primary Decision Maker: Davon Simons - [phone]    Today we documented Decision Maker(s) consistent with Legal Next of Kin hierarchy. Care Preferences    Ventilation: \"If you were in your present state of health and suddenly became very ill and were unable to breathe on your own, what would your preference be about the use of a ventilator (breathing machine) if it were available to you? \"      Would the patient desire the use of ventilator (breathing machine)?: yes    \"If your health worsens and it becomes clear that your chance of recovery is unlikely, what would your preference be about the use of a ventilator (breathing machine) if it were available to you? \"     Would the patient desire the use of ventilator (breathing machine)?: Yes      Resuscitation  \"CPR works best to restart the heart when there is a sudden event, like a heart attack, in someone who is otherwise healthy. Unfortunately, CPR does not typically restart the heart for people who have serious health conditions or who are very sick. \"    \"In the event your heart stopped as a result of an underlying serious health condition, would you want attempts to be made to restart your heart (answer \"yes\" for attempt to resuscitate) or would you prefer a natural death (answer \"no\" for do not attempt to resuscitate)? \" yes       [] Yes   [] No   Educated Patient / Braydon Rudd regarding differences between Advance Directives and portable DNR orders.     Length of ACP Conversation in minutes:      Conversation Outcomes:  ACP discussion completed    Follow-up plan:    [] Schedule follow-up conversation to continue planning  []

## 2023-04-06 NOTE — ED NOTES

## 2023-04-06 NOTE — ED NOTES
Report called to [facility] AND [facility], No questions at this time. VSS. Pt and family updated on plan of care.       Jimmy Grant RN  04/06/23 6342

## 2023-04-06 NOTE — ED PROVIDER NOTES
eye: No discharge. Left eye: No discharge. Conjunctiva/sclera: Conjunctivae normal.   Neck:      Trachea: No tracheal deviation. Cardiovascular:      Rate and Rhythm: Normal rate. Heart sounds: No murmur heard. No gallop. Pulmonary:      Effort: Pulmonary effort is normal. No respiratory distress. Abdominal:      Tenderness: There is no abdominal tenderness. There is no guarding or rebound. Musculoskeletal:      Cervical back: Normal range of motion. Comments: Trace bilateral lower extremity edema   Skin:     General: Skin is dry. Capillary Refill: Capillary refill takes less than 2 seconds. Neurological:      General: No focal deficit present. Mental Status: He is alert and oriented to person, place, and time. Psychiatric:         Mood and Affect: Mood normal.     DIAGNOSTIC RESULTS   EKG: interpreted by the Emergency Department Physician who either signs or Co-signs this chart in the absence of a cardiologist.  Indication: Fatigue  Interpretation: Rate 77, rhythm A-fib rate controlled, axis left.  with a right bundle branch block morphology noted. QTc 491. Comparison to prior EKG from October 25, 2022 shows similar morphology including the A-fib that is rate controlled, the left axis deviation, the right bundle branch block. No acute ischemic changes are noted. RADIOLOGY:   Non-plain film images such as CT, Ultrasound and MRI are read by the radiologist. Plain radiographic images are visualized and preliminarily interpreted by the ED Provider with the below findings:  Chest x-ray shows effusion on the left  Interpretation per Radiologist below, if available at the time of this note:  XR CHEST (2 VW)   Final Result   Moderate left and small right pleural effusion with associated infiltrates in   the lower lungs. Mild-to-moderate congestion.            LABS:  Labs Reviewed   CBC WITH AUTO DIFFERENTIAL - Abnormal; Notable for the following components:

## 2023-04-07 LAB
ANION GAP SERPL CALCULATED.3IONS-SCNC: 10 MMOL/L (ref 3–16)
BASOPHILS # BLD: 0 K/UL (ref 0–0.2)
BASOPHILS NFR BLD: 0.4 %
BUN SERPL-MCNC: 22 MG/DL (ref 7–20)
CALCIUM SERPL-MCNC: 8.9 MG/DL (ref 8.3–10.6)
CHLORIDE SERPL-SCNC: 97 MMOL/L (ref 99–110)
CO2 SERPL-SCNC: 31 MMOL/L (ref 21–32)
CREAT SERPL-MCNC: 1 MG/DL (ref 0.8–1.3)
DEPRECATED RDW RBC AUTO: 16.7 % (ref 12.4–15.4)
EKG DIAGNOSIS: NORMAL
EKG Q-T INTERVAL: 434 MS
EKG QRS DURATION: 124 MS
EKG QTC CALCULATION (BAZETT): 491 MS
EKG R AXIS: -73 DEGREES
EKG T AXIS: 76 DEGREES
EKG VENTRICULAR RATE: 77 BPM
EOSINOPHIL # BLD: 0.1 K/UL (ref 0–0.6)
EOSINOPHIL NFR BLD: 1.3 %
EST. AVERAGE GLUCOSE BLD GHB EST-MCNC: 125.5 MG/DL
GFR SERPLBLD CREATININE-BSD FMLA CKD-EPI: >60 ML/MIN/{1.73_M2}
GLUCOSE BLD-MCNC: 119 MG/DL (ref 70–99)
GLUCOSE BLD-MCNC: 179 MG/DL (ref 70–99)
GLUCOSE BLD-MCNC: 185 MG/DL (ref 70–99)
GLUCOSE BLD-MCNC: 95 MG/DL (ref 70–99)
GLUCOSE SERPL-MCNC: 88 MG/DL (ref 70–99)
HBA1C MFR BLD: 6 %
HCT VFR BLD AUTO: 37.2 % (ref 40.5–52.5)
HGB BLD-MCNC: 12.3 G/DL (ref 13.5–17.5)
LYMPHOCYTES # BLD: 0.9 K/UL (ref 1–5.1)
LYMPHOCYTES NFR BLD: 14.3 %
MAGNESIUM SERPL-MCNC: 1.6 MG/DL (ref 1.8–2.4)
MCH RBC QN AUTO: 31.4 PG (ref 26–34)
MCHC RBC AUTO-ENTMCNC: 33.2 G/DL (ref 31–36)
MCV RBC AUTO: 94.7 FL (ref 80–100)
MONOCYTES # BLD: 0.7 K/UL (ref 0–1.3)
MONOCYTES NFR BLD: 11 %
NEUTROPHILS # BLD: 4.4 K/UL (ref 1.7–7.7)
NEUTROPHILS NFR BLD: 73 %
PERFORMED ON: ABNORMAL
PERFORMED ON: NORMAL
PLATELET # BLD AUTO: 276 K/UL (ref 135–450)
PMV BLD AUTO: 8.2 FL (ref 5–10.5)
POTASSIUM SERPL-SCNC: 3.3 MMOL/L (ref 3.5–5.1)
RBC # BLD AUTO: 3.93 M/UL (ref 4.2–5.9)
SODIUM SERPL-SCNC: 138 MMOL/L (ref 136–145)
TROPONIN T SERPL-MCNC: 0.1 NG/ML
TROPONIN T SERPL-MCNC: 0.1 NG/ML
WBC # BLD AUTO: 6 K/UL (ref 4–11)

## 2023-04-07 PROCEDURE — 97116 GAIT TRAINING THERAPY: CPT

## 2023-04-07 PROCEDURE — 6370000000 HC RX 637 (ALT 250 FOR IP): Performed by: INTERNAL MEDICINE

## 2023-04-07 PROCEDURE — 80048 BASIC METABOLIC PNL TOTAL CA: CPT

## 2023-04-07 PROCEDURE — 84484 ASSAY OF TROPONIN QUANT: CPT

## 2023-04-07 PROCEDURE — 97535 SELF CARE MNGMENT TRAINING: CPT

## 2023-04-07 PROCEDURE — 94760 N-INVAS EAR/PLS OXIMETRY 1: CPT

## 2023-04-07 PROCEDURE — 97162 PT EVAL MOD COMPLEX 30 MIN: CPT

## 2023-04-07 PROCEDURE — 1200000000 HC SEMI PRIVATE

## 2023-04-07 PROCEDURE — 97530 THERAPEUTIC ACTIVITIES: CPT

## 2023-04-07 PROCEDURE — 97165 OT EVAL LOW COMPLEX 30 MIN: CPT

## 2023-04-07 PROCEDURE — 36415 COLL VENOUS BLD VENIPUNCTURE: CPT

## 2023-04-07 PROCEDURE — 85025 COMPLETE CBC W/AUTO DIFF WBC: CPT

## 2023-04-07 PROCEDURE — 6360000002 HC RX W HCPCS: Performed by: INTERNAL MEDICINE

## 2023-04-07 PROCEDURE — 2580000003 HC RX 258: Performed by: INTERNAL MEDICINE

## 2023-04-07 PROCEDURE — 83735 ASSAY OF MAGNESIUM: CPT

## 2023-04-07 RX ADMIN — ALLOPURINOL 100 MG: 100 TABLET ORAL at 08:59

## 2023-04-07 RX ADMIN — APIXABAN 5 MG: 5 TABLET, FILM COATED ORAL at 08:58

## 2023-04-07 RX ADMIN — FAMOTIDINE 20 MG: 20 TABLET, FILM COATED ORAL at 21:12

## 2023-04-07 RX ADMIN — INSULIN GLARGINE 7 UNITS: 100 INJECTION, SOLUTION SUBCUTANEOUS at 21:00

## 2023-04-07 RX ADMIN — METOPROLOL SUCCINATE 50 MG: 50 TABLET, EXTENDED RELEASE ORAL at 21:12

## 2023-04-07 RX ADMIN — Medication 10 ML: at 09:00

## 2023-04-07 RX ADMIN — APIXABAN 5 MG: 5 TABLET, FILM COATED ORAL at 21:12

## 2023-04-07 RX ADMIN — FAMOTIDINE 20 MG: 20 TABLET, FILM COATED ORAL at 08:58

## 2023-04-07 RX ADMIN — FUROSEMIDE 40 MG: 10 INJECTION, SOLUTION INTRAMUSCULAR; INTRAVENOUS at 08:58

## 2023-04-07 RX ADMIN — GABAPENTIN 300 MG: 300 CAPSULE ORAL at 21:12

## 2023-04-07 RX ADMIN — Medication 10 ML: at 21:14

## 2023-04-07 RX ADMIN — FUROSEMIDE 40 MG: 10 INJECTION, SOLUTION INTRAMUSCULAR; INTRAVENOUS at 17:07

## 2023-04-07 NOTE — H&P
TABLET BY MOUTH EVERY DAY 1/10/23   Patsy Melissa MD   ketoconazole (NIZORAL) 2 % cream Apply topically daily to feet as needed for itching, peeling 1/10/23   Deuceomar Hopson, APRN - CNP   Artificial Saliva (MOUTH KOTE) SOLN solution Use as needed for dry mouth up to every 4 hours 12/5/22   MARIA FERNANDA Taylor CNP   docusate sodium (COLACE) 100 MG capsule Take 1 capsule by mouth 2 times daily as needed for Constipation 12/5/22   MARIA FERNANDA Taylor CNP   metoprolol succinate (TOPROL XL) 50 MG extended release tablet TAKE 1 TABLET BY MOUTH EVERY DAY 12/5/22   MARIA FERNANDA Taylor CNP   Insulin Pen Needle (B-D ULTRAFINE III SHORT PEN) 31G X 8 MM MISC USE DAILY WITH INSULIN 12/5/22   MARIA FERNANDA Taylor CNP   Elastic Bandages & Supports (MEDICAL COMPRESSION STOCKINGS) MISC 1 each by Does not apply route daily as needed (daily thigh high compression stockings (15-20 mmHg)) 9/19/22   Dianelys Bills MD   torsemide (DEMADEX) 20 MG tablet 20 mg daily except on MWF 20 mg BID 6/15/22   Patsy Melissa MD       Allergies:  Levofloxacin    Social History:      TOBACCO:   reports that he quit smoking about 50 years ago. His smoking use included cigarettes. He has never used smokeless tobacco.  ETOH:   reports no history of alcohol use. Family History:       Reviewed in detail and non contributory          Family history unknown: Yes       REVIEW OF SYSTEMS:   Pertinent positives as noted in the HPI. All other systems reviewed and negative. PHYSICAL EXAM PERFORMED:    /68   Pulse 76   Temp 97.5 °F (36.4 °C) (Temporal)   Resp 16   Ht 6' (1.829 m)   Wt 190 lb 11.2 oz (86.5 kg)   SpO2 98%   BMI 25.86 kg/m²     General appearance:  No apparent distress, cooperative. HEENT:  Normal cephalic, atraumatic without obvious deformity. Conjunctivae/corneas clear. Neck: Supple, with full range of motion.  No cervical lymphadenopathy  Respiratory:  He has b/l lung crackles  Cardiovascular:  Regular rate

## 2023-04-07 NOTE — FLOWSHEET NOTE
04/07/23 1546   Vital Signs   Temp 98.2 °F (36.8 °C)   Temp Source Oral   Heart Rate 76   Heart Rate Source Monitor   Resp 17   /67   MAP (Calculated) 80   MAP (mmHg) 80   Oxygen Therapy   SpO2 97 %   O2 Device None (Room air)     Pt resting quietly no distress noted call light within reach and bed in low position for pt safety

## 2023-04-07 NOTE — DISCHARGE INSTRUCTIONS
Extra Heart Failure sites:     https://OBMedical.com/   --- this is American Heart Association interactive Healthier Living with Heart Failure guidebook. Please click hyperlink or copy / paste link into search bar. Use your mouse to scroll through the pages. Lots of information about weight monitoring, diet tips, activity, meds, etc    HF Rotan jas  -- this is a free smart phone jas available for iPhone and Android download. Use your phone to track sodium / fluid intake, zone tool symptom tracking, weights, medications, etc. Click on this hyperlink  HF Rotan Jas   for QR code for easy download. DASH (Dietary Approach to Stop Hypertension) diet --  SeekAlumni.no -- this diet is a flexible eating plan that promotes heart healthy eating style. Click on hyperlink or copy / paste link into search bar. Lots of low sodium recipes and tips. CigarRepair.ca  -- more free recipes      Put Zinc cream on excoriated areas as needed.

## 2023-04-07 NOTE — PLAN OF CARE
Problem: Discharge Planning  Goal: Discharge to home or other facility with appropriate resources  Outcome: Progressing  Flowsheets (Taken 4/6/2023 2252 by Aspen Hou RN)  Discharge to home or other facility with appropriate resources:   Arrange for needed discharge resources and transportation as appropriate   Identify barriers to discharge with patient and caregiver   Identify discharge learning needs (meds, wound care, etc)   Arrange for interpreters to assist at discharge as needed   Refer to discharge planning if patient needs post-hospital services based on physician order or complex needs related to functional status, cognitive ability or social support system     Problem: Safety - Adult  Goal: Free from fall injury  Outcome: Progressing     Problem: ABCDS Injury Assessment  Goal: Absence of physical injury  Outcome: Progressing

## 2023-04-07 NOTE — CONSULTS
vial 0-4 Units, 0-4 Units, SubCUTAneous, Nightly      Labs:   Recent Labs     04/06/23  1345 04/07/23  0408   WBC 6.8 6.0   HGB 12.3* 12.3*   HCT 37.3* 37.2*    276     Recent Labs     04/06/23  1345 04/07/23  0613    138   K 3.5 3.3*   CO2 31 31   BUN 21* 22*   CREATININE 0.9 1.0   GLUCOSE 116* 88     No results for input(s): BNP in the last 72 hours. No results for input(s): PROTIME, INR in the last 72 hours. No results for input(s): APTT in the last 72 hours. Recent Labs     04/06/23  1345 04/07/23  0252 04/07/23  0613   TROPONINI 0.11* 0.10* 0.10*     Lab Results   Component Value Date/Time    HDL 34 10/26/2022 06:28 AM    LDLCALC 56 10/26/2022 06:28 AM    TRIG 44 10/26/2022 06:28 AM     Recent Labs     04/06/23  1345   AST 18   ALT 10   LABALBU 3.2*         EKG:   Atrial fibrillation    Stress Nuclear: 9/26/2018  Pharmacological Stress/MPI Results:        1. Technically a satisfactory study. 2. Normal pharmacological stress portion of the study. 3. No evidence of Ischemia by Myocardial Perfusion Imaging. 4. Gated Study shows dilated LV with EF of 59 %. Echo: 5/2021   Patient appears to be in atrial fibrillation. Normal left ventricle size and systolic function with an estimated ejection   fraction of 50-55%. No regional wall motion abnormalities are seen. There is   mildly increased left ventricular wall thickness. Ungraded diastolic   dysfunction (secondary to arrhythmia) with elevated LV filling pressures. The right ventricle is not well visualized but function appears reduced. The left and right atria appear moderately dilated. Moderate mitral and tricuspid regurgitation. Trivial aortic regurgitation. Stress Nuclear:      ASSESSMENT AND PLAN:        41-year-old gentleman with history of diastolic heart failure chronic atrial fibrillation presents with shortness of breath lower extremity edema and fatigue.   His proBNP is markedly elevated suggestive of acute on

## 2023-04-07 NOTE — FLOWSHEET NOTE
04/07/23 0728   Vital Signs   Temp 97.9 °F (36.6 °C)   Temp Source Oral   Heart Rate 72   Heart Rate Source Monitor   Resp 17   /70   MAP (Calculated) 86   MAP (mmHg) 86   BP Location Right Arm   Pain Assessment   Pain Assessment None - Denies Pain   Oxygen Therapy   SpO2 93 %   O2 Device None (Room air)     See flowsheet for full assessment call light within reach and bed in low position for pt safety pt is caox3 resp even

## 2023-04-08 VITALS
DIASTOLIC BLOOD PRESSURE: 63 MMHG | BODY MASS INDEX: 24.63 KG/M2 | TEMPERATURE: 97.8 F | HEART RATE: 79 BPM | HEIGHT: 72 IN | WEIGHT: 181.88 LBS | RESPIRATION RATE: 16 BRPM | SYSTOLIC BLOOD PRESSURE: 101 MMHG | OXYGEN SATURATION: 95 %

## 2023-04-08 LAB
ANION GAP SERPL CALCULATED.3IONS-SCNC: 13 MMOL/L (ref 3–16)
BASOPHILS # BLD: 0 K/UL (ref 0–0.2)
BASOPHILS NFR BLD: 0.6 %
BUN SERPL-MCNC: 25 MG/DL (ref 7–20)
CALCIUM SERPL-MCNC: 9.2 MG/DL (ref 8.3–10.6)
CHLORIDE SERPL-SCNC: 96 MMOL/L (ref 99–110)
CO2 SERPL-SCNC: 31 MMOL/L (ref 21–32)
CREAT SERPL-MCNC: 0.9 MG/DL (ref 0.8–1.3)
DEPRECATED RDW RBC AUTO: 16.3 % (ref 12.4–15.4)
EOSINOPHIL # BLD: 0.1 K/UL (ref 0–0.6)
EOSINOPHIL NFR BLD: 1.5 %
GFR SERPLBLD CREATININE-BSD FMLA CKD-EPI: >60 ML/MIN/{1.73_M2}
GLUCOSE BLD-MCNC: 106 MG/DL (ref 70–99)
GLUCOSE BLD-MCNC: 139 MG/DL (ref 70–99)
GLUCOSE SERPL-MCNC: 109 MG/DL (ref 70–99)
HCT VFR BLD AUTO: 37.8 % (ref 40.5–52.5)
HGB BLD-MCNC: 12.8 G/DL (ref 13.5–17.5)
LYMPHOCYTES # BLD: 1.1 K/UL (ref 1–5.1)
LYMPHOCYTES NFR BLD: 17.4 %
MAGNESIUM SERPL-MCNC: 1.6 MG/DL (ref 1.8–2.4)
MCH RBC QN AUTO: 32 PG (ref 26–34)
MCHC RBC AUTO-ENTMCNC: 33.8 G/DL (ref 31–36)
MCV RBC AUTO: 94.6 FL (ref 80–100)
MONOCYTES # BLD: 0.6 K/UL (ref 0–1.3)
MONOCYTES NFR BLD: 9.6 %
NEUTROPHILS # BLD: 4.4 K/UL (ref 1.7–7.7)
NEUTROPHILS NFR BLD: 70.9 %
PERFORMED ON: ABNORMAL
PERFORMED ON: ABNORMAL
PLATELET # BLD AUTO: 308 K/UL (ref 135–450)
PMV BLD AUTO: 8.4 FL (ref 5–10.5)
POTASSIUM SERPL-SCNC: 3.6 MMOL/L (ref 3.5–5.1)
RBC # BLD AUTO: 3.99 M/UL (ref 4.2–5.9)
SODIUM SERPL-SCNC: 140 MMOL/L (ref 136–145)
WBC # BLD AUTO: 6.2 K/UL (ref 4–11)

## 2023-04-08 PROCEDURE — 85025 COMPLETE CBC W/AUTO DIFF WBC: CPT

## 2023-04-08 PROCEDURE — 36415 COLL VENOUS BLD VENIPUNCTURE: CPT

## 2023-04-08 PROCEDURE — 83735 ASSAY OF MAGNESIUM: CPT

## 2023-04-08 PROCEDURE — 6370000000 HC RX 637 (ALT 250 FOR IP): Performed by: INTERNAL MEDICINE

## 2023-04-08 PROCEDURE — 94760 N-INVAS EAR/PLS OXIMETRY 1: CPT

## 2023-04-08 PROCEDURE — 6360000002 HC RX W HCPCS: Performed by: INTERNAL MEDICINE

## 2023-04-08 PROCEDURE — 2580000003 HC RX 258: Performed by: INTERNAL MEDICINE

## 2023-04-08 PROCEDURE — 80048 BASIC METABOLIC PNL TOTAL CA: CPT

## 2023-04-08 RX ORDER — POTASSIUM CHLORIDE 750 MG/1
10 TABLET, EXTENDED RELEASE ORAL 2 TIMES DAILY
Qty: 60 TABLET | Refills: 5 | Status: SHIPPED | OUTPATIENT
Start: 2023-04-08

## 2023-04-08 RX ORDER — FUROSEMIDE 40 MG/1
40 TABLET ORAL 2 TIMES DAILY
Qty: 60 TABLET | Refills: 3 | Status: SHIPPED | OUTPATIENT
Start: 2023-04-08

## 2023-04-08 RX ORDER — POTASSIUM CHLORIDE 20 MEQ/1
10 TABLET, EXTENDED RELEASE ORAL 2 TIMES DAILY WITH MEALS
Status: DISCONTINUED | OUTPATIENT
Start: 2023-04-08 | End: 2023-04-08 | Stop reason: HOSPADM

## 2023-04-08 RX ORDER — FUROSEMIDE 40 MG/1
40 TABLET ORAL 2 TIMES DAILY
Status: DISCONTINUED | OUTPATIENT
Start: 2023-04-08 | End: 2023-04-08 | Stop reason: HOSPADM

## 2023-04-08 RX ADMIN — ALLOPURINOL 100 MG: 100 TABLET ORAL at 08:50

## 2023-04-08 RX ADMIN — Medication 10 ML: at 08:50

## 2023-04-08 RX ADMIN — FUROSEMIDE 40 MG: 10 INJECTION, SOLUTION INTRAMUSCULAR; INTRAVENOUS at 08:54

## 2023-04-08 RX ADMIN — APIXABAN 5 MG: 5 TABLET, FILM COATED ORAL at 08:50

## 2023-04-08 RX ADMIN — FAMOTIDINE 20 MG: 20 TABLET, FILM COATED ORAL at 08:50

## 2023-04-08 NOTE — DISCHARGE SUMMARY
high compression stockings (15-20 mmHg))     metoprolol succinate 50 MG extended release tablet  Commonly known as: TOPROL XL  TAKE 1 TABLET BY MOUTH EVERY DAY     mouth kote Soln solution  Use as needed for dry mouth up to every 4 hours     ondansetron 4 MG tablet  Commonly known as: ZOFRAN  Take 1 tablet by mouth 3 times daily as needed for Nausea or Vomiting         STOP taking these medications      torsemide 20 MG tablet  Commonly known as: DEMADEX       These medications were sent to [facility]/pharmacy [address] [place] [address], [address] [place] [address]. [facility] [phone] Illa Delay [phone]  [address] [facility] [address]     furosemide 40 MG tablet  potassium chloride 10 MEQ extended release tablet           Consults:  Cardiology    Significant Diagnostic Studies:    PA/lat CXR (4/6) Moderate left and small right pleural effusion with associated infiltrates in the lower lungs. Mild-to-moderate congestion. Treatments:   IV diuresis, medication adjustment    Disposition:   Home with self care  Follow up with MARIA FERNANDA Perez CNP in 1-2 weeks.   Follow up with cardiology, Dr Gayla Barnard, in the next 1-2 weeks      Signed:  John Martinez MD  4/8/2023, 1:28 PM

## 2023-04-08 NOTE — PLAN OF CARE
Problem: Discharge Planning  Goal: Discharge to home or other facility with appropriate resources  Outcome: Progressing  Flowsheets (Taken 4/8/2023 0818)  Discharge to home or other facility with appropriate resources: Identify barriers to discharge with patient and caregiver     Problem: Safety - Adult  Goal: Free from fall injury  Outcome: Progressing  Flowsheets (Taken 4/8/2023 0818)  Free From Fall Injury: Instruct family/caregiver on patient safety     Problem: ABCDS Injury Assessment  Goal: Absence of physical injury  Outcome: Progressing  Flowsheets (Taken 4/8/2023 0818)  Absence of Physical Injury: Implement safety measures based on patient assessment     Problem: Respiratory - Adult  Goal: Achieves optimal ventilation and oxygenation  Outcome: Progressing  Flowsheets (Taken 4/8/2023 0818)  Achieves optimal ventilation and oxygenation:   Assess for changes in respiratory status   Assess for changes in mentation and behavior     Problem: Cardiovascular - Adult  Goal: Maintains optimal cardiac output and hemodynamic stability  Outcome: Progressing     Problem: Metabolic/Fluid and Electrolytes - Adult  Goal: Electrolytes maintained within normal limits  Outcome: Progressing

## 2023-04-08 NOTE — DISCHARGE INSTR - COC
Continuity of Care Form    Patient Name: Evelin Perkins   :  1935  MRN:  [id number]    Admit date:  2023  Discharge date:  ***    Code Status Order: Full Code   Advance Directives:     Admitting Physician:  Isma Hollingsworth MD  PCP: Ashley Guadarrama, APRN - CNP    Discharging Nurse: [facility] Unit/Room#: W1H-6633/3116-01  Discharging Unit Phone Number: ***    Emergency Contact:   Extended Emergency Contact Information  Primary Emergency Contact: Dayanara Stover  Address: [address] Phone: [phone]  Mobile Phone: [phone]  Relation: Spouse  Other needs: Other   needed?  No  Secondary Emergency Contact: [address] [place] [address] Phone: [phone]  Relation: Child    Past Surgical History:  Past Surgical History:   Procedure Laterality Date    CARDIOVASCULAR STRESS TEST  2018    negative    CARPAL TUNNEL RELEASE Right 2018    HAND SURGERY      Left carpel tunnel     HIP SURGERY Bilateral     total hips       Immunization History:   Immunization History   Administered Date(s) Administered    COVID-19, MODERNA BLUE border, Primary or Immunocompromised, (age 12y+), IM, 100 mcg/0.5mL 2021, 2021, 2021    Influenza 10/25/2010    Influenza Virus Vaccine 10/12/2004, 10/17/2007, 2009, 10/31/2013, 10/23/2014, 2015, 2016    Influenza, FLUAD, (age 72 y+), Adjuvanted, 0.5mL 10/14/2021    Influenza, FLUZONE (age 72 y+), High Dose, 0.7mL 2020    Influenza, High Dose (Fluzone 65 yrs and older) 2011, 2012, 10/31/2013, 10/23/2014, 2015, 2016, 2018, 10/02/2019    Pneumococcal, PCV-13, PREVNAR 13, (age 6w+), IM, 0.5mL 2015    Zoster Live (Zostavax) 2011       Active Problems:  Patient Active Problem List   Diagnosis Code    Lumbar disc disease M51.9    Iron deficiency anemia D50.9    Edema R60.9    Degeneration of cervical

## 2023-04-08 NOTE — PROGRESS NOTES
4 Eyes Skin Assessment     NAME:  Haseeb Arciniega  YOB: 1935  MEDICAL RECORD NUMBER:  [id number]    The patient is being assessed for  Admission    I agree that One RN has performed a thorough Head to Toe Skin Assessment on the patient. ALL assessment sites listed below have been assessed. Areas assessed by both nurses:            Does the Patient have a Wound?  No noted wound(s)       Theodore Prevention initiated by RN: Yes   Wound Care Orders initiated by RN: NA    Pressure Injury (Stage 3,4, Unstageable, DTI, NWPT, and Complex wounds) if present, place referral order by RN under : NA    New and Established Ostomies, if present place, referral order under : NA      Nurse 1 eSignature: Electronically signed by Ubaldo Severin, RN on 4/6/23 at 6:35 PM EDT    **SHARE this note so that the co-signing nurse can place an eSignature**    Nurse 2 eSignature: Electronically signed by You Mcgill RN on 4/6/23 at 6:36 PM EDT
Data- discharge order received, pt verbalized agreement to discharge, disposition to previous residence, no needs for HHC/DME. Action- discharge instructions prepared and given to patient, pt verbalized understanding. Medication information packet given r/t NEW and/or CHANGED prescriptions emphasizing name/purpose/side effects, pt verbalized understanding. Discharge instruction summary: Diet- low sodium, Activity- at tolerated, Primary Care Physician as follows: MARIA FERNANDA Kim - LEROY [phone] f/u appointment in 2 weeks, immunizations reviewed and up to date, prescription medications filled at preferred pharmacy. Response- Pt belongings gathered, IV removed. Disposition is home (no HHC/DME needs), transported with belongings, taken to lobby via w/c w/ transport, no complications. Family transporting home.
End of shift report to oncoming RN to assume care of pt.  No distress noted at this time
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 Elevated troponin attributed to demand ischemia. Permanent atrial fibrillation on apixaban and metoprolol succinate. Diet: controlled carbohydrate (60 gm/meal), 2 gram sodium, 1500 ml fluid restriction  Right forearm peripheral IV (4/6, day #3)  Medications:     allopurinol  100 mg Oral Daily   apixaban  5 mg Oral BID   famotidine  20 mg Oral BID   gabapentin  300 mg Oral Nightly   insulin glargine  7 Units SubCUTAneous Nightly   metoprolol succinate  50 mg Oral Nightly   furosemide  40 mg IntraVENous BID   insulin lispro SSI  0-8 Units SubCUTAneous TID WC     Recent Labs     04/06/23  1345 04/07/23  0408 04/08/23  0456   WBC 6.8 6.0 6.2   HGB 12.3* 12.3* 12.8*    276 308   MCV 94.8 94.7 94.6     Recent Labs     04/06/23  1345 04/07/23  0613 04/08/23  0456    138 140   K 3.5 3.3* 3.6   CL 98* 97* 96*   CO2 31 31 31   BUN 21* 22* 25*   CREATININE 0.9 1.0 0.9   GLUCOSE 116* 88 109*     Recent Labs     04/06/23  1345   AST 18   ALT 10   BILITOT 1.0   ALKPHOS 154*       proBNP (4/6) 6,785 pg/mL  Magnesium (4/7) 1.60 mg/dL  HgbA1c (4/6) 6.0%    Troponin T:   Recent Labs     04/06/23  1345 04/07/23  0252 04/07/23  0613   TROPONINI 0.11* 0.10* 0.10*     POC Glucose:   Recent Labs     04/07/23  1110 04/07/23  1602 04/07/23  1920 04/08/23  0616   POCGLU 119* 185* 179* 106*     PA/lat CXR (4/6) Moderate left and small right pleural effusion with associated infiltrates in the lower lungs. Mild-to-moderate congestion. Echocardiogram (10/25/22) Left ventricular cavity size is normal.  There is severe concentric left ventricular hypertrophy. Ejection fraction is visually estimated to be 40-45%.  There is
Patient awake and alert. Sitting on side of bed. Denies pain. Anxious to be discharged today. Morning medications given without difficulty. Breakfast eaten. VSS. Ambulates with walker. Call light and belongings within reach. Will monitor.      Electronically signed by Kylee Vargas RN on 4/8/2023 at 9:02 AM
Patient in bed resting in bed at this time. Patient is  alert and oriented x 3. Able to make all needs known. Assessment completed. VS WNL. . IV capped and flushed. Fall precautions in place. Call light within reach.
Patient to floor from ED, A/Ox4 and vital signs stable, has no complaints. Admission complete, alarm on.
This RN spoke with patient wife concerning discharge plan . Family requesting [facility] rehab for patient home care , specifically requesting  [facility] . Wife request to speak to CM prior to discharge to discuss care, spouse home and mobile numbers verified  in chart. Please allow pt time to get to phone when calling or leave call back number.
(kcal/day): 2658-9393 ( 25 - 30 x CBW 87)  Weight Used for Protein Requirements: Current  Protein (g/day): 104- (1-1.2 x CBW 87)     Fluid (ml/day): per provider    Nutrition Diagnosis:   Moderate malnutrition related to inadequate protein-energy intake as evidenced by Criteria as identified in malnutrition assessment    Nutrition Interventions:   Food and/or Nutrient Delivery: Modify Current Diet  Nutrition Education/Counseling: Education completed  Coordination of Nutrition Care: Continue to monitor while inpatient       Goals:     Goals: Meet at least 75% of estimated needs       Nutrition Monitoring and Evaluation:   Behavioral-Environmental Outcomes: None Identified  Food/Nutrient Intake Outcomes: None Identified  Physical Signs/Symptoms Outcomes: Biochemical Data, Fluid Status or Edema, Nutrition Focused Physical Findings, Weight    Discharge Planning:    Continue current diet     Velma Osuna N [address] [place] [address],   Contact: [phone]
80's bpm  Constitutional: Alert. Oriented to person, place, and time. No distress. Head: Normocephalic and atraumatic. Mouth/Throat: Lips appear moist. Oropharynx is clear and moist.  Eyes: Conjunctivae normal. EOM are normal.   Neck: Neck supple. No lymphadenopathy. No rigidity. No JVD present. Cardiovascular: Normal rate, regular rhythm. Normal S1&S2. Carotid pulse 2+ bilaterally. Pulmonary/Chest: Bilateral respiratory sounds present. No respiratory accessory muscle use. No wheezes, No rhonchi. Fine bibasilar rales. Abdominal: Soft. Normal bowel sounds present. No distension, No tenderness. No splenomegaly. No hernia. Musculoskeletal: No tenderness. Full range of motion in bilateral upper and lower extremities. 1+ pitting BLE edema    Lymphadenopathy: Has no cervical adenopathy. Neurological: Alert and oriented. Cranial nerve II-XII grossly intact, No gross deficit to touch. Skin: Skin is warm and dry. No rash, lesions, ulcerations noted. Psychiatric: No anxiety nor agitation. Labs, telemetry, diagnostic and imaging results personally reviewed and interpreted. Recent Labs     04/06/23  1345 04/07/23  0613 04/08/23  0456    138 140   K 3.5 3.3* 3.6   CL 98* 97* 96*   CO2 31 31 31   BUN 21* 22* 25*   CREATININE 0.9 1.0 0.9     Recent Labs     04/06/23  1345 04/07/23  0408 04/08/23  0456   WBC 6.8 6.0 6.2   HGB 12.3* 12.3* 12.8*   HCT 37.3* 37.2* 37.8*   MCV 94.8 94.7 94.6    276 308     Lab Results   Component Value Date/Time    TROPONINI 0.10 04/07/2023 06:13 AM     Estimated Creatinine Clearance: 63 mL/min (based on SCr of 0.9 mg/dL).    No results found for: BNP  No results found for: PROTIME, INR  Lab Results   Component Value Date/Time    CHOL 99 10/26/2022 06:28 AM    HDL 34 10/26/2022 06:28 AM    TRIG 44 10/26/2022 06:28 AM       Scheduled Meds:   sodium chloride flush  10 mL IntraVENous 2 times per day    allopurinol  100 mg Oral Daily    apixaban  5 mg Oral BID
DM, CHF, HTN, Afib. PTA, pt lives with family with assist for IADL, otherwise IND with ADl (sponge bathing) and fxl mobility with RW. Today, pt functioning just below baseline limited by decreased endurance, balance and safety awareness. Pt with fatigue and SOB with household  mobility with HR up to 143, cues for deep breathing to recover within ~30 sec to 100s (RN aware). Pt completes bed mobility, fxl tx and household distance fxl mobility with RW with forward flexion at trunk SBA. Pt noted with decreased safety awareness and attention to cues for safe alignment to surfaces. Pt completes seated grooming with set up, and declines further ADL, anticipate up to [address] A LB and SBA UB ADL based on balance, endurance, cognition, pain and PLOF. Cont acute OT to address above deficits. Anticipate pt able to return home with initial 24 hr SUP  Treatment Diagnosis: impaired ADL/fxl mobility  Prognosis: Good  Decision Making: Low Complexity  REQUIRES OT FOLLOW-UP: Yes  Activity Tolerance  Activity Tolerance: Patient limited by fatigue  Activity Tolerance Comments: HR up to 143 + SOB with activity- cues for deep breathing, recovers to low 100s within ~30 seconds        Plan   Occupational Therapy Plan  Times Per Week: 3-5  Current Treatment Recommendations: Strengthening, ROM, Balance training, Functional mobility training, Endurance training, Pain management, Safety education & training, Self-Care / ADL, Home management training, Patient/Caregiver education & training     Restrictions  Restrictions/Precautions  Restrictions/Precautions: Fall Risk  Position Activity Restriction  Other position/activity restrictions: [place]. telemonitor    Subjective   General  Chart Reviewed: Yes  Patient assessed for rehabilitation services?: Yes  Additional Pertinent Hx: 82 yo male admitted with SOB and fatigue.  PMH: DM, CHF, HTN, Afib  Family / Caregiver Present: No  Referring Practitioner: Maria R Butler MD  Diagnosis:
Intervention: Decreased functional mobility ; Decreased balance;Decreased ADL status; Decreased endurance  Therapy Prognosis: Good  Decision Making: Medium Complexity  Requires PT Follow-Up: Yes  Activity Tolerance  Activity Tolerance: Patient limited by endurance; Patient tolerated treatment well     Plan   Physcial Therapy Plan  General Plan: 3-5 times per week  Current Treatment Recommendations: Functional mobility training, Positioning, Therapeutic activities, Patient/Caregiver education & training  Safety Devices  Type of Devices: Call light within reach, Chair alarm in place, Patient at risk for falls, Nurse notified, Left in chair     Restrictions  Restrictions/Precautions  Restrictions/Precautions: Fall Risk  Position Activity Restriction  Other position/activity restrictions: [place].  telemonitor     Subjective   General  Chart Reviewed: Yes  Patient assessed for rehabilitation services?: Yes  Additional Pertinent Hx: here due to increasing weakness - fatigue  Response To Previous Treatment: Not applicable  Family / Caregiver Present: No  Follows Commands: Within Functional Limits  Subjective  Subjective: arrived to room along with OT to patient resting in bed - alert oriented and agreeable to PTOT assessments - he states he is feeling better         Social/Functional History  Social/Functional History  Lives With: Spouse, Daughter  Type of Home: House  Home Layout: Multi-level, Bed/Bath upstairs (quad level.)  Home Access: Stairs to enter with rails  Entrance Stairs - Number of Steps: 2 to enter, but then 6 steps to go up and 6 steps to go down (having stair lift put in)  Bathroom Shower/Tub: Tub/Shower unit  [place] Toilet: Handicap height  Bathroom Equipment: Toilet raiser  Home Equipment: Emily Barry, rolling, Wheelchair-manual (3 walkers on each floor)  Has the patient had two or more falls in the past year or any fall with injury in the past year?: Yes (2 last week)  Receives Help From: Family  ADL
diastolic CHF EF 28-77% exacerbation- fluid overload  Hypertroponinemia- demand ischemia  Atrial fibrillation  Diabetes mellitus  Hypertension  Hyperlipidemia     Plan  Cont 40 IV twice daily Lasix  Low-salt diet/daily weights/ fluid restriction  Appreciate cardiology recs  Home meds  Insulin sliding scale          DVT prophylaxis-Lovenox  Diet: ADULT DIET;  Regular; 4 carb choices (60 gm/meal)  Code Status: Full Code     PT/OT Eval Status: ordered     Dispo - pending clinical improvement      Appropriate for A1 Discharge Unit: No      Inna Watson MD

## 2023-04-15 DIAGNOSIS — H10.13 ALLERGIC CONJUNCTIVITIS OF BOTH EYES: ICD-10-CM

## 2023-04-17 RX ORDER — KETOTIFEN FUMARATE 0.35 MG/ML
1 SOLUTION/ DROPS OPHTHALMIC 2 TIMES DAILY
Qty: 10 ML | Refills: 0 | Status: SHIPPED | OUTPATIENT
Start: 2023-04-17 | End: 2023-04-27

## 2023-04-18 DIAGNOSIS — Z79.4 TYPE 2 DIABETES MELLITUS WITH DIABETIC POLYNEUROPATHY, WITH LONG-TERM CURRENT USE OF INSULIN (HCC): Primary | ICD-10-CM

## 2023-04-18 DIAGNOSIS — E11.42 TYPE 2 DIABETES MELLITUS WITH DIABETIC POLYNEUROPATHY, WITH LONG-TERM CURRENT USE OF INSULIN (HCC): Primary | ICD-10-CM

## 2023-04-18 NOTE — PROGRESS NOTES
We will check with [facility] to see the status of this order. We will also need wound care through them if possible as the patient is unable to leave his house at this time.

## 2023-04-19 ENCOUNTER — CARE COORDINATION (OUTPATIENT)
Dept: CASE MANAGEMENT | Age: 88
End: 2023-04-19

## 2023-04-19 NOTE — CARE COORDINATION
any barriers to care and/or understanding of plan of care after discharge. Discussed appropriate site of care based on symptoms and resources available to patient including: PCP. The family agrees to contact the PCP office for questions related to their healthcare. Offered patient enrollment in the Remote Patient Monitoring (RPM) program for in-home monitoring: Patient is not eligible for RPM program.     Care Transitions Subsequent and Final Call    Subsequent and Final Calls  Do you have any ongoing symptoms?: No  Have your medications changed?: No  Do you have any questions related to your medications?: No  Do you currently have any active services?: Yes  Are you currently active with any services?: Home Health  Do you have any needs or concerns that I can assist you with?: No  Identified Barriers: None  Care Transitions Interventions  Other Interventions:             Care Transition Nurse provided contact information for future needs. Plan for follow-up call in 3-5 days based on severity of symptoms and risk factors. Plan for next call: symptom management-.     GERI LinaresN, RN   Care Transition Nurse  Mobile: [phone]

## 2023-04-24 ENCOUNTER — CARE COORDINATION (OUTPATIENT)
Dept: CASE MANAGEMENT | Age: 88
End: 2023-04-24

## 2023-04-24 NOTE — CARE COORDINATION
[facility] Care Transitions Follow Up Call    Patient: Haseeb Arciniega  Patient : 1935   MRN: [id number]  Reason for Admission: CHF exacerbation  Discharge Date: 23 RARS: Readmission Risk Score: 12.6    Follow up outreach call attempt, no answer. CTN left VM with contact information and request for return call. CTN will continue with outreach call attempts. Follow Up  No future appointments.     KETTY Doherty, RN   Care Transition Nurse  Mobile: [phone]

## 2023-04-27 ENCOUNTER — CARE COORDINATION (OUTPATIENT)
Dept: CASE MANAGEMENT | Age: 88
End: 2023-04-27

## 2023-04-27 NOTE — CARE COORDINATION
[facility] Care Transitions Follow Up Call    Patient: Eric Nunn  Patient : 1935   MRN: [id number]  Reason for Admission: CHF exacerbation  Discharge Date: 23 RARS: Readmission Risk Score: 12.6    Second follow up outreach call attempt, no answer. CTN left VM with contact information and request for return call. CTN will continue with one outreach call attempts. Follow Up  No future appointments.     KETTY Brewer, RN   Care Transition Nurse  Mobile: [phone]

## 2023-05-02 RX ORDER — FUROSEMIDE 40 MG/1
TABLET ORAL
Qty: 60 TABLET | Refills: 3 | OUTPATIENT
Start: 2023-05-02

## 2023-05-02 RX ORDER — POTASSIUM CHLORIDE 750 MG/1
TABLET, EXTENDED RELEASE ORAL
Qty: 60 TABLET | Refills: 5 | OUTPATIENT
Start: 2023-05-02

## 2023-05-05 ENCOUNTER — CARE COORDINATION (OUTPATIENT)
Dept: CASE MANAGEMENT | Age: 88
End: 2023-05-05

## 2023-05-05 NOTE — CARE COORDINATION
[facility] Care Transitions Follow Up Call    . Patient: Courtney Barnard  Patient : 1935   MRN: [id number]  Reason for Admission: CHF exacerbation  Discharge Date: 23 RARS: Readmission Risk Score: 12.6          {Insert Care Summary Note [facility]:[id number]  3rd unsuccessful attempt to reach for Care Transition follow up call. HIPAA compliant message left requesting call back. Episode closed per protocol, no further outreach scheduled. Follow Up  No future appointments.       Hernandez Siddiqi RN

## 2023-05-07 DIAGNOSIS — H10.13 ALLERGIC CONJUNCTIVITIS OF BOTH EYES: ICD-10-CM

## 2023-05-08 RX ORDER — KETOTIFEN FUMARATE 0.35 MG/ML
1 SOLUTION/ DROPS OPHTHALMIC 2 TIMES DAILY
Qty: 10 ML | Refills: 0 | Status: SHIPPED | OUTPATIENT
Start: 2023-05-08 | End: 2023-05-18

## 2023-05-08 NOTE — TELEPHONE ENCOUNTER
LV 1/10/23 WITH CB FOR GOUT NV NONE  Return in about 3 months (around 4/21/2023) for Annual Wellness Visit, Fasting Labs.   PLEASE CALL PT TO SCHEDULE APPT

## 2023-05-12 DIAGNOSIS — K21.9 GASTROESOPHAGEAL REFLUX DISEASE, UNSPECIFIED WHETHER ESOPHAGITIS PRESENT: Primary | ICD-10-CM

## 2023-05-12 RX ORDER — FAMOTIDINE 20 MG/1
20 TABLET, FILM COATED ORAL 2 TIMES DAILY
Qty: 60 TABLET | Refills: 0 | Status: SHIPPED | OUTPATIENT
Start: 2023-05-12 | End: 2023-05-12 | Stop reason: SDUPTHER

## 2023-05-12 RX ORDER — FAMOTIDINE 20 MG/1
20 TABLET, FILM COATED ORAL 2 TIMES DAILY
Qty: 180 TABLET | Refills: 3 | Status: SHIPPED | OUTPATIENT
Start: 2023-05-12

## 2023-05-15 ENCOUNTER — TELEMEDICINE (OUTPATIENT)
Dept: FAMILY MEDICINE CLINIC | Age: 88
End: 2023-05-15
Payer: MEDICARE

## 2023-05-15 DIAGNOSIS — H10.33 ACUTE BACTERIAL CONJUNCTIVITIS OF BOTH EYES: Primary | ICD-10-CM

## 2023-05-15 DIAGNOSIS — H10.13 ALLERGIC CONJUNCTIVITIS OF BOTH EYES: ICD-10-CM

## 2023-05-15 PROCEDURE — 99442 PR PHYS/QHP TELEPHONE EVALUATION 11-20 MIN: CPT | Performed by: NURSE PRACTITIONER

## 2023-05-15 PROCEDURE — G8427 DOCREV CUR MEDS BY ELIG CLIN: HCPCS | Performed by: NURSE PRACTITIONER

## 2023-05-15 PROCEDURE — G8420 CALC BMI NORM PARAMETERS: HCPCS | Performed by: NURSE PRACTITIONER

## 2023-05-15 PROCEDURE — 1123F ACP DISCUSS/DSCN MKR DOCD: CPT | Performed by: NURSE PRACTITIONER

## 2023-05-15 PROCEDURE — 1036F TOBACCO NON-USER: CPT | Performed by: NURSE PRACTITIONER

## 2023-05-15 RX ORDER — POLYMYXIN B SULFATE AND TRIMETHOPRIM 1; 10000 MG/ML; [USP'U]/ML
1 SOLUTION OPHTHALMIC EVERY 4 HOURS
Qty: 10 ML | Refills: 0 | Status: SHIPPED | OUTPATIENT
Start: 2023-05-15 | End: 2023-05-25

## 2023-05-15 RX ORDER — KETOTIFEN FUMARATE 0.35 MG/ML
1 SOLUTION/ DROPS OPHTHALMIC 2 TIMES DAILY
Qty: 10 ML | Refills: 0 | Status: SHIPPED | OUTPATIENT
Start: 2023-05-15 | End: 2023-05-25

## 2023-05-15 SDOH — ECONOMIC STABILITY: INCOME INSECURITY: HOW HARD IS IT FOR YOU TO PAY FOR THE VERY BASICS LIKE FOOD, HOUSING, MEDICAL CARE, AND HEATING?: NOT HARD AT ALL

## 2023-05-15 SDOH — ECONOMIC STABILITY: FOOD INSECURITY: WITHIN THE PAST 12 MONTHS, YOU WORRIED THAT YOUR FOOD WOULD RUN OUT BEFORE YOU GOT MONEY TO BUY MORE.: NEVER TRUE

## 2023-05-15 SDOH — ECONOMIC STABILITY: FOOD INSECURITY: WITHIN THE PAST 12 MONTHS, THE FOOD YOU BOUGHT JUST DIDN'T LAST AND YOU DIDN'T HAVE MONEY TO GET MORE.: NEVER TRUE

## 2023-05-15 ASSESSMENT — ENCOUNTER SYMPTOMS
EYE PAIN: 1
WHEEZING: 0
SHORTNESS OF BREATH: 0
EYE ITCHING: 1
EYE DISCHARGE: 1
COUGH: 0
EYE REDNESS: 1

## 2023-05-15 NOTE — PROGRESS NOTES
Rosa Quiñones is a 81 y.o. male evaluated via telephone on 5/15/2023 for Conjunctivitis (PT C/O POSSIBLE PINK EYE IN BOTH EYES X 2 DAYS )  . Documentation:  I communicated with the patient and/or health care decision maker about conjunctivitis. Details of this discussion including any medical advice provided:      ASSESSMENT/PLAN:  1. Acute bacterial conjunctivitis of both eyes  -Unable to assess given phone call. We will treat with Polytrim drops. Educated on medication use as well as side effects. Follow-up as needed if no improvement. -     trimethoprim-polymyxin b (POLYTRIM) 67670-8.1 UNIT/ML-% ophthalmic solution; Place 1 drop into both eyes every 4 hours for 10 days, Disp-10 mL, R-0Normal      Return if symptoms worsen or fail to improve. SUBJECTIVE/OBJECTIVE:  ANURAG  Tiffany Butler presents today via telephone with concerns of pinkeye in both eyes. He states that he has had this for about 2 days. He typically takes ketotifen eyedrops for allergies. He states that despite these, he has been experiencing redness and itching in both eyes. Has gotten pinkeye in the past.  Inquired about possible antibiotic eyedrop. Review of Systems   Constitutional:  Negative for chills and fever. Eyes:  Positive for pain, discharge, redness and itching. Respiratory:  Negative for cough, shortness of breath and wheezing. Cardiovascular:  Negative for chest pain and palpitations. Neurological:  Negative for dizziness, weakness, light-headedness, numbness and headaches. Psychiatric/Behavioral:  Negative for decreased concentration, dysphoric mood, self-injury, sleep disturbance and suicidal ideas. The patient is not nervous/anxious. Physical Exam  Constitutional:       Comments: Limited due to phone call   Eyes:      Comments: MELCHOR given phone call   Neurological:      Mental Status: He is alert and oriented to person, place, and time.    Psychiatric:         Mood and Affect: Mood normal.

## 2023-05-23 DIAGNOSIS — I21.4 NSTEMI (NON-ST ELEVATED MYOCARDIAL INFARCTION) (HCC): ICD-10-CM

## 2023-05-23 DIAGNOSIS — I10 PRIMARY HYPERTENSION: ICD-10-CM

## 2023-05-23 DIAGNOSIS — I48.20 CHRONIC ATRIAL FIBRILLATION (HCC): ICD-10-CM

## 2023-05-23 RX ORDER — METOPROLOL SUCCINATE 50 MG/1
TABLET, EXTENDED RELEASE ORAL
Qty: 90 TABLET | Refills: 0 | Status: SHIPPED | OUTPATIENT
Start: 2023-05-23

## 2023-06-08 ENCOUNTER — OFFICE VISIT (OUTPATIENT)
Dept: FAMILY MEDICINE CLINIC | Age: 88
End: 2023-06-08
Payer: MEDICARE

## 2023-06-08 VITALS
DIASTOLIC BLOOD PRESSURE: 76 MMHG | SYSTOLIC BLOOD PRESSURE: 118 MMHG | WEIGHT: 199 LBS | OXYGEN SATURATION: 97 % | HEART RATE: 65 BPM | HEIGHT: 72 IN | BODY MASS INDEX: 26.95 KG/M2

## 2023-06-08 DIAGNOSIS — E83.42 HYPOMAGNESEMIA: ICD-10-CM

## 2023-06-08 DIAGNOSIS — G62.9 NEUROPATHY: ICD-10-CM

## 2023-06-08 DIAGNOSIS — Z79.4 TYPE 2 DIABETES MELLITUS WITH DIABETIC POLYNEUROPATHY, WITH LONG-TERM CURRENT USE OF INSULIN (HCC): ICD-10-CM

## 2023-06-08 DIAGNOSIS — I25.5 ISCHEMIC CARDIOMYOPATHY: ICD-10-CM

## 2023-06-08 DIAGNOSIS — Z00.00 MEDICARE ANNUAL WELLNESS VISIT, SUBSEQUENT: Primary | ICD-10-CM

## 2023-06-08 DIAGNOSIS — H10.33 ACUTE BACTERIAL CONJUNCTIVITIS OF BOTH EYES: ICD-10-CM

## 2023-06-08 DIAGNOSIS — Z12.5 SCREENING FOR MALIGNANT NEOPLASM OF PROSTATE: ICD-10-CM

## 2023-06-08 DIAGNOSIS — H10.13 ALLERGIC CONJUNCTIVITIS OF BOTH EYES: ICD-10-CM

## 2023-06-08 DIAGNOSIS — I48.20 CHRONIC ATRIAL FIBRILLATION (HCC): ICD-10-CM

## 2023-06-08 DIAGNOSIS — I50.32 CHRONIC DIASTOLIC HEART FAILURE (HCC): ICD-10-CM

## 2023-06-08 DIAGNOSIS — I87.8 VENOUS STASIS OF BOTH LOWER EXTREMITIES: ICD-10-CM

## 2023-06-08 DIAGNOSIS — K21.9 GASTROESOPHAGEAL REFLUX DISEASE, UNSPECIFIED WHETHER ESOPHAGITIS PRESENT: ICD-10-CM

## 2023-06-08 DIAGNOSIS — E11.42 TYPE 2 DIABETES MELLITUS WITH DIABETIC POLYNEUROPATHY, WITH LONG-TERM CURRENT USE OF INSULIN (HCC): ICD-10-CM

## 2023-06-08 DIAGNOSIS — M10.072 ACUTE IDIOPATHIC GOUT OF LEFT ANKLE: ICD-10-CM

## 2023-06-08 LAB
ALBUMIN SERPL-MCNC: 3.8 G/DL (ref 3.4–5)
ALBUMIN/GLOB SERPL: 1.1 {RATIO} (ref 1.1–2.2)
ALP SERPL-CCNC: 154 U/L (ref 40–129)
ALT SERPL-CCNC: 15 U/L (ref 10–40)
ANION GAP SERPL CALCULATED.3IONS-SCNC: 12 MMOL/L (ref 3–16)
AST SERPL-CCNC: 23 U/L (ref 15–37)
BASOPHILS # BLD: 0 K/UL (ref 0–0.2)
BASOPHILS NFR BLD: 0.4 %
BILIRUB SERPL-MCNC: 1 MG/DL (ref 0–1)
BUN SERPL-MCNC: 27 MG/DL (ref 7–20)
CALCIUM SERPL-MCNC: 9.1 MG/DL (ref 8.3–10.6)
CHLORIDE SERPL-SCNC: 97 MMOL/L (ref 99–110)
CO2 SERPL-SCNC: 32 MMOL/L (ref 21–32)
CREAT SERPL-MCNC: 1 MG/DL (ref 0.8–1.3)
DEPRECATED RDW RBC AUTO: 17.3 % (ref 12.4–15.4)
EOSINOPHIL # BLD: 0.1 K/UL (ref 0–0.6)
EOSINOPHIL NFR BLD: 0.9 %
GFR SERPLBLD CREATININE-BSD FMLA CKD-EPI: >60 ML/MIN/{1.73_M2}
GLUCOSE SERPL-MCNC: 135 MG/DL (ref 70–99)
HCT VFR BLD AUTO: 40.2 % (ref 40.5–52.5)
HGB BLD-MCNC: 13.4 G/DL (ref 13.5–17.5)
LYMPHOCYTES # BLD: 1 K/UL (ref 1–5.1)
LYMPHOCYTES NFR BLD: 15.9 %
MAGNESIUM SERPL-MCNC: 1.7 MG/DL (ref 1.8–2.4)
MCH RBC QN AUTO: 32.5 PG (ref 26–34)
MCHC RBC AUTO-ENTMCNC: 33.3 G/DL (ref 31–36)
MCV RBC AUTO: 97.7 FL (ref 80–100)
MONOCYTES # BLD: 0.6 K/UL (ref 0–1.3)
MONOCYTES NFR BLD: 10.3 %
NEUTROPHILS # BLD: 4.4 K/UL (ref 1.7–7.7)
NEUTROPHILS NFR BLD: 72.5 %
PLATELET # BLD AUTO: 246 K/UL (ref 135–450)
PMV BLD AUTO: 8.3 FL (ref 5–10.5)
POTASSIUM SERPL-SCNC: 4.3 MMOL/L (ref 3.5–5.1)
PROT SERPL-MCNC: 7.3 G/DL (ref 6.4–8.2)
PSA SERPL DL<=0.01 NG/ML-MCNC: 0.1 NG/ML (ref 0–4)
RBC # BLD AUTO: 4.12 M/UL (ref 4.2–5.9)
SODIUM SERPL-SCNC: 141 MMOL/L (ref 136–145)
URATE SERPL-MCNC: 6.6 MG/DL (ref 3.5–7.2)
WBC # BLD AUTO: 6 K/UL (ref 4–11)

## 2023-06-08 PROCEDURE — 1123F ACP DISCUSS/DSCN MKR DOCD: CPT | Performed by: NURSE PRACTITIONER

## 2023-06-08 PROCEDURE — G0439 PPPS, SUBSEQ VISIT: HCPCS | Performed by: NURSE PRACTITIONER

## 2023-06-08 PROCEDURE — 3044F HG A1C LEVEL LT 7.0%: CPT | Performed by: NURSE PRACTITIONER

## 2023-06-08 RX ORDER — KETOTIFEN FUMARATE 0.35 MG/ML
1 SOLUTION/ DROPS OPHTHALMIC 2 TIMES DAILY
Qty: 5 ML | Refills: 0 | Status: SHIPPED | OUTPATIENT
Start: 2023-06-08

## 2023-06-08 RX ORDER — POLYMYXIN B SULFATE AND TRIMETHOPRIM 1; 10000 MG/ML; [USP'U]/ML
1 SOLUTION OPHTHALMIC EVERY 4 HOURS
Qty: 10 ML | Refills: 0 | Status: SHIPPED | OUTPATIENT
Start: 2023-06-08 | End: 2023-06-18

## 2023-06-08 RX ORDER — INSULIN GLARGINE 100 [IU]/ML
20 INJECTION, SOLUTION SUBCUTANEOUS NIGHTLY
Qty: 45 ML | Refills: 2 | Status: SHIPPED | OUTPATIENT
Start: 2023-06-08

## 2023-06-08 RX ORDER — ALLOPURINOL 100 MG/1
100 TABLET ORAL DAILY
Qty: 30 TABLET | Refills: 5 | Status: SHIPPED | OUTPATIENT
Start: 2023-06-08

## 2023-06-08 ASSESSMENT — PATIENT HEALTH QUESTIONNAIRE - PHQ9
SUM OF ALL RESPONSES TO PHQ QUESTIONS 1-9: 2
1. LITTLE INTEREST OR PLEASURE IN DOING THINGS: 1
SUM OF ALL RESPONSES TO PHQ QUESTIONS 1-9: 2
SUM OF ALL RESPONSES TO PHQ QUESTIONS 1-9: 2
SUM OF ALL RESPONSES TO PHQ9 QUESTIONS 1 & 2: 2
2. FEELING DOWN, DEPRESSED OR HOPELESS: 1
SUM OF ALL RESPONSES TO PHQ QUESTIONS 1-9: 2

## 2023-06-08 NOTE — PATIENT INSTRUCTIONS
feelings and actions. Talk to someone you trust, or find a counselor. There are groups in your area that can connect you with people to talk to. Behavioral Health Treatment Services . This service from the national Substance Abuse and [employer]  can help you find local counselors. Search online at raksul. SCRMa.gov or call 4-809-459-HELP ([phone]), or [employer] 0-848.865.2064. Parents Anonymous. Self-help groups that serve parents under stress, as well as children who have been abused, are available throughout the [facility], [facility]), and [facility]. To find a group in your area, search online or in your phone book under Parents Anonymous or call (651) 591-9771. Where can you learn more? Go to http://[employer].jean.com/ and enter Z357 to learn more about \"Learning About Managing Anger. \"  Current as of: October 20, 2022               Content Version: 13.6  © 2006-2023 [employer]. Care instructions adapted under license by [facility] ([facility]). If you have questions about a medical condition or this instruction, always ask your healthcare professional. [place] [address] any warranty or liability for your use of this information. Learning About Being Active as an Older Adult  Why is being active important as you get older? Being active is one of the best things you can do for your health. And it's never too late to start. Being active--or getting active, if you aren't already--has definite benefits. It can:  Give you more energy,  Keep your mind sharp. Improve balance to reduce your risk of falls. Help you manage chronic illness with fewer medicines. No matter how old you are, how fit you are, or what health problems you have, there is a form of activity that will work for you. And the more physical activity you can do, the better your overall health will be. What kinds of activity can help you stay healthy?   Being more active will

## 2023-06-09 DIAGNOSIS — E83.42 HYPOMAGNESEMIA: Primary | ICD-10-CM

## 2023-06-09 RX ORDER — MAGNESIUM OXIDE 400 MG/1
400 TABLET ORAL DAILY
Qty: 90 TABLET | Refills: 1 | Status: SHIPPED | OUTPATIENT
Start: 2023-06-09

## 2023-06-12 DIAGNOSIS — E83.42 HYPOMAGNESEMIA: ICD-10-CM

## 2023-06-14 ENCOUNTER — APPOINTMENT (OUTPATIENT)
Dept: GENERAL RADIOLOGY | Age: 88
DRG: 291 | End: 2023-06-14
Payer: MEDICARE

## 2023-06-14 ENCOUNTER — APPOINTMENT (OUTPATIENT)
Dept: CT IMAGING | Age: 88
DRG: 291 | End: 2023-06-14
Payer: MEDICARE

## 2023-06-14 ENCOUNTER — HOSPITAL ENCOUNTER (INPATIENT)
Age: 88
LOS: 12 days | Discharge: NURSING, REHAB OR LONG-TERM CARE | DRG: 291 | End: 2023-06-26
Attending: EMERGENCY MEDICINE | Admitting: INTERNAL MEDICINE
Payer: MEDICARE

## 2023-06-14 DIAGNOSIS — J90 BILATERAL PLEURAL EFFUSION: ICD-10-CM

## 2023-06-14 DIAGNOSIS — I50.9 ACUTE ON CHRONIC CONGESTIVE HEART FAILURE, UNSPECIFIED HEART FAILURE TYPE (HCC): Primary | ICD-10-CM

## 2023-06-14 DIAGNOSIS — R77.8 ELEVATED TROPONIN: ICD-10-CM

## 2023-06-14 LAB
ALBUMIN SERPL-MCNC: 3.4 G/DL (ref 3.4–5)
ALBUMIN/GLOB SERPL: 0.9 {RATIO} (ref 1.1–2.2)
ALP SERPL-CCNC: 122 U/L (ref 40–129)
ALT SERPL-CCNC: 16 U/L (ref 10–40)
ANION GAP SERPL CALCULATED.3IONS-SCNC: 9 MMOL/L (ref 3–16)
AST SERPL-CCNC: 20 U/L (ref 15–37)
BACTERIA URNS QL MICRO: NORMAL /HPF
BASOPHILS # BLD: 0 K/UL (ref 0–0.2)
BASOPHILS NFR BLD: 0.5 %
BILIRUB SERPL-MCNC: 1.1 MG/DL (ref 0–1)
BILIRUB UR QL STRIP.AUTO: NEGATIVE
BUN SERPL-MCNC: 29 MG/DL (ref 7–20)
CALCIUM SERPL-MCNC: 9.2 MG/DL (ref 8.3–10.6)
CHLORIDE SERPL-SCNC: 98 MMOL/L (ref 99–110)
CLARITY UR: CLEAR
CO2 SERPL-SCNC: 33 MMOL/L (ref 21–32)
COLOR UR: YELLOW
CREAT SERPL-MCNC: 1 MG/DL (ref 0.8–1.3)
DEPRECATED RDW RBC AUTO: 17.8 % (ref 12.4–15.4)
EKG DIAGNOSIS: NORMAL
EKG Q-T INTERVAL: 432 MS
EKG QRS DURATION: 126 MS
EKG QTC CALCULATION (BAZETT): 504 MS
EKG R AXIS: -81 DEGREES
EKG T AXIS: 90 DEGREES
EKG VENTRICULAR RATE: 82 BPM
EOSINOPHIL # BLD: 0.1 K/UL (ref 0–0.6)
EOSINOPHIL NFR BLD: 1.3 %
EPI CELLS #/AREA URNS AUTO: 1 /HPF (ref 0–5)
GFR SERPLBLD CREATININE-BSD FMLA CKD-EPI: >60 ML/MIN/{1.73_M2}
GLUCOSE BLD-MCNC: 130 MG/DL (ref 70–99)
GLUCOSE BLD-MCNC: 164 MG/DL (ref 70–99)
GLUCOSE SERPL-MCNC: 204 MG/DL (ref 70–99)
GLUCOSE UR STRIP.AUTO-MCNC: NEGATIVE MG/DL
HCT VFR BLD AUTO: 40 % (ref 40.5–52.5)
HGB BLD-MCNC: 13 G/DL (ref 13.5–17.5)
HGB UR QL STRIP.AUTO: NEGATIVE
HYALINE CASTS #/AREA URNS AUTO: 5 /LPF (ref 0–8)
KETONES UR STRIP.AUTO-MCNC: NEGATIVE MG/DL
LACTATE BLDV-SCNC: 1.7 MMOL/L (ref 0.4–1.9)
LACTATE BLDV-SCNC: 1.7 MMOL/L (ref 0.4–1.9)
LEUKOCYTE ESTERASE UR QL STRIP.AUTO: ABNORMAL
LYMPHOCYTES # BLD: 0.8 K/UL (ref 1–5.1)
LYMPHOCYTES NFR BLD: 12.9 %
MCH RBC QN AUTO: 31.7 PG (ref 26–34)
MCHC RBC AUTO-ENTMCNC: 32.5 G/DL (ref 31–36)
MCV RBC AUTO: 97.5 FL (ref 80–100)
MONOCYTES # BLD: 0.7 K/UL (ref 0–1.3)
MONOCYTES NFR BLD: 12 %
NEUTROPHILS # BLD: 4.3 K/UL (ref 1.7–7.7)
NEUTROPHILS NFR BLD: 73.3 %
NITRITE UR QL STRIP.AUTO: NEGATIVE
NT-PROBNP SERPL-MCNC: 6251 PG/ML (ref 0–449)
PERFORMED ON: ABNORMAL
PERFORMED ON: ABNORMAL
PH UR STRIP.AUTO: 5 [PH] (ref 5–8)
PLATELET # BLD AUTO: 254 K/UL (ref 135–450)
PMV BLD AUTO: 8.2 FL (ref 5–10.5)
POTASSIUM SERPL-SCNC: 4.5 MMOL/L (ref 3.5–5.1)
PROT SERPL-MCNC: 7.3 G/DL (ref 6.4–8.2)
PROT UR STRIP.AUTO-MCNC: NEGATIVE MG/DL
RBC # BLD AUTO: 4.1 M/UL (ref 4.2–5.9)
RBC CLUMPS #/AREA URNS AUTO: 2 /HPF (ref 0–4)
SARS-COV-2 RDRP RESP QL NAA+PROBE: NOT DETECTED
SODIUM SERPL-SCNC: 140 MMOL/L (ref 136–145)
SP GR UR STRIP.AUTO: 1.01 (ref 1–1.03)
TROPONIN, HIGH SENSITIVITY: 107 NG/L (ref 0–22)
TROPONIN, HIGH SENSITIVITY: 107 NG/L (ref 0–22)
UA COMPLETE W REFLEX CULTURE PNL UR: ABNORMAL
UA DIPSTICK W REFLEX MICRO PNL UR: YES
URN SPEC COLLECT METH UR: ABNORMAL
UROBILINOGEN UR STRIP-ACNC: 1 E.U./DL
WBC # BLD AUTO: 5.9 K/UL (ref 4–11)
WBC #/AREA URNS AUTO: 2 /HPF (ref 0–5)

## 2023-06-14 PROCEDURE — 83036 HEMOGLOBIN GLYCOSYLATED A1C: CPT

## 2023-06-14 PROCEDURE — 6370000000 HC RX 637 (ALT 250 FOR IP): Performed by: INTERNAL MEDICINE

## 2023-06-14 PROCEDURE — 94640 AIRWAY INHALATION TREATMENT: CPT

## 2023-06-14 PROCEDURE — 83880 ASSAY OF NATRIURETIC PEPTIDE: CPT

## 2023-06-14 PROCEDURE — 2700000000 HC OXYGEN THERAPY PER DAY

## 2023-06-14 PROCEDURE — 85025 COMPLETE CBC W/AUTO DIFF WBC: CPT

## 2023-06-14 PROCEDURE — 93005 ELECTROCARDIOGRAM TRACING: CPT | Performed by: PHYSICIAN ASSISTANT

## 2023-06-14 PROCEDURE — 6360000004 HC RX CONTRAST MEDICATION: Performed by: PHYSICIAN ASSISTANT

## 2023-06-14 PROCEDURE — 71046 X-RAY EXAM CHEST 2 VIEWS: CPT

## 2023-06-14 PROCEDURE — 99285 EMERGENCY DEPT VISIT HI MDM: CPT

## 2023-06-14 PROCEDURE — 81001 URINALYSIS AUTO W/SCOPE: CPT

## 2023-06-14 PROCEDURE — 2580000003 HC RX 258: Performed by: PHYSICIAN ASSISTANT

## 2023-06-14 PROCEDURE — 6360000002 HC RX W HCPCS: Performed by: PHYSICIAN ASSISTANT

## 2023-06-14 PROCEDURE — 87449 NOS EACH ORGANISM AG IA: CPT

## 2023-06-14 PROCEDURE — 84484 ASSAY OF TROPONIN QUANT: CPT

## 2023-06-14 PROCEDURE — 6370000000 HC RX 637 (ALT 250 FOR IP): Performed by: EMERGENCY MEDICINE

## 2023-06-14 PROCEDURE — 2060000000 HC ICU INTERMEDIATE R&B

## 2023-06-14 PROCEDURE — 71260 CT THORAX DX C+: CPT

## 2023-06-14 PROCEDURE — 36415 COLL VENOUS BLD VENIPUNCTURE: CPT

## 2023-06-14 PROCEDURE — 87635 SARS-COV-2 COVID-19 AMP PRB: CPT

## 2023-06-14 PROCEDURE — 87040 BLOOD CULTURE FOR BACTERIA: CPT

## 2023-06-14 PROCEDURE — 6360000002 HC RX W HCPCS: Performed by: INTERNAL MEDICINE

## 2023-06-14 PROCEDURE — 80053 COMPREHEN METABOLIC PANEL: CPT

## 2023-06-14 PROCEDURE — 96375 TX/PRO/DX INJ NEW DRUG ADDON: CPT

## 2023-06-14 PROCEDURE — 93010 ELECTROCARDIOGRAM REPORT: CPT | Performed by: INTERNAL MEDICINE

## 2023-06-14 PROCEDURE — 83605 ASSAY OF LACTIC ACID: CPT

## 2023-06-14 PROCEDURE — 96365 THER/PROPH/DIAG IV INF INIT: CPT

## 2023-06-14 RX ORDER — POLYETHYLENE GLYCOL 3350 17 G/17G
17 POWDER, FOR SOLUTION ORAL DAILY PRN
Status: DISCONTINUED | OUTPATIENT
Start: 2023-06-14 | End: 2023-06-26 | Stop reason: HOSPADM

## 2023-06-14 RX ORDER — GUAIFENESIN 600 MG/1
600 TABLET, EXTENDED RELEASE ORAL 2 TIMES DAILY
Status: DISCONTINUED | OUTPATIENT
Start: 2023-06-14 | End: 2023-06-26 | Stop reason: HOSPADM

## 2023-06-14 RX ORDER — LANOLIN ALCOHOL/MO/W.PET/CERES
400 CREAM (GRAM) TOPICAL DAILY
Status: DISCONTINUED | OUTPATIENT
Start: 2023-06-15 | End: 2023-06-26 | Stop reason: HOSPADM

## 2023-06-14 RX ORDER — ALLOPURINOL 100 MG/1
100 TABLET ORAL DAILY
Status: DISCONTINUED | OUTPATIENT
Start: 2023-06-15 | End: 2023-06-26 | Stop reason: HOSPADM

## 2023-06-14 RX ORDER — ONDANSETRON 4 MG/1
4 TABLET, ORALLY DISINTEGRATING ORAL EVERY 8 HOURS PRN
Status: DISCONTINUED | OUTPATIENT
Start: 2023-06-14 | End: 2023-06-26 | Stop reason: HOSPADM

## 2023-06-14 RX ORDER — INSULIN GLARGINE 100 [IU]/ML
12 INJECTION, SOLUTION SUBCUTANEOUS NIGHTLY
Status: DISCONTINUED | OUTPATIENT
Start: 2023-06-14 | End: 2023-06-21

## 2023-06-14 RX ORDER — METOPROLOL SUCCINATE 50 MG/1
50 TABLET, EXTENDED RELEASE ORAL DAILY
Status: DISCONTINUED | OUTPATIENT
Start: 2023-06-15 | End: 2023-06-26 | Stop reason: HOSPADM

## 2023-06-14 RX ORDER — ASPIRIN 81 MG/1
324 TABLET, CHEWABLE ORAL ONCE
Status: COMPLETED | OUTPATIENT
Start: 2023-06-14 | End: 2023-06-14

## 2023-06-14 RX ORDER — ACETAMINOPHEN 325 MG/1
650 TABLET ORAL EVERY 6 HOURS PRN
Status: DISCONTINUED | OUTPATIENT
Start: 2023-06-14 | End: 2023-06-26 | Stop reason: HOSPADM

## 2023-06-14 RX ORDER — SODIUM CHLORIDE 0.9 % (FLUSH) 0.9 %
5-40 SYRINGE (ML) INJECTION PRN
Status: DISCONTINUED | OUTPATIENT
Start: 2023-06-14 | End: 2023-06-26 | Stop reason: HOSPADM

## 2023-06-14 RX ORDER — ALBUTEROL SULFATE 2.5 MG/3ML
2.5 SOLUTION RESPIRATORY (INHALATION)
Status: DISCONTINUED | OUTPATIENT
Start: 2023-06-14 | End: 2023-06-17

## 2023-06-14 RX ORDER — MAGNESIUM SULFATE IN WATER 40 MG/ML
2000 INJECTION, SOLUTION INTRAVENOUS PRN
Status: DISCONTINUED | OUTPATIENT
Start: 2023-06-14 | End: 2023-06-26 | Stop reason: HOSPADM

## 2023-06-14 RX ORDER — ONDANSETRON 2 MG/ML
4 INJECTION INTRAMUSCULAR; INTRAVENOUS EVERY 6 HOURS PRN
Status: DISCONTINUED | OUTPATIENT
Start: 2023-06-14 | End: 2023-06-26 | Stop reason: HOSPADM

## 2023-06-14 RX ORDER — INSULIN LISPRO 100 [IU]/ML
0-4 INJECTION, SOLUTION INTRAVENOUS; SUBCUTANEOUS
Status: DISCONTINUED | OUTPATIENT
Start: 2023-06-15 | End: 2023-06-26 | Stop reason: HOSPADM

## 2023-06-14 RX ORDER — FLUORIDE TOOTHPASTE
5 TOOTHPASTE DENTAL 2 TIMES DAILY PRN
Status: DISCONTINUED | OUTPATIENT
Start: 2023-06-14 | End: 2023-06-26 | Stop reason: HOSPADM

## 2023-06-14 RX ORDER — SODIUM CHLORIDE 9 MG/ML
INJECTION, SOLUTION INTRAVENOUS PRN
Status: DISCONTINUED | OUTPATIENT
Start: 2023-06-14 | End: 2023-06-26 | Stop reason: HOSPADM

## 2023-06-14 RX ORDER — POTASSIUM CHLORIDE 20 MEQ/1
40 TABLET, EXTENDED RELEASE ORAL PRN
Status: DISCONTINUED | OUTPATIENT
Start: 2023-06-14 | End: 2023-06-26 | Stop reason: HOSPADM

## 2023-06-14 RX ORDER — INSULIN LISPRO 100 [IU]/ML
0-4 INJECTION, SOLUTION INTRAVENOUS; SUBCUTANEOUS NIGHTLY
Status: DISCONTINUED | OUTPATIENT
Start: 2023-06-14 | End: 2023-06-26 | Stop reason: HOSPADM

## 2023-06-14 RX ORDER — AZITHROMYCIN 500 MG/1
500 TABLET, FILM COATED ORAL EVERY 24 HOURS
Status: COMPLETED | OUTPATIENT
Start: 2023-06-14 | End: 2023-06-16

## 2023-06-14 RX ORDER — DEXTROSE MONOHYDRATE 100 MG/ML
INJECTION, SOLUTION INTRAVENOUS CONTINUOUS PRN
Status: DISCONTINUED | OUTPATIENT
Start: 2023-06-14 | End: 2023-06-26 | Stop reason: HOSPADM

## 2023-06-14 RX ORDER — POTASSIUM CHLORIDE 7.45 MG/ML
10 INJECTION INTRAVENOUS PRN
Status: DISCONTINUED | OUTPATIENT
Start: 2023-06-14 | End: 2023-06-26 | Stop reason: HOSPADM

## 2023-06-14 RX ORDER — SODIUM CHLORIDE 0.9 % (FLUSH) 0.9 %
5-40 SYRINGE (ML) INJECTION EVERY 12 HOURS SCHEDULED
Status: DISCONTINUED | OUTPATIENT
Start: 2023-06-14 | End: 2023-06-26 | Stop reason: HOSPADM

## 2023-06-14 RX ORDER — FUROSEMIDE 10 MG/ML
40 INJECTION INTRAMUSCULAR; INTRAVENOUS 2 TIMES DAILY
Status: DISPENSED | OUTPATIENT
Start: 2023-06-15 | End: 2023-06-26

## 2023-06-14 RX ORDER — ACETAMINOPHEN 650 MG/1
650 SUPPOSITORY RECTAL EVERY 6 HOURS PRN
Status: DISCONTINUED | OUTPATIENT
Start: 2023-06-14 | End: 2023-06-26 | Stop reason: HOSPADM

## 2023-06-14 RX ADMIN — AZITHROMYCIN MONOHYDRATE 500 MG: 500 TABLET ORAL at 21:50

## 2023-06-14 RX ADMIN — INSULIN GLARGINE 12 UNITS: 100 INJECTION, SOLUTION SUBCUTANEOUS at 21:50

## 2023-06-14 RX ADMIN — VANCOMYCIN HYDROCHLORIDE 1500 MG: 1.5 INJECTION, POWDER, LYOPHILIZED, FOR SOLUTION INTRAVENOUS at 17:07

## 2023-06-14 RX ADMIN — ASPIRIN 324 MG: 81 TABLET, CHEWABLE ORAL at 18:23

## 2023-06-14 RX ADMIN — IOPAMIDOL 75 ML: 755 INJECTION, SOLUTION INTRAVENOUS at 15:37

## 2023-06-14 RX ADMIN — GUAIFENESIN 600 MG: 600 TABLET ORAL at 21:50

## 2023-06-14 RX ADMIN — APIXABAN 5 MG: 5 TABLET, FILM COATED ORAL at 21:49

## 2023-06-14 RX ADMIN — ALBUTEROL SULFATE 2.5 MG: 2.5 SOLUTION RESPIRATORY (INHALATION) at 20:35

## 2023-06-14 RX ADMIN — CEFEPIME 2000 MG: 2 INJECTION, POWDER, FOR SOLUTION INTRAVENOUS at 16:21

## 2023-06-14 ASSESSMENT — PAIN - FUNCTIONAL ASSESSMENT: PAIN_FUNCTIONAL_ASSESSMENT: NONE - DENIES PAIN

## 2023-06-15 LAB
ANION GAP SERPL CALCULATED.3IONS-SCNC: 8 MMOL/L (ref 3–16)
BUN SERPL-MCNC: 28 MG/DL (ref 7–20)
CALCIUM SERPL-MCNC: 8.9 MG/DL (ref 8.3–10.6)
CHLORIDE SERPL-SCNC: 101 MMOL/L (ref 99–110)
CHOLEST SERPL-MCNC: 110 MG/DL (ref 0–199)
CO2 SERPL-SCNC: 33 MMOL/L (ref 21–32)
CREAT SERPL-MCNC: 1 MG/DL (ref 0.8–1.3)
EST. AVERAGE GLUCOSE BLD GHB EST-MCNC: 148.5 MG/DL
GFR SERPLBLD CREATININE-BSD FMLA CKD-EPI: >60 ML/MIN/{1.73_M2}
GLUCOSE BLD-MCNC: 106 MG/DL (ref 70–99)
GLUCOSE BLD-MCNC: 133 MG/DL (ref 70–99)
GLUCOSE BLD-MCNC: 140 MG/DL (ref 70–99)
GLUCOSE BLD-MCNC: 215 MG/DL (ref 70–99)
GLUCOSE SERPL-MCNC: 155 MG/DL (ref 70–99)
HBA1C MFR BLD: 6.8 %
HDLC SERPL-MCNC: 43 MG/DL (ref 40–60)
LDLC SERPL CALC-MCNC: 58 MG/DL
LEGIONELLA AG UR QL: NORMAL
MAGNESIUM SERPL-MCNC: 2 MG/DL (ref 1.8–2.4)
PERFORMED ON: ABNORMAL
POTASSIUM SERPL-SCNC: 4.2 MMOL/L (ref 3.5–5.1)
PROCALCITONIN SERPL IA-MCNC: 0.03 NG/ML (ref 0–0.15)
S PNEUM AG UR QL: NORMAL
SODIUM SERPL-SCNC: 142 MMOL/L (ref 136–145)
TRIGL SERPL-MCNC: 43 MG/DL (ref 0–150)
TROPONIN, HIGH SENSITIVITY: 105 NG/L (ref 0–22)
VLDLC SERPL CALC-MCNC: 9 MG/DL

## 2023-06-15 PROCEDURE — 84145 PROCALCITONIN (PCT): CPT

## 2023-06-15 PROCEDURE — 87641 MR-STAPH DNA AMP PROBE: CPT

## 2023-06-15 PROCEDURE — 2700000000 HC OXYGEN THERAPY PER DAY

## 2023-06-15 PROCEDURE — 2580000003 HC RX 258: Performed by: INTERNAL MEDICINE

## 2023-06-15 PROCEDURE — 84484 ASSAY OF TROPONIN QUANT: CPT

## 2023-06-15 PROCEDURE — 94640 AIRWAY INHALATION TREATMENT: CPT

## 2023-06-15 PROCEDURE — 6370000000 HC RX 637 (ALT 250 FOR IP): Performed by: INTERNAL MEDICINE

## 2023-06-15 PROCEDURE — 6360000002 HC RX W HCPCS: Performed by: INTERNAL MEDICINE

## 2023-06-15 PROCEDURE — 94761 N-INVAS EAR/PLS OXIMETRY MLT: CPT

## 2023-06-15 PROCEDURE — 83735 ASSAY OF MAGNESIUM: CPT

## 2023-06-15 PROCEDURE — 97530 THERAPEUTIC ACTIVITIES: CPT

## 2023-06-15 PROCEDURE — 2060000000 HC ICU INTERMEDIATE R&B

## 2023-06-15 PROCEDURE — 97166 OT EVAL MOD COMPLEX 45 MIN: CPT

## 2023-06-15 PROCEDURE — 80048 BASIC METABOLIC PNL TOTAL CA: CPT

## 2023-06-15 PROCEDURE — 97162 PT EVAL MOD COMPLEX 30 MIN: CPT

## 2023-06-15 PROCEDURE — 36415 COLL VENOUS BLD VENIPUNCTURE: CPT

## 2023-06-15 PROCEDURE — 80061 LIPID PANEL: CPT

## 2023-06-15 PROCEDURE — 99223 1ST HOSP IP/OBS HIGH 75: CPT | Performed by: INTERNAL MEDICINE

## 2023-06-15 RX ADMIN — ALBUTEROL SULFATE 2.5 MG: 2.5 SOLUTION RESPIRATORY (INHALATION) at 16:09

## 2023-06-15 RX ADMIN — FUROSEMIDE 40 MG: 10 INJECTION, SOLUTION INTRAMUSCULAR; INTRAVENOUS at 12:12

## 2023-06-15 RX ADMIN — GUAIFENESIN 600 MG: 600 TABLET ORAL at 10:42

## 2023-06-15 RX ADMIN — APIXABAN 5 MG: 5 TABLET, FILM COATED ORAL at 21:01

## 2023-06-15 RX ADMIN — GUAIFENESIN 600 MG: 600 TABLET ORAL at 21:01

## 2023-06-15 RX ADMIN — ALBUTEROL SULFATE 2.5 MG: 2.5 SOLUTION RESPIRATORY (INHALATION) at 07:40

## 2023-06-15 RX ADMIN — AZITHROMYCIN MONOHYDRATE 500 MG: 500 TABLET ORAL at 21:01

## 2023-06-15 RX ADMIN — METOPROLOL SUCCINATE 50 MG: 50 TABLET, EXTENDED RELEASE ORAL at 10:42

## 2023-06-15 RX ADMIN — INSULIN GLARGINE 12 UNITS: 100 INJECTION, SOLUTION SUBCUTANEOUS at 21:02

## 2023-06-15 RX ADMIN — SODIUM CHLORIDE, PRESERVATIVE FREE 10 ML: 5 INJECTION INTRAVENOUS at 21:03

## 2023-06-15 RX ADMIN — Medication 400 MG: at 10:42

## 2023-06-15 RX ADMIN — FUROSEMIDE 40 MG: 10 INJECTION, SOLUTION INTRAMUSCULAR; INTRAVENOUS at 19:31

## 2023-06-15 RX ADMIN — CEFTRIAXONE 1000 MG: 1 INJECTION, POWDER, FOR SOLUTION INTRAMUSCULAR; INTRAVENOUS at 12:21

## 2023-06-15 RX ADMIN — ALBUTEROL SULFATE 2.5 MG: 2.5 SOLUTION RESPIRATORY (INHALATION) at 19:26

## 2023-06-15 RX ADMIN — ALLOPURINOL 100 MG: 100 TABLET ORAL at 10:42

## 2023-06-15 RX ADMIN — ALBUTEROL SULFATE 2.5 MG: 2.5 SOLUTION RESPIRATORY (INHALATION) at 12:18

## 2023-06-15 RX ADMIN — VANCOMYCIN HYDROCHLORIDE 1500 MG: 1.5 INJECTION, POWDER, LYOPHILIZED, FOR SOLUTION INTRAVENOUS at 13:48

## 2023-06-15 RX ADMIN — APIXABAN 5 MG: 5 TABLET, FILM COATED ORAL at 10:42

## 2023-06-16 PROBLEM — I48.21 PERMANENT ATRIAL FIBRILLATION (HCC): Status: ACTIVE | Noted: 2023-06-16

## 2023-06-16 PROBLEM — I50.43 ACUTE ON CHRONIC COMBINED SYSTOLIC AND DIASTOLIC CONGESTIVE HEART FAILURE (HCC): Status: ACTIVE | Noted: 2022-10-25

## 2023-06-16 PROBLEM — I24.89 DEMAND ISCHEMIA: Status: ACTIVE | Noted: 2023-06-16

## 2023-06-16 PROBLEM — I24.8 DEMAND ISCHEMIA (HCC): Status: ACTIVE | Noted: 2023-06-16

## 2023-06-16 PROBLEM — I25.10 CORONARY ARTERY DISEASE INVOLVING NATIVE CORONARY ARTERY OF NATIVE HEART WITHOUT ANGINA PECTORIS: Status: ACTIVE | Noted: 2023-06-16

## 2023-06-16 LAB
ANION GAP SERPL CALCULATED.3IONS-SCNC: 8 MMOL/L (ref 3–16)
BUN SERPL-MCNC: 27 MG/DL (ref 7–20)
CALCIUM SERPL-MCNC: 8.9 MG/DL (ref 8.3–10.6)
CHLORIDE SERPL-SCNC: 100 MMOL/L (ref 99–110)
CO2 SERPL-SCNC: 35 MMOL/L (ref 21–32)
CREAT SERPL-MCNC: 1 MG/DL (ref 0.8–1.3)
GFR SERPLBLD CREATININE-BSD FMLA CKD-EPI: >60 ML/MIN/{1.73_M2}
GLUCOSE BLD-MCNC: 110 MG/DL (ref 70–99)
GLUCOSE BLD-MCNC: 116 MG/DL (ref 70–99)
GLUCOSE BLD-MCNC: 132 MG/DL (ref 70–99)
GLUCOSE BLD-MCNC: 179 MG/DL (ref 70–99)
GLUCOSE SERPL-MCNC: 96 MG/DL (ref 70–99)
MAGNESIUM SERPL-MCNC: 1.9 MG/DL (ref 1.8–2.4)
MRSA DNA SPEC QL NAA+PROBE: NORMAL
PERFORMED ON: ABNORMAL
POTASSIUM SERPL-SCNC: 3.9 MMOL/L (ref 3.5–5.1)
SODIUM SERPL-SCNC: 143 MMOL/L (ref 136–145)
VANCOMYCIN SERPL-MCNC: 16.3 UG/ML

## 2023-06-16 PROCEDURE — 97116 GAIT TRAINING THERAPY: CPT

## 2023-06-16 PROCEDURE — 83735 ASSAY OF MAGNESIUM: CPT

## 2023-06-16 PROCEDURE — 6360000002 HC RX W HCPCS: Performed by: INTERNAL MEDICINE

## 2023-06-16 PROCEDURE — 94640 AIRWAY INHALATION TREATMENT: CPT

## 2023-06-16 PROCEDURE — 6370000000 HC RX 637 (ALT 250 FOR IP): Performed by: INTERNAL MEDICINE

## 2023-06-16 PROCEDURE — 97530 THERAPEUTIC ACTIVITIES: CPT

## 2023-06-16 PROCEDURE — 99233 SBSQ HOSP IP/OBS HIGH 50: CPT | Performed by: INTERNAL MEDICINE

## 2023-06-16 PROCEDURE — 80202 ASSAY OF VANCOMYCIN: CPT

## 2023-06-16 PROCEDURE — 94760 N-INVAS EAR/PLS OXIMETRY 1: CPT

## 2023-06-16 PROCEDURE — 36415 COLL VENOUS BLD VENIPUNCTURE: CPT

## 2023-06-16 PROCEDURE — 80048 BASIC METABOLIC PNL TOTAL CA: CPT

## 2023-06-16 PROCEDURE — 2060000000 HC ICU INTERMEDIATE R&B

## 2023-06-16 PROCEDURE — 2580000003 HC RX 258: Performed by: INTERNAL MEDICINE

## 2023-06-16 RX ORDER — DOCUSATE SODIUM 100 MG/1
100 CAPSULE, LIQUID FILLED ORAL DAILY
Status: DISCONTINUED | OUTPATIENT
Start: 2023-06-16 | End: 2023-06-26 | Stop reason: HOSPADM

## 2023-06-16 RX ORDER — LANOLIN ALCOHOL/MO/W.PET/CERES
3 CREAM (GRAM) TOPICAL NIGHTLY PRN
Status: DISCONTINUED | OUTPATIENT
Start: 2023-06-16 | End: 2023-06-18

## 2023-06-16 RX ADMIN — ALLOPURINOL 100 MG: 100 TABLET ORAL at 07:50

## 2023-06-16 RX ADMIN — SODIUM CHLORIDE, PRESERVATIVE FREE 10 ML: 5 INJECTION INTRAVENOUS at 07:56

## 2023-06-16 RX ADMIN — ALBUTEROL SULFATE 2.5 MG: 2.5 SOLUTION RESPIRATORY (INHALATION) at 19:24

## 2023-06-16 RX ADMIN — DOCUSATE SODIUM 100 MG: 100 CAPSULE, LIQUID FILLED ORAL at 13:06

## 2023-06-16 RX ADMIN — AZITHROMYCIN MONOHYDRATE 500 MG: 500 TABLET ORAL at 19:42

## 2023-06-16 RX ADMIN — ALBUTEROL SULFATE 2.5 MG: 2.5 SOLUTION RESPIRATORY (INHALATION) at 17:28

## 2023-06-16 RX ADMIN — FUROSEMIDE 40 MG: 10 INJECTION, SOLUTION INTRAMUSCULAR; INTRAVENOUS at 07:57

## 2023-06-16 RX ADMIN — FUROSEMIDE 40 MG: 10 INJECTION, SOLUTION INTRAMUSCULAR; INTRAVENOUS at 17:24

## 2023-06-16 RX ADMIN — METOPROLOL SUCCINATE 50 MG: 50 TABLET, EXTENDED RELEASE ORAL at 07:50

## 2023-06-16 RX ADMIN — ALBUTEROL SULFATE 2.5 MG: 2.5 SOLUTION RESPIRATORY (INHALATION) at 08:11

## 2023-06-16 RX ADMIN — APIXABAN 5 MG: 5 TABLET, FILM COATED ORAL at 07:50

## 2023-06-16 RX ADMIN — Medication 3 MG: at 21:39

## 2023-06-16 RX ADMIN — APIXABAN 5 MG: 5 TABLET, FILM COATED ORAL at 19:42

## 2023-06-16 RX ADMIN — VANCOMYCIN HYDROCHLORIDE 1500 MG: 1.5 INJECTION, POWDER, LYOPHILIZED, FOR SOLUTION INTRAVENOUS at 11:55

## 2023-06-16 RX ADMIN — ALBUTEROL SULFATE 2.5 MG: 2.5 SOLUTION RESPIRATORY (INHALATION) at 12:34

## 2023-06-16 RX ADMIN — CEFTRIAXONE 1000 MG: 1 INJECTION, POWDER, FOR SOLUTION INTRAMUSCULAR; INTRAVENOUS at 08:02

## 2023-06-16 RX ADMIN — Medication 400 MG: at 07:50

## 2023-06-16 RX ADMIN — GUAIFENESIN 600 MG: 600 TABLET ORAL at 07:50

## 2023-06-16 RX ADMIN — INSULIN GLARGINE 12 UNITS: 100 INJECTION, SOLUTION SUBCUTANEOUS at 19:44

## 2023-06-16 RX ADMIN — GUAIFENESIN 600 MG: 600 TABLET ORAL at 19:42

## 2023-06-17 LAB
ANION GAP SERPL CALCULATED.3IONS-SCNC: 6 MMOL/L (ref 3–16)
BUN SERPL-MCNC: 27 MG/DL (ref 7–20)
CALCIUM SERPL-MCNC: 8.9 MG/DL (ref 8.3–10.6)
CHLORIDE SERPL-SCNC: 99 MMOL/L (ref 99–110)
CO2 SERPL-SCNC: 36 MMOL/L (ref 21–32)
CREAT SERPL-MCNC: 0.9 MG/DL (ref 0.8–1.3)
GFR SERPLBLD CREATININE-BSD FMLA CKD-EPI: >60 ML/MIN/{1.73_M2}
GLUCOSE BLD-MCNC: 118 MG/DL (ref 70–99)
GLUCOSE BLD-MCNC: 120 MG/DL (ref 70–99)
GLUCOSE BLD-MCNC: 141 MG/DL (ref 70–99)
GLUCOSE BLD-MCNC: 92 MG/DL (ref 70–99)
GLUCOSE SERPL-MCNC: 104 MG/DL (ref 70–99)
MAGNESIUM SERPL-MCNC: 1.8 MG/DL (ref 1.8–2.4)
NT-PROBNP SERPL-MCNC: 7662 PG/ML (ref 0–449)
PERFORMED ON: ABNORMAL
PERFORMED ON: NORMAL
POTASSIUM SERPL-SCNC: 4.1 MMOL/L (ref 3.5–5.1)
SODIUM SERPL-SCNC: 141 MMOL/L (ref 136–145)

## 2023-06-17 PROCEDURE — 2060000000 HC ICU INTERMEDIATE R&B

## 2023-06-17 PROCEDURE — 6370000000 HC RX 637 (ALT 250 FOR IP): Performed by: INTERNAL MEDICINE

## 2023-06-17 PROCEDURE — 83880 ASSAY OF NATRIURETIC PEPTIDE: CPT

## 2023-06-17 PROCEDURE — 83735 ASSAY OF MAGNESIUM: CPT

## 2023-06-17 PROCEDURE — 6360000002 HC RX W HCPCS: Performed by: INTERNAL MEDICINE

## 2023-06-17 PROCEDURE — 2580000003 HC RX 258: Performed by: INTERNAL MEDICINE

## 2023-06-17 PROCEDURE — 80048 BASIC METABOLIC PNL TOTAL CA: CPT

## 2023-06-17 PROCEDURE — 99233 SBSQ HOSP IP/OBS HIGH 50: CPT | Performed by: CLINICAL NURSE SPECIALIST

## 2023-06-17 PROCEDURE — 36415 COLL VENOUS BLD VENIPUNCTURE: CPT

## 2023-06-17 PROCEDURE — 94761 N-INVAS EAR/PLS OXIMETRY MLT: CPT

## 2023-06-17 RX ORDER — ALBUTEROL SULFATE 2.5 MG/3ML
2.5 SOLUTION RESPIRATORY (INHALATION) EVERY 4 HOURS PRN
Status: DISCONTINUED | OUTPATIENT
Start: 2023-06-17 | End: 2023-06-26 | Stop reason: HOSPADM

## 2023-06-17 RX ORDER — LANOLIN ALCOHOL/MO/W.PET/CERES
3 CREAM (GRAM) TOPICAL ONCE
Status: COMPLETED | OUTPATIENT
Start: 2023-06-17 | End: 2023-06-17

## 2023-06-17 RX ADMIN — ALLOPURINOL 100 MG: 100 TABLET ORAL at 09:19

## 2023-06-17 RX ADMIN — APIXABAN 5 MG: 5 TABLET, FILM COATED ORAL at 20:58

## 2023-06-17 RX ADMIN — FUROSEMIDE 40 MG: 10 INJECTION, SOLUTION INTRAMUSCULAR; INTRAVENOUS at 09:19

## 2023-06-17 RX ADMIN — Medication 400 MG: at 09:19

## 2023-06-17 RX ADMIN — ALBUTEROL SULFATE 2.5 MG: 2.5 SOLUTION RESPIRATORY (INHALATION) at 09:02

## 2023-06-17 RX ADMIN — GUAIFENESIN 600 MG: 600 TABLET ORAL at 09:19

## 2023-06-17 RX ADMIN — APIXABAN 5 MG: 5 TABLET, FILM COATED ORAL at 09:19

## 2023-06-17 RX ADMIN — SODIUM CHLORIDE, PRESERVATIVE FREE 10 ML: 5 INJECTION INTRAVENOUS at 09:20

## 2023-06-17 RX ADMIN — Medication 3 MG: at 20:58

## 2023-06-17 RX ADMIN — SODIUM CHLORIDE, PRESERVATIVE FREE 10 ML: 5 INJECTION INTRAVENOUS at 20:58

## 2023-06-17 RX ADMIN — METOPROLOL SUCCINATE 50 MG: 50 TABLET, EXTENDED RELEASE ORAL at 09:19

## 2023-06-17 RX ADMIN — INSULIN GLARGINE 12 UNITS: 100 INJECTION, SOLUTION SUBCUTANEOUS at 20:58

## 2023-06-17 RX ADMIN — FUROSEMIDE 40 MG: 10 INJECTION, SOLUTION INTRAMUSCULAR; INTRAVENOUS at 17:22

## 2023-06-17 RX ADMIN — GUAIFENESIN 600 MG: 600 TABLET ORAL at 20:58

## 2023-06-17 RX ADMIN — DOCUSATE SODIUM 100 MG: 100 CAPSULE, LIQUID FILLED ORAL at 09:19

## 2023-06-17 RX ADMIN — Medication 3 MG: at 10:52

## 2023-06-18 ENCOUNTER — APPOINTMENT (OUTPATIENT)
Dept: GENERAL RADIOLOGY | Age: 88
DRG: 291 | End: 2023-06-18
Payer: MEDICARE

## 2023-06-18 LAB
ANION GAP SERPL CALCULATED.3IONS-SCNC: 8 MMOL/L (ref 3–16)
BACTERIA BLD CULT ORG #2: NORMAL
BACTERIA BLD CULT: NORMAL
BUN SERPL-MCNC: 26 MG/DL (ref 7–20)
CALCIUM SERPL-MCNC: 9 MG/DL (ref 8.3–10.6)
CHLORIDE SERPL-SCNC: 99 MMOL/L (ref 99–110)
CO2 SERPL-SCNC: 35 MMOL/L (ref 21–32)
CREAT SERPL-MCNC: 0.8 MG/DL (ref 0.8–1.3)
GFR SERPLBLD CREATININE-BSD FMLA CKD-EPI: >60 ML/MIN/{1.73_M2}
GLUCOSE BLD-MCNC: 128 MG/DL (ref 70–99)
GLUCOSE BLD-MCNC: 152 MG/DL (ref 70–99)
GLUCOSE BLD-MCNC: 80 MG/DL (ref 70–99)
GLUCOSE BLD-MCNC: 87 MG/DL (ref 70–99)
GLUCOSE SERPL-MCNC: 88 MG/DL (ref 70–99)
MAGNESIUM SERPL-MCNC: 1.9 MG/DL (ref 1.8–2.4)
PERFORMED ON: ABNORMAL
PERFORMED ON: ABNORMAL
PERFORMED ON: NORMAL
PERFORMED ON: NORMAL
POTASSIUM SERPL-SCNC: 3.6 MMOL/L (ref 3.5–5.1)
SODIUM SERPL-SCNC: 142 MMOL/L (ref 136–145)

## 2023-06-18 PROCEDURE — 6370000000 HC RX 637 (ALT 250 FOR IP): Performed by: INTERNAL MEDICINE

## 2023-06-18 PROCEDURE — 2700000000 HC OXYGEN THERAPY PER DAY

## 2023-06-18 PROCEDURE — 83735 ASSAY OF MAGNESIUM: CPT

## 2023-06-18 PROCEDURE — 2580000003 HC RX 258: Performed by: INTERNAL MEDICINE

## 2023-06-18 PROCEDURE — 71046 X-RAY EXAM CHEST 2 VIEWS: CPT

## 2023-06-18 PROCEDURE — 2060000000 HC ICU INTERMEDIATE R&B

## 2023-06-18 PROCEDURE — 80048 BASIC METABOLIC PNL TOTAL CA: CPT

## 2023-06-18 PROCEDURE — 6360000002 HC RX W HCPCS: Performed by: INTERNAL MEDICINE

## 2023-06-18 PROCEDURE — 99232 SBSQ HOSP IP/OBS MODERATE 35: CPT | Performed by: CLINICAL NURSE SPECIALIST

## 2023-06-18 PROCEDURE — 36415 COLL VENOUS BLD VENIPUNCTURE: CPT

## 2023-06-18 PROCEDURE — 94761 N-INVAS EAR/PLS OXIMETRY MLT: CPT

## 2023-06-18 RX ORDER — DIPHENHYDRAMINE HCL 25 MG
25 TABLET ORAL
Status: COMPLETED | OUTPATIENT
Start: 2023-06-18 | End: 2023-06-20

## 2023-06-18 RX ADMIN — GLYCERIN 2 G: 2 SUPPOSITORY RECTAL at 13:17

## 2023-06-18 RX ADMIN — GUAIFENESIN 600 MG: 600 TABLET ORAL at 08:35

## 2023-06-18 RX ADMIN — METOPROLOL SUCCINATE 50 MG: 50 TABLET, EXTENDED RELEASE ORAL at 08:35

## 2023-06-18 RX ADMIN — SODIUM CHLORIDE, PRESERVATIVE FREE 10 ML: 5 INJECTION INTRAVENOUS at 08:36

## 2023-06-18 RX ADMIN — APIXABAN 5 MG: 5 TABLET, FILM COATED ORAL at 20:30

## 2023-06-18 RX ADMIN — FUROSEMIDE 40 MG: 10 INJECTION, SOLUTION INTRAMUSCULAR; INTRAVENOUS at 08:35

## 2023-06-18 RX ADMIN — FUROSEMIDE 40 MG: 10 INJECTION, SOLUTION INTRAMUSCULAR; INTRAVENOUS at 17:26

## 2023-06-18 RX ADMIN — SODIUM CHLORIDE, PRESERVATIVE FREE 10 ML: 5 INJECTION INTRAVENOUS at 20:29

## 2023-06-18 RX ADMIN — ALLOPURINOL 100 MG: 100 TABLET ORAL at 08:35

## 2023-06-18 RX ADMIN — DIPHENHYDRAMINE HCL 25 MG: 25 TABLET ORAL at 23:33

## 2023-06-18 RX ADMIN — DOCUSATE SODIUM 100 MG: 100 CAPSULE, LIQUID FILLED ORAL at 08:35

## 2023-06-18 RX ADMIN — APIXABAN 5 MG: 5 TABLET, FILM COATED ORAL at 08:35

## 2023-06-18 RX ADMIN — GUAIFENESIN 600 MG: 600 TABLET ORAL at 20:30

## 2023-06-18 RX ADMIN — Medication 400 MG: at 08:35

## 2023-06-18 RX ADMIN — INSULIN GLARGINE 12 UNITS: 100 INJECTION, SOLUTION SUBCUTANEOUS at 20:30

## 2023-06-19 LAB
ANION GAP SERPL CALCULATED.3IONS-SCNC: 6 MMOL/L (ref 3–16)
BUN SERPL-MCNC: 28 MG/DL (ref 7–20)
CALCIUM SERPL-MCNC: 9.1 MG/DL (ref 8.3–10.6)
CHLORIDE SERPL-SCNC: 95 MMOL/L (ref 99–110)
CO2 SERPL-SCNC: 39 MMOL/L (ref 21–32)
CREAT SERPL-MCNC: 0.9 MG/DL (ref 0.8–1.3)
GFR SERPLBLD CREATININE-BSD FMLA CKD-EPI: >60 ML/MIN/{1.73_M2}
GLUCOSE BLD-MCNC: 153 MG/DL (ref 70–99)
GLUCOSE BLD-MCNC: 74 MG/DL (ref 70–99)
GLUCOSE BLD-MCNC: 81 MG/DL (ref 70–99)
GLUCOSE BLD-MCNC: 95 MG/DL (ref 70–99)
GLUCOSE SERPL-MCNC: 72 MG/DL (ref 70–99)
MAGNESIUM SERPL-MCNC: 2 MG/DL (ref 1.8–2.4)
NT-PROBNP SERPL-MCNC: 8696 PG/ML (ref 0–449)
PERFORMED ON: ABNORMAL
PERFORMED ON: NORMAL
POTASSIUM SERPL-SCNC: 3.5 MMOL/L (ref 3.5–5.1)
SODIUM SERPL-SCNC: 140 MMOL/L (ref 136–145)

## 2023-06-19 PROCEDURE — 83880 ASSAY OF NATRIURETIC PEPTIDE: CPT

## 2023-06-19 PROCEDURE — 2580000003 HC RX 258: Performed by: INTERNAL MEDICINE

## 2023-06-19 PROCEDURE — 6360000002 HC RX W HCPCS: Performed by: INTERNAL MEDICINE

## 2023-06-19 PROCEDURE — 36415 COLL VENOUS BLD VENIPUNCTURE: CPT

## 2023-06-19 PROCEDURE — 97530 THERAPEUTIC ACTIVITIES: CPT

## 2023-06-19 PROCEDURE — 2060000000 HC ICU INTERMEDIATE R&B

## 2023-06-19 PROCEDURE — 99233 SBSQ HOSP IP/OBS HIGH 50: CPT | Performed by: INTERNAL MEDICINE

## 2023-06-19 PROCEDURE — 94760 N-INVAS EAR/PLS OXIMETRY 1: CPT

## 2023-06-19 PROCEDURE — 83735 ASSAY OF MAGNESIUM: CPT

## 2023-06-19 PROCEDURE — 6370000000 HC RX 637 (ALT 250 FOR IP): Performed by: INTERNAL MEDICINE

## 2023-06-19 PROCEDURE — 97535 SELF CARE MNGMENT TRAINING: CPT

## 2023-06-19 PROCEDURE — 2700000000 HC OXYGEN THERAPY PER DAY

## 2023-06-19 PROCEDURE — 80048 BASIC METABOLIC PNL TOTAL CA: CPT

## 2023-06-19 RX ADMIN — INSULIN GLARGINE 12 UNITS: 100 INJECTION, SOLUTION SUBCUTANEOUS at 21:25

## 2023-06-19 RX ADMIN — FUROSEMIDE 40 MG: 10 INJECTION, SOLUTION INTRAMUSCULAR; INTRAVENOUS at 09:40

## 2023-06-19 RX ADMIN — Medication 400 MG: at 09:40

## 2023-06-19 RX ADMIN — FUROSEMIDE 40 MG: 10 INJECTION, SOLUTION INTRAMUSCULAR; INTRAVENOUS at 17:20

## 2023-06-19 RX ADMIN — DOCUSATE SODIUM 100 MG: 100 CAPSULE, LIQUID FILLED ORAL at 09:40

## 2023-06-19 RX ADMIN — GUAIFENESIN 600 MG: 600 TABLET ORAL at 09:40

## 2023-06-19 RX ADMIN — SODIUM CHLORIDE, PRESERVATIVE FREE 10 ML: 5 INJECTION INTRAVENOUS at 09:40

## 2023-06-19 RX ADMIN — APIXABAN 5 MG: 5 TABLET, FILM COATED ORAL at 21:25

## 2023-06-19 RX ADMIN — METOPROLOL SUCCINATE 50 MG: 50 TABLET, EXTENDED RELEASE ORAL at 09:39

## 2023-06-19 RX ADMIN — GUAIFENESIN 600 MG: 600 TABLET ORAL at 21:25

## 2023-06-19 RX ADMIN — ALLOPURINOL 100 MG: 100 TABLET ORAL at 09:40

## 2023-06-19 RX ADMIN — SODIUM CHLORIDE, PRESERVATIVE FREE 10 ML: 5 INJECTION INTRAVENOUS at 21:25

## 2023-06-19 RX ADMIN — APIXABAN 5 MG: 5 TABLET, FILM COATED ORAL at 09:40

## 2023-06-20 PROBLEM — I50.9 ACUTE ON CHRONIC CONGESTIVE HEART FAILURE (HCC): Status: ACTIVE | Noted: 2023-06-20

## 2023-06-20 LAB
ANION GAP SERPL CALCULATED.3IONS-SCNC: 5 MMOL/L (ref 3–16)
BUN SERPL-MCNC: 30 MG/DL (ref 7–20)
CALCIUM SERPL-MCNC: 8.9 MG/DL (ref 8.3–10.6)
CHLORIDE SERPL-SCNC: 93 MMOL/L (ref 99–110)
CO2 SERPL-SCNC: 40 MMOL/L (ref 21–32)
CREAT SERPL-MCNC: 0.9 MG/DL (ref 0.8–1.3)
GFR SERPLBLD CREATININE-BSD FMLA CKD-EPI: >60 ML/MIN/{1.73_M2}
GLUCOSE BLD-MCNC: 127 MG/DL (ref 70–99)
GLUCOSE BLD-MCNC: 164 MG/DL (ref 70–99)
GLUCOSE BLD-MCNC: 67 MG/DL (ref 70–99)
GLUCOSE BLD-MCNC: 70 MG/DL (ref 70–99)
GLUCOSE BLD-MCNC: 99 MG/DL (ref 70–99)
GLUCOSE SERPL-MCNC: 148 MG/DL (ref 70–99)
MAGNESIUM SERPL-MCNC: 2 MG/DL (ref 1.8–2.4)
PERFORMED ON: ABNORMAL
PERFORMED ON: NORMAL
PERFORMED ON: NORMAL
POTASSIUM SERPL-SCNC: 3.9 MMOL/L (ref 3.5–5.1)
SODIUM SERPL-SCNC: 138 MMOL/L (ref 136–145)

## 2023-06-20 PROCEDURE — 97530 THERAPEUTIC ACTIVITIES: CPT

## 2023-06-20 PROCEDURE — 6370000000 HC RX 637 (ALT 250 FOR IP): Performed by: INTERNAL MEDICINE

## 2023-06-20 PROCEDURE — 80048 BASIC METABOLIC PNL TOTAL CA: CPT

## 2023-06-20 PROCEDURE — 6370000000 HC RX 637 (ALT 250 FOR IP): Performed by: NURSE PRACTITIONER

## 2023-06-20 PROCEDURE — 6360000002 HC RX W HCPCS: Performed by: INTERNAL MEDICINE

## 2023-06-20 PROCEDURE — 97110 THERAPEUTIC EXERCISES: CPT

## 2023-06-20 PROCEDURE — 83735 ASSAY OF MAGNESIUM: CPT

## 2023-06-20 PROCEDURE — 94761 N-INVAS EAR/PLS OXIMETRY MLT: CPT

## 2023-06-20 PROCEDURE — 36415 COLL VENOUS BLD VENIPUNCTURE: CPT

## 2023-06-20 PROCEDURE — 2060000000 HC ICU INTERMEDIATE R&B

## 2023-06-20 PROCEDURE — 2700000000 HC OXYGEN THERAPY PER DAY

## 2023-06-20 PROCEDURE — 99232 SBSQ HOSP IP/OBS MODERATE 35: CPT | Performed by: NURSE PRACTITIONER

## 2023-06-20 PROCEDURE — 2580000003 HC RX 258: Performed by: INTERNAL MEDICINE

## 2023-06-20 RX ORDER — INSULIN GLARGINE 100 [IU]/ML
6 INJECTION, SOLUTION SUBCUTANEOUS ONCE
Status: COMPLETED | OUTPATIENT
Start: 2023-06-20 | End: 2023-06-20

## 2023-06-20 RX ORDER — TRAMADOL HYDROCHLORIDE 50 MG/1
50 TABLET ORAL EVERY 6 HOURS PRN
Status: DISCONTINUED | OUTPATIENT
Start: 2023-06-20 | End: 2023-06-21

## 2023-06-20 RX ADMIN — SODIUM CHLORIDE, PRESERVATIVE FREE 10 ML: 5 INJECTION INTRAVENOUS at 09:05

## 2023-06-20 RX ADMIN — SODIUM CHLORIDE, PRESERVATIVE FREE 10 ML: 5 INJECTION INTRAVENOUS at 20:59

## 2023-06-20 RX ADMIN — DOCUSATE SODIUM 100 MG: 100 CAPSULE, LIQUID FILLED ORAL at 09:05

## 2023-06-20 RX ADMIN — Medication 400 MG: at 09:05

## 2023-06-20 RX ADMIN — FUROSEMIDE 40 MG: 10 INJECTION, SOLUTION INTRAMUSCULAR; INTRAVENOUS at 18:00

## 2023-06-20 RX ADMIN — ALLOPURINOL 100 MG: 100 TABLET ORAL at 09:05

## 2023-06-20 RX ADMIN — METOPROLOL SUCCINATE 50 MG: 50 TABLET, EXTENDED RELEASE ORAL at 09:05

## 2023-06-20 RX ADMIN — GUAIFENESIN 600 MG: 600 TABLET ORAL at 09:05

## 2023-06-20 RX ADMIN — APIXABAN 5 MG: 5 TABLET, FILM COATED ORAL at 20:58

## 2023-06-20 RX ADMIN — APIXABAN 5 MG: 5 TABLET, FILM COATED ORAL at 09:05

## 2023-06-20 RX ADMIN — GUAIFENESIN 600 MG: 600 TABLET ORAL at 20:58

## 2023-06-20 RX ADMIN — INSULIN GLARGINE 6 UNITS: 100 INJECTION, SOLUTION SUBCUTANEOUS at 20:59

## 2023-06-20 RX ADMIN — DIPHENHYDRAMINE HCL 25 MG: 25 TABLET ORAL at 20:59

## 2023-06-20 RX ADMIN — FUROSEMIDE 40 MG: 10 INJECTION, SOLUTION INTRAMUSCULAR; INTRAVENOUS at 09:06

## 2023-06-21 ENCOUNTER — APPOINTMENT (OUTPATIENT)
Dept: GENERAL RADIOLOGY | Age: 88
DRG: 291 | End: 2023-06-21
Payer: MEDICARE

## 2023-06-21 LAB
GLUCOSE BLD-MCNC: 108 MG/DL (ref 70–99)
GLUCOSE BLD-MCNC: 154 MG/DL (ref 70–99)
GLUCOSE BLD-MCNC: 57 MG/DL (ref 70–99)
GLUCOSE BLD-MCNC: 77 MG/DL (ref 70–99)
GLUCOSE BLD-MCNC: 80 MG/DL (ref 70–99)
GLUCOSE BLD-MCNC: 87 MG/DL (ref 70–99)
PERFORMED ON: ABNORMAL
PERFORMED ON: NORMAL

## 2023-06-21 PROCEDURE — 94760 N-INVAS EAR/PLS OXIMETRY 1: CPT

## 2023-06-21 PROCEDURE — 6370000000 HC RX 637 (ALT 250 FOR IP): Performed by: NURSE PRACTITIONER

## 2023-06-21 PROCEDURE — 2580000003 HC RX 258: Performed by: INTERNAL MEDICINE

## 2023-06-21 PROCEDURE — 97110 THERAPEUTIC EXERCISES: CPT

## 2023-06-21 PROCEDURE — 99233 SBSQ HOSP IP/OBS HIGH 50: CPT | Performed by: INTERNAL MEDICINE

## 2023-06-21 PROCEDURE — 71045 X-RAY EXAM CHEST 1 VIEW: CPT

## 2023-06-21 PROCEDURE — 2060000000 HC ICU INTERMEDIATE R&B

## 2023-06-21 PROCEDURE — 6360000002 HC RX W HCPCS: Performed by: INTERNAL MEDICINE

## 2023-06-21 PROCEDURE — 0W993ZZ DRAINAGE OF RIGHT PLEURAL CAVITY, PERCUTANEOUS APPROACH: ICD-10-PCS | Performed by: PSYCHIATRY & NEUROLOGY

## 2023-06-21 PROCEDURE — 2700000000 HC OXYGEN THERAPY PER DAY

## 2023-06-21 PROCEDURE — 6370000000 HC RX 637 (ALT 250 FOR IP): Performed by: INTERNAL MEDICINE

## 2023-06-21 PROCEDURE — 97530 THERAPEUTIC ACTIVITIES: CPT

## 2023-06-21 RX ORDER — INSULIN GLARGINE 100 [IU]/ML
6 INJECTION, SOLUTION SUBCUTANEOUS NIGHTLY
Status: DISCONTINUED | OUTPATIENT
Start: 2023-06-21 | End: 2023-06-26 | Stop reason: HOSPADM

## 2023-06-21 RX ORDER — POLYMYXIN B SULFATE AND TRIMETHOPRIM 1; 10000 MG/ML; [USP'U]/ML
1 SOLUTION OPHTHALMIC 4 TIMES DAILY
Status: DISCONTINUED | OUTPATIENT
Start: 2023-06-21 | End: 2023-06-26 | Stop reason: HOSPADM

## 2023-06-21 RX ORDER — LANOLIN ALCOHOL/MO/W.PET/CERES
3 CREAM (GRAM) TOPICAL NIGHTLY PRN
Status: DISCONTINUED | OUTPATIENT
Start: 2023-06-21 | End: 2023-06-26 | Stop reason: HOSPADM

## 2023-06-21 RX ADMIN — SODIUM CHLORIDE, PRESERVATIVE FREE 10 ML: 5 INJECTION INTRAVENOUS at 09:39

## 2023-06-21 RX ADMIN — GUAIFENESIN 600 MG: 600 TABLET ORAL at 20:26

## 2023-06-21 RX ADMIN — APIXABAN 5 MG: 5 TABLET, FILM COATED ORAL at 09:36

## 2023-06-21 RX ADMIN — FUROSEMIDE 40 MG: 10 INJECTION, SOLUTION INTRAMUSCULAR; INTRAVENOUS at 18:12

## 2023-06-21 RX ADMIN — FUROSEMIDE 40 MG: 10 INJECTION, SOLUTION INTRAMUSCULAR; INTRAVENOUS at 09:36

## 2023-06-21 RX ADMIN — METOPROLOL SUCCINATE 50 MG: 50 TABLET, EXTENDED RELEASE ORAL at 09:36

## 2023-06-21 RX ADMIN — DOCUSATE SODIUM 100 MG: 100 CAPSULE, LIQUID FILLED ORAL at 09:36

## 2023-06-21 RX ADMIN — Medication 400 MG: at 09:36

## 2023-06-21 RX ADMIN — POLYMYXIN B SULFATE AND TRIMETHOPRIM SULFATE 1 DROP: 10000; 1 SOLUTION/ DROPS OPHTHALMIC at 21:40

## 2023-06-21 RX ADMIN — SODIUM CHLORIDE, PRESERVATIVE FREE 10 ML: 5 INJECTION INTRAVENOUS at 20:27

## 2023-06-21 RX ADMIN — ALLOPURINOL 100 MG: 100 TABLET ORAL at 09:36

## 2023-06-21 RX ADMIN — Medication 3 MG: at 21:40

## 2023-06-21 RX ADMIN — GUAIFENESIN 600 MG: 600 TABLET ORAL at 09:36

## 2023-06-22 ENCOUNTER — APPOINTMENT (OUTPATIENT)
Dept: GENERAL RADIOLOGY | Age: 88
DRG: 291 | End: 2023-06-22
Payer: MEDICARE

## 2023-06-22 ENCOUNTER — APPOINTMENT (OUTPATIENT)
Dept: ULTRASOUND IMAGING | Age: 88
DRG: 291 | End: 2023-06-22
Payer: MEDICARE

## 2023-06-22 LAB
APPEARANCE FLUID: CLEAR
BDY FLUID QUALITY: NORMAL
CELL COUNT FLUID TYPE: NORMAL
COLOR FLUID: YELLOW
GLUCOSE BLD-MCNC: 110 MG/DL (ref 70–99)
GLUCOSE BLD-MCNC: 117 MG/DL (ref 70–99)
GLUCOSE BLD-MCNC: 132 MG/DL (ref 70–99)
GLUCOSE BLD-MCNC: 203 MG/DL (ref 70–99)
GLUCOSE BLD-MCNC: 209 MG/DL (ref 70–99)
INR PPP: 1.56 (ref 0.84–1.16)
LDH FLD L TO P-CCNC: 78 U/L
LYMPHOCYTES NFR FLD: 30 %
MACROPHAGES # FLD: 28 %
MESOTHL CELL NFR FLD: 2 %
NEUTROPHIL, FLUID: 40 %
NUC CELL # FLD: 177 /CUMM
PERFORMED ON: ABNORMAL
PH FLD STRIP: 8 [PH]
PROT FLD-MCNC: 2.4 G/DL
PROTHROMBIN TIME: 18.6 SEC (ref 11.5–14.8)
RBC FLUID: <2000 /CUMM
SPECIMEN SOURCE FLD: NORMAL
TOTAL CELLS COUNTED FLD: 100

## 2023-06-22 PROCEDURE — 6370000000 HC RX 637 (ALT 250 FOR IP): Performed by: NURSE PRACTITIONER

## 2023-06-22 PROCEDURE — 88112 CYTOPATH CELL ENHANCE TECH: CPT

## 2023-06-22 PROCEDURE — 2580000003 HC RX 258: Performed by: INTERNAL MEDICINE

## 2023-06-22 PROCEDURE — 2060000000 HC ICU INTERMEDIATE R&B

## 2023-06-22 PROCEDURE — 6370000000 HC RX 637 (ALT 250 FOR IP): Performed by: INTERNAL MEDICINE

## 2023-06-22 PROCEDURE — 94760 N-INVAS EAR/PLS OXIMETRY 1: CPT

## 2023-06-22 PROCEDURE — 2709999900 US THORACENTESIS

## 2023-06-22 PROCEDURE — 9990000010 HC NO CHARGE VISIT

## 2023-06-22 PROCEDURE — 84157 ASSAY OF PROTEIN OTHER: CPT

## 2023-06-22 PROCEDURE — 83986 ASSAY PH BODY FLUID NOS: CPT

## 2023-06-22 PROCEDURE — 71110 X-RAY EXAM RIBS BIL 3 VIEWS: CPT

## 2023-06-22 PROCEDURE — 36415 COLL VENOUS BLD VENIPUNCTURE: CPT

## 2023-06-22 PROCEDURE — 6360000002 HC RX W HCPCS: Performed by: INTERNAL MEDICINE

## 2023-06-22 PROCEDURE — 99223 1ST HOSP IP/OBS HIGH 75: CPT | Performed by: INTERNAL MEDICINE

## 2023-06-22 PROCEDURE — 83615 LACTATE (LD) (LDH) ENZYME: CPT

## 2023-06-22 PROCEDURE — 89051 BODY FLUID CELL COUNT: CPT

## 2023-06-22 PROCEDURE — 0W9B3ZZ DRAINAGE OF LEFT PLEURAL CAVITY, PERCUTANEOUS APPROACH: ICD-10-PCS | Performed by: PSYCHIATRY & NEUROLOGY

## 2023-06-22 PROCEDURE — 71045 X-RAY EXAM CHEST 1 VIEW: CPT

## 2023-06-22 PROCEDURE — 2700000000 HC OXYGEN THERAPY PER DAY

## 2023-06-22 PROCEDURE — C1729 CATH, DRAINAGE: HCPCS

## 2023-06-22 PROCEDURE — 88305 TISSUE EXAM BY PATHOLOGIST: CPT

## 2023-06-22 PROCEDURE — 85610 PROTHROMBIN TIME: CPT

## 2023-06-22 RX ORDER — MORPHINE SULFATE 2 MG/ML
2 INJECTION, SOLUTION INTRAMUSCULAR; INTRAVENOUS EVERY 4 HOURS PRN
Status: DISCONTINUED | OUTPATIENT
Start: 2023-06-22 | End: 2023-06-26 | Stop reason: HOSPADM

## 2023-06-22 RX ORDER — SODIUM CHLORIDE 0.9 % (FLUSH) 0.9 %
5-40 SYRINGE (ML) INJECTION EVERY 12 HOURS SCHEDULED
Status: DISCONTINUED | OUTPATIENT
Start: 2023-06-22 | End: 2023-06-26 | Stop reason: HOSPADM

## 2023-06-22 RX ORDER — OXYCODONE HYDROCHLORIDE 5 MG/1
5 TABLET ORAL EVERY 6 HOURS PRN
Status: DISCONTINUED | OUTPATIENT
Start: 2023-06-22 | End: 2023-06-22

## 2023-06-22 RX ADMIN — DOCUSATE SODIUM 100 MG: 100 CAPSULE, LIQUID FILLED ORAL at 08:55

## 2023-06-22 RX ADMIN — Medication 10 ML: at 20:10

## 2023-06-22 RX ADMIN — POLYMYXIN B SULFATE AND TRIMETHOPRIM SULFATE 1 DROP: 10000; 1 SOLUTION/ DROPS OPHTHALMIC at 08:59

## 2023-06-22 RX ADMIN — Medication 3 MG: at 22:01

## 2023-06-22 RX ADMIN — FUROSEMIDE 40 MG: 10 INJECTION, SOLUTION INTRAMUSCULAR; INTRAVENOUS at 08:55

## 2023-06-22 RX ADMIN — GUAIFENESIN 600 MG: 600 TABLET ORAL at 20:10

## 2023-06-22 RX ADMIN — SODIUM CHLORIDE, PRESERVATIVE FREE 10 ML: 5 INJECTION INTRAVENOUS at 20:10

## 2023-06-22 RX ADMIN — METOPROLOL SUCCINATE 50 MG: 50 TABLET, EXTENDED RELEASE ORAL at 08:55

## 2023-06-22 RX ADMIN — Medication 400 MG: at 08:55

## 2023-06-22 RX ADMIN — MORPHINE SULFATE 2 MG: 2 INJECTION, SOLUTION INTRAMUSCULAR; INTRAVENOUS at 17:55

## 2023-06-22 RX ADMIN — ACETAMINOPHEN 650 MG: 325 TABLET ORAL at 15:16

## 2023-06-22 RX ADMIN — SODIUM CHLORIDE, PRESERVATIVE FREE 10 ML: 5 INJECTION INTRAVENOUS at 08:56

## 2023-06-22 RX ADMIN — POLYETHYLENE GLYCOL 3350 17 G: 17 POWDER, FOR SOLUTION ORAL at 17:56

## 2023-06-22 RX ADMIN — GUAIFENESIN 600 MG: 600 TABLET ORAL at 08:55

## 2023-06-22 RX ADMIN — POLYMYXIN B SULFATE AND TRIMETHOPRIM SULFATE 1 DROP: 10000; 1 SOLUTION/ DROPS OPHTHALMIC at 23:16

## 2023-06-22 RX ADMIN — FUROSEMIDE 40 MG: 10 INJECTION, SOLUTION INTRAMUSCULAR; INTRAVENOUS at 17:55

## 2023-06-22 RX ADMIN — POLYMYXIN B SULFATE AND TRIMETHOPRIM SULFATE 1 DROP: 10000; 1 SOLUTION/ DROPS OPHTHALMIC at 18:38

## 2023-06-22 RX ADMIN — INSULIN GLARGINE 6 UNITS: 100 INJECTION, SOLUTION SUBCUTANEOUS at 22:00

## 2023-06-22 RX ADMIN — ALLOPURINOL 100 MG: 100 TABLET ORAL at 08:55

## 2023-06-22 RX ADMIN — OXYCODONE HYDROCHLORIDE 5 MG: 5 TABLET ORAL at 15:41

## 2023-06-22 ASSESSMENT — PAIN SCALES - GENERAL
PAINLEVEL_OUTOF10: 6
PAINLEVEL_OUTOF10: 7

## 2023-06-22 ASSESSMENT — PAIN DESCRIPTION - LOCATION: LOCATION: BACK

## 2023-06-23 LAB
ALBUMIN SERPL-MCNC: 2.7 G/DL (ref 3.4–5)
ALBUMIN/GLOB SERPL: 0.8 {RATIO} (ref 1.1–2.2)
ALP SERPL-CCNC: 106 U/L (ref 40–129)
ALT SERPL-CCNC: 14 U/L (ref 10–40)
ANION GAP SERPL CALCULATED.3IONS-SCNC: 5 MMOL/L (ref 3–16)
AST SERPL-CCNC: 23 U/L (ref 15–37)
BASOPHILS # BLD: 0 K/UL (ref 0–0.2)
BASOPHILS NFR BLD: 0.6 %
BILIRUB SERPL-MCNC: 1.4 MG/DL (ref 0–1)
BUN SERPL-MCNC: 36 MG/DL (ref 7–20)
CALCIUM SERPL-MCNC: 8.8 MG/DL (ref 8.3–10.6)
CHLORIDE SERPL-SCNC: 93 MMOL/L (ref 99–110)
CO2 SERPL-SCNC: 42 MMOL/L (ref 21–32)
CREAT SERPL-MCNC: 1.3 MG/DL (ref 0.8–1.3)
DEPRECATED RDW RBC AUTO: 16.7 % (ref 12.4–15.4)
EOSINOPHIL # BLD: 0 K/UL (ref 0–0.6)
EOSINOPHIL NFR BLD: 0.2 %
GFR SERPLBLD CREATININE-BSD FMLA CKD-EPI: 53 ML/MIN/{1.73_M2}
GLUCOSE BLD-MCNC: 128 MG/DL (ref 70–99)
GLUCOSE BLD-MCNC: 134 MG/DL (ref 70–99)
GLUCOSE BLD-MCNC: 142 MG/DL (ref 70–99)
GLUCOSE BLD-MCNC: 151 MG/DL (ref 70–99)
GLUCOSE SERPL-MCNC: 145 MG/DL (ref 70–99)
HCT VFR BLD AUTO: 38.7 % (ref 40.5–52.5)
HGB BLD-MCNC: 13 G/DL (ref 13.5–17.5)
LDH SERPL L TO P-CCNC: 186 U/L (ref 100–190)
LYMPHOCYTES # BLD: 0.7 K/UL (ref 1–5.1)
LYMPHOCYTES NFR BLD: 7.7 %
MCH RBC QN AUTO: 32.4 PG (ref 26–34)
MCHC RBC AUTO-ENTMCNC: 33.7 G/DL (ref 31–36)
MCV RBC AUTO: 96 FL (ref 80–100)
MONOCYTES # BLD: 1 K/UL (ref 0–1.3)
MONOCYTES NFR BLD: 11.3 %
NEUTROPHILS # BLD: 6.8 K/UL (ref 1.7–7.7)
NEUTROPHILS NFR BLD: 80.2 %
PERFORMED ON: ABNORMAL
PLATELET # BLD AUTO: 182 K/UL (ref 135–450)
PMV BLD AUTO: 8.7 FL (ref 5–10.5)
POTASSIUM SERPL-SCNC: 4.4 MMOL/L (ref 3.5–5.1)
PROT SERPL-MCNC: 6.3 G/DL (ref 6.4–8.2)
RBC # BLD AUTO: 4.03 M/UL (ref 4.2–5.9)
SODIUM SERPL-SCNC: 140 MMOL/L (ref 136–145)
WBC # BLD AUTO: 8.5 K/UL (ref 4–11)

## 2023-06-23 PROCEDURE — 94760 N-INVAS EAR/PLS OXIMETRY 1: CPT

## 2023-06-23 PROCEDURE — 99232 SBSQ HOSP IP/OBS MODERATE 35: CPT | Performed by: INTERNAL MEDICINE

## 2023-06-23 PROCEDURE — 2580000003 HC RX 258: Performed by: INTERNAL MEDICINE

## 2023-06-23 PROCEDURE — 99233 SBSQ HOSP IP/OBS HIGH 50: CPT | Performed by: INTERNAL MEDICINE

## 2023-06-23 PROCEDURE — 80053 COMPREHEN METABOLIC PANEL: CPT

## 2023-06-23 PROCEDURE — 6360000002 HC RX W HCPCS: Performed by: INTERNAL MEDICINE

## 2023-06-23 PROCEDURE — 2060000000 HC ICU INTERMEDIATE R&B

## 2023-06-23 PROCEDURE — 83615 LACTATE (LD) (LDH) ENZYME: CPT

## 2023-06-23 PROCEDURE — 6370000000 HC RX 637 (ALT 250 FOR IP): Performed by: INTERNAL MEDICINE

## 2023-06-23 PROCEDURE — 97535 SELF CARE MNGMENT TRAINING: CPT

## 2023-06-23 PROCEDURE — 36415 COLL VENOUS BLD VENIPUNCTURE: CPT

## 2023-06-23 PROCEDURE — 85025 COMPLETE CBC W/AUTO DIFF WBC: CPT

## 2023-06-23 PROCEDURE — 97530 THERAPEUTIC ACTIVITIES: CPT

## 2023-06-23 PROCEDURE — 6370000000 HC RX 637 (ALT 250 FOR IP): Performed by: NURSE PRACTITIONER

## 2023-06-23 PROCEDURE — 2700000000 HC OXYGEN THERAPY PER DAY

## 2023-06-23 PROCEDURE — 97166 OT EVAL MOD COMPLEX 45 MIN: CPT

## 2023-06-23 RX ORDER — MINERAL OIL 100 G/100G
1 OIL RECTAL PRN
Status: DISCONTINUED | OUTPATIENT
Start: 2023-06-23 | End: 2023-06-26 | Stop reason: HOSPADM

## 2023-06-23 RX ORDER — BISACODYL 10 MG
10 SUPPOSITORY, RECTAL RECTAL DAILY PRN
Status: DISCONTINUED | OUTPATIENT
Start: 2023-06-23 | End: 2023-06-26 | Stop reason: HOSPADM

## 2023-06-23 RX ADMIN — DOCUSATE SODIUM 100 MG: 100 CAPSULE, LIQUID FILLED ORAL at 08:34

## 2023-06-23 RX ADMIN — POLYMYXIN B SULFATE AND TRIMETHOPRIM SULFATE 1 DROP: 10000; 1 SOLUTION/ DROPS OPHTHALMIC at 08:35

## 2023-06-23 RX ADMIN — ACETAMINOPHEN 650 MG: 325 TABLET ORAL at 08:33

## 2023-06-23 RX ADMIN — POLYMYXIN B SULFATE AND TRIMETHOPRIM SULFATE 1 DROP: 10000; 1 SOLUTION/ DROPS OPHTHALMIC at 18:21

## 2023-06-23 RX ADMIN — FUROSEMIDE 40 MG: 10 INJECTION, SOLUTION INTRAMUSCULAR; INTRAVENOUS at 18:23

## 2023-06-23 RX ADMIN — INSULIN GLARGINE 6 UNITS: 100 INJECTION, SOLUTION SUBCUTANEOUS at 20:39

## 2023-06-23 RX ADMIN — Medication 10 ML: at 08:34

## 2023-06-23 RX ADMIN — GUAIFENESIN 600 MG: 600 TABLET ORAL at 08:35

## 2023-06-23 RX ADMIN — POLYMYXIN B SULFATE AND TRIMETHOPRIM SULFATE 1 DROP: 10000; 1 SOLUTION/ DROPS OPHTHALMIC at 20:39

## 2023-06-23 RX ADMIN — Medication 3 MG: at 21:50

## 2023-06-23 RX ADMIN — APIXABAN 5 MG: 5 TABLET, FILM COATED ORAL at 20:39

## 2023-06-23 RX ADMIN — SODIUM CHLORIDE, PRESERVATIVE FREE 10 ML: 5 INJECTION INTRAVENOUS at 20:40

## 2023-06-23 RX ADMIN — POLYMYXIN B SULFATE AND TRIMETHOPRIM SULFATE 1 DROP: 10000; 1 SOLUTION/ DROPS OPHTHALMIC at 13:00

## 2023-06-23 RX ADMIN — SODIUM CHLORIDE, PRESERVATIVE FREE 10 ML: 5 INJECTION INTRAVENOUS at 08:37

## 2023-06-23 RX ADMIN — Medication 400 MG: at 08:35

## 2023-06-23 RX ADMIN — Medication 10 ML: at 20:40

## 2023-06-23 RX ADMIN — GUAIFENESIN 600 MG: 600 TABLET ORAL at 20:39

## 2023-06-23 RX ADMIN — ALLOPURINOL 100 MG: 100 TABLET ORAL at 08:34

## 2023-06-23 RX ADMIN — POLYETHYLENE GLYCOL 3350 17 G: 17 POWDER, FOR SOLUTION ORAL at 08:30

## 2023-06-23 ASSESSMENT — PAIN DESCRIPTION - LOCATION: LOCATION: GENERALIZED

## 2023-06-24 LAB
ALBUMIN SERPL-MCNC: 2.7 G/DL (ref 3.4–5)
ALBUMIN/GLOB SERPL: 0.7 {RATIO} (ref 1.1–2.2)
ALP SERPL-CCNC: 100 U/L (ref 40–129)
ALT SERPL-CCNC: 14 U/L (ref 10–40)
ANION GAP SERPL CALCULATED.3IONS-SCNC: 7 MMOL/L (ref 3–16)
AST SERPL-CCNC: 20 U/L (ref 15–37)
BASOPHILS # BLD: 0 K/UL (ref 0–0.2)
BASOPHILS NFR BLD: 0.3 %
BILIRUB SERPL-MCNC: 1.5 MG/DL (ref 0–1)
BUN SERPL-MCNC: 39 MG/DL (ref 7–20)
CALCIUM SERPL-MCNC: 8.9 MG/DL (ref 8.3–10.6)
CHLORIDE SERPL-SCNC: 94 MMOL/L (ref 99–110)
CO2 SERPL-SCNC: 41 MMOL/L (ref 21–32)
CREAT SERPL-MCNC: 1.1 MG/DL (ref 0.8–1.3)
DEPRECATED RDW RBC AUTO: 16.9 % (ref 12.4–15.4)
EOSINOPHIL # BLD: 0 K/UL (ref 0–0.6)
EOSINOPHIL NFR BLD: 0.4 %
GFR SERPLBLD CREATININE-BSD FMLA CKD-EPI: >60 ML/MIN/{1.73_M2}
GLUCOSE BLD-MCNC: 126 MG/DL (ref 70–99)
GLUCOSE BLD-MCNC: 142 MG/DL (ref 70–99)
GLUCOSE BLD-MCNC: 168 MG/DL (ref 70–99)
GLUCOSE BLD-MCNC: 89 MG/DL (ref 70–99)
GLUCOSE SERPL-MCNC: 97 MG/DL (ref 70–99)
HCT VFR BLD AUTO: 38.3 % (ref 40.5–52.5)
HGB BLD-MCNC: 13.1 G/DL (ref 13.5–17.5)
LYMPHOCYTES # BLD: 0.8 K/UL (ref 1–5.1)
LYMPHOCYTES NFR BLD: 10.2 %
MCH RBC QN AUTO: 32.9 PG (ref 26–34)
MCHC RBC AUTO-ENTMCNC: 34.2 G/DL (ref 31–36)
MCV RBC AUTO: 96.3 FL (ref 80–100)
MONOCYTES # BLD: 1 K/UL (ref 0–1.3)
MONOCYTES NFR BLD: 13.4 %
NEUTROPHILS # BLD: 5.7 K/UL (ref 1.7–7.7)
NEUTROPHILS NFR BLD: 75.7 %
NT-PROBNP SERPL-MCNC: 6941 PG/ML (ref 0–449)
PERFORMED ON: ABNORMAL
PERFORMED ON: NORMAL
PLATELET # BLD AUTO: 157 K/UL (ref 135–450)
PMV BLD AUTO: 9.3 FL (ref 5–10.5)
POTASSIUM SERPL-SCNC: 4.2 MMOL/L (ref 3.5–5.1)
PROT SERPL-MCNC: 6.5 G/DL (ref 6.4–8.2)
RBC # BLD AUTO: 3.97 M/UL (ref 4.2–5.9)
SODIUM SERPL-SCNC: 142 MMOL/L (ref 136–145)
WBC # BLD AUTO: 7.5 K/UL (ref 4–11)

## 2023-06-24 PROCEDURE — 36415 COLL VENOUS BLD VENIPUNCTURE: CPT

## 2023-06-24 PROCEDURE — 2700000000 HC OXYGEN THERAPY PER DAY

## 2023-06-24 PROCEDURE — 2580000003 HC RX 258: Performed by: INTERNAL MEDICINE

## 2023-06-24 PROCEDURE — 83880 ASSAY OF NATRIURETIC PEPTIDE: CPT

## 2023-06-24 PROCEDURE — 2060000000 HC ICU INTERMEDIATE R&B

## 2023-06-24 PROCEDURE — 6370000000 HC RX 637 (ALT 250 FOR IP): Performed by: NURSE PRACTITIONER

## 2023-06-24 PROCEDURE — 80053 COMPREHEN METABOLIC PANEL: CPT

## 2023-06-24 PROCEDURE — 6370000000 HC RX 637 (ALT 250 FOR IP): Performed by: INTERNAL MEDICINE

## 2023-06-24 PROCEDURE — 94761 N-INVAS EAR/PLS OXIMETRY MLT: CPT

## 2023-06-24 PROCEDURE — 99232 SBSQ HOSP IP/OBS MODERATE 35: CPT | Performed by: INTERNAL MEDICINE

## 2023-06-24 PROCEDURE — 99232 SBSQ HOSP IP/OBS MODERATE 35: CPT | Performed by: NURSE PRACTITIONER

## 2023-06-24 PROCEDURE — 85025 COMPLETE CBC W/AUTO DIFF WBC: CPT

## 2023-06-24 PROCEDURE — 6360000002 HC RX W HCPCS: Performed by: INTERNAL MEDICINE

## 2023-06-24 RX ADMIN — APIXABAN 5 MG: 5 TABLET, FILM COATED ORAL at 20:43

## 2023-06-24 RX ADMIN — SODIUM CHLORIDE, PRESERVATIVE FREE 10 ML: 5 INJECTION INTRAVENOUS at 20:44

## 2023-06-24 RX ADMIN — Medication 10 ML: at 20:44

## 2023-06-24 RX ADMIN — INSULIN GLARGINE 6 UNITS: 100 INJECTION, SOLUTION SUBCUTANEOUS at 20:43

## 2023-06-24 RX ADMIN — ACETAMINOPHEN 650 MG: 325 TABLET ORAL at 20:43

## 2023-06-24 RX ADMIN — POLYMYXIN B SULFATE AND TRIMETHOPRIM SULFATE 1 DROP: 10000; 1 SOLUTION/ DROPS OPHTHALMIC at 20:43

## 2023-06-24 RX ADMIN — APIXABAN 5 MG: 5 TABLET, FILM COATED ORAL at 08:57

## 2023-06-24 RX ADMIN — POLYMYXIN B SULFATE AND TRIMETHOPRIM SULFATE 1 DROP: 10000; 1 SOLUTION/ DROPS OPHTHALMIC at 12:51

## 2023-06-24 RX ADMIN — FUROSEMIDE 40 MG: 10 INJECTION, SOLUTION INTRAMUSCULAR; INTRAVENOUS at 08:51

## 2023-06-24 RX ADMIN — ALLOPURINOL 100 MG: 100 TABLET ORAL at 08:57

## 2023-06-24 RX ADMIN — GUAIFENESIN 600 MG: 600 TABLET ORAL at 08:57

## 2023-06-24 RX ADMIN — GUAIFENESIN 600 MG: 600 TABLET ORAL at 20:43

## 2023-06-24 RX ADMIN — POLYMYXIN B SULFATE AND TRIMETHOPRIM SULFATE 1 DROP: 10000; 1 SOLUTION/ DROPS OPHTHALMIC at 17:34

## 2023-06-24 RX ADMIN — Medication 10 ML: at 08:51

## 2023-06-24 RX ADMIN — POLYMYXIN B SULFATE AND TRIMETHOPRIM SULFATE 1 DROP: 10000; 1 SOLUTION/ DROPS OPHTHALMIC at 08:50

## 2023-06-24 RX ADMIN — Medication 400 MG: at 08:57

## 2023-06-24 RX ADMIN — SODIUM CHLORIDE, PRESERVATIVE FREE 10 ML: 5 INJECTION INTRAVENOUS at 08:51

## 2023-06-24 RX ADMIN — DOCUSATE SODIUM 100 MG: 100 CAPSULE, LIQUID FILLED ORAL at 08:57

## 2023-06-24 RX ADMIN — Medication 3 MG: at 20:43

## 2023-06-25 LAB
ALBUMIN SERPL-MCNC: 2.6 G/DL (ref 3.4–5)
ALBUMIN/GLOB SERPL: 0.7 {RATIO} (ref 1.1–2.2)
ALP SERPL-CCNC: 98 U/L (ref 40–129)
ALT SERPL-CCNC: 12 U/L (ref 10–40)
ANION GAP SERPL CALCULATED.3IONS-SCNC: 6 MMOL/L (ref 3–16)
AST SERPL-CCNC: 19 U/L (ref 15–37)
BASOPHILS # BLD: 0 K/UL (ref 0–0.2)
BASOPHILS NFR BLD: 0.3 %
BILIRUB SERPL-MCNC: 1.4 MG/DL (ref 0–1)
BUN SERPL-MCNC: 38 MG/DL (ref 7–20)
CALCIUM SERPL-MCNC: 8.8 MG/DL (ref 8.3–10.6)
CHLORIDE SERPL-SCNC: 94 MMOL/L (ref 99–110)
CO2 SERPL-SCNC: 41 MMOL/L (ref 21–32)
CREAT SERPL-MCNC: 0.9 MG/DL (ref 0.8–1.3)
DEPRECATED RDW RBC AUTO: 16.9 % (ref 12.4–15.4)
EOSINOPHIL # BLD: 0.1 K/UL (ref 0–0.6)
EOSINOPHIL NFR BLD: 1 %
GFR SERPLBLD CREATININE-BSD FMLA CKD-EPI: >60 ML/MIN/{1.73_M2}
GLUCOSE BLD-MCNC: 122 MG/DL (ref 70–99)
GLUCOSE BLD-MCNC: 148 MG/DL (ref 70–99)
GLUCOSE BLD-MCNC: 187 MG/DL (ref 70–99)
GLUCOSE BLD-MCNC: 93 MG/DL (ref 70–99)
GLUCOSE SERPL-MCNC: 94 MG/DL (ref 70–99)
HCT VFR BLD AUTO: 36.3 % (ref 40.5–52.5)
HGB BLD-MCNC: 12 G/DL (ref 13.5–17.5)
LYMPHOCYTES # BLD: 0.8 K/UL (ref 1–5.1)
LYMPHOCYTES NFR BLD: 13.1 %
MCH RBC QN AUTO: 32.1 PG (ref 26–34)
MCHC RBC AUTO-ENTMCNC: 33 G/DL (ref 31–36)
MCV RBC AUTO: 97.3 FL (ref 80–100)
MONOCYTES # BLD: 0.8 K/UL (ref 0–1.3)
MONOCYTES NFR BLD: 12.3 %
NEUTROPHILS # BLD: 4.5 K/UL (ref 1.7–7.7)
NEUTROPHILS NFR BLD: 73.3 %
PERFORMED ON: ABNORMAL
PERFORMED ON: NORMAL
PLATELET # BLD AUTO: 153 K/UL (ref 135–450)
PMV BLD AUTO: 9.1 FL (ref 5–10.5)
POTASSIUM SERPL-SCNC: 3.7 MMOL/L (ref 3.5–5.1)
PROT SERPL-MCNC: 6.2 G/DL (ref 6.4–8.2)
RBC # BLD AUTO: 3.73 M/UL (ref 4.2–5.9)
SODIUM SERPL-SCNC: 141 MMOL/L (ref 136–145)
WBC # BLD AUTO: 6.2 K/UL (ref 4–11)

## 2023-06-25 PROCEDURE — 2580000003 HC RX 258: Performed by: INTERNAL MEDICINE

## 2023-06-25 PROCEDURE — 80053 COMPREHEN METABOLIC PANEL: CPT

## 2023-06-25 PROCEDURE — 6370000000 HC RX 637 (ALT 250 FOR IP): Performed by: NURSE PRACTITIONER

## 2023-06-25 PROCEDURE — 6370000000 HC RX 637 (ALT 250 FOR IP): Performed by: INTERNAL MEDICINE

## 2023-06-25 PROCEDURE — 36415 COLL VENOUS BLD VENIPUNCTURE: CPT

## 2023-06-25 PROCEDURE — 2060000000 HC ICU INTERMEDIATE R&B

## 2023-06-25 PROCEDURE — 6360000002 HC RX W HCPCS: Performed by: NURSE PRACTITIONER

## 2023-06-25 PROCEDURE — 99232 SBSQ HOSP IP/OBS MODERATE 35: CPT | Performed by: NURSE PRACTITIONER

## 2023-06-25 PROCEDURE — 85025 COMPLETE CBC W/AUTO DIFF WBC: CPT

## 2023-06-25 RX ORDER — FUROSEMIDE 40 MG/1
40 TABLET ORAL 2 TIMES DAILY
Status: DISCONTINUED | OUTPATIENT
Start: 2023-06-26 | End: 2023-06-26 | Stop reason: HOSPADM

## 2023-06-25 RX ADMIN — DOCUSATE SODIUM 100 MG: 100 CAPSULE, LIQUID FILLED ORAL at 09:46

## 2023-06-25 RX ADMIN — GUAIFENESIN 600 MG: 600 TABLET ORAL at 09:47

## 2023-06-25 RX ADMIN — INSULIN GLARGINE 6 UNITS: 100 INJECTION, SOLUTION SUBCUTANEOUS at 21:33

## 2023-06-25 RX ADMIN — POLYMYXIN B SULFATE AND TRIMETHOPRIM SULFATE 1 DROP: 10000; 1 SOLUTION/ DROPS OPHTHALMIC at 13:02

## 2023-06-25 RX ADMIN — POLYMYXIN B SULFATE AND TRIMETHOPRIM SULFATE 1 DROP: 10000; 1 SOLUTION/ DROPS OPHTHALMIC at 09:52

## 2023-06-25 RX ADMIN — Medication 10 ML: at 21:34

## 2023-06-25 RX ADMIN — METOPROLOL SUCCINATE 50 MG: 50 TABLET, EXTENDED RELEASE ORAL at 09:47

## 2023-06-25 RX ADMIN — Medication 10 ML: at 09:52

## 2023-06-25 RX ADMIN — Medication 3 MG: at 21:33

## 2023-06-25 RX ADMIN — POLYMYXIN B SULFATE AND TRIMETHOPRIM SULFATE 1 DROP: 10000; 1 SOLUTION/ DROPS OPHTHALMIC at 17:58

## 2023-06-25 RX ADMIN — SODIUM CHLORIDE, PRESERVATIVE FREE 10 ML: 5 INJECTION INTRAVENOUS at 21:34

## 2023-06-25 RX ADMIN — FUROSEMIDE 40 MG: 10 INJECTION, SOLUTION INTRAMUSCULAR; INTRAVENOUS at 11:29

## 2023-06-25 RX ADMIN — APIXABAN 5 MG: 5 TABLET, FILM COATED ORAL at 09:47

## 2023-06-25 RX ADMIN — APIXABAN 5 MG: 5 TABLET, FILM COATED ORAL at 21:33

## 2023-06-25 RX ADMIN — SODIUM CHLORIDE, PRESERVATIVE FREE 10 ML: 5 INJECTION INTRAVENOUS at 09:52

## 2023-06-25 RX ADMIN — ALLOPURINOL 100 MG: 100 TABLET ORAL at 09:46

## 2023-06-25 RX ADMIN — GUAIFENESIN 600 MG: 600 TABLET ORAL at 21:33

## 2023-06-25 RX ADMIN — POLYMYXIN B SULFATE AND TRIMETHOPRIM SULFATE 1 DROP: 10000; 1 SOLUTION/ DROPS OPHTHALMIC at 21:33

## 2023-06-25 RX ADMIN — Medication 400 MG: at 09:46

## 2023-06-26 VITALS
OXYGEN SATURATION: 97 % | RESPIRATION RATE: 17 BRPM | SYSTOLIC BLOOD PRESSURE: 98 MMHG | BODY MASS INDEX: 24.89 KG/M2 | HEIGHT: 73 IN | TEMPERATURE: 97.4 F | DIASTOLIC BLOOD PRESSURE: 72 MMHG | HEART RATE: 79 BPM | WEIGHT: 187.83 LBS

## 2023-06-26 LAB
GLUCOSE BLD-MCNC: 104 MG/DL (ref 70–99)
GLUCOSE BLD-MCNC: 148 MG/DL (ref 70–99)
PERFORMED ON: ABNORMAL
PERFORMED ON: ABNORMAL

## 2023-06-26 PROCEDURE — 6370000000 HC RX 637 (ALT 250 FOR IP): Performed by: NURSE PRACTITIONER

## 2023-06-26 PROCEDURE — 2580000003 HC RX 258: Performed by: INTERNAL MEDICINE

## 2023-06-26 PROCEDURE — 2700000000 HC OXYGEN THERAPY PER DAY

## 2023-06-26 PROCEDURE — 99232 SBSQ HOSP IP/OBS MODERATE 35: CPT | Performed by: INTERNAL MEDICINE

## 2023-06-26 PROCEDURE — 94761 N-INVAS EAR/PLS OXIMETRY MLT: CPT

## 2023-06-26 PROCEDURE — 6370000000 HC RX 637 (ALT 250 FOR IP): Performed by: INTERNAL MEDICINE

## 2023-06-26 PROCEDURE — 99231 SBSQ HOSP IP/OBS SF/LOW 25: CPT | Performed by: INTERNAL MEDICINE

## 2023-06-26 RX ORDER — FUROSEMIDE 40 MG/1
40 TABLET ORAL 2 TIMES DAILY
Qty: 60 TABLET | Refills: 3
Start: 2023-06-26

## 2023-06-26 RX ORDER — ALBUTEROL SULFATE 2.5 MG/3ML
2.5 SOLUTION RESPIRATORY (INHALATION) EVERY 4 HOURS PRN
Qty: 120 EACH | Refills: 3 | Status: SHIPPED | OUTPATIENT
Start: 2023-06-26

## 2023-06-26 RX ORDER — POLYETHYLENE GLYCOL 3350 17 G/17G
17 POWDER, FOR SOLUTION ORAL DAILY PRN
Qty: 30 EACH | Refills: 0 | Status: SHIPPED | OUTPATIENT
Start: 2023-06-26 | End: 2023-07-26

## 2023-06-26 RX ORDER — INSULIN GLARGINE 100 [IU]/ML
6 INJECTION, SOLUTION SUBCUTANEOUS NIGHTLY
Qty: 10 ML | Refills: 3 | Status: SHIPPED | OUTPATIENT
Start: 2023-06-26

## 2023-06-26 RX ADMIN — Medication 400 MG: at 10:26

## 2023-06-26 RX ADMIN — Medication 10 ML: at 10:26

## 2023-06-26 RX ADMIN — ALLOPURINOL 100 MG: 100 TABLET ORAL at 10:26

## 2023-06-26 RX ADMIN — GUAIFENESIN 600 MG: 600 TABLET ORAL at 10:26

## 2023-06-26 RX ADMIN — APIXABAN 5 MG: 5 TABLET, FILM COATED ORAL at 10:26

## 2023-06-26 RX ADMIN — FUROSEMIDE 40 MG: 40 TABLET ORAL at 10:26

## 2023-06-26 RX ADMIN — DOCUSATE SODIUM 100 MG: 100 CAPSULE, LIQUID FILLED ORAL at 10:26

## 2023-06-26 RX ADMIN — SODIUM CHLORIDE, PRESERVATIVE FREE 10 ML: 5 INJECTION INTRAVENOUS at 10:32

## 2023-06-26 RX ADMIN — POLYMYXIN B SULFATE AND TRIMETHOPRIM SULFATE 1 DROP: 10000; 1 SOLUTION/ DROPS OPHTHALMIC at 10:26

## 2023-06-27 ENCOUNTER — TELEPHONE (OUTPATIENT)
Dept: CARDIOLOGY CLINIC | Age: 88
End: 2023-06-27

## 2023-07-06 ENCOUNTER — TELEPHONE (OUTPATIENT)
Dept: FAMILY MEDICINE CLINIC | Age: 88
End: 2023-07-06

## 2023-07-06 NOTE — TELEPHONE ENCOUNTER
Need social work. I have discussed this with the patient and wife at length. At home he is progressively becoming more debilitated. I do agree with her that at this point he likely needs enough assistance with his care to stay at a nursing home. This has been a heavy burden on both the patient's wife and their granddaughter. The wife has had to perform more care at home and then I would be comfortable with her doing given her age and physical ailments that she has herself. Hopefully they can help with placement.

## 2023-07-06 NOTE — TELEPHONE ENCOUNTER
CALLED AND SPOKE TO PATIENT WIFE. PATIENT IS CURRENTLY AT [address] [facility] WORRIED ABOUT HIM. SHE STATES HE IS GETTING MORE CONFUSED AND EXTREMELY ANXIOUS. WIFE STATES THAT SHE SPOKE TO THE DIRECTOR OF NURSING ABOUT HIM AND THEY ARE CURRENTLY RUNNING A U/A ON HIM BECAUSE OF THE SUDDEN DECLINE. WIFE WANTS TO KNOW THAT JOSE A THINKS? WIFE THINKS ALL IN ALL THAT HE SHOULD STAY IN THE NURSING HOME BECAUSE SHE HERSELF CANNOT PHYSICAL TAKE CARE OF HIM AT [facility]. SHE KNOWS THAT MENTALLY HE WOULD RATHER BE HOME, BUT SHE IS NOT SURE THAT IS A GOOD IDEA ANYMORE. SHE TOLD ME THAT THE PATIENT IS CONSISTENTLY WANTING SOMEONE TO BE WITH HIM ALL THE TIME. SHE SAID HE IS TERRIFIED OF DYING, ESPECIALLY NOT IN HIS OWN HOME. SHE WAS NOT SURE IF JOSE A THINKS HE SHOULD STAY, OR IF HE THINKS TAKING HIM HOME AND TRYING TO GET HOME CARE NURSING FOR HIM? SHE STATES PATIENT IS TELLING HER THAT PHYSICAL THERAPY IS BEEN A BIT ROUGH ON HIM, AND HE IS NOT WANTING TO STAND OR WALK MUCH. SHE IS AT A LOSS AND UNSURE ANYMORE HOW TO HELP HIM? SHE TRUST JOSE A AND WILL TAKE WHAT EVER ADVISE SHE CAN GET. SHE IS ALSO WORRIED ABOUT COST. WILL INSURANCE COVER THE NURSING HOME STAY? WILL IT COST THEM OUT OF POCKET? SHE ALSO CONCERNED WITH THE SAME ISSUE WITH HOME HEALTH, WILL INSURANCE COVER THIS OR WILL SHE BE STUCK PAYING OUT OF POCKET? SHE HAS A LOT GOING ON AND UNSURE WHERE TO TAKE IT FROM HERE. PLEASE ADVISE ON WHAT YOU CAN TO TRY TO HELP HER? SHE WANTED TO SEE IF SHE NEEDED TO HAVE THE NURSING HOME TAKE HIM TO THE HOSPITAL FOR THE CONFUSION AS WELL? I SPOKE TO JOSE A BRIEFLY AND HE ASKED ME TO SEND THIS MESSAGE TO HIM AND ALSO YAZAN HDEZ, RN AMBULATORY CARE MANAGER TO SEE IF SHE CAN HELP WIFE WITH ANY OF HER CONCERNS.  SC

## 2023-07-06 NOTE — TELEPHONE ENCOUNTER
Patient wife needs to discuss her  condition with Martin Collazo, to see what she needs to do. Please give her a call back.

## 2023-07-06 NOTE — TELEPHONE ENCOUNTER
CALLED AND SPOKE TO PATIENT WIFE AND ADVISED ON JOSE A NOTE. SC SHE WILL AWAIT A CALL BACK FROM CARE COORDINATOR.  SC

## 2023-07-10 ENCOUNTER — TELEPHONE (OUTPATIENT)
Dept: CARDIOLOGY CLINIC | Age: 88
End: 2023-07-10

## 2023-07-10 ENCOUNTER — CARE COORDINATION (OUTPATIENT)
Dept: CARE COORDINATION | Age: 88
End: 2023-07-10

## 2023-07-10 NOTE — TELEPHONE ENCOUNTER
Dr. Brant Paulino,   Upon your return tomorrow, see below to discuss further with patient's wife. Thanks.

## 2023-07-10 NOTE — CARE COORDINATION
BALBIR spoke to spouse, Lori Liang, in great detail regarding long term care options. She reported that pt fell last week at rehab and he did not go to the hospital. She was informed that PT will not improve his condition. Pt has been asking to come home and telling Dayanara that he feels he is about to pass away. Dayanara indicated that she is not physically able to care for pt and has minimal help from family however she wants pt to feel comfortable and not deteriorate at the facility. She reported that someone from [facility] contacted her and spoke about nursing home placement however she informed them that she had not decided if she wanted him to stay. BALBIR explained that insurance only covers a certain amount of days and she will need to discuss that with the discharge planner. Discussed palliative care. Dayanara understands the support she'll have but aware it is not 24 hr care. Informed her that the discharge planner should also assist with identifying an agency and initiating that process as well. Suggested speaking with someone at palliative care to answer her questions and discuss the amount of care she'll receive to assist with her decision. Dayanara stated that she appreciated SW call however she would like to speak with another physician to assist with her decision making process. BALBIR offered to f/u.     CECILLE Winchester, [place]  Care Transitions   [phone]  *covering for Ian Guillen

## 2023-07-10 NOTE — TELEPHONE ENCOUNTER
Dayanara Nahid's wife called the office today wanting to know if Dr. Angel Manuel can  and offer a professional opinion on whether Cory Seo should continue care in the nursing home? She feels that Dr. Angel Manuel would know what Cory Seo would need as he has been his provider for quite some time. She did not want to go with the opinion of the PCP as he just started seeing Cory Seo at the beginning of last year. Dayanara states that her family feels that he will do better with recovery at home, to which Jose Barboza shares that she feels that she is not able to care for him without assistance. Dayanara believes that Cory Seo should be home as well on a financial aspect.     Please advise: [phone] or [phone]

## 2023-07-11 ENCOUNTER — APPOINTMENT (OUTPATIENT)
Dept: CT IMAGING | Age: 88
End: 2023-07-11
Payer: MEDICARE

## 2023-07-11 ENCOUNTER — APPOINTMENT (OUTPATIENT)
Dept: GENERAL RADIOLOGY | Age: 88
End: 2023-07-11
Payer: MEDICARE

## 2023-07-11 ENCOUNTER — HOSPITAL ENCOUNTER (EMERGENCY)
Age: 88
Discharge: HOSPICE | End: 2023-07-11
Attending: EMERGENCY MEDICINE
Payer: MEDICARE

## 2023-07-11 VITALS
TEMPERATURE: 97.7 F | HEART RATE: 78 BPM | DIASTOLIC BLOOD PRESSURE: 62 MMHG | RESPIRATION RATE: 12 BRPM | HEIGHT: 70 IN | BODY MASS INDEX: 26.54 KG/M2 | OXYGEN SATURATION: 96 % | WEIGHT: 185.41 LBS | SYSTOLIC BLOOD PRESSURE: 89 MMHG

## 2023-07-11 DIAGNOSIS — R31.9 URINARY TRACT INFECTION WITH HEMATURIA, SITE UNSPECIFIED: ICD-10-CM

## 2023-07-11 DIAGNOSIS — J96.22 ACUTE ON CHRONIC RESPIRATORY FAILURE WITH HYPERCAPNIA (HCC): Primary | ICD-10-CM

## 2023-07-11 DIAGNOSIS — J90 BILATERAL PLEURAL EFFUSION: ICD-10-CM

## 2023-07-11 DIAGNOSIS — I50.23 ACUTE ON CHRONIC SYSTOLIC HEART FAILURE (HCC): ICD-10-CM

## 2023-07-11 DIAGNOSIS — N39.0 URINARY TRACT INFECTION WITH HEMATURIA, SITE UNSPECIFIED: ICD-10-CM

## 2023-07-11 LAB
ALBUMIN SERPL-MCNC: 2.7 G/DL (ref 3.4–5)
ALBUMIN/GLOB SERPL: 0.6 {RATIO} (ref 1.1–2.2)
ALP SERPL-CCNC: 135 U/L (ref 40–129)
ALT SERPL-CCNC: 12 U/L (ref 10–40)
ANION GAP SERPL CALCULATED.3IONS-SCNC: 5 MMOL/L (ref 3–16)
AST SERPL-CCNC: 51 U/L (ref 15–37)
BACTERIA URNS QL MICRO: ABNORMAL /HPF
BASE EXCESS BLDV CALC-SCNC: 9.5 MMOL/L
BASOPHILS # BLD: 0 K/UL (ref 0–0.2)
BASOPHILS NFR BLD: 0.6 %
BILIRUB SERPL-MCNC: 0.9 MG/DL (ref 0–1)
BILIRUB UR QL STRIP.AUTO: NEGATIVE
BUN SERPL-MCNC: 41 MG/DL (ref 7–20)
CALCIUM SERPL-MCNC: 8.9 MG/DL (ref 8.3–10.6)
CHLORIDE SERPL-SCNC: 100 MMOL/L (ref 99–110)
CLARITY UR: ABNORMAL
CO2 BLDV-SCNC: 45 MMOL/L
CO2 SERPL-SCNC: 33 MMOL/L (ref 21–32)
COHGB MFR BLDV: 1.5 %
COLOR UR: YELLOW
CREAT SERPL-MCNC: 1.1 MG/DL (ref 0.8–1.3)
DEPRECATED RDW RBC AUTO: 16.9 % (ref 12.4–15.4)
EKG DIAGNOSIS: NORMAL
EKG Q-T INTERVAL: 434 MS
EKG QRS DURATION: 140 MS
EKG QTC CALCULATION (BAZETT): 497 MS
EKG R AXIS: -77 DEGREES
EKG T AXIS: 87 DEGREES
EKG VENTRICULAR RATE: 79 BPM
EOSINOPHIL # BLD: 0.1 K/UL (ref 0–0.6)
EOSINOPHIL NFR BLD: 1.2 %
EPI CELLS #/AREA URNS HPF: ABNORMAL /HPF (ref 0–5)
GFR SERPLBLD CREATININE-BSD FMLA CKD-EPI: >60 ML/MIN/{1.73_M2}
GLUCOSE BLD-MCNC: 80 MG/DL (ref 70–99)
GLUCOSE SERPL-MCNC: 100 MG/DL (ref 70–99)
GLUCOSE UR STRIP.AUTO-MCNC: NEGATIVE MG/DL
HCO3 BLDV-SCNC: 42 MMOL/L (ref 23–29)
HCT VFR BLD AUTO: 43 % (ref 40.5–52.5)
HGB BLD-MCNC: 14 G/DL (ref 13.5–17.5)
HGB UR QL STRIP.AUTO: ABNORMAL
KETONES UR STRIP.AUTO-MCNC: NEGATIVE MG/DL
LACTATE BLDV-SCNC: 1.5 MMOL/L (ref 0.4–1.9)
LEUKOCYTE ESTERASE UR QL STRIP.AUTO: ABNORMAL
LYMPHOCYTES # BLD: 0.7 K/UL (ref 1–5.1)
LYMPHOCYTES NFR BLD: 11.2 %
MCH RBC QN AUTO: 32.5 PG (ref 26–34)
MCHC RBC AUTO-ENTMCNC: 32.5 G/DL (ref 31–36)
MCV RBC AUTO: 100.1 FL (ref 80–100)
METHGB MFR BLDV: 0.6 %
MONOCYTES # BLD: 0.7 K/UL (ref 0–1.3)
MONOCYTES NFR BLD: 11.1 %
NEUTROPHILS # BLD: 4.6 K/UL (ref 1.7–7.7)
NEUTROPHILS NFR BLD: 75.9 %
NITRITE UR QL STRIP.AUTO: POSITIVE
NT-PROBNP SERPL-MCNC: ABNORMAL PG/ML (ref 0–449)
O2 THERAPY: ABNORMAL
PCO2 BLDV: 93.5 MMHG (ref 40–50)
PERFORMED ON: NORMAL
PH BLDV: 7.26 [PH] (ref 7.35–7.45)
PH UR STRIP.AUTO: 7 [PH] (ref 5–8)
PLATELET # BLD AUTO: 230 K/UL (ref 135–450)
PMV BLD AUTO: 8.9 FL (ref 5–10.5)
PO2 BLDV: <30 MMHG
POTASSIUM SERPL-SCNC: 4.7 MMOL/L (ref 3.5–5.1)
PROT SERPL-MCNC: 7.4 G/DL (ref 6.4–8.2)
PROT UR STRIP.AUTO-MCNC: 100 MG/DL
RBC # BLD AUTO: 4.3 M/UL (ref 4.2–5.9)
RBC #/AREA URNS HPF: >100 /HPF (ref 0–4)
SAO2 % BLDV: 34 %
SODIUM SERPL-SCNC: 138 MMOL/L (ref 136–145)
SP GR UR STRIP.AUTO: 1.01 (ref 1–1.03)
TROPONIN, HIGH SENSITIVITY: 141 NG/L (ref 0–22)
UA COMPLETE W REFLEX CULTURE PNL UR: YES
UA DIPSTICK W REFLEX MICRO PNL UR: YES
URN SPEC COLLECT METH UR: ABNORMAL
UROBILINOGEN UR STRIP-ACNC: 2 E.U./DL
WBC # BLD AUTO: 6.1 K/UL (ref 4–11)
WBC #/AREA URNS HPF: >100 /HPF (ref 0–5)

## 2023-07-11 PROCEDURE — 81001 URINALYSIS AUTO W/SCOPE: CPT

## 2023-07-11 PROCEDURE — 84132 ASSAY OF SERUM POTASSIUM: CPT

## 2023-07-11 PROCEDURE — 87040 BLOOD CULTURE FOR BACTERIA: CPT

## 2023-07-11 PROCEDURE — 2580000003 HC RX 258: Performed by: EMERGENCY MEDICINE

## 2023-07-11 PROCEDURE — 83880 ASSAY OF NATRIURETIC PEPTIDE: CPT

## 2023-07-11 PROCEDURE — 99285 EMERGENCY DEPT VISIT HI MDM: CPT

## 2023-07-11 PROCEDURE — 71045 X-RAY EXAM CHEST 1 VIEW: CPT

## 2023-07-11 PROCEDURE — 87077 CULTURE AEROBIC IDENTIFY: CPT

## 2023-07-11 PROCEDURE — 70450 CT HEAD/BRAIN W/O DYE: CPT

## 2023-07-11 PROCEDURE — 83605 ASSAY OF LACTIC ACID: CPT

## 2023-07-11 PROCEDURE — 87186 SC STD MICRODIL/AGAR DIL: CPT

## 2023-07-11 PROCEDURE — 87086 URINE CULTURE/COLONY COUNT: CPT

## 2023-07-11 PROCEDURE — 93005 ELECTROCARDIOGRAM TRACING: CPT | Performed by: EMERGENCY MEDICINE

## 2023-07-11 PROCEDURE — 80053 COMPREHEN METABOLIC PANEL: CPT

## 2023-07-11 PROCEDURE — 6360000002 HC RX W HCPCS: Performed by: EMERGENCY MEDICINE

## 2023-07-11 PROCEDURE — 93010 ELECTROCARDIOGRAM REPORT: CPT | Performed by: INTERNAL MEDICINE

## 2023-07-11 PROCEDURE — 82803 BLOOD GASES ANY COMBINATION: CPT

## 2023-07-11 PROCEDURE — 84484 ASSAY OF TROPONIN QUANT: CPT

## 2023-07-11 PROCEDURE — 85025 COMPLETE CBC W/AUTO DIFF WBC: CPT

## 2023-07-11 RX ORDER — CEFUROXIME AXETIL 500 MG/1
500 TABLET ORAL 2 TIMES DAILY
Qty: 20 TABLET | Refills: 0 | Status: SHIPPED | OUTPATIENT
Start: 2023-07-11 | End: 2023-07-21

## 2023-07-11 RX ADMIN — CEFTRIAXONE 1000 MG: 1 INJECTION, POWDER, FOR SOLUTION INTRAMUSCULAR; INTRAVENOUS at 12:39

## 2023-07-11 ASSESSMENT — PAIN - FUNCTIONAL ASSESSMENT: PAIN_FUNCTIONAL_ASSESSMENT: NONE - DENIES PAIN

## 2023-07-11 NOTE — ED NOTES
Per pt's wife, pt fell a week ago yesterday and has been declining ever since     Modesta Peterson, ORESTES  07/11/23 0226

## 2023-07-11 NOTE — ED PROVIDER NOTES
magnesium oxide (MAG-OX) 400 MG tablet Take 1 tablet by mouth daily, Disp-90 tablet, R-1Normal      allopurinol (ZYLOPRIM) 100 MG tablet Take 1 tablet by mouth daily, Disp-30 tablet, R-5Normal      ketotifen (ZADITOR) 0.025 % ophthalmic solution Place 1 drop into both eyes 2 times daily, Disp-5 mL, R-0DX Code Needed  . Normal      apixaban (ELIQUIS) 5 MG TABS tablet TAKE 1 TABLET BY MOUTH TWICE A DAY, Disp-60 tablet, R-0Normal      metoprolol succinate (TOPROL XL) 50 MG extended release tablet TAKE 1 TABLET BY MOUTH EVERY DAY, Disp-90 tablet, R-0Normal      famotidine (PEPCID) 20 MG tablet Take 1 tablet by mouth in the morning and at bedtime, Disp-180 tablet, R-3Normal      Continuous Blood Gluc  (FREESTYLE BARRON 2 READER) KORIN 1 each by Does not apply route every 14 days, Disp-6 each, R-1Normal      !! Continuous Blood Gluc Sensor (FREESTYLE BARRON 2 SENSOR) MISC 1 each by Does not apply route every 14 days, Disp-6 each, R-1Normal      !! Continuous Blood Gluc Sensor (FREESTYLE BARRON 2 SENSOR) MISC 1 each by Does not apply route 4 times daily, Disp-1 each, R-0Normal      potassium chloride (KLOR-CON M) 10 MEQ extended release tablet Take 1 tablet by mouth 2 times daily, Disp-60 tablet, R-5Normal      gabapentin (NEURONTIN) 300 MG capsule Take 1 capsule by mouth at bedtime. Historical Med      [employer] Lancets Original MISC 4 TIMES DAILY Starting Tue 4/4/2023, Disp-500 each, R-3, NormalTESTING BLOOD SUGAR 4X DAILY. DX: E11.42 PLEASE DISPENSE WHICHEVER IS APPROVED BY INSURANCE.      blood glucose monitor strips TESTING 4 X DAILY. DX: E11.42 PLEASE DISPENSE WHICHEVER IS APPROVED BY INSURANCE., Disp-500 strip, R-3, Normal      blood glucose monitor kit and supplies 1 KIT.  DX: E11.42 PLEASE DISPENSE WHICHEVER IS APPROVED BY INSURANCE., Disp-1 kit, R-0, Normal      Alcohol Swabs (ALCOHOL PREP) PADS 4 TIMES DAILY Starting Tue 4/4/2023, Disp-500 each, R-3, NormalUSE FOR TESTING BS 4X DAILY, DX: E11.42 PLEASE DISPENSE

## 2023-07-11 NOTE — CONSULTS
[facility]:       RN met with pt, wife and daughter at bedside to discuss [facility] philosophy, services and LOC. Emotional support given. Pt is confused and lethargic, family agreed for patient to go to the [facility] today for comfort care. He is appropriate for higher level of care for symptom management of SOB and anxiety. Updated RN Robert Cast. Patient is confused and lethargic and unable to sign consents. Wife, Kenyatta Sanchez, signed [facility] consents and requested medical director to follow. Dr. Zuly Green discharged pt and signed DNR-CC. [facility] ambulance transferring pt to [facility] today. Liaison will fax and call report to [address]      Thank you for this referral,   Please call [address] [place] [address] with questions/ concerns at [phone]  [facility]   [address] [place] [address] Admissions Liaison

## 2023-07-11 NOTE — ED TRIAGE NOTES
Pt into ER via [facility] ambulance from [address] [place] [address] with c/c Lethargy, not as active or alert as normal.  Sent from [facility] per family request to have pt checked out.   Pt is a DNR-CC

## 2023-07-12 ENCOUNTER — CARE COORDINATION (OUTPATIENT)
Dept: CARE COORDINATION | Age: 88
End: 2023-07-12

## 2023-07-12 NOTE — RESULT ENCOUNTER NOTE
Urine Culture reviewed, positive for morganella, sensitivities pending. Discharged on ceftin, continue for now until sensitivities are back.

## 2023-07-12 NOTE — CARE COORDINATION
F/u call to spouse, Maxwell Found. She reported that pt was taken to hospital for f/u on fall. He was d/c to [id number]. She indicated that she is now unsure if she should allow him to stay or let him come home. She stated she was getting dressed to go see him. No further outreach.

## 2023-07-13 LAB
BACTERIA UR CULT: ABNORMAL
ORGANISM: ABNORMAL

## 2023-07-13 NOTE — RESULT ENCOUNTER NOTE
Culture reviewed, culture is positive but resistant to antibiotics prescribed at discharge. Antibiotic needs to be changed to Bactrim DS 1 tablet twice daily for 10 days. Please note this patient was discharged and likely to end up on hospice.

## 2023-07-13 NOTE — RESULT ENCOUNTER NOTE
Patient's positive result has been appropriately evaluated by the provider pool. Information sent to Migue Wu at [facility]. Dr. Ro Castanon will be in today to see information.

## 2023-07-15 LAB
BACTERIA BLD CULT ORG #2: NORMAL
BACTERIA BLD CULT: NORMAL

## 2023-07-19 RX ORDER — FUROSEMIDE 40 MG/1
TABLET ORAL
Qty: 60 TABLET | Refills: 3 | OUTPATIENT
Start: 2023-07-19

## 2024-12-17 NOTE — TELEPHONE ENCOUNTER
Does the Patient have a central line?  Yes: See Invasive (Oncology) Lines Flowsheet for [facility] documentation.     Reg made. Spoke with patient and wife.